# Patient Record
Sex: FEMALE | Race: WHITE | NOT HISPANIC OR LATINO | Employment: OTHER | URBAN - NONMETROPOLITAN AREA
[De-identification: names, ages, dates, MRNs, and addresses within clinical notes are randomized per-mention and may not be internally consistent; named-entity substitution may affect disease eponyms.]

---

## 2017-01-13 ENCOUNTER — OFFICE VISIT (OUTPATIENT)
Dept: OPHTHALMOLOGY | Facility: CLINIC | Age: 82
End: 2017-01-13

## 2017-01-13 DIAGNOSIS — IMO0002 NUCLEAR CATARACT, BILATERAL: ICD-10-CM

## 2017-01-13 DIAGNOSIS — E11.9 DIABETES MELLITUS WITHOUT COMPLICATION (HCC): Primary | ICD-10-CM

## 2017-01-13 PROCEDURE — 92014 COMPRE OPH EXAM EST PT 1/>: CPT | Performed by: OPHTHALMOLOGY

## 2017-01-13 RX ORDER — MULTIVIT WITH MINERALS/LUTEIN
500 TABLET ORAL
COMMUNITY
End: 2017-04-24

## 2017-01-13 RX ORDER — DOCUSATE SODIUM 100 MG/1
100 CAPSULE, LIQUID FILLED ORAL DAILY
COMMUNITY

## 2017-01-13 RX ORDER — METFORMIN HYDROCHLORIDE EXTENDED-RELEASE TABLETS 500 MG/1
TABLET, FILM COATED, EXTENDED RELEASE ORAL
COMMUNITY
Start: 2016-11-10 | End: 2017-01-13 | Stop reason: SDUPTHER

## 2017-01-13 RX ORDER — MAGNESIUM OXIDE 400 MG/1
400 TABLET ORAL
COMMUNITY
End: 2017-10-18

## 2017-01-13 RX ORDER — BENZONATATE 200 MG/1
CAPSULE ORAL
Refills: 0 | COMMUNITY
Start: 2016-12-14 | End: 2017-04-24

## 2017-01-13 RX ORDER — CEFUROXIME AXETIL 500 MG/1
TABLET ORAL
Refills: 0 | COMMUNITY
Start: 2016-12-14 | End: 2017-04-24

## 2017-01-13 RX ORDER — LANOLIN ALCOHOL/MO/W.PET/CERES
1000 CREAM (GRAM) TOPICAL
Status: ON HOLD | COMMUNITY
End: 2019-07-09

## 2017-01-13 RX ORDER — IPRATROPIUM BROMIDE 21 UG/1
SPRAY, METERED NASAL
Refills: 0 | COMMUNITY
Start: 2016-12-14 | End: 2017-01-13 | Stop reason: SDUPTHER

## 2017-01-13 RX ORDER — IPRATROPIUM BROMIDE 21 UG/1
2 SPRAY, METERED NASAL EVERY 12 HOURS
COMMUNITY
Start: 2016-12-14 | End: 2017-01-14

## 2017-01-13 NOTE — PROGRESS NOTES
Subjective   Alexia Lopez is a 81 y.o. female.   Chief Complaint   Patient presents with   • Diabetic Eye Exam     HPI     Diabetic Eye Exam   Vision is blurred for distance.  Associated symptoms include blurred vision.  Diabetes characteristics include Type 2.  Duration of years.  Blood sugar level is uncontrolled.       Last edited by Demarco Scherer MD on 1/13/2017  2:22 PM. (History)          Review of Systems    Objective   Visual Acuity (Snellen - Linear)      Right Left   Dist cc 20/40 -1 20/40   Near cc J5 J5       Correction:  Glasses         Wearing Rx      Sphere Cylinder Axis Add   Right -0.50 +1.00 008 +2.75   Left -0.75 +1.00 009 +2.75           Manifest Refraction      Sphere Cylinder Axis Dist   Right -0.75 +1.50 005 20/25   Left -0.50 +1.50 180 20/25-            Final Rx      Sphere Cylinder Axis Add   Right -0.75 +1.50 005 +2.50   Left -0.50 +1.50 180 +2.50            Confrontational Visual Fields     Visual Fields      Left Right   Result Full Full                  Extraocular Movement      Right Left   Result Full Full              Tonometry (Applanation, 2:21 PM)      Right Left   Pressure 17 17              Main Ophthalmology Exam     External Exam      Right Left    External Normal Normal      Slit Lamp Exam      Right Left    Lids/Lashes Normal Normal    Conjunctiva/Sclera White and quiet White and quiet    Cornea Clear Clear    Anterior Chamber Deep and quiet Deep and quiet    Iris Round and reactive Round and reactive    Lens 1+ Nuclear sclerosis 1+ Nuclear sclerosis    Vitreous Normal Normal      Fundus Exam      Right Left    Disc Normal Normal    Macula Normal Normal    Vessels Normal Normal    Periphery Normal Normal                Assessment/Plan   Alexia was seen today for diabetic eye exam.    Diagnoses and all orders for this visit:    Diabetes mellitus without complication    Nuclear cataract, bilateral    Eye disease risks with diabetes discussed  Glasses Rx given per  refraction  Follow up 1 year or prn

## 2017-01-13 NOTE — MR AVS SNAPSHOT
Alexia Pereskins   1/13/2017 2:00 PM   Office Visit    Dept Phone:  [phone]   Encounter #:  [id number]    Provider:  Demarco Scherer MD   Department:  [facility] OPHTHALMOLOGY                Your Full Care Plan              Today's Medication Changes          These changes are accurate as of: 1/13/17  3:12 PM.  If you have any questions, ask your nurse or doctor.               Medication(s)that have changed:     aspirin 81 MG disintegrating tablet   Take 81 mg by mouth.   What changed:  Another medication with the same name was removed. Continue taking this medication, and follow the directions you see here.   Changed by:  Demarco Scherer MD       ATROVENT 0.03 % nasal spray   Generic drug:  ipratropium   2 sprays into each nostril Every 12 (Twelve) Hours.   What changed:  Another medication with the same name was removed. Continue taking this medication, and follow the directions you see here.   Changed by:  Demarco Scherer MD       docusate sodium 100 MG capsule   Commonly known as:  COLACE   Take 100 mg by mouth.   What changed:  Another medication with the same name was removed. Continue taking this medication, and follow the directions you see here.   Changed by:  Demacro Scherer MD       metFORMIN 1000 MG tablet   Commonly known as:  GLUCOPHAGE   Take 1,000 mg by mouth daily.   What changed:  Another medication with the same name was removed. Continue taking this medication, and follow the directions you see here.   Changed by:  Gay Bower       vitamin B-12 1000 MCG tablet   Commonly known as:  CYANOCOBALAMIN   Take 1,000 mcg by mouth.   What changed:  Another medication with the same name was removed. Continue taking this medication, and follow the directions you see here.   Changed by:  Demarco Scherer MD         Stop taking medication(s)listed here:     IRON PO   Stopped by:  Demarco Scherer MD                      Your  Updated Medication List          This list is accurate as of: 1/13/17  3:12 PM.  Always use your most recent med list.                amLODIPine 5 MG tablet   Commonly known as:  NORVASC       aspirin 81 MG disintegrating tablet       ATROVENT 0.03 % nasal spray   Generic drug:  ipratropium       benzonatate 200 MG capsule   Commonly known as:  TESSALON       cefuroxime 500 MG tablet   Commonly known as:  CEFTIN       cetirizine 10 MG tablet   Commonly known as:  zyrTEC       CO ENZYME Q-10 PO       docusate sodium 100 MG capsule   Commonly known as:  COLACE       fexofenadine 60 MG tablet   Commonly known as:  ALLEGRA       labetalol 200 MG tablet   Commonly known as:  NORMODYNE       losartan 50 MG tablet   Commonly known as:  COZAAR       magnesium oxide 400 MG tablet   Commonly known as:  MAG-OX       metFORMIN 1000 MG tablet   Commonly known as:  GLUCOPHAGE       MULTI-VITAMIN/IRON PO       ONE TOUCH ULTRA TEST test strip   Generic drug:  glucose blood       polyethylene glycol-electrolytes 420 G solution   Commonly known as:  NULYTELY       * risedronate 35 MG tablet   Commonly known as:  ACTONEL       * Risedronate Sodium 35 MG tablet delayed-release       rosuvastatin 10 MG tablet   Commonly known as:  CRESTOR       vitamin B-12 1000 MCG tablet   Commonly known as:  CYANOCOBALAMIN       * vitamin C 500 MG tablet   Commonly known as:  ASCORBIC ACID       * vitamin C 250 MG tablet   Commonly known as:  ASCORBIC ACID       * Notice:  This list has 4 medication(s) that are the same as other medications prescribed for you. Read the directions carefully, and ask your doctor or other care provider to review them with you.            You Were Diagnosed With        Codes Comments    Diabetes mellitus without complication    -  Primary ICD-10-CM: E11.9  ICD-9-CM: 250.00     Nuclear cataract, bilateral     ICD-10-CM: H25.13  ICD-9-CM: 366.16       Instructions     None    Patient Instructions History      Upcoming  Appointments     Visit Type Date Time Department    OFFICE VISIT 1/13/2017  2:00 PM [facility]    OFFICE VISIT 3/21/2017 11:30 AM [facility]      MyChart Signup     Our records indicate that [facility]. If you would like to sign up for MyChart, please email [email or web address] or call [phone] to obtain an activation code.             Other Info from Your Visit           Your Appointments     Mar 21, 2017 11:30 AM CDT   Office Visit with Ember Forrest MD   [facility] CARDIOLOGY (--)    [address]  [facility] [address] [phone]           Arrive 15 minutes prior to appointment.            Jan 19, 2018  8:00 AM CST   Office Visit with Demarco Scherer MD   [facility] OPHTHALMOLOGY (--)    [address] [place] [address] [address]  [facility] [address] [place] [id number] [phone]           Arrive 15 minutes prior to appointment.              Allergies     Articaine      Cherry      Lidocaine      Lidocaine injection at dentist office caused shaking    Peanut-containing Drug Products        Reason for Visit     Diabetic Eye Exam           Vital Signs     Smoking Status                   Never Smoker           Problems and Diagnoses Noted     Diabetes mellitus without complication    Nuclear cataract

## 2017-04-24 ENCOUNTER — OFFICE VISIT (OUTPATIENT)
Dept: CARDIOLOGY | Facility: CLINIC | Age: 82
End: 2017-04-24

## 2017-04-24 VITALS
SYSTOLIC BLOOD PRESSURE: 124 MMHG | BODY MASS INDEX: 28.88 KG/M2 | WEIGHT: 184 LBS | HEART RATE: 64 BPM | HEIGHT: 67 IN | DIASTOLIC BLOOD PRESSURE: 60 MMHG

## 2017-04-24 DIAGNOSIS — Z95.1 S/P CABG (CORONARY ARTERY BYPASS GRAFT): ICD-10-CM

## 2017-04-24 DIAGNOSIS — I10 BENIGN ESSENTIAL HYPERTENSION: ICD-10-CM

## 2017-04-24 DIAGNOSIS — R00.2 PALPITATIONS: ICD-10-CM

## 2017-04-24 DIAGNOSIS — E78.00 HYPERCHOLESTEROLEMIA: ICD-10-CM

## 2017-04-24 DIAGNOSIS — I25.10 CORONARY ARTERIOSCLEROSIS IN NATIVE ARTERY: Primary | ICD-10-CM

## 2017-04-24 PROCEDURE — 99213 OFFICE O/P EST LOW 20 MIN: CPT | Performed by: INTERNAL MEDICINE

## 2017-04-24 NOTE — PROGRESS NOTES
Alexia Lopez  81 y.o. female    04/24/2017  1. Coronary arteriosclerosis in native artery    2. Benign essential hypertension    3. Hypercholesterolemia    4. S/P CABG (coronary artery bypass graft)    5. Palpitations        History of Present Illness    Mrs. Lopez is here for follow-up of her above stated problems.  She denied any chest pain but did report intermittent episodes of palpitations which seem to occur at different times.  There is no definite pattern to it and not always related to activity.  She feels a pounding in the neck with no dizziness or syncope.  No shortness of breath is reported.  She had lab work done January 2017 and LDL was within normal limits.  Her thyroid function is known to be normal.  Her blood pressure for the most part has remained stable though there are some mild fluctuations with systolic going up to 140-150 mm mercury.        SUBJECTIVE    Allergies   Allergen Reactions   • Articaine    • Cherry    • Lidocaine      Lidocaine injection at dentist office caused shaking   • Peanut-Containing Drug Products          Past Medical History:   Diagnosis Date   • Cataract    • Coronary artery disease    • Diabetes mellitus     Type 2 diabetes mellitus - without retinopathy      • History of echocardiogram 05/12/2014    Normal LV systolic function with EF of 55% with mild hypokinesis. Diastolic relaxation abnormality of left ventricle. Mild tricuspid regurg. Trace mitral regurg   • Hyperlipemia    • Hypertension    • Myocardial infarction          Past Surgical History:   Procedure Laterality Date   • BACK SURGERY     • CARDIAC CATHETERIZATION  05/12/2014    Severe multivessel CAD with critical lesions noted in the LAD coronary artery, left circumflex coronary artery and RCA. Left ventriculogram no performed in view of elevated left ventricular end-diastolic pressure   • CORONARY ARTERY BYPASS GRAFT      X 3 with LIMA to LAD, SVG to OMB and SVG to PDA.   • DILATATION AND CURETTAGE      • OTHER SURGICAL HISTORY  05/06/2014    INCISION OF EARDRUM GENERAL ANESTHETIC 68641 (1)    Bilateral myringotomy with tubes.    • TONSILLECTOMY     • TONSILLECTOMY AND ADENOIDECTOMY     • TUBAL ABDOMINAL LIGATION  03/14/1978         Family History   Problem Relation Age of Onset   • Hypertension Mother    • Coronary artery disease Father    • Diabetes Other      grandmother         Social History     Social History   • Marital status:      Spouse name: N/A   • Number of children: N/A   • Years of education: N/A     Occupational History   • Not on file.     Social History Main Topics   • Smoking status: Never Smoker   • Smokeless tobacco: Never Used   • Alcohol use No   • Drug use: No   • Sexual activity: Defer     Other Topics Concern   • Not on file     Social History Narrative         Current Outpatient Prescriptions   Medication Sig Dispense Refill   • amLODIPine (NORVASC) 5 MG tablet Take 5 mg by mouth daily.     • aspirin 81 MG disintegrating tablet Take 81 mg by mouth.     • cetirizine (ZyrTEC) 10 MG tablet Take 10 mg by mouth daily.     • CO ENZYME Q-10 PO Take 100 mg by mouth.     • docusate sodium (COLACE) 100 MG capsule Take 100 mg by mouth.     • fluticasone (VERAMYST) 27.5 MCG/SPRAY nasal spray 2 sprays into each nostril 2 (Two) Times a Day.     • labetalol (NORMODYNE) 200 MG tablet Take 100 mg by mouth 2 (two) times a day.     • losartan (COZAAR) 50 MG tablet Take 100 mg by mouth daily.     • magnesium oxide (MAG-OX) 400 MG tablet Take 400 mg by mouth.     • metFORMIN (GLUCOPHAGE) 1000 MG tablet Take 1,000 mg by mouth daily.     • Multiple Vitamins-Iron (MULTI-VITAMIN/IRON PO) Take 1 tablet by mouth daily.     • ONE TOUCH ULTRA TEST test strip USE TO TEST BLOOD SUGAR EVERY DAY DX.E11.9  5   • Risedronate Sodium 35 MG tablet delayed-release TAKE 1 TAB EVERY 7 DAYS WITH WATER ON EMPTY STOMACH, LIE DOWN FOR NEXT 30 MINUTES  3   • rosuvastatin (CRESTOR) 10 MG tablet Take 10 mg by mouth daily.    "  • vitamin B-12 (CYANOCOBALAMIN) 1000 MCG tablet Take 1,000 mcg by mouth.       No current facility-administered medications for this visit.          OBJECTIVE    /60  Pulse 64  Ht 67\" (170.2 cm)  Wt 184 lb (83.5 kg)  BMI 28.82 kg/m2        Review of Systems     Constitutional:  Denies recent weight loss, weight gain, fever or chills, no change in exercise tolerance     HENT:  Denies any hearing loss, epistaxis, hoarseness, or difficulty speaking.     Eyes: Wears eyeglasses or contact lenses     Respiratory:  Denies dyspnea with exertion,no cough, wheezing, or hemoptysis.     Cardiovascular: See history of present illness    Gastrointestinal:  Denies change in bowel habits, dyspepsia, ulcer disease, hematochezia, or melena.     Endocrine: Negative for cold intolerance, heat intolerance, polydipsia, polyphagia and polyuria. Denies any history of weight change, heat/cold intolerance, polydipsia, polyuria     Genitourinary: Negative.      Musculoskeletal: Denies any history of arthritic symptoms or back problems     Skin:  Denies any change in hair or nails, rashes, or skin lesions.     Allergic/Immunologic: Negative.  Negative for environmental allergies, food allergies and immunocompromised state.     Neurological:  Denies any history of recurrent headaches, strokes, TIA, or seizure disorder.     Hematological: Denies any food allergies, seasonal allergies, bleeding disorders, or lymphadenopathy.     Psychiatric/Behavioral: Denies any history of depression, substance abuse, or change in cognitive function.         Physical Exam     Constitutional: Cooperative, alert and oriented, well-developed, well-nourished, in no acute distress.     HENT:   Head: Normocephalic, normal hair patterns, no masses or tenderness.  Ears, Nose, and Throat: No gross abnormalities. No pallor or cyanosis. Dentition good.   Eyes: EOMS intact, PERRL, conjunctivae and lids unremarkable. Fundoscopic exam and visual fields not " performed.   Neck: No palpable masses or adenopathy, no thyromegaly, no JVD, carotid pulses are full and equal bilaterally and without  Bruits.     Cardiovascular: Regular rhythm, S1 and S2 normal, no S3 or S4. Apical impulse not displaced. No murmurs, gallops, or rubs detected.     Pulmonary/Chest: Chest: normal symmetry, no tenderness to palpation, normal respiratory excursion, no intercostal retraction, no use of accessory muscles.            Pulmonary: Normal breath sounds. No rales or ronchi.    Abdominal: Abdomen soft, bowel sounds normoactive, no masses, no hepatosplenomegaly, non-tender, no bruits.     Musculoskeletal: No deformities, clubbing, cyanosis, erythema, or edema observed. There are no spinal abnormalities noted. Normal muscle strength and tone. Pulses full and equal in all extremities, no bruits auscultated.     Neurological: No gross motor or sensory deficits noted, affect appropriate, oriented to time, person, place.     Skin: Warm and dry to the touch, no apparent skin lesions or masses noted.     Psychiatric: She has a normal mood and affect. Her behavior is normal. Judgment and thought content normal.         Procedures      Lab Results   Component Value Date    WBC 7.97 07/31/2014    HGB 12.1 07/31/2014    HCT 36.8 (L) 07/31/2014    MCV 88.0 07/31/2014     07/31/2014     Lab Results   Component Value Date    GLUCOSE 96 06/04/2014    BUN 14 06/04/2014    CREATININE 1.0 06/04/2014    CO2 26 06/04/2014    CALCIUM 9.5 06/04/2014    ALBUMIN 3.7 06/04/2014    AST 15 06/04/2014    ALT 12 06/04/2014     No results found for: CHOL  Lab Results   Component Value Date    TRIG 187 05/11/2014     Lab Results   Component Value Date    HDL 34 (L) 05/11/2014     Lab Results   Component Value Date    LDLCALC 230 (H) 05/11/2014     No results found for: LDL  No results found for: HDLLDLRATIO  No components found for: CHOLHDL  Lab Results   Component Value Date    HGBA1C 6.2 (H) 05/12/2014     Lab  Results   Component Value Date    TSH 1.74 05/12/2014           ASSESSMENT AND PLAN  Mrs. Lopez has intermittent palpitations the exact etiology of which is not clear.  Paroxysmal atrial arrhythmia is a consideration.  Her blood pressures in the normal range.  To further evaluate his symptoms Holter monitoring is being arranged and an echocardiogram to assess left ventricular and valvular function is being arranged.  Her present combination of amlodipine, labetalol, losartan for management of blood pressure, Crestor for management of hyperlipidemia and antiplatelet therapy with aspirin has been continued.  Further recommendations will follow.    Alexia was seen today for leg pain.    Diagnoses and all orders for this visit:    Coronary arteriosclerosis in native artery  -     Adult Transthoracic Echo Complete; Future  -     Holter Monitor - 24 Hour; Future    Benign essential hypertension  -     Adult Transthoracic Echo Complete; Future  -     Holter Monitor - 24 Hour; Future    Hypercholesterolemia  -     Adult Transthoracic Echo Complete; Future  -     Holter Monitor - 24 Hour; Future    S/P CABG (coronary artery bypass graft)  -     Adult Transthoracic Echo Complete; Future  -     Holter Monitor - 24 Hour; Future    Palpitations  -     Adult Transthoracic Echo Complete; Future  -     Holter Monitor - 24 Hour; Future        Ember Forrest MD  4/24/2017  1:27 PM

## 2017-05-05 ENCOUNTER — TELEPHONE (OUTPATIENT)
Dept: CARDIOLOGY | Facility: CLINIC | Age: 82
End: 2017-05-05

## 2017-10-18 ENCOUNTER — OFFICE VISIT (OUTPATIENT)
Dept: CARDIOLOGY | Facility: CLINIC | Age: 82
End: 2017-10-18

## 2017-10-18 VITALS
WEIGHT: 179 LBS | BODY MASS INDEX: 28.09 KG/M2 | HEIGHT: 67 IN | DIASTOLIC BLOOD PRESSURE: 64 MMHG | HEART RATE: 58 BPM | SYSTOLIC BLOOD PRESSURE: 156 MMHG

## 2017-10-18 DIAGNOSIS — Z95.1 S/P CABG (CORONARY ARTERY BYPASS GRAFT): ICD-10-CM

## 2017-10-18 DIAGNOSIS — E78.00 HYPERCHOLESTEROLEMIA: ICD-10-CM

## 2017-10-18 DIAGNOSIS — I10 BENIGN ESSENTIAL HYPERTENSION: Primary | ICD-10-CM

## 2017-10-18 DIAGNOSIS — I25.10 CORONARY ARTERIOSCLEROSIS IN NATIVE ARTERY: ICD-10-CM

## 2017-10-18 PROCEDURE — 99213 OFFICE O/P EST LOW 20 MIN: CPT | Performed by: INTERNAL MEDICINE

## 2017-10-18 RX ORDER — METFORMIN HYDROCHLORIDE 500 MG/1
1000 TABLET, EXTENDED RELEASE ORAL NIGHTLY
COMMUNITY
End: 2018-01-30 | Stop reason: DRUGHIGH

## 2017-10-18 RX ORDER — LOSARTAN POTASSIUM 100 MG/1
100 TABLET ORAL DAILY
COMMUNITY

## 2017-10-18 RX ORDER — MULTIVITAMIN WITH IRON
250 TABLET ORAL DAILY
COMMUNITY
End: 2019-08-15

## 2017-10-18 RX ORDER — RANITIDINE 150 MG/1
150 TABLET ORAL 2 TIMES DAILY
COMMUNITY
End: 2018-06-04

## 2017-10-18 NOTE — PROGRESS NOTES
Alexia Lopez  82 y.o. female    10/18/2017  1. Benign essential hypertension    2. Coronary arteriosclerosis in native artery    3. Hypercholesterolemia    4. S/P CABG (coronary artery bypass graft)        History of Present Illness    Mrs. Lopez is here for follow-up of above stated problems.  She denied any chest pain or shortness of breath and her predominant complaint relates to sinus problems and nasal allergies which has been distressing for her.  Clinical exam did not reveal any bronchospasm or signs of congestive heart failure.  She has been compliant with her medications.        SUBJECTIVE    Allergies   Allergen Reactions   • Articaine    • Cherry    • Lidocaine      Lidocaine injection at dentist office caused shaking   • Peanut-Containing Drug Products          Past Medical History:   Diagnosis Date   • Cataract    • Coronary artery disease    • Diabetes mellitus     Type 2 diabetes mellitus - without retinopathy      • History of echocardiogram 05/12/2014    Normal LV systolic function with EF of 55% with mild hypokinesis. Diastolic relaxation abnormality of left ventricle. Mild tricuspid regurg. Trace mitral regurg   • Hyperlipemia    • Hypertension    • Myocardial infarction          Past Surgical History:   Procedure Laterality Date   • BACK SURGERY     • CARDIAC CATHETERIZATION  05/12/2014    Severe multivessel CAD with critical lesions noted in the LAD coronary artery, left circumflex coronary artery and RCA. Left ventriculogram no performed in view of elevated left ventricular end-diastolic pressure   • CORONARY ARTERY BYPASS GRAFT      X 3 with LIMA to LAD, SVG to OMB and SVG to PDA.   • DILATATION AND CURETTAGE     • OTHER SURGICAL HISTORY  05/06/2014    INCISION OF EARDRUM GENERAL ANESTHETIC 47600 (1)    Bilateral myringotomy with tubes.    • TONSILLECTOMY     • TONSILLECTOMY AND ADENOIDECTOMY     • TUBAL ABDOMINAL LIGATION  03/14/1978         Family History   Problem Relation Age of  "Onset   • Hypertension Mother    • Coronary artery disease Father    • Diabetes Other      grandmother         Social History     Social History   • Marital status:      Spouse name: N/A   • Number of children: N/A   • Years of education: N/A     Occupational History   • Not on file.     Social History Main Topics   • Smoking status: Never Smoker   • Smokeless tobacco: Never Used   • Alcohol use No   • Drug use: No   • Sexual activity: Defer     Other Topics Concern   • Not on file     Social History Narrative         Current Outpatient Prescriptions   Medication Sig Dispense Refill   • amLODIPine (NORVASC) 5 MG tablet Take 5 mg by mouth daily.     • aspirin 81 MG disintegrating tablet Take 81 mg by mouth.     • cetirizine (ZyrTEC) 10 MG tablet Take 10 mg by mouth daily.     • CO ENZYME Q-10 PO Take 100 mg by mouth.     • docusate sodium (COLACE) 100 MG capsule Take 100 mg by mouth.     • labetalol (NORMODYNE) 200 MG tablet Take 100 mg by mouth 2 (two) times a day.     • losartan (COZAAR) 100 MG tablet Take 100 mg by mouth Daily.     • Magnesium 250 MG tablet Take 250 mg by mouth Daily.     • metFORMIN ER (GLUCOPHAGE-XR) 500 MG 24 hr tablet Take 1,000 mg by mouth Every Night.     • Multiple Vitamins-Iron (MULTI-VITAMIN/IRON PO) Take 1 tablet by mouth daily.     • ONE TOUCH ULTRA TEST test strip USE TO TEST BLOOD SUGAR EVERY DAY DX.E11.9  5   • raNITIdine (ZANTAC) 150 MG tablet Take 150 mg by mouth 2 (Two) Times a Day.     • Risedronate Sodium 35 MG tablet delayed-release TAKE 1 TAB EVERY 7 DAYS WITH WATER ON EMPTY STOMACH, LIE DOWN FOR NEXT 30 MINUTES  3   • rosuvastatin (CRESTOR) 10 MG tablet Take 10 mg by mouth daily.     • vitamin B-12 (CYANOCOBALAMIN) 1000 MCG tablet Take 1,000 mcg by mouth.       No current facility-administered medications for this visit.          OBJECTIVE    /64  Pulse 58  Ht 67\" (170.2 cm)  Wt 179 lb (81.2 kg)  BMI 28.04 kg/m2        Review of Systems     Constitutional:  " Denies recent weight loss, weight gain, fever or chills, no change in exercise tolerance     HENT:  Denies any hearing loss, epistaxis, hoarseness, or difficulty speaking. Sinusitis/ nasal allergies     Eyes: Wears eyeglasses or contact lenses     Respiratory:  Denies dyspnea with exertion,no cough, wheezing, or hemoptysis.     Cardiovascular: Negative for palpations, chest pain, orthopnea, PND, peripheral edema, syncope, or claudication.     Gastrointestinal:  Denies change in bowel habits, dyspepsia, ulcer disease, hematochezia, or melena.     Endocrine: Negative for cold intolerance, heat intolerance, polydipsia, polyphagia and polyuria.     Genitourinary: Negative.      Musculoskeletal: Denies any history of arthritic symptoms or back problems     Skin:  Denies any change in hair or nails, rashes, or skin lesions.     Allergic/Immunologic: Negative.  Negative for environmental allergies, food allergies and immunocompromised state.     Neurological:  Denies any history of recurrent headaches, strokes, TIA, or seizure disorder.     Hematological: Denies any food allergies, seasonal allergies, bleeding disorders, or lymphadenopathy.     Psychiatric/Behavioral: Denies any history of depression, substance abuse, or change in cognitive function.         Physical Exam     Constitutional: Cooperative, alert and oriented, in no acute distress.     HENT:   Head: Normocephalic, normal hair patterns, no masses or tenderness.  Ears, Nose, and Throat: No gross abnormalities. No pallor or cyanosis.  Eyes: EOMS intact, PERRL, conjunctivae and lids unremarkable. Fundoscopic exam and visual fields not performed.   Neck: No palpable masses or adenopathy, no thyromegaly, no JVD, carotid pulses are full and equal bilaterally and without  Bruits.     Cardiovascular: Regular rhythm, S1 and S2 normal, no S3 or S4. No murmurs, gallops, or rubs detected.     Pulmonary/Chest: Chest: normal symmetry, no tenderness to palpation, normal  respiratory excursion, no intercostal retraction, no use of accessory muscles.            Pulmonary: Normal breath sounds. No rales or ronchi.    Abdominal: Abdomen soft, bowel sounds normoactive, no masses, no hepatosplenomegaly, non-tender, no bruits.     Musculoskeletal: No deformities, clubbing, cyanosis, erythema, or edema observed.      Neurological: No gross motor or sensory deficits noted, affect appropriate, oriented to time, person, place.     Skin: Warm and dry to the touch, no apparent skin lesions or masses noted.     Psychiatric: She has a normal mood and affect. Her behavior is normal. Judgment and thought content normal.         Procedures      Lab Results   Component Value Date    WBC 7.97 07/31/2014    HGB 12.1 07/31/2014    HCT 36.8 (L) 07/31/2014    MCV 88.0 07/31/2014     07/31/2014     Lab Results   Component Value Date    GLUCOSE 96 06/04/2014    BUN 14 06/04/2014    CREATININE 1.0 06/04/2014    CO2 26 06/04/2014    CALCIUM 9.5 06/04/2014    ALBUMIN 3.7 06/04/2014    AST 15 06/04/2014    ALT 12 06/04/2014     No results found for: CHOL  Lab Results   Component Value Date    TRIG 187 05/11/2014     Lab Results   Component Value Date    HDL 34 (L) 05/11/2014     Lab Results   Component Value Date    LDLCALC 230 (H) 05/11/2014     No results found for: LDL  No results found for: HDLLDLRATIO  No components found for: CHOLHDL  Lab Results   Component Value Date    HGBA1C 6.2 (H) 05/12/2014     Lab Results   Component Value Date    TSH 1.74 05/12/2014           ASSESSMENT AND PLAN  Mrs. Lopez is stable with no evidence of progression of coronary artery disease and antiplatelet therapy with aspirin, antihypertensive therapy with amlodipine, labetalol, losartan and lipid-lowering therapy with Crestor have been continued.  She will be seeing Dr. Rodriguez soon, and she follows up with ENT surgeons in [place].    Alexia was seen today for follow-up.    Diagnoses and all orders for this  visit:    Benign essential hypertension    Coronary arteriosclerosis in native artery    Hypercholesterolemia    S/P CABG (coronary artery bypass graft)        Ember Forrest MD  10/18/2017  12:09 PM

## 2018-01-30 RX ORDER — METFORMIN HYDROCHLORIDE 750 MG/1
1500 TABLET, EXTENDED RELEASE ORAL NIGHTLY
COMMUNITY

## 2018-02-01 ENCOUNTER — ANESTHESIA EVENT (OUTPATIENT)
Dept: PERIOP | Facility: HOSPITAL | Age: 83
End: 2018-02-01

## 2018-02-02 ENCOUNTER — ANESTHESIA (OUTPATIENT)
Dept: PERIOP | Facility: HOSPITAL | Age: 83
End: 2018-02-02

## 2018-02-02 ENCOUNTER — HOSPITAL ENCOUNTER (OUTPATIENT)
Facility: HOSPITAL | Age: 83
Setting detail: HOSPITAL OUTPATIENT SURGERY
Discharge: HOME | End: 2018-02-02
Attending: OPHTHALMOLOGY | Admitting: OPHTHALMOLOGY

## 2018-02-02 VITALS
BODY MASS INDEX: 27.51 KG/M2 | RESPIRATION RATE: 18 BRPM | TEMPERATURE: 97.8 F | WEIGHT: 175.27 LBS | SYSTOLIC BLOOD PRESSURE: 179 MMHG | HEART RATE: 60 BPM | HEIGHT: 67 IN | DIASTOLIC BLOOD PRESSURE: 83 MMHG | OXYGEN SATURATION: 98 %

## 2018-02-02 LAB — GLUCOSE BLDC GLUCOMTR-MCNC: 130 MG/DL (ref 70–130)

## 2018-02-02 PROCEDURE — 25010000002 FENTANYL CITRATE (PF) 100 MCG/2ML SOLUTION: Performed by: NURSE ANESTHETIST, CERTIFIED REGISTERED

## 2018-02-02 PROCEDURE — 25010000002 EPINEPHRINE PER 0.1 MG: Performed by: OPHTHALMOLOGY

## 2018-02-02 PROCEDURE — V2632 POST CHMBR INTRAOCULAR LENS: HCPCS | Performed by: OPHTHALMOLOGY

## 2018-02-02 PROCEDURE — 25010000002 VANCOMYCIN PER 500 MG: Performed by: OPHTHALMOLOGY

## 2018-02-02 PROCEDURE — 82962 GLUCOSE BLOOD TEST: CPT

## 2018-02-02 DEVICE — LENS ACRYSOF IQ 6X13MM SN60WF 19.0: Type: IMPLANTABLE DEVICE | Site: EYE | Status: FUNCTIONAL

## 2018-02-02 RX ORDER — PHENYLEPHRINE HCL 2.5 %
1 DROPS OPHTHALMIC (EYE)
Status: COMPLETED | OUTPATIENT
Start: 2018-02-02 | End: 2018-02-02

## 2018-02-02 RX ORDER — PROMETHAZINE HYDROCHLORIDE 25 MG/ML
12.5 INJECTION, SOLUTION INTRAMUSCULAR; INTRAVENOUS ONCE AS NEEDED
Status: CANCELLED | OUTPATIENT
Start: 2018-02-02

## 2018-02-02 RX ORDER — SODIUM CHLORIDE 0.9 % (FLUSH) 0.9 %
10 SYRINGE (ML) INJECTION ONCE
Status: COMPLETED | OUTPATIENT
Start: 2018-02-02 | End: 2018-02-02

## 2018-02-02 RX ORDER — MOXIFLOXACIN 5 MG/ML
SOLUTION/ DROPS OPHTHALMIC AS NEEDED
Status: DISCONTINUED | OUTPATIENT
Start: 2018-02-02 | End: 2018-02-02 | Stop reason: HOSPADM

## 2018-02-02 RX ORDER — TETRACAINE HYDROCHLORIDE 5 MG/ML
SOLUTION OPHTHALMIC AS NEEDED
Status: DISCONTINUED | OUTPATIENT
Start: 2018-02-02 | End: 2018-02-02 | Stop reason: HOSPADM

## 2018-02-02 RX ORDER — ONDANSETRON 2 MG/ML
4 INJECTION INTRAMUSCULAR; INTRAVENOUS ONCE AS NEEDED
Status: CANCELLED | OUTPATIENT
Start: 2018-02-02

## 2018-02-02 RX ORDER — TETRACAINE HYDROCHLORIDE 5 MG/ML
1 SOLUTION OPHTHALMIC AS NEEDED
Status: COMPLETED | OUTPATIENT
Start: 2018-02-02 | End: 2018-02-02

## 2018-02-02 RX ORDER — PROMETHAZINE HYDROCHLORIDE 25 MG/1
25 SUPPOSITORY RECTAL ONCE AS NEEDED
Status: CANCELLED | OUTPATIENT
Start: 2018-02-02

## 2018-02-02 RX ORDER — FENTANYL CITRATE 50 UG/ML
INJECTION, SOLUTION INTRAMUSCULAR; INTRAVENOUS AS NEEDED
Status: DISCONTINUED | OUTPATIENT
Start: 2018-02-02 | End: 2018-02-02 | Stop reason: SURG

## 2018-02-02 RX ORDER — PREDNISOLONE ACETATE 10 MG/ML
SUSPENSION/ DROPS OPHTHALMIC AS NEEDED
Status: DISCONTINUED | OUTPATIENT
Start: 2018-02-02 | End: 2018-02-02 | Stop reason: HOSPADM

## 2018-02-02 RX ORDER — CYCLOPENTOLATE HYDROCHLORIDE 10 MG/ML
1 SOLUTION/ DROPS OPHTHALMIC
Status: COMPLETED | OUTPATIENT
Start: 2018-02-02 | End: 2018-02-02

## 2018-02-02 RX ORDER — PROMETHAZINE HYDROCHLORIDE 12.5 MG/1
25 TABLET ORAL ONCE AS NEEDED
Status: CANCELLED | OUTPATIENT
Start: 2018-02-02

## 2018-02-02 RX ORDER — BRIMONIDINE TARTRATE 0.15 %
DROPS OPHTHALMIC (EYE) AS NEEDED
Status: DISCONTINUED | OUTPATIENT
Start: 2018-02-02 | End: 2018-02-02 | Stop reason: HOSPADM

## 2018-02-02 RX ADMIN — PHENYLEPHRINE HYDROCHLORIDE 1 DROP: 25 SOLUTION/ DROPS OPHTHALMIC at 08:31

## 2018-02-02 RX ADMIN — FENTANYL CITRATE 50 MCG: 50 INJECTION, SOLUTION INTRAMUSCULAR; INTRAVENOUS at 10:51

## 2018-02-02 RX ADMIN — PHENYLEPHRINE HYDROCHLORIDE 1 DROP: 25 SOLUTION/ DROPS OPHTHALMIC at 08:21

## 2018-02-02 RX ADMIN — TETRACAINE HYDROCHLORIDE 1 DROP: 5 SOLUTION OPHTHALMIC at 08:31

## 2018-02-02 RX ADMIN — CYCLOPENTOLATE HYDROCHLORIDE 1 DROP: 10 SOLUTION/ DROPS OPHTHALMIC at 08:11

## 2018-02-02 RX ADMIN — SODIUM CHLORIDE, PRESERVATIVE FREE 10 ML: 5 INJECTION INTRAVENOUS at 08:18

## 2018-02-02 RX ADMIN — PHENYLEPHRINE HYDROCHLORIDE 1 DROP: 25 SOLUTION/ DROPS OPHTHALMIC at 08:11

## 2018-02-02 RX ADMIN — CYCLOPENTOLATE HYDROCHLORIDE 1 DROP: 10 SOLUTION/ DROPS OPHTHALMIC at 08:31

## 2018-02-02 RX ADMIN — TETRACAINE HYDROCHLORIDE 1 DROP: 5 SOLUTION OPHTHALMIC at 08:11

## 2018-02-02 RX ADMIN — CYCLOPENTOLATE HYDROCHLORIDE 1 DROP: 10 SOLUTION/ DROPS OPHTHALMIC at 08:21

## 2018-02-02 NOTE — ANESTHESIA PREPROCEDURE EVALUATION
Anesthesia Evaluation     no history of anesthetic complications:  NPO Solid Status: > 8 hours  NPO Liquid Status: > 2 hours     Airway   Mallampati: II  TM distance: >3 FB  Neck ROM: full  no difficulty expected  Dental - normal exam     Pulmonary - normal exam    breath sounds clear to auscultation  (-) not a smoker    ROS comment: Sinus congestion    Cardiovascular   Exercise tolerance: poor (<4 METS)    Patient on routine beta blocker and Beta blocker given within 24 hours of surgery  Rhythm: regular  Rate: normal    (+) hypertension, past MI  >12 months, CAD, CABG > 6 Months, hyperlipidemia  (-) angina, murmur    ROS comment: EF 55%    Neuro/Psych  (+) headaches,     GI/Hepatic/Renal/Endo    (+)  GERD well controlled, diabetes mellitus type 2,     Musculoskeletal     Abdominal  - normal exam   Substance History - negative use     OB/GYN negative ob/gyn ROS         Other   (+) arthritis (knees)     (-) history of cancer                                          Anesthesia Plan    ASA 3     MAC     intravenous induction   Anesthetic plan and risks discussed with patient and spouse/significant other.

## 2018-02-02 NOTE — ANESTHESIA POSTPROCEDURE EVALUATION
Patient: Alexia Lopez    Procedure Summary     Date Anesthesia Start Anesthesia Stop Room / Location    02/02/18 1051 1116  [facility] / [facility]       Procedure Diagnosis Surgeon Provider    CATARACT PHACO EXTRACTION WITH INTRAOCULAR LENS IMPLANT (Left Eye) Age-related nuclear cataract of left eye  (Age-related nuclear cataract of left eye [H25.12]) MD Nelson Head MD          Anesthesia Type: MAC  Last vitals  BP   147/70 (02/02/18 0810)   Temp   96.9 °F (36.1 °C) (02/02/18 0810)   Pulse   56 (02/02/18 0810)   Resp   18 (02/02/18 0810)     SpO2   99 % (02/02/18 0810)     Post Anesthesia Care and Evaluation    Patient location during evaluation: bedside  Patient participation: complete - patient cannot participate  Level of consciousness: awake  Pain score: 0  Pain management: adequate  Airway patency: patent  Anesthetic complications: No anesthetic complications  PONV Status: none  Cardiovascular status: acceptable  Respiratory status: acceptable  Hydration status: acceptable

## 2018-02-08 ENCOUNTER — ANESTHESIA EVENT (OUTPATIENT)
Dept: PERIOP | Facility: HOSPITAL | Age: 83
End: 2018-02-08

## 2018-02-09 ENCOUNTER — ANESTHESIA (OUTPATIENT)
Dept: PERIOP | Facility: HOSPITAL | Age: 83
End: 2018-02-09

## 2018-02-09 ENCOUNTER — HOSPITAL ENCOUNTER (OUTPATIENT)
Facility: HOSPITAL | Age: 83
Setting detail: HOSPITAL OUTPATIENT SURGERY
Discharge: HOME | End: 2018-02-09
Attending: OPHTHALMOLOGY | Admitting: OPHTHALMOLOGY

## 2018-02-09 VITALS
OXYGEN SATURATION: 98 % | TEMPERATURE: 96.7 F | WEIGHT: 171.3 LBS | DIASTOLIC BLOOD PRESSURE: 66 MMHG | HEIGHT: 67 IN | RESPIRATION RATE: 18 BRPM | BODY MASS INDEX: 26.89 KG/M2 | SYSTOLIC BLOOD PRESSURE: 148 MMHG | HEART RATE: 54 BPM

## 2018-02-09 LAB — GLUCOSE BLDC GLUCOMTR-MCNC: 125 MG/DL (ref 70–130)

## 2018-02-09 PROCEDURE — V2632 POST CHMBR INTRAOCULAR LENS: HCPCS | Performed by: OPHTHALMOLOGY

## 2018-02-09 PROCEDURE — 25010000002 MIDAZOLAM PER 1 MG: Performed by: NURSE ANESTHETIST, CERTIFIED REGISTERED

## 2018-02-09 PROCEDURE — 25010000002 VANCOMYCIN PER 500 MG: Performed by: OPHTHALMOLOGY

## 2018-02-09 PROCEDURE — 25010000002 FENTANYL CITRATE (PF) 100 MCG/2ML SOLUTION: Performed by: NURSE ANESTHETIST, CERTIFIED REGISTERED

## 2018-02-09 PROCEDURE — 82962 GLUCOSE BLOOD TEST: CPT

## 2018-02-09 PROCEDURE — 25010000002 EPINEPHRINE PER 0.1 MG: Performed by: OPHTHALMOLOGY

## 2018-02-09 DEVICE — LENS ACRYSOF IQ 6X13MM SN60WF 19.0: Type: IMPLANTABLE DEVICE | Site: EYE | Status: FUNCTIONAL

## 2018-02-09 RX ORDER — CYCLOPENTOLATE HYDROCHLORIDE 10 MG/ML
1 SOLUTION/ DROPS OPHTHALMIC
Status: COMPLETED | OUTPATIENT
Start: 2018-02-09 | End: 2018-02-09

## 2018-02-09 RX ORDER — BRIMONIDINE TARTRATE 0.15 %
DROPS OPHTHALMIC (EYE) AS NEEDED
Status: DISCONTINUED | OUTPATIENT
Start: 2018-02-09 | End: 2018-02-09 | Stop reason: HOSPADM

## 2018-02-09 RX ORDER — SODIUM CHLORIDE 0.9 % (FLUSH) 0.9 %
10 SYRINGE (ML) INJECTION AS NEEDED
Status: DISCONTINUED | OUTPATIENT
Start: 2018-02-09 | End: 2018-02-09 | Stop reason: HOSPADM

## 2018-02-09 RX ORDER — TETRACAINE HYDROCHLORIDE 5 MG/ML
SOLUTION OPHTHALMIC AS NEEDED
Status: DISCONTINUED | OUTPATIENT
Start: 2018-02-09 | End: 2018-02-09 | Stop reason: HOSPADM

## 2018-02-09 RX ORDER — TETRACAINE HYDROCHLORIDE 5 MG/ML
1 SOLUTION OPHTHALMIC AS NEEDED
Status: COMPLETED | OUTPATIENT
Start: 2018-02-09 | End: 2018-02-09

## 2018-02-09 RX ORDER — MOXIFLOXACIN 5 MG/ML
SOLUTION/ DROPS OPHTHALMIC AS NEEDED
Status: DISCONTINUED | OUTPATIENT
Start: 2018-02-09 | End: 2018-02-09 | Stop reason: HOSPADM

## 2018-02-09 RX ORDER — ONDANSETRON 2 MG/ML
4 INJECTION INTRAMUSCULAR; INTRAVENOUS ONCE AS NEEDED
Status: CANCELLED | OUTPATIENT
Start: 2018-02-09

## 2018-02-09 RX ORDER — FENTANYL CITRATE 50 UG/ML
INJECTION, SOLUTION INTRAMUSCULAR; INTRAVENOUS AS NEEDED
Status: DISCONTINUED | OUTPATIENT
Start: 2018-02-09 | End: 2018-02-09 | Stop reason: SURG

## 2018-02-09 RX ORDER — PHENYLEPHRINE HCL 2.5 %
1 DROPS OPHTHALMIC (EYE)
Status: COMPLETED | OUTPATIENT
Start: 2018-02-09 | End: 2018-02-09

## 2018-02-09 RX ORDER — PREDNISOLONE ACETATE 10 MG/ML
SUSPENSION/ DROPS OPHTHALMIC AS NEEDED
Status: DISCONTINUED | OUTPATIENT
Start: 2018-02-09 | End: 2018-02-09 | Stop reason: HOSPADM

## 2018-02-09 RX ORDER — MIDAZOLAM HYDROCHLORIDE 1 MG/ML
INJECTION INTRAMUSCULAR; INTRAVENOUS AS NEEDED
Status: DISCONTINUED | OUTPATIENT
Start: 2018-02-09 | End: 2018-02-09 | Stop reason: SURG

## 2018-02-09 RX ADMIN — MIDAZOLAM 1 MG: 1 INJECTION INTRAMUSCULAR; INTRAVENOUS at 10:44

## 2018-02-09 RX ADMIN — FENTANYL CITRATE 25 MCG: 50 INJECTION, SOLUTION INTRAMUSCULAR; INTRAVENOUS at 10:49

## 2018-02-09 RX ADMIN — PHENYLEPHRINE HYDROCHLORIDE 1 DROP: 25 SOLUTION/ DROPS OPHTHALMIC at 09:22

## 2018-02-09 RX ADMIN — CYCLOPENTOLATE HYDROCHLORIDE 1 DROP: 10 SOLUTION/ DROPS OPHTHALMIC at 09:22

## 2018-02-09 RX ADMIN — FENTANYL CITRATE 25 MCG: 50 INJECTION, SOLUTION INTRAMUSCULAR; INTRAVENOUS at 10:51

## 2018-02-09 RX ADMIN — PHENYLEPHRINE HYDROCHLORIDE 1 DROP: 25 SOLUTION/ DROPS OPHTHALMIC at 09:12

## 2018-02-09 RX ADMIN — PHENYLEPHRINE HYDROCHLORIDE 1 DROP: 25 SOLUTION/ DROPS OPHTHALMIC at 09:32

## 2018-02-09 RX ADMIN — TETRACAINE HYDROCHLORIDE 1 DROP: 5 SOLUTION OPHTHALMIC at 09:32

## 2018-02-09 RX ADMIN — SODIUM CHLORIDE, PRESERVATIVE FREE 10 ML: 5 INJECTION INTRAVENOUS at 09:21

## 2018-02-09 RX ADMIN — CYCLOPENTOLATE HYDROCHLORIDE 1 DROP: 10 SOLUTION/ DROPS OPHTHALMIC at 09:32

## 2018-02-09 RX ADMIN — TETRACAINE HYDROCHLORIDE 1 DROP: 5 SOLUTION OPHTHALMIC at 09:12

## 2018-02-09 RX ADMIN — CYCLOPENTOLATE HYDROCHLORIDE 1 DROP: 10 SOLUTION/ DROPS OPHTHALMIC at 09:12

## 2018-02-09 NOTE — ANESTHESIA POSTPROCEDURE EVALUATION
Patient: Alexia Lopez    Procedure Summary     Date Anesthesia Start Anesthesia Stop Room / Location    02/09/18 1046 1106  [facility] / [facility]       Procedure Diagnosis Surgeon Provider    CATARACT PHACO EXTRACTION WITH INTRAOCULAR LENS IMPLANT (Right Eye) Age-related nuclear cataract of right eye  (Age-related nuclear cataract of right eye [H25.11]) MD Shabbir Head MD          Anesthesia Type: MAC  Last vitals  BP   157/69 (02/09/18 0916)   Temp   97.5 °F (36.4 °C) (02/09/18 0916)   Pulse   64 (02/09/18 0916)   Resp   18 (02/09/18 0916)     SpO2   98 % (02/09/18 0916)     Post Anesthesia Care and Evaluation    Patient location during evaluation: bedside  Patient participation: complete - patient participated  Level of consciousness: responsive to verbal stimuli and awake and alert  Pain management: adequate  Airway patency: patent  Anesthetic complications: No anesthetic complications    Cardiovascular status: acceptable  Respiratory status: acceptable  Hydration status: acceptable

## 2018-02-09 NOTE — ANESTHESIA PREPROCEDURE EVALUATION
Anesthesia Evaluation     no history of anesthetic complications:  NPO Solid Status: > 8 hours  NPO Liquid Status: > 2 hours           Airway   Mallampati: II  TM distance: >3 FB  Neck ROM: full  Dental - normal exam     Pulmonary - normal exam    breath sounds clear to auscultation  (-) not a smoker    ROS comment: Sinus congestion    Cardiovascular   Exercise tolerance: poor (<4 METS)    Patient on routine beta blocker and Beta blocker given within 24 hours of surgery  Rhythm: regular  Rate: normal    (+) hypertension, past MI  >12 months, CAD, CABG > 6 Months, hyperlipidemia  (-) angina, murmur    ROS comment: EF 55%    Neuro/Psych  (+) headaches,     GI/Hepatic/Renal/Endo    (+)  GERD well controlled, diabetes mellitus type 2,     Musculoskeletal     Abdominal  - normal exam   Substance History - negative use     OB/GYN negative ob/gyn ROS         Other   (+) arthritis (knees)     (-) history of cancer                  Anesthesia Plan    ASA 3     MAC     intravenous induction   Anesthetic plan and risks discussed with patient.

## 2018-02-09 NOTE — PLAN OF CARE
Problem: Patient Care Overview (Adult)  Goal: Plan of Care Review  Outcome: Ongoing (interventions implemented as appropriate)   02/09/18 0923   Coping/Psychosocial Response Interventions   Plan Of Care Reviewed With patient   Patient Care Overview   Progress no change     Goal: Adult Individualization and Mutuality  Outcome: Ongoing (interventions implemented as appropriate)   02/09/18 0923   Mutuality/Individual Preferences   What Information Would Help Us Give You More Personalized Care? Patient likes to be called Alexia     Goal: Discharge Needs Assessment  Outcome: Ongoing (interventions implemented as appropriate)   02/09/18 0923   Discharge Needs Assessment   Concerns To Be Addressed no discharge needs identified   Living Environment   Transportation Available family or friend will provide       Problem: Perioperative Period (Adult)  Goal: Signs and Symptoms of Listed Potential Problems Will be Absent or Manageable (Perioperative Period)  Outcome: Ongoing (interventions implemented as appropriate)   02/09/18 0923   Perioperative Period   Problems Assessed (Perioperative Period) all   Problems Present (Perioperative Period) none

## 2018-04-24 ENCOUNTER — OFFICE VISIT (OUTPATIENT)
Dept: CARDIOLOGY | Facility: CLINIC | Age: 83
End: 2018-04-24

## 2018-04-24 VITALS
HEIGHT: 67 IN | HEART RATE: 69 BPM | DIASTOLIC BLOOD PRESSURE: 60 MMHG | OXYGEN SATURATION: 100 % | WEIGHT: 177.56 LBS | SYSTOLIC BLOOD PRESSURE: 122 MMHG | BODY MASS INDEX: 27.87 KG/M2

## 2018-04-24 DIAGNOSIS — I10 ESSENTIAL HYPERTENSION: ICD-10-CM

## 2018-04-24 DIAGNOSIS — Z95.1 S/P CABG (CORONARY ARTERY BYPASS GRAFT): ICD-10-CM

## 2018-04-24 DIAGNOSIS — E78.2 MIXED HYPERLIPIDEMIA: ICD-10-CM

## 2018-04-24 DIAGNOSIS — I10 BENIGN ESSENTIAL HYPERTENSION: ICD-10-CM

## 2018-04-24 DIAGNOSIS — I25.10 CORONARY ARTERIOSCLEROSIS IN NATIVE ARTERY: Primary | ICD-10-CM

## 2018-04-24 PROCEDURE — 93000 ELECTROCARDIOGRAM COMPLETE: CPT | Performed by: INTERNAL MEDICINE

## 2018-04-24 PROCEDURE — 99214 OFFICE O/P EST MOD 30 MIN: CPT | Performed by: INTERNAL MEDICINE

## 2018-04-24 RX ORDER — IPRATROPIUM BROMIDE 21 UG/1
SPRAY, METERED NASAL
Refills: 5 | COMMUNITY
Start: 2018-03-14 | End: 2019-08-15

## 2018-04-24 NOTE — PROGRESS NOTES
Alexia Lopez  82 y.o. female    04/24/2018  1. Coronary arteriosclerosis in native artery    2. Benign essential hypertension    3. Mixed hyperlipidemia    4. Essential hypertension    5. S/P CABG (coronary artery bypass graft)        History of Present Illness    Mrs. Lopez is here for follow-up of her above stated problems.  She denied any chest pain or shortness of breath but does have some degree of fatigue.  She indicated that her blood glucoses elevated and the monitorings between 130 and 150 mg and this may be contributing to her fatigue.  She will be seeing Dr. Michael chavez.  Blood pressure was in the normal range.  EKG showed sinus rhythm with heart rate of 54 bpm with no ST-T wave changes diagnostic of ischemia.  No signs of congestive heart failure was noted.        SUBJECTIVE    Allergies   Allergen Reactions   • Cherry Anaphylaxis   • Peanut-Containing Drug Products Anaphylaxis   • Articaine Other (See Comments)     Had at dentist office causes shaking   • Lidocaine Other (See Comments)     Lidocaine injection at dentist office caused shaking         Past Medical History:   Diagnosis Date   • Cataract    • Coronary artery disease    • Diabetes mellitus     Type 2 diabetes mellitus - without retinopathy      • History of echocardiogram 05/12/2014    Normal LV systolic function with EF of 55% with mild hypokinesis. Diastolic relaxation abnormality of left ventricle. Mild tricuspid regurg. Trace mitral regurg   • Hyperlipemia    • Hypertension    • Myocardial infarction          Past Surgical History:   Procedure Laterality Date   • BACK SURGERY     • CARDIAC CATHETERIZATION  05/12/2014    Severe multivessel CAD with critical lesions noted in the LAD coronary artery, left circumflex coronary artery and RCA. Left ventriculogram no performed in view of elevated left ventricular end-diastolic pressure   • CATARACT EXTRACTION W/ INTRAOCULAR LENS IMPLANT Left 2/2/2018    Procedure: CATARACT PHACO  EXTRACTION WITH INTRAOCULAR LENS IMPLANT;  Surgeon: Gagan Lizarraga MD;  Location: [facility] OR;  Service:    • CATARACT EXTRACTION W/ INTRAOCULAR LENS IMPLANT Right 2/9/2018    Procedure: CATARACT PHACO EXTRACTION WITH INTRAOCULAR LENS IMPLANT;  Surgeon: Gagan Lizarraga MD;  Location: [facility];  Service:    • CORONARY ARTERY BYPASS GRAFT      X 3 with LIMA to LAD, SVG to OMB and SVG to PDA.   • DILATATION AND CURETTAGE     • OTHER SURGICAL HISTORY  05/06/2014    INCISION OF EARDRUM GENERAL ANESTHETIC 57657 (1)    Bilateral myringotomy with tubes.    • TONSILLECTOMY     • TONSILLECTOMY AND ADENOIDECTOMY     • TUBAL ABDOMINAL LIGATION  03/14/1978         Family History   Problem Relation Age of Onset   • Hypertension Mother    • Coronary artery disease Father    • Diabetes Other      grandmother         Social History     Social History   • Marital status:      Spouse name: N/A   • Number of children: N/A   • Years of education: N/A     Occupational History   • Not on file.     Social History Main Topics   • Smoking status: Never Smoker   • Smokeless tobacco: Never Used   • Alcohol use No   • Drug use: No   • Sexual activity: Defer     Other Topics Concern   • Not on file     Social History Narrative   • No narrative on file         Current Outpatient Prescriptions   Medication Sig Dispense Refill   • amLODIPine (NORVASC) 5 MG tablet Take 5 mg by mouth daily.     • aspirin 81 MG disintegrating tablet Take 81 mg by mouth.     • budesonide (RINOCORT AQUA) 32 MCG/ACT nasal spray 2 sprays into each nostril Daily.     • CO ENZYME Q-10 PO Take 100 mg by mouth.     • docusate sodium (COLACE) 100 MG capsule Take 100 mg by mouth Daily.     • ipratropium (ATROVENT) 0.03 % nasal spray ADMINISTER 2 SPRAYS TO EACH NOSTRIL 3 TIMES DAILY AS NEEDED  5   • labetalol (NORMODYNE) 200 MG tablet Take 100 mg by mouth 2 (two) times a day.     • losartan (COZAAR) 100 MG tablet Take 100 mg by mouth Daily.     • Magnesium  "250 MG tablet Take 250 mg by mouth Daily.     • metFORMIN ER (GLUCOPHAGE-XR) 750 MG 24 hr tablet Take 1,500 mg by mouth Every Night.     • ONE TOUCH ULTRA TEST test strip USE TO TEST BLOOD SUGAR EVERY DAY DX.E11.9  5   • raNITIdine (ZANTAC) 150 MG tablet Take 150 mg by mouth 2 (Two) Times a Day.     • rosuvastatin (CRESTOR) 10 MG tablet Take 10 mg by mouth Every Night.     • vitamin B-12 (CYANOCOBALAMIN) 1000 MCG tablet Take 1,000 mcg by mouth.       No current facility-administered medications for this visit.          OBJECTIVE    /60 (BP Location: Left arm, Patient Position: Sitting, Cuff Size: Adult)   Pulse 69   Ht 170.2 cm (67\")   Wt 80.5 kg (177 lb 9 oz)   LMP  (LMP Unknown)   SpO2 100%   Breastfeeding? No   BMI 27.81 kg/m²         Review of Systems     Constitutional:  Denies recent weight loss, weight gain, fever or chills.  Has fatigue     HENT:  Denies any hearing loss, epistaxis, hoarseness, or difficulty speaking.     Eyes: Wears eyeglasses or contact lenses     Respiratory:  Denies dyspnea with exertion,no cough, wheezing, or hemoptysis.     Cardiovascular: Negative for palpations, chest pain, orthopnea, PND, peripheral edema, syncope, or claudication.     Gastrointestinal:  Denies change in bowel habits, dyspepsia, ulcer disease, hematochezia, or melena.     Endocrine: Negative for cold intolerance, heat intolerance, polydipsia, polyphagia and polyuria.     Genitourinary: Negative.      Musculoskeletal: Denies any history of arthritic symptoms or back problems     Skin:  Denies any change in hair or nails, rashes, or skin lesions.     Allergic/Immunologic: Negative.  Negative for environmental allergies, food allergies and immunocompromised state.     Neurological:  Denies any history of recurrent headaches, strokes, TIA, or seizure disorder.     Hematological: Denies any food allergies, seasonal allergies, bleeding disorders, or lymphadenopathy.     Psychiatric/Behavioral: Denies any " history of depression, substance abuse, or change in cognitive function.         Physical Exam     Constitutional: Cooperative, alert and oriented, well-developed, well-nourished, in no acute distress.     HENT:   Head: Normocephalic, normal hair patterns, no masses or tenderness.  Ears, Nose, and Throat: No gross abnormalities. No pallor or cyanosis.   Eyes: EOMS intact, PERRL, conjunctivae and lids unremarkable. Fundoscopic exam and visual fields not performed.   Neck: No palpable masses or adenopathy, no thyromegaly, no JVD, carotid pulses are full and equal bilaterally and without  Bruits.     Cardiovascular: Regular rhythm, S1 and S2 normal, no S3 or S4.  No murmurs, gallops, or rubs detected.     Pulmonary/Chest: Chest: normal symmetry, no tenderness to palpation, normal respiratory excursion, no intercostal retraction, no use of accessory muscles.            Pulmonary: Normal breath sounds. No rales or ronchi.    Abdominal: Abdomen soft, bowel sounds normoactive, no masses, no hepatosplenomegaly, non-tender, no bruits.     Musculoskeletal: No deformities, clubbing, cyanosis, erythema, or edema observed. There are no spinal abnormalities noted. Normal muscle strength and tone.     Neurological: No gross motor or sensory deficits noted, affect appropriate, oriented to time, person, place.     Skin: Warm and dry to the touch, no apparent skin lesions or masses noted.     Psychiatric: She has a normal mood and affect. Her behavior is normal. Judgment and thought content normal.           ECG 12 Lead  Date/Time: 4/24/2018 9:26 AM  Performed by: SHREYAS CROOKS  Authorized by: SHREYAS CROOKS   Comparison: not compared with previous ECG   Rhythm: sinus rhythm and sinus bradycardia  Rate: normal  QRS axis: normal  Comments: Heart rate of 54 bpm.  No ST-T wave changes diagnostic of ischemia.              Lab Results   Component Value Date    WBC 7.97 07/31/2014    HGB 12.1 07/31/2014    HCT 36.8  (L) 07/31/2014    MCV 88.0 07/31/2014     07/31/2014     Lab Results   Component Value Date    GLUCOSE 96 06/04/2014    BUN 14 06/04/2014    CREATININE 1.0 06/04/2014    CO2 26 06/04/2014    CALCIUM 9.5 06/04/2014    ALBUMIN 3.7 06/04/2014    AST 15 06/04/2014    ALT 12 06/04/2014     No results found for: CHOL  Lab Results   Component Value Date    TRIG 187 05/11/2014     Lab Results   Component Value Date    HDL 34 (L) 05/11/2014     No components found for: LDLCALC  Lab Results   Component Value Date     (H) 05/11/2014     No results found for: HDLLDLRATIO  No components found for: CHOLHDL  Lab Results   Component Value Date    HGBA1C 6.2 (H) 05/12/2014     Lab Results   Component Value Date    TSH 1.74 05/12/2014           ASSESSMENT AND PLAN  Mrs. Lopez is stable with no definite evidence of angina, arrhythmia or congestive heart failure.  Present antiplatelet therapy with aspirin, antihypertensive therapy with amlodipine, labetalol, losartan and lipid-lowering therapy with Crestor have been continued.  She admits to not exercising much on a regular basis and I strongly encouraged her to do so.  This could improve her fatigue.  She will be seen again in 6 months.    Alexia was seen today for hypertension and coronary artery disease.    Diagnoses and all orders for this visit:    Coronary arteriosclerosis in native artery    Benign essential hypertension    Mixed hyperlipidemia    Essential hypertension    S/P CABG (coronary artery bypass graft)        Ember Forrest MD  4/24/2018  9:23 AM

## 2018-05-08 DIAGNOSIS — M25.561 RIGHT KNEE PAIN, UNSPECIFIED CHRONICITY: Primary | ICD-10-CM

## 2018-05-09 ENCOUNTER — OFFICE VISIT (OUTPATIENT)
Dept: ORTHOPEDIC SURGERY | Facility: CLINIC | Age: 83
End: 2018-05-09

## 2018-05-09 VITALS — HEIGHT: 67 IN | WEIGHT: 173 LBS | BODY MASS INDEX: 27.15 KG/M2

## 2018-05-09 DIAGNOSIS — M25.561 RIGHT KNEE PAIN, UNSPECIFIED CHRONICITY: ICD-10-CM

## 2018-05-09 DIAGNOSIS — M17.0 PRIMARY OSTEOARTHRITIS OF BOTH KNEES: Primary | ICD-10-CM

## 2018-05-09 PROCEDURE — 99214 OFFICE O/P EST MOD 30 MIN: CPT | Performed by: NURSE PRACTITIONER

## 2018-05-09 NOTE — PROGRESS NOTES
Alexia Lopez is a 82 y.o. female   Primary provider:  Munir Rodriguez MD       Chief Complaint   Patient presents with   • Right Knee - Pain       HISTORY OF PRESENT ILLNESS: Patient being seen today for right knee pain. X-rays done today. Pain increases during ambulation.     Pain   This is a new problem. The current episode started in the past 7 days. The problem occurs constantly. Associated symptoms include coughing, joint swelling and numbness. Associated symptoms comments: Crushing, grinding. The symptoms are aggravated by standing and walking. She has tried acetaminophen, NSAIDs, ice, heat and rest for the symptoms. The treatment provided mild relief.        CONCURRENT MEDICAL HISTORY:    Past Medical History:   Diagnosis Date   • Cataract    • Coronary artery disease    • Diabetes mellitus     Type 2 diabetes mellitus - without retinopathy      • History of echocardiogram 05/12/2014    Normal LV systolic function with EF of 55% with mild hypokinesis. Diastolic relaxation abnormality of left ventricle. Mild tricuspid regurg. Trace mitral regurg   • Hyperlipemia    • Hypertension    • Myocardial infarction        Allergies   Allergen Reactions   • Cherry Anaphylaxis   • Peanut-Containing Drug Products Anaphylaxis   • Articaine Other (See Comments)     Had at dentist office causes shaking   • Lidocaine Other (See Comments)     Lidocaine injection at dentist office caused shaking         Current Outpatient Prescriptions:   •  amLODIPine (NORVASC) 5 MG tablet, Take 5 mg by mouth daily., Disp: , Rfl:   •  aspirin 81 MG disintegrating tablet, Take 81 mg by mouth., Disp: , Rfl:   •  budesonide (RINOCORT AQUA) 32 MCG/ACT nasal spray, 2 sprays into each nostril Daily., Disp: , Rfl:   •  CO ENZYME Q-10 PO, Take 100 mg by mouth., Disp: , Rfl:   •  docusate sodium (COLACE) 100 MG capsule, Take 100 mg by mouth Daily., Disp: , Rfl:   •  ipratropium (ATROVENT) 0.03 % nasal spray, ADMINISTER 2 SPRAYS TO EACH NOSTRIL 3  TIMES DAILY AS NEEDED, Disp: , Rfl: 5  •  labetalol (NORMODYNE) 200 MG tablet, Take 100 mg by mouth 2 (two) times a day., Disp: , Rfl:   •  losartan (COZAAR) 100 MG tablet, Take 100 mg by mouth Daily., Disp: , Rfl:   •  Magnesium 250 MG tablet, Take 250 mg by mouth Daily., Disp: , Rfl:   •  metFORMIN ER (GLUCOPHAGE-XR) 750 MG 24 hr tablet, Take 1,500 mg by mouth Every Night., Disp: , Rfl:   •  ONE TOUCH ULTRA TEST test strip, USE TO TEST BLOOD SUGAR EVERY DAY DX.E11.9, Disp: , Rfl: 5  •  raNITIdine (ZANTAC) 150 MG tablet, Take 150 mg by mouth 2 (Two) Times a Day., Disp: , Rfl:   •  rosuvastatin (CRESTOR) 10 MG tablet, Take 10 mg by mouth Every Night., Disp: , Rfl:   •  vitamin B-12 (CYANOCOBALAMIN) 1000 MCG tablet, Take 1,000 mcg by mouth., Disp: , Rfl:     Past Surgical History:   Procedure Laterality Date   • BACK SURGERY     • CARDIAC CATHETERIZATION  05/12/2014    Severe multivessel CAD with critical lesions noted in the LAD coronary artery, left circumflex coronary artery and RCA. Left ventriculogram no performed in view of elevated left ventricular end-diastolic pressure   • CATARACT EXTRACTION W/ INTRAOCULAR LENS IMPLANT Left 2/2/2018    Procedure: CATARACT PHACO EXTRACTION WITH INTRAOCULAR LENS IMPLANT;  Surgeon: Gagan Lizarraga MD;  Location: [facility];  Service:    • CATARACT EXTRACTION W/ INTRAOCULAR LENS IMPLANT Right 2/9/2018    Procedure: CATARACT PHACO EXTRACTION WITH INTRAOCULAR LENS IMPLANT;  Surgeon: Gagan Lizarraga MD;  Location: [facility];  Service:    • CORONARY ARTERY BYPASS GRAFT      X 3 with LIMA to LAD, SVG to OMB and SVG to PDA.   • DILATATION AND CURETTAGE     • OTHER SURGICAL HISTORY  05/06/2014    INCISION OF EARDRUM GENERAL ANESTHETIC 06510 (1)    Bilateral myringotomy with tubes.    • TONSILLECTOMY     • TONSILLECTOMY AND ADENOIDECTOMY     • TUBAL ABDOMINAL LIGATION  03/14/1978       Family History   Problem Relation Age of Onset   • Hypertension Mother    • Coronary  "artery disease Father    • Diabetes Other      grandmother   • Cancer Other        Social History     Social History   • Marital status:      Spouse name: N/A   • Number of children: N/A   • Years of education: N/A     Occupational History   • Not on file.     Social History Main Topics   • Smoking status: Never Smoker   • Smokeless tobacco: Never Used   • Alcohol use No   • Drug use: No   • Sexual activity: Defer     Other Topics Concern   • Not on file     Social History Narrative   • No narrative on file        Review of Systems   HENT: Positive for hearing loss and tinnitus.    Respiratory: Positive for cough.    Musculoskeletal: Positive for joint swelling.        Muscle pain   Neurological: Positive for numbness.   All other systems reviewed and are negative.      PHYSICAL EXAMINATION:       Ht 170.2 cm (67\")   Wt 78.5 kg (173 lb)   LMP  (LMP Unknown)   BMI 27.10 kg/m²     Physical Exam   Constitutional: She is oriented to person, place, and time. Vital signs are normal. She appears well-developed and well-nourished. She is cooperative.   HENT:   Head: Normocephalic and atraumatic.   Neck: Trachea normal and phonation normal.   Pulmonary/Chest: Effort normal. No respiratory distress.   Abdominal: Soft. Normal appearance. She exhibits no distension.   Musculoskeletal:        Right knee: She exhibits no effusion.        Left knee: She exhibits no effusion.   Neurological: She is alert and oriented to person, place, and time. GCS eye subscore is 4. GCS verbal subscore is 5. GCS motor subscore is 6.   Skin: Skin is warm, dry and intact.   Psychiatric: She has a normal mood and affect. Her speech is normal and behavior is normal. Judgment and thought content normal. Cognition and memory are normal.   Vitals reviewed.      GAIT:     []  Normal  [x]  Antalgic    Assistive device: [x]  None  []  Walker     []  Crutches  []  Cane     []  Wheelchair  []  Stretcher    Right Knee Exam     Tenderness   The " patient is experiencing tenderness in the medial joint line and lateral joint line.    Range of Motion   Extension: normal   Flexion: abnormal     Other   Erythema: absent  Scars: absent  Sensation: normal  Pulse: present  Swelling: mild  Other tests: no effusion present      Left Knee Exam     Tenderness   The patient is experiencing tenderness in the lateral joint line and medial joint line.    Range of Motion   Extension: normal   Flexion: abnormal     Other   Erythema: absent  Scars: absent  Sensation: normal  Pulse: present  Swelling: mild  Effusion: no effusion present                  No results found.        ASSESSMENT:    Diagnoses and all orders for this visit:    Primary osteoarthritis of both knees    Right knee pain, unspecified chronicity          PLAN  Reviewed xrays with the patient and discussed tx options with the patient in detail.   Will proceed with viscosupplmentation and modified weight baring.   No Follow-up on file.    Praveen Rivas, APRN

## 2018-06-04 ENCOUNTER — CLINICAL SUPPORT (OUTPATIENT)
Dept: ORTHOPEDIC SURGERY | Facility: CLINIC | Age: 83
End: 2018-06-04

## 2018-06-04 VITALS — HEIGHT: 67 IN | BODY MASS INDEX: 27.31 KG/M2 | WEIGHT: 174 LBS

## 2018-06-04 DIAGNOSIS — M17.0 PRIMARY OSTEOARTHRITIS OF BOTH KNEES: Primary | ICD-10-CM

## 2018-06-04 PROCEDURE — 20610 DRAIN/INJ JOINT/BURSA W/O US: CPT | Performed by: NURSE PRACTITIONER

## 2018-06-04 RX ORDER — HYALURONATE SODIUM 10 MG/ML
20 SYRINGE (ML) INTRAARTICULAR
Status: DISCONTINUED | OUTPATIENT
Start: 2018-06-04 | End: 2018-08-23

## 2018-06-04 NOTE — PROGRESS NOTES
Alexia Lopez is a 83 y.o. female returns for     Chief Complaint   Patient presents with   • Left Knee - Follow-up, Injections   • Right Knee - Injections, Follow-up       HISTORY OF PRESENT ILLNESS: Patient being seen for bilateral knee follow up. Patient would like Hyalgan injection in bilateral knees.        CONCURRENT MEDICAL HISTORY:    Past Medical History:   Diagnosis Date   • Cataract    • Coronary artery disease    • Diabetes mellitus (CMS/HCC)     Type 2 diabetes mellitus - without retinopathy      • History of echocardiogram 05/12/2014    Normal LV systolic function with EF of 55% with mild hypokinesis. Diastolic relaxation abnormality of left ventricle. Mild tricuspid regurg. Trace mitral regurg   • Hyperlipemia    • Hypertension    • Myocardial infarction        Allergies   Allergen Reactions   • Cherry Anaphylaxis   • Peanut-Containing Drug Products Anaphylaxis   • Articaine Other (See Comments)     Had at dentist office causes shaking   • Lidocaine Other (See Comments)     Lidocaine injection at dentist office caused shaking         Current Outpatient Prescriptions:   •  amLODIPine (NORVASC) 5 MG tablet, Take 5 mg by mouth daily., Disp: , Rfl:   •  aspirin 81 MG disintegrating tablet, Take 81 mg by mouth., Disp: , Rfl:   •  budesonide (RINOCORT AQUA) 32 MCG/ACT nasal spray, 2 sprays into each nostril Daily., Disp: , Rfl:   •  CO ENZYME Q-10 PO, Take 100 mg by mouth., Disp: , Rfl:   •  docusate sodium (COLACE) 100 MG capsule, Take 100 mg by mouth Daily., Disp: , Rfl:   •  ipratropium (ATROVENT) 0.03 % nasal spray, ADMINISTER 2 SPRAYS TO EACH NOSTRIL 3 TIMES DAILY AS NEEDED, Disp: , Rfl: 5  •  labetalol (NORMODYNE) 200 MG tablet, Take 100 mg by mouth 2 (two) times a day., Disp: , Rfl:   •  losartan (COZAAR) 100 MG tablet, Take 100 mg by mouth Daily., Disp: , Rfl:   •  Magnesium 250 MG tablet, Take 250 mg by mouth Daily., Disp: , Rfl:   •  metFORMIN ER (GLUCOPHAGE-XR) 750 MG 24 hr tablet, Take 1,500  "mg by mouth Every Night., Disp: , Rfl:   •  ONE TOUCH ULTRA TEST test strip, USE TO TEST BLOOD SUGAR EVERY DAY DX.E11.9, Disp: , Rfl: 5  •  rosuvastatin (CRESTOR) 10 MG tablet, Take 10 mg by mouth Every Night., Disp: , Rfl:   •  vitamin B-12 (CYANOCOBALAMIN) 1000 MCG tablet, Take 1,000 mcg by mouth., Disp: , Rfl:   No current facility-administered medications for this visit.     Past Surgical History:   Procedure Laterality Date   • BACK SURGERY     • CARDIAC CATHETERIZATION  05/12/2014    Severe multivessel CAD with critical lesions noted in the LAD coronary artery, left circumflex coronary artery and RCA. Left ventriculogram no performed in view of elevated left ventricular end-diastolic pressure   • CATARACT EXTRACTION W/ INTRAOCULAR LENS IMPLANT Left 2/2/2018    Procedure: CATARACT PHACO EXTRACTION WITH INTRAOCULAR LENS IMPLANT;  Surgeon: Gagan Lizarraga MD;  Location: [facility];  Service:    • CATARACT EXTRACTION W/ INTRAOCULAR LENS IMPLANT Right 2/9/2018    Procedure: CATARACT PHACO EXTRACTION WITH INTRAOCULAR LENS IMPLANT;  Surgeon: Gagan Lizarraga MD;  Location: [facility];  Service:    • CORONARY ARTERY BYPASS GRAFT      X 3 with LIMA to LAD, SVG to OMB and SVG to PDA.   • DILATATION AND CURETTAGE     • OTHER SURGICAL HISTORY  05/06/2014    INCISION OF EARDRUM GENERAL ANESTHETIC 34980 (1)    Bilateral myringotomy with tubes.    • TONSILLECTOMY     • TONSILLECTOMY AND ADENOIDECTOMY     • TUBAL ABDOMINAL LIGATION  03/14/1978       ROS  No fevers or chills.  No chest pain or shortness of air.  No GI or  disturbances.    PHYSICAL EXAMINATION:       Ht 170.2 cm (67\")   Wt 78.9 kg (174 lb)   LMP  (LMP Unknown)   BMI 27.25 kg/m²     Physical Exam    GAIT:     []  Normal  []  Antalgic    Assistive device: [x]  None  []  Walker     []  Crutches  []  Cane     []  Wheelchair  []  Stretcher    Ortho Exam      No results found.          ASSESSMENT:    Diagnoses and all orders for this visit:    Primary " osteoarthritis of both knees  -     Large Joint Arthrocentesis  -     Large Joint Arthrocentesis  -     Discontinue: Sodium Hyaluronate (EUFLEXXA) injection 20 mg; Inject 20 mg into the appropriate joint as directed by provider Once.  -     Discontinue: Sodium Hyaluronate (EUFLEXXA) injection 20 mg; Inject 20 mg into the appropriate joint as directed by provider Once.  -     sodium hyaluronate (SUPARTZ) injection 25 mg; Inject 25 mg into the appropriate joint as directed by provider Once.  -     sodium hyaluronate (SUPARTZ) injection 25 mg; Inject 25 mg into the appropriate joint as directed by provider Once.    Large Joint Arthrocentesis  Date/Time: 6/4/2018 9:05 AM  Consent given by: patient  Site marked: site marked  Timeout: Immediately prior to procedure a time out was called to verify the correct patient, procedure, equipment, support staff and site/side marked as required   Supporting Documentation  Indications: pain   Procedure Details  Location: knee - R knee  Preparation: Patient was prepped and draped in the usual sterile fashion  Needle size: 22 G  Approach: anteromedial  Medications administered: 25 mg sodium hyaluronate 25 MG/2.5ML      Large Joint Arthrocentesis  Date/Time: 6/4/2018 9:08 AM  Consent given by: patient  Site marked: site marked  Timeout: Immediately prior to procedure a time out was called to verify the correct patient, procedure, equipment, support staff and site/side marked as required   Supporting Documentation  Indications: pain   Procedure Details  Location: knee - L knee  Preparation: Patient was prepped and draped in the usual sterile fashion  Needle size: 22 G  Approach: anteromedial  Medications administered: 25 mg sodium hyaluronate 25 MG/2.5ML                PLAN  Injected both knees today and recommended progression range of motion and activity as tolerated based on pain follow-up next week for repeat injections.  No Follow-up on file.    Praveen Rivas, APRN

## 2018-06-11 ENCOUNTER — CLINICAL SUPPORT (OUTPATIENT)
Dept: ORTHOPEDIC SURGERY | Facility: CLINIC | Age: 83
End: 2018-06-11

## 2018-06-11 DIAGNOSIS — M17.0 PRIMARY OSTEOARTHRITIS OF BOTH KNEES: Primary | ICD-10-CM

## 2018-06-11 DIAGNOSIS — M25.561 RIGHT KNEE PAIN, UNSPECIFIED CHRONICITY: ICD-10-CM

## 2018-06-11 PROCEDURE — 20610 DRAIN/INJ JOINT/BURSA W/O US: CPT | Performed by: NURSE PRACTITIONER

## 2018-06-11 RX ORDER — HYALURONATE SODIUM 10 MG/ML
20 SYRINGE (ML) INTRAARTICULAR
Status: DISCONTINUED | OUTPATIENT
Start: 2018-06-11 | End: 2018-08-23

## 2018-06-11 NOTE — PROGRESS NOTES
Knee Joint Injection      Patient: Alexia Lopez    YOB: 1935    Chief Complaint   Patient presents with   • Right Knee - Follow-up, Pain   • Left Knee - Follow-up, Pain   • Injections     hyalgan injection #2 bilateral knees.        History of Present Illness:  Patient is here for an injection.  No new complaints.    Physical Exam: 83 y.o. female  General Appearance:    Alert, cooperative, in no acute distress                   There were no vitals filed for this visit.     Patient is alert and oriented, ×3 no acute distress.  Affect is normal respiratory rate is normal unlabored.  Exam and complaints are unchanged.    Procedure:  Large Joint Arthrocentesis  Date/Time: 6/11/2018 8:54 AM  Consent given by: patient  Site marked: site marked  Timeout: Immediately prior to procedure a time out was called to verify the correct patient, procedure, equipment, support staff and site/side marked as required   Supporting Documentation  Indications: pain   Procedure Details  Location: knee - R knee  Preparation: Patient was prepped and draped in the usual sterile fashion  Needle size: 22 G  Approach: anteromedial  Medications administered: 25 mg sodium hyaluronate 25 MG/2.5ML  Patient tolerance: patient tolerated the procedure well with no immediate complications    Large Joint Arthrocentesis  Date/Time: 6/11/2018 8:57 AM  Consent given by: patient  Site marked: site marked  Timeout: Immediately prior to procedure a time out was called to verify the correct patient, procedure, equipment, support staff and site/side marked as required   Supporting Documentation  Indications: pain   Procedure Details  Location: knee - L knee  Preparation: Patient was prepped and draped in the usual sterile fashion  Needle size: 20 G  Approach: anteromedial  Medications administered: 25 mg sodium hyaluronate 25 MG/2.5ML  Patient tolerance: patient tolerated the procedure well with no immediate  complications          Assessment:    Diagnoses and all orders for this visit:    Primary osteoarthritis of both knees  -     Large Joint Arthrocentesis  -     Large Joint Arthrocentesis  -     Sodium Hyaluronate (EUFLEXXA) injection 20 mg; Inject 20 mg into the appropriate joint as directed by provider Once.  -     Sodium Hyaluronate (EUFLEXXA) injection 20 mg; Inject 20 mg into the appropriate joint as directed by provider Once.    Right knee pain, unspecified chronicity  -     Large Joint Arthrocentesis  -     Large Joint Arthrocentesis  -     Sodium Hyaluronate (EUFLEXXA) injection 20 mg; Inject 20 mg into the appropriate joint as directed by provider Once.  -     Sodium Hyaluronate (EUFLEXXA) injection 20 mg; Inject 20 mg into the appropriate joint as directed by provider Once.        Plan:   Slowly increase activity as tolerated.  Discussed importance of leg strengthening and general conditioning.  Discussed warning signs of injection.  Discussed that purpose of injections was symptom improvement and improved activity.  Also discussed that further treatment options depended on symptoms at followup and length of time of improvement after injections.    No Follow-up on file.    Praveen Rivas, APRN

## 2018-06-18 ENCOUNTER — CLINICAL SUPPORT (OUTPATIENT)
Dept: ORTHOPEDIC SURGERY | Facility: CLINIC | Age: 83
End: 2018-06-18

## 2018-06-18 VITALS — HEIGHT: 67 IN | BODY MASS INDEX: 27.64 KG/M2 | WEIGHT: 176.1 LBS

## 2018-06-18 DIAGNOSIS — M17.0 PRIMARY OSTEOARTHRITIS OF BOTH KNEES: Primary | ICD-10-CM

## 2018-06-18 PROCEDURE — 20610 DRAIN/INJ JOINT/BURSA W/O US: CPT | Performed by: NURSE PRACTITIONER

## 2018-06-18 RX ORDER — HYALURONATE SODIUM 10 MG/ML
20 SYRINGE (ML) INTRAARTICULAR
Status: DISCONTINUED | OUTPATIENT
Start: 2018-06-18 | End: 2018-08-23

## 2018-06-18 NOTE — PROGRESS NOTES
Alexia Lopez is a 83 y.o. female returns for     Chief Complaint   Patient presents with   • Left Knee - Follow-up, Injections   • Right Knee - Follow-up, Injections       HISTORY OF PRESENT ILLNESS: Patient being seen for bilateral knee follow up. Patient would like Hyalgan #3 injections today.        CONCURRENT MEDICAL HISTORY:    Past Medical History:   Diagnosis Date   • Cataract    • Coronary artery disease    • Diabetes mellitus (CMS/HCC)     Type 2 diabetes mellitus - without retinopathy      • History of echocardiogram 05/12/2014    Normal LV systolic function with EF of 55% with mild hypokinesis. Diastolic relaxation abnormality of left ventricle. Mild tricuspid regurg. Trace mitral regurg   • Hyperlipemia    • Hypertension    • Myocardial infarction        Allergies   Allergen Reactions   • Cherry Anaphylaxis   • Peanut-Containing Drug Products Anaphylaxis   • Articaine Other (See Comments)     Had at dentist office causes shaking   • Lidocaine Other (See Comments)     Lidocaine injection at dentist office caused shaking         Current Outpatient Prescriptions:   •  amLODIPine (NORVASC) 5 MG tablet, Take 5 mg by mouth daily., Disp: , Rfl:   •  aspirin 81 MG disintegrating tablet, Take 81 mg by mouth., Disp: , Rfl:   •  budesonide (RINOCORT AQUA) 32 MCG/ACT nasal spray, 2 sprays into each nostril Daily., Disp: , Rfl:   •  CO ENZYME Q-10 PO, Take 100 mg by mouth., Disp: , Rfl:   •  docusate sodium (COLACE) 100 MG capsule, Take 100 mg by mouth Daily., Disp: , Rfl:   •  ipratropium (ATROVENT) 0.03 % nasal spray, ADMINISTER 2 SPRAYS TO EACH NOSTRIL 3 TIMES DAILY AS NEEDED, Disp: , Rfl: 5  •  labetalol (NORMODYNE) 200 MG tablet, Take 100 mg by mouth 2 (two) times a day., Disp: , Rfl:   •  losartan (COZAAR) 100 MG tablet, Take 100 mg by mouth Daily., Disp: , Rfl:   •  Magnesium 250 MG tablet, Take 250 mg by mouth Daily., Disp: , Rfl:   •  metFORMIN ER (GLUCOPHAGE-XR) 750 MG 24 hr tablet, Take 1,500 mg by  "mouth Every Night., Disp: , Rfl:   •  ONE TOUCH ULTRA TEST test strip, USE TO TEST BLOOD SUGAR EVERY DAY DX.E11.9, Disp: , Rfl: 5  •  rosuvastatin (CRESTOR) 10 MG tablet, Take 10 mg by mouth Every Night., Disp: , Rfl:   •  vitamin B-12 (CYANOCOBALAMIN) 1000 MCG tablet, Take 1,000 mcg by mouth., Disp: , Rfl:   No current facility-administered medications for this visit.     Past Surgical History:   Procedure Laterality Date   • BACK SURGERY     • CARDIAC CATHETERIZATION  05/12/2014    Severe multivessel CAD with critical lesions noted in the LAD coronary artery, left circumflex coronary artery and RCA. Left ventriculogram no performed in view of elevated left ventricular end-diastolic pressure   • CATARACT EXTRACTION W/ INTRAOCULAR LENS IMPLANT Left 2/2/2018    Procedure: CATARACT PHACO EXTRACTION WITH INTRAOCULAR LENS IMPLANT;  Surgeon: Gagan Lizarraga MD;  Location: [facility];  Service:    • CATARACT EXTRACTION W/ INTRAOCULAR LENS IMPLANT Right 2/9/2018    Procedure: CATARACT PHACO EXTRACTION WITH INTRAOCULAR LENS IMPLANT;  Surgeon: Gagan Lizarraga MD;  Location: [facility];  Service:    • CORONARY ARTERY BYPASS GRAFT      X 3 with LIMA to LAD, SVG to OMB and SVG to PDA.   • DILATATION AND CURETTAGE     • OTHER SURGICAL HISTORY  05/06/2014    INCISION OF EARDRUM GENERAL ANESTHETIC 06511 (1)    Bilateral myringotomy with tubes.    • TONSILLECTOMY     • TONSILLECTOMY AND ADENOIDECTOMY     • TUBAL ABDOMINAL LIGATION  03/14/1978       ROS  No fevers or chills.  No chest pain or shortness of air.  No GI or  disturbances.    PHYSICAL EXAMINATION:       Ht 170.2 cm (67\")   Wt 79.9 kg (176 lb 1.6 oz)   LMP  (LMP Unknown)   BMI 27.58 kg/m²     Physical Exam    GAIT:     []  Normal  []  Antalgic    Assistive device: [x]  None  []  Walker     []  Crutches  []  Cane     []  Wheelchair  []  Stretcher    Ortho Exam        XR Knee 1 or 2 View Right and XR Knee Bilateral AP Standing    Standing AP of both knees " with lateral views of the right knee only reveals severe end-stage tricompartmental degenerative changes of the right knee and severe end-stage medial compartmental degenerative changes of the left knee seen only on the AP standing view.  There are no acute radiological abnormalities noted and no comparison views on file.  05/10/18 at 10:26 AM by PEGGY Martin        ASSESSMENT:    Diagnoses and all orders for this visit:    Primary osteoarthritis of both knees  -     Large Joint Arthrocentesis  -     Large Joint Arthrocentesis  -     Discontinue: Sodium Hyaluronate (EUFLEXXA) injection 20 mg; Inject 20 mg into the appropriate joint as directed by provider Once.  -     Discontinue: Sodium Hyaluronate (EUFLEXXA) injection 20 mg; Inject 20 mg into the appropriate joint as directed by provider Once.  -     sodium hyaluronate (SUPARTZ) injection 25 mg; Inject 25 mg into the appropriate joint as directed by provider Once.  -     sodium hyaluronate (SUPARTZ) injection 25 mg; Inject 25 mg into the appropriate joint as directed by provider Once.    Large Joint Arthrocentesis  Date/Time: 6/18/2018 9:02 AM  Consent given by: patient  Site marked: site marked  Timeout: Immediately prior to procedure a time out was called to verify the correct patient, procedure, equipment, support staff and site/side marked as required   Supporting Documentation  Indications: pain   Procedure Details  Location: knee - R knee  Preparation: Patient was prepped and draped in the usual sterile fashion  Needle size: 22 G  Approach: anteromedial  Medications administered: 25 mg sodium hyaluronate 25 MG/2.5ML      Large Joint Arthrocentesis  Date/Time: 6/18/2018 9:02 AM  Consent given by: patient  Site marked: site marked  Timeout: Immediately prior to procedure a time out was called to verify the correct patient, procedure, equipment, support staff and site/side marked as required   Supporting Documentation  Indications: pain   Procedure  Details  Location: knee - L knee  Preparation: Patient was prepped and draped in the usual sterile fashion  Needle size: 22 G  Approach: anteromedial  Medications administered: 25 mg sodium hyaluronate 25 MG/2.5ML                PLAN  Injected both knees today and recommended progression of ROM and activity as tolerated based on pain   No Follow-up on file.    Praveen Rivas, APRN

## 2018-06-25 ENCOUNTER — CLINICAL SUPPORT (OUTPATIENT)
Dept: ORTHOPEDIC SURGERY | Facility: CLINIC | Age: 83
End: 2018-06-25

## 2018-06-25 VITALS — BODY MASS INDEX: 27.78 KG/M2 | HEIGHT: 67 IN | WEIGHT: 177 LBS

## 2018-06-25 DIAGNOSIS — M17.0 PRIMARY OSTEOARTHRITIS OF BOTH KNEES: Primary | ICD-10-CM

## 2018-06-25 PROCEDURE — 20610 DRAIN/INJ JOINT/BURSA W/O US: CPT | Performed by: NURSE PRACTITIONER

## 2018-06-25 RX ORDER — HYALURONATE SODIUM 10 MG/ML
20 SYRINGE (ML) INTRAARTICULAR
Status: DISCONTINUED | OUTPATIENT
Start: 2018-06-25 | End: 2018-08-23

## 2018-06-25 NOTE — PROGRESS NOTES
Alexia Lopez is a 83 y.o. female returns for     Chief Complaint   Patient presents with   • Left Knee - Follow-up, Injections   • Right Knee - Follow-up, Injections       HISTORY OF PRESENT ILLNESS: Patient being seen for bilateral knee follow up. Patient would like Hyalgan #4 injections today.        CONCURRENT MEDICAL HISTORY:    Past Medical History:   Diagnosis Date   • Cataract    • Coronary artery disease    • Diabetes mellitus (CMS/HCC)     Type 2 diabetes mellitus - without retinopathy      • History of echocardiogram 05/12/2014    Normal LV systolic function with EF of 55% with mild hypokinesis. Diastolic relaxation abnormality of left ventricle. Mild tricuspid regurg. Trace mitral regurg   • Hyperlipemia    • Hypertension    • Myocardial infarction        Allergies   Allergen Reactions   • Cherry Anaphylaxis   • Peanut-Containing Drug Products Anaphylaxis   • Articaine Other (See Comments)     Had at dentist office causes shaking   • Lidocaine Other (See Comments)     Lidocaine injection at dentist office caused shaking         Current Outpatient Prescriptions:   •  amLODIPine (NORVASC) 5 MG tablet, Take 5 mg by mouth daily., Disp: , Rfl:   •  aspirin 81 MG disintegrating tablet, Take 81 mg by mouth., Disp: , Rfl:   •  budesonide (RINOCORT AQUA) 32 MCG/ACT nasal spray, 2 sprays into each nostril Daily., Disp: , Rfl:   •  CO ENZYME Q-10 PO, Take 100 mg by mouth., Disp: , Rfl:   •  docusate sodium (COLACE) 100 MG capsule, Take 100 mg by mouth Daily., Disp: , Rfl:   •  ipratropium (ATROVENT) 0.03 % nasal spray, ADMINISTER 2 SPRAYS TO EACH NOSTRIL 3 TIMES DAILY AS NEEDED, Disp: , Rfl: 5  •  labetalol (NORMODYNE) 200 MG tablet, Take 100 mg by mouth 2 (two) times a day., Disp: , Rfl:   •  losartan (COZAAR) 100 MG tablet, Take 100 mg by mouth Daily., Disp: , Rfl:   •  Magnesium 250 MG tablet, Take 250 mg by mouth Daily., Disp: , Rfl:   •  metFORMIN ER (GLUCOPHAGE-XR) 750 MG 24 hr tablet, Take 1,500 mg by  "mouth Every Night., Disp: , Rfl:   •  ONE TOUCH ULTRA TEST test strip, USE TO TEST BLOOD SUGAR EVERY DAY DX.E11.9, Disp: , Rfl: 5  •  rosuvastatin (CRESTOR) 10 MG tablet, Take 10 mg by mouth Every Night., Disp: , Rfl:   •  vitamin B-12 (CYANOCOBALAMIN) 1000 MCG tablet, Take 1,000 mcg by mouth., Disp: , Rfl:   No current facility-administered medications for this visit.     Past Surgical History:   Procedure Laterality Date   • BACK SURGERY     • CARDIAC CATHETERIZATION  05/12/2014    Severe multivessel CAD with critical lesions noted in the LAD coronary artery, left circumflex coronary artery and RCA. Left ventriculogram no performed in view of elevated left ventricular end-diastolic pressure   • CATARACT EXTRACTION W/ INTRAOCULAR LENS IMPLANT Left 2/2/2018    Procedure: CATARACT PHACO EXTRACTION WITH INTRAOCULAR LENS IMPLANT;  Surgeon: Gagan Lizarraga MD;  Location: [facility];  Service:    • CATARACT EXTRACTION W/ INTRAOCULAR LENS IMPLANT Right 2/9/2018    Procedure: CATARACT PHACO EXTRACTION WITH INTRAOCULAR LENS IMPLANT;  Surgeon: Gagan Lizarraga MD;  Location: [facility];  Service:    • CORONARY ARTERY BYPASS GRAFT      X 3 with LIMA to LAD, SVG to OMB and SVG to PDA.   • DILATATION AND CURETTAGE     • OTHER SURGICAL HISTORY  05/06/2014    INCISION OF EARDRUM GENERAL ANESTHETIC 87311 (1)    Bilateral myringotomy with tubes.    • TONSILLECTOMY     • TONSILLECTOMY AND ADENOIDECTOMY     • TUBAL ABDOMINAL LIGATION  03/14/1978       ROS  No fevers or chills.  No chest pain or shortness of air.  No GI or  disturbances.    PHYSICAL EXAMINATION:       Ht 170.2 cm (67\")   Wt 80.3 kg (177 lb)   LMP  (LMP Unknown)   BMI 27.72 kg/m²     Physical Exam    GAIT:     []  Normal  []  Antalgic    Assistive device: []  None  []  Walker     []  Crutches  []  Cane     []  Wheelchair  []  Stretcher    Ortho Exam      Large Joint Arthrocentesis  Date/Time: 6/25/2018 8:58 AM  Consent given by: patient  Site marked: " site marked  Timeout: Immediately prior to procedure a time out was called to verify the correct patient, procedure, equipment, support staff and site/side marked as required   Supporting Documentation  Indications: pain   Procedure Details  Location: knee - R knee  Preparation: Patient was prepped and draped in the usual sterile fashion  Needle size: 22 G  Approach: anteromedial  Medications administered: 25 mg sodium hyaluronate 25 MG/2.5ML      Large Joint Arthrocentesis  Date/Time: 6/25/2018 8:58 AM  Consent given by: patient  Site marked: site marked  Timeout: Immediately prior to procedure a time out was called to verify the correct patient, procedure, equipment, support staff and site/side marked as required   Supporting Documentation  Indications: pain   Procedure Details  Location: knee - L knee  Preparation: Patient was prepped and draped in the usual sterile fashion  Needle size: 22 G  Approach: anteromedial  Medications administered: 25 mg sodium hyaluronate 25 MG/2.5ML                    ASSESSMENT:    Diagnoses and all orders for this visit:    Primary osteoarthritis of both knees  -     Large Joint Arthrocentesis  -     Large Joint Arthrocentesis  -     Discontinue: Sodium Hyaluronate (EUFLEXXA) injection 20 mg; Inject 20 mg into the appropriate joint as directed by provider Once.  -     Discontinue: Sodium Hyaluronate (EUFLEXXA) injection 20 mg; Inject 20 mg into the appropriate joint as directed by provider Once.  -     sodium hyaluronate (SUPARTZ) injection 25 mg; Inject 25 mg into the appropriate joint as directed by provider Once.  -     sodium hyaluronate (SUPARTZ) injection 25 mg; Inject 25 mg into the appropriate joint as directed by provider Once.          PLAN  Injected both knees today recommending to progress range of motion and activity as tolerated based on pain and follow-up next week for the last of the series.  No Follow-up on file.    Praveen Rivas, APRN

## 2018-07-02 ENCOUNTER — CLINICAL SUPPORT (OUTPATIENT)
Dept: ORTHOPEDIC SURGERY | Facility: CLINIC | Age: 83
End: 2018-07-02

## 2018-07-02 VITALS — WEIGHT: 176 LBS | HEIGHT: 67 IN | BODY MASS INDEX: 27.62 KG/M2

## 2018-07-02 DIAGNOSIS — M17.0 PRIMARY OSTEOARTHRITIS OF BOTH KNEES: Primary | ICD-10-CM

## 2018-07-02 PROCEDURE — 20610 DRAIN/INJ JOINT/BURSA W/O US: CPT | Performed by: NURSE PRACTITIONER

## 2018-07-02 RX ORDER — HYALURONATE SODIUM 10 MG/ML
20 SYRINGE (ML) INTRAARTICULAR
Status: DISCONTINUED | OUTPATIENT
Start: 2018-07-02 | End: 2018-08-23

## 2018-07-02 NOTE — PROGRESS NOTES
"Knee Joint Injection      Patient: Alexia Lopez    YOB: 1935    Chief Complaint   Patient presents with   • Left Knee - Injections   • Right Knee - Injections       History of Present Illness:  Patient is here for an injection.  No new complaints.    Physical Exam: 83 y.o. female  General Appearance:    Alert, cooperative, in no acute distress                   Vitals:    07/02/18 0901   Weight: 79.8 kg (176 lb)   Height: 170.2 cm (67\")   PainSc: 0-No pain        Patient is alert and oriented, ×3 no acute distress.  Affect is normal respiratory rate is normal unlabored.  Exam and complaints are unchanged.    Procedure:  Large Joint Arthrocentesis  Date/Time: 7/2/2018 9:02 AM  Consent given by: patient  Site marked: site marked  Timeout: Immediately prior to procedure a time out was called to verify the correct patient, procedure, equipment, support staff and site/side marked as required   Supporting Documentation  Indications: pain   Procedure Details  Location: knee - R knee  Preparation: Patient was prepped and draped in the usual sterile fashion  Needle size: 22 G  Approach: anteromedial  Medications administered: 25 mg sodium hyaluronate 25 MG/2.5ML      Large Joint Arthrocentesis  Date/Time: 7/2/2018 9:02 AM  Consent given by: patient  Site marked: site marked  Timeout: Immediately prior to procedure a time out was called to verify the correct patient, procedure, equipment, support staff and site/side marked as required   Supporting Documentation  Indications: pain   Procedure Details  Location: knee - L knee  Preparation: Patient was prepped and draped in the usual sterile fashion  Needle size: 22 G  Approach: anteromedial  Medications administered: 25 mg sodium hyaluronate 25 MG/2.5ML            Assessment:    Diagnoses and all orders for this visit:    Primary osteoarthritis of both knees  -     Large Joint Arthrocentesis  -     Large Joint Arthrocentesis  -     Discontinue: Sodium " Hyaluronate (EUFLEXXA) injection 20 mg; Inject 20 mg into the appropriate joint as directed by provider Once.  -     Discontinue: Sodium Hyaluronate (EUFLEXXA) injection 20 mg; Inject 20 mg into the appropriate joint as directed by provider Once.  -     sodium hyaluronate (SUPARTZ) injection 25 mg; Inject 25 mg into the appropriate joint as directed by provider Once.  -     sodium hyaluronate (SUPARTZ) injection 25 mg; Inject 25 mg into the appropriate joint as directed by provider Once.        Plan:   Slowly increase activity as tolerated.  Discussed importance of leg strengthening and general conditioning.  Discussed warning signs of injection.  Discussed that purpose of injections was symptom improvement and improved activity.  Also discussed that further treatment options depended on symptoms at followup and length of time of improvement after injections.    No Follow-up on file.    Praveen Rivas, APRN

## 2018-08-14 ENCOUNTER — OFFICE VISIT (OUTPATIENT)
Dept: ORTHOPEDIC SURGERY | Facility: CLINIC | Age: 83
End: 2018-08-14

## 2018-08-14 VITALS — WEIGHT: 175 LBS | BODY MASS INDEX: 27.47 KG/M2 | HEIGHT: 67 IN

## 2018-08-14 DIAGNOSIS — M25.561 RIGHT KNEE PAIN, UNSPECIFIED CHRONICITY: ICD-10-CM

## 2018-08-14 DIAGNOSIS — M17.0 PRIMARY OSTEOARTHRITIS OF BOTH KNEES: Primary | ICD-10-CM

## 2018-08-14 PROCEDURE — 99214 OFFICE O/P EST MOD 30 MIN: CPT | Performed by: NURSE PRACTITIONER

## 2018-08-14 NOTE — PROGRESS NOTES
"Alexia Lopez is a 83 y.o. female returns for     Chief Complaint   Patient presents with   • Right Knee - Follow-up   • Left Knee - Follow-up       HISTORY OF PRESENT ILLNESS: f/u buffy knee pain.  On 07/02/2018 patient had the Hyalgan injections in both knees. She states that she is doing much better.        CONCURRENT MEDICAL HISTORY:    The following portions of the patient's history were reviewed and updated as appropriate: allergies, current medications, past family history, past medical history, past social history, past surgical history and problem list.     ROS  No fevers or chills.  No chest pain or shortness of air.  No GI or  disturbances.    PHYSICAL EXAMINATION:       Ht 170.2 cm (67\")   Wt 79.4 kg (175 lb)   LMP  (LMP Unknown)   BMI 27.41 kg/m²     Physical Exam   Constitutional: She is oriented to person, place, and time. Vital signs are normal. She appears well-developed and well-nourished. She is cooperative.   HENT:   Head: Normocephalic and atraumatic.   Neck: Trachea normal and phonation normal.   Pulmonary/Chest: Effort normal. No respiratory distress.   Abdominal: Soft. Normal appearance. She exhibits no distension.   Musculoskeletal:        Right knee: She exhibits no effusion.        Left knee: She exhibits no effusion.   Neurological: She is alert and oriented to person, place, and time. GCS eye subscore is 4. GCS verbal subscore is 5. GCS motor subscore is 6.   Skin: Skin is warm, dry and intact.   Psychiatric: She has a normal mood and affect. Her speech is normal and behavior is normal. Judgment and thought content normal. Cognition and memory are normal.   Vitals reviewed.      GAIT:     []  Normal  []  Antalgic    Assistive device: []  None  []  Walker     []  Crutches  []  Cane     []  Wheelchair  []  Stretcher    Right Knee Exam     Tenderness   The patient is experiencing tenderness in the medial joint line and lateral joint line.    Range of Motion   Extension: normal "   Flexion: abnormal     Other   Erythema: absent  Scars: absent  Sensation: normal  Pulse: present  Swelling: mild  Other tests: no effusion present      Left Knee Exam     Tenderness   The patient is experiencing tenderness in the lateral joint line and medial joint line.    Range of Motion   Extension: normal   Flexion: abnormal     Other   Erythema: absent  Scars: absent  Sensation: normal  Pulse: present  Swelling: mild  Effusion: no effusion present              No results found.          ASSESSMENT:    Diagnoses and all orders for this visit:    Primary osteoarthritis of both knees    Right knee pain, unspecified chronicity          PLAN  Improvement after the Visco supplementation injections we'll progress range of motion and activity as tolerated based on pain follow-up as needed for exacerbations and symptoms.  No Follow-up on file.    Praveen Rivas, APRN

## 2018-10-24 ENCOUNTER — OFFICE VISIT (OUTPATIENT)
Dept: CARDIOLOGY | Facility: CLINIC | Age: 83
End: 2018-10-24

## 2018-10-24 VITALS
WEIGHT: 179 LBS | HEART RATE: 67 BPM | OXYGEN SATURATION: 98 % | BODY MASS INDEX: 28.09 KG/M2 | SYSTOLIC BLOOD PRESSURE: 138 MMHG | DIASTOLIC BLOOD PRESSURE: 58 MMHG | HEIGHT: 67 IN

## 2018-10-24 DIAGNOSIS — I10 ESSENTIAL HYPERTENSION: ICD-10-CM

## 2018-10-24 DIAGNOSIS — I25.10 CORONARY ARTERIOSCLEROSIS IN NATIVE ARTERY: Primary | ICD-10-CM

## 2018-10-24 DIAGNOSIS — E78.2 MIXED HYPERLIPIDEMIA: ICD-10-CM

## 2018-10-24 PROCEDURE — 99214 OFFICE O/P EST MOD 30 MIN: CPT | Performed by: INTERNAL MEDICINE

## 2018-10-24 NOTE — PROGRESS NOTES
Alexia Lopez  83 y.o. female    10/24/2018  1. Coronary arteriosclerosis in native artery    2. Mixed hyperlipidemia    3. Essential hypertension        History of Present Illness    Mrs. Lopez is here for follow-up of her above stated problems.  She is done well from a cardiac standpoint and denied any chest pain, shortness of breath, palpitation, dizziness or syncope.  She underwent coronary artery bypass surgery in May 2014 when she received LIMA to LAD, SVG to obtuse marginal and SVG to PDA.  She admits to not watching her diet very closely and her glucose has been fluctuating.  Her lipid profiles within the normal range when checked by Dr. Rodriguez recently.  Blood pressure was in the normal range.  Clinical exam was unremarkable.        SUBJECTIVE    Allergies   Allergen Reactions   • Cherry Anaphylaxis   • Peanut-Containing Drug Products Anaphylaxis   • Articaine Other (See Comments)     Had at dentist office causes shaking   • Lidocaine Other (See Comments)     Lidocaine injection at dentist office caused shaking         Past Medical History:   Diagnosis Date   • Cataract    • Coronary artery disease    • Diabetes mellitus (CMS/[facility])     Type 2 diabetes mellitus - without retinopathy      • History of echocardiogram 05/12/2014    Normal LV systolic function with EF of 55% with mild hypokinesis. Diastolic relaxation abnormality of left ventricle. Mild tricuspid regurg. Trace mitral regurg   • Hyperlipemia    • Hypertension    • Myocardial infarction          Past Surgical History:   Procedure Laterality Date   • BACK SURGERY     • CARDIAC CATHETERIZATION  05/12/2014    Severe multivessel CAD with critical lesions noted in the LAD coronary artery, left circumflex coronary artery and RCA. Left ventriculogram no performed in view of elevated left ventricular end-diastolic pressure   • CATARACT EXTRACTION W/ INTRAOCULAR LENS IMPLANT Left 2/2/2018    Procedure: CATARACT PHACO EXTRACTION WITH INTRAOCULAR LENS  IMPLANT;  Surgeon: Gagan Lizarraga MD;  Location: [facility];  Service:    • CATARACT EXTRACTION W/ INTRAOCULAR LENS IMPLANT Right 2/9/2018    Procedure: CATARACT PHACO EXTRACTION WITH INTRAOCULAR LENS IMPLANT;  Surgeon: Gagan Lizarraga MD;  Location: [facility];  Service:    • CORONARY ARTERY BYPASS GRAFT      X 3 with LIMA to LAD, SVG to OMB and SVG to PDA.   • DILATATION AND CURETTAGE     • OTHER SURGICAL HISTORY  05/06/2014    INCISION OF EARDRUM GENERAL ANESTHETIC 09823 (1)    Bilateral myringotomy with tubes.    • TONSILLECTOMY     • TONSILLECTOMY AND ADENOIDECTOMY     • TUBAL ABDOMINAL LIGATION  03/14/1978         Family History   Problem Relation Age of Onset   • Hypertension Mother    • Coronary artery disease Father    • Diabetes Other         grandmother   • Cancer Other          Social History     Social History   • Marital status:      Spouse name: N/A   • Number of children: N/A   • Years of education: N/A     Occupational History   • Not on file.     Social History Main Topics   • Smoking status: Never Smoker   • Smokeless tobacco: Never Used   • Alcohol use No   • Drug use: No   • Sexual activity: Defer     Other Topics Concern   • Not on file     Social History Narrative   • No narrative on file         Current Outpatient Prescriptions   Medication Sig Dispense Refill   • amLODIPine (NORVASC) 5 MG tablet Take 5 mg by mouth daily.     • aspirin 81 MG disintegrating tablet Take 81 mg by mouth.     • CO ENZYME Q-10 PO Take 100 mg by mouth.     • labetalol (NORMODYNE) 200 MG tablet Take 100 mg by mouth 2 (two) times a day.     • losartan (COZAAR) 100 MG tablet Take 100 mg by mouth Daily.     • metFORMIN ER (GLUCOPHAGE-XR) 750 MG 24 hr tablet Take 1,500 mg by mouth Every Night.     • ONE TOUCH ULTRA TEST test strip USE TO TEST BLOOD SUGAR EVERY DAY DX.E11.9  5   • rosuvastatin (CRESTOR) 10 MG tablet Take 10 mg by mouth Every Night.     • vitamin B-12 (CYANOCOBALAMIN) 1000 MCG tablet  "Take 1,000 mcg by mouth.     • budesonide (RINOCORT AQUA) 32 MCG/ACT nasal spray 2 sprays into each nostril Daily.     • docusate sodium (COLACE) 100 MG capsule Take 100 mg by mouth Daily.     • ipratropium (ATROVENT) 0.03 % nasal spray ADMINISTER 2 SPRAYS TO EACH NOSTRIL 3 TIMES DAILY AS NEEDED  5   • Magnesium 250 MG tablet Take 250 mg by mouth Daily.       No current facility-administered medications for this visit.          OBJECTIVE    /58   Pulse 67   Ht 170.2 cm (67.01\")   Wt 81.2 kg (179 lb)   LMP  (LMP Unknown)   SpO2 98%   BMI 28.03 kg/m²         Review of Systems     Constitutional:  Denies recent weight loss, weight gain, fever or chills, no change in exercise tolerance     HENT:  Denies any hearing loss, epistaxis, hoarseness, or difficulty speaking.     Eyes: Wears eyeglasses or contact lenses     Respiratory:  Denies dyspnea with exertion,no cough, wheezing, or hemoptysis.     Cardiovascular: Negative for palpations, chest pain, orthopnea, PND, peripheral edema, syncope, or claudication.     Gastrointestinal:  Denies change in bowel habits, dyspepsia, ulcer disease, hematochezia, or melena.     Endocrine: Negative for cold intolerance, heat intolerance, polydipsia, polyphagia and polyuria.  History of diabetes mellitus, hyperlipidemia    Genitourinary: Negative.      Musculoskeletal: Denies any history of arthritic symptoms or back problems     Skin:  Denies any change in hair or nails, rashes, or skin lesions.     Neurological:  Denies any history of recurrent headaches, strokes, TIA, or seizure disorder.     Hematological: Denies any food allergies, seasonal allergies, bleeding disorders, or lymphadenopathy.     Psychiatric/Behavioral: Denies any history of depression, substance abuse, or change in cognitive function.         Physical Exam     Constitutional: Cooperative, alert and oriented,  in no acute distress.     HENT:   Head: Normocephalic, normal hair patterns, no masses or " tenderness.  Ears, Nose, and Throat: No gross abnormalities. No pallor or cyanosis.   Eyes: EOMS intact, PERRL, conjunctivae and lids unremarkable. Fundoscopic exam and visual fields not performed.   Neck: No palpable masses or adenopathy, no thyromegaly, no JVD, carotid pulses are full and equal bilaterally and without  Bruits.     Cardiovascular: Regular rhythm, S1 and S2 normal, no S3 or S4.  No murmurs, gallops, or rubs detected.     Pulmonary/Chest: Chest: normal symmetry,  normal respiratory excursion, no intercostal retraction, no use of accessory muscles.            Pulmonary: Normal breath sounds. No rales or ronchi.    Abdominal: Abdomen soft, bowel sounds normoactive, no masses, no hepatosplenomegaly, non-tender, no bruits.     Musculoskeletal: No deformities, clubbing, cyanosis, erythema, or edema observed.     Neurological: No gross motor or sensory deficits noted, affect appropriate, oriented to time, person, place.     Skin: Warm and dry to the touch, no apparent skin lesions or masses noted.     Psychiatric: She has a normal mood and affect. Her behavior is normal. Judgment and thought content normal.         Procedures      Lab Results   Component Value Date    WBC 7.97 07/31/2014    HGB 12.1 07/31/2014    HCT 36.8 (L) 07/31/2014    MCV 88.0 07/31/2014     07/31/2014     Lab Results   Component Value Date    GLUCOSE 96 06/04/2014    BUN 14 06/04/2014    CREATININE 1.0 06/04/2014    CO2 26 06/04/2014    CALCIUM 9.5 06/04/2014    ALBUMIN 3.7 06/04/2014    AST 15 06/04/2014    ALT 12 06/04/2014     No results found for: CHOL  Lab Results   Component Value Date    TRIG 187 05/11/2014     Lab Results   Component Value Date    HDL 34 (L) 05/11/2014     No components found for: LDLCALC  Lab Results   Component Value Date     (H) 05/11/2014     No results found for: HDLLDLRATIO  No components found for: CHOLHDL  Lab Results   Component Value Date    HGBA1C 6.2 (H) 05/12/2014     Lab Results    Component Value Date    TSH 1.74 05/12/2014           ASSESSMENT AND PLAN  Mrs. Lopez is stable with regards to her heart with no definite evidence of angina, arrhythmia or congestive heart failure.  I have continued antiplatelet therapy with aspirin, antihypertensive therapy with amlodipine, labetalol, losartan and lipid-lowering therapy with Crestor has been continued.    Alexia was seen today for follow-up.    Diagnoses and all orders for this visit:    Coronary arteriosclerosis in native artery    Mixed hyperlipidemia    Essential hypertension        Ember Forrest MD  10/24/2018  10:11 AM

## 2019-04-24 ENCOUNTER — OFFICE VISIT (OUTPATIENT)
Dept: CARDIOLOGY | Facility: CLINIC | Age: 84
End: 2019-04-24

## 2019-04-24 VITALS
HEIGHT: 67 IN | BODY MASS INDEX: 27.72 KG/M2 | HEART RATE: 76 BPM | OXYGEN SATURATION: 98 % | WEIGHT: 176.6 LBS | DIASTOLIC BLOOD PRESSURE: 80 MMHG | SYSTOLIC BLOOD PRESSURE: 120 MMHG

## 2019-04-24 DIAGNOSIS — I73.9 CLAUDICATION OF LOWER EXTREMITY (HCC): ICD-10-CM

## 2019-04-24 DIAGNOSIS — E78.2 MIXED HYPERLIPIDEMIA: ICD-10-CM

## 2019-04-24 DIAGNOSIS — I25.10 CORONARY ARTERIOSCLEROSIS IN NATIVE ARTERY: ICD-10-CM

## 2019-04-24 DIAGNOSIS — Z95.1 S/P CABG (CORONARY ARTERY BYPASS GRAFT): ICD-10-CM

## 2019-04-24 DIAGNOSIS — I10 ESSENTIAL HYPERTENSION: Primary | ICD-10-CM

## 2019-04-24 PROCEDURE — 99214 OFFICE O/P EST MOD 30 MIN: CPT | Performed by: INTERNAL MEDICINE

## 2019-04-24 NOTE — PROGRESS NOTES
Alexia Lpoez  83 y.o. female    04/24/2019  1. Essential hypertension    2. Mixed hyperlipidemia    3. Coronary arteriosclerosis in native artery    4. S/P CABG (coronary artery bypass graft)        History of Present Illness    Mrs. Lopez is here for follow-up of her above stated problems.  She is done well from a cardiac standpoint and denied any chest pain, shortness of breath, palpitation, dizziness or syncope.  She underwent coronary artery bypass surgery in May 2014 when she received LIMA to LAD, SVG to obtuse marginal and SVG to PDA.    She does have cramping/pain in the lower extremities when she ambulates and claudication could not be ruled out.  She has multiple risk factors for vascular disease.  Her lipid profiles were checked last week and LDL was 85 and triglycerides with 235.  She has been compliant with her medications.  Her hemoglobin A1c is less than 7.  No signs of congestive heart failure was noted.  Blood pressure was in the normal range.        SUBJECTIVE    Allergies   Allergen Reactions   • Cherry Anaphylaxis   • Peanut-Containing Drug Products Anaphylaxis   • Articaine Other (See Comments)     Had at dentist office causes shaking   • Lidocaine Other (See Comments)     Lidocaine injection at dentist office caused shaking         Past Medical History:   Diagnosis Date   • Cataract    • Coronary artery disease    • Diabetes mellitus (CMS/[facility])     Type 2 diabetes mellitus - without retinopathy      • History of echocardiogram 05/12/2014    Normal LV systolic function with EF of 55% with mild hypokinesis. Diastolic relaxation abnormality of left ventricle. Mild tricuspid regurg. Trace mitral regurg   • Hyperlipemia    • Hypertension    • Myocardial infarction          Past Surgical History:   Procedure Laterality Date   • BACK SURGERY     • CARDIAC CATHETERIZATION  05/12/2014    Severe multivessel CAD with critical lesions noted in the LAD coronary artery, left circumflex coronary artery  and RCA. Left ventriculogram no performed in view of elevated left ventricular end-diastolic pressure   • CATARACT EXTRACTION W/ INTRAOCULAR LENS IMPLANT Left 2/2/2018    Procedure: CATARACT PHACO EXTRACTION WITH INTRAOCULAR LENS IMPLANT;  Surgeon: Gagan Lizarraga MD;  Location: [facility];  Service:    • CATARACT EXTRACTION W/ INTRAOCULAR LENS IMPLANT Right 2/9/2018    Procedure: CATARACT PHACO EXTRACTION WITH INTRAOCULAR LENS IMPLANT;  Surgeon: Gagan Lizarraga MD;  Location: [facility];  Service:    • CORONARY ARTERY BYPASS GRAFT      X 3 with LIMA to LAD, SVG to OMB and SVG to PDA.   • DILATATION AND CURETTAGE     • OTHER SURGICAL HISTORY  05/06/2014    INCISION OF EARDRUM GENERAL ANESTHETIC 91828 (1)    Bilateral myringotomy with tubes.    • TONSILLECTOMY     • TONSILLECTOMY AND ADENOIDECTOMY     • TUBAL ABDOMINAL LIGATION  03/14/1978         Family History   Problem Relation Age of Onset   • Hypertension Mother    • Coronary artery disease Father    • Diabetes Other         grandmother   • Cancer Other          Social History     Socioeconomic History   • Marital status:      Spouse name: Not on file   • Number of children: Not on file   • Years of education: Not on file   • Highest education level: Not on file   Tobacco Use   • Smoking status: Never Smoker   • Smokeless tobacco: Never Used   Substance and Sexual Activity   • Alcohol use: No   • Drug use: No   • Sexual activity: Defer         Current Outpatient Medications   Medication Sig Dispense Refill   • amLODIPine (NORVASC) 5 MG tablet Take 5 mg by mouth daily.     • aspirin 81 MG disintegrating tablet Take 81 mg by mouth.     • budesonide (RINOCORT AQUA) 32 MCG/ACT nasal spray 2 sprays into each nostril Daily.     • CO ENZYME Q-10 PO Take 100 mg by mouth.     • docusate sodium (COLACE) 100 MG capsule Take 100 mg by mouth Daily.     • ipratropium (ATROVENT) 0.03 % nasal spray ADMINISTER 2 SPRAYS TO EACH NOSTRIL 3 TIMES DAILY AS NEEDED   "5   • labetalol (NORMODYNE) 200 MG tablet Take 100 mg by mouth 2 (two) times a day.     • losartan (COZAAR) 100 MG tablet Take 100 mg by mouth Daily.     • Magnesium 250 MG tablet Take 250 mg by mouth Daily.     • metFORMIN ER (GLUCOPHAGE-XR) 750 MG 24 hr tablet Take 1,500 mg by mouth Every Night.     • ONE TOUCH ULTRA TEST test strip USE TO TEST BLOOD SUGAR EVERY DAY DX.E11.9  5   • rosuvastatin (CRESTOR) 10 MG tablet Take 10 mg by mouth Every Night.     • vitamin B-12 (CYANOCOBALAMIN) 1000 MCG tablet Take 1,000 mcg by mouth.       No current facility-administered medications for this visit.          OBJECTIVE    /80   Pulse 76   Ht 170.2 cm (67.01\")   Wt 80.1 kg (176 lb 9.6 oz)   LMP  (LMP Unknown)   SpO2 98%   BMI 27.65 kg/m²         Review of Systems     Constitutional:  Denies recent weight loss, weight gain, fever or chills, no change in exercise tolerance     HENT:  Denies any hearing loss, epistaxis, hoarseness, or difficulty speaking.     Eyes: Wears eyeglasses or contact lenses     Respiratory:  Denies dyspnea with exertion,no cough, wheezing, or hemoptysis.     Cardiovascular: Negative for palpations, chest pain, orthopnea, PND, peripheral edema, syncope.  Cramping/pain lower extremities suggestive of claudication.    Gastrointestinal:  Denies change in bowel habits, dyspepsia, ulcer disease, hematochezia, or melena.     Endocrine: Negative for cold intolerance, heat intolerance, polydipsia, polyphagia and polyuria.     Genitourinary: Negative.      Musculoskeletal: DJD    Skin:  Denies any change in hair or nails, rashes, or skin lesions.     Allergic/Immunologic: Negative.  Negative for environmental allergies, food allergies and immunocompromised state.     Neurological:  Denies any history of recurrent headaches, strokes, TIA, or seizure disorder.     Hematological: Denies any food allergies, seasonal allergies, bleeding disorders, or lymphadenopathy.     Psychiatric/Behavioral: Denies any " history of depression, substance abuse, or change in cognitive function.         Physical Exam     Constitutional: Cooperative, alert and oriented,in no acute distress.     HENT:   Head: Normocephalic, normal hair patterns, no masses or tenderness.  Ears, Nose, and Throat: No gross abnormalities. No pallor or cyanosis.   Eyes: EOMS intact, PERRL, conjunctivae and lids unremarkable. Fundoscopic exam and visual fields not performed.   Neck: No palpable masses or adenopathy, no thyromegaly, no JVD, carotid pulses are full and equal bilaterally and without  Bruits.     Cardiovascular: Regular rhythm, S1 and S2 normal, no S3 or S4. No murmurs, gallops, or rubs detected.     Pulmonary/Chest: Chest: normal symmetry,  normal respiratory excursion, no intercostal retraction, no use of accessory muscles.            Pulmonary: Normal breath sounds. No rales or ronchi.    Abdominal: Abdomen soft, bowel sounds normoactive, no masses, no hepatosplenomegaly, non-tender, no bruits.     Musculoskeletal: No deformities, clubbing, cyanosis, erythema, or edema observed.     Neurological: No gross motor or sensory deficits noted, affect appropriate, oriented to time, person, place.     Skin: Warm and dry to the touch, no apparent skin lesions or masses noted.     Psychiatric: She has a normal mood and affect. Her behavior is normal. Judgment and thought content normal.         Procedures      Lab Results   Component Value Date    WBC 7.97 07/31/2014    HGB 12.1 07/31/2014    HCT 36.8 (L) 07/31/2014    MCV 88.0 07/31/2014     07/31/2014     Lab Results   Component Value Date    GLUCOSE 96 06/04/2014    BUN 14 06/04/2014    CREATININE 1.0 06/04/2014    CO2 26 06/04/2014    CALCIUM 9.5 06/04/2014    ALBUMIN 3.7 06/04/2014    AST 15 06/04/2014    ALT 12 06/04/2014     Lab Results   Component Value Date    CHOL 174 04/19/2019    CHOL 157 10/19/2018    CHOL 178 04/26/2018     Lab Results   Component Value Date    TRIG 235 (H)  04/19/2019    TRIG 258 (H) 10/19/2018    TRIG 265 (H) 04/26/2018     Lab Results   Component Value Date    HDL 46 04/19/2019    HDL 44 10/19/2018    HDL 46 04/26/2018     No components found for: LDLCALC  Lab Results   Component Value Date    LDL 85 04/19/2019    LDL 75 10/19/2018    LDL 93 04/26/2018     No results found for: HDLLDLRATIO  No components found for: CHOLHDL  Lab Results   Component Value Date    HGBA1C 6.5 (H) 04/19/2019     Lab Results   Component Value Date    TSH 1.63 04/19/2019           ASSESSMENT AND PLAN  Mrs. Lopez is stable as regards her heart and of continued present antiplatelet therapy with aspirin, antihypertensive therapy with amlodipine, labetalol, losartan and lipid-lowering therapy with Crestor have been continued.  To further evaluate cramping/pain in the lower extremities suspicious for claudication, and EMMANUELLE is being arranged.  Her blood pressure was in the normal range.  No signs of congestive heart failure or angina was noted.    Alexia was seen today for follow-up.    Diagnoses and all orders for this visit:    Essential hypertension    Mixed hyperlipidemia    Coronary arteriosclerosis in native artery    S/P CABG (coronary artery bypass graft)        Patient's Body mass index is 27.65 kg/m². BMI is above normal parameters. Recommendations include: exercise counseling and nutrition counseling.    Ember Forrest MD  4/24/2019  11:27 AM

## 2019-05-03 ENCOUNTER — DOCUMENTATION (OUTPATIENT)
Dept: CARDIOLOGY | Facility: CLINIC | Age: 84
End: 2019-05-03

## 2019-05-06 ENCOUNTER — DOCUMENTATION (OUTPATIENT)
Dept: CARDIOLOGY | Facility: CLINIC | Age: 84
End: 2019-05-06

## 2019-07-08 ENCOUNTER — APPOINTMENT (OUTPATIENT)
Dept: CT IMAGING | Facility: HOSPITAL | Age: 84
End: 2019-07-08

## 2019-07-08 ENCOUNTER — HOSPITAL ENCOUNTER (OUTPATIENT)
Facility: HOSPITAL | Age: 84
Setting detail: OBSERVATION
Discharge: HOME | End: 2019-07-10
Attending: FAMILY MEDICINE | Admitting: INTERNAL MEDICINE

## 2019-07-08 ENCOUNTER — APPOINTMENT (OUTPATIENT)
Dept: GENERAL RADIOLOGY | Facility: HOSPITAL | Age: 84
End: 2019-07-08

## 2019-07-08 DIAGNOSIS — R07.2 PRECORDIAL PAIN: Primary | ICD-10-CM

## 2019-07-08 DIAGNOSIS — I16.0 HYPERTENSIVE URGENCY: ICD-10-CM

## 2019-07-08 LAB
ALBUMIN SERPL-MCNC: 4.2 G/DL (ref 3.5–5.2)
ALBUMIN/GLOB SERPL: 1.6 G/DL
ALP SERPL-CCNC: 89 U/L (ref 39–117)
ALT SERPL W P-5'-P-CCNC: 7 U/L (ref 1–33)
ANION GAP SERPL CALCULATED.3IONS-SCNC: 13 MMOL/L (ref 5–15)
AST SERPL-CCNC: 14 U/L (ref 1–32)
BASOPHILS # BLD AUTO: 0.07 10*3/MM3 (ref 0–0.2)
BASOPHILS NFR BLD AUTO: 1 % (ref 0–1.5)
BILIRUB SERPL-MCNC: 0.2 MG/DL (ref 0.2–1.2)
BUN BLD-MCNC: 20 MG/DL (ref 8–23)
BUN/CREAT SERPL: 22.5 (ref 7–25)
CALCIUM SPEC-SCNC: 10.3 MG/DL (ref 8.6–10.5)
CHLORIDE SERPL-SCNC: 105 MMOL/L (ref 98–107)
CO2 SERPL-SCNC: 24 MMOL/L (ref 22–29)
CREAT BLD-MCNC: 0.89 MG/DL (ref 0.57–1)
DEPRECATED RDW RBC AUTO: 46.9 FL (ref 37–54)
EOSINOPHIL # BLD AUTO: 0.5 10*3/MM3 (ref 0–0.4)
EOSINOPHIL NFR BLD AUTO: 7.4 % (ref 0.3–6.2)
ERYTHROCYTE [DISTWIDTH] IN BLOOD BY AUTOMATED COUNT: 14.4 % (ref 12.3–15.4)
GFR SERPL CREATININE-BSD FRML MDRD: 61 ML/MIN/1.73
GLOBULIN UR ELPH-MCNC: 2.7 GM/DL
GLUCOSE BLD-MCNC: 142 MG/DL (ref 65–99)
HCT VFR BLD AUTO: 33.1 % (ref 34–46.6)
HGB BLD-MCNC: 10.9 G/DL (ref 12–15.9)
HOLD SPECIMEN: NORMAL
HOLD SPECIMEN: NORMAL
IMM GRANULOCYTES # BLD AUTO: 0.01 10*3/MM3 (ref 0–0.05)
IMM GRANULOCYTES NFR BLD AUTO: 0.1 % (ref 0–0.5)
LYMPHOCYTES # BLD AUTO: 1.35 10*3/MM3 (ref 0.7–3.1)
LYMPHOCYTES NFR BLD AUTO: 19.9 % (ref 19.6–45.3)
MCH RBC QN AUTO: 29.5 PG (ref 26.6–33)
MCHC RBC AUTO-ENTMCNC: 32.9 G/DL (ref 31.5–35.7)
MCV RBC AUTO: 89.7 FL (ref 79–97)
MONOCYTES # BLD AUTO: 0.56 10*3/MM3 (ref 0.1–0.9)
MONOCYTES NFR BLD AUTO: 8.2 % (ref 5–12)
NEUTROPHILS # BLD AUTO: 4.31 10*3/MM3 (ref 1.7–7)
NEUTROPHILS NFR BLD AUTO: 63.4 % (ref 42.7–76)
NRBC BLD AUTO-RTO: 0 /100 WBC (ref 0–0.2)
NT-PROBNP SERPL-MCNC: 373.9 PG/ML (ref 5–1800)
PLATELET # BLD AUTO: 183 10*3/MM3 (ref 140–450)
PMV BLD AUTO: 12.3 FL (ref 6–12)
POTASSIUM BLD-SCNC: 4.6 MMOL/L (ref 3.5–5.2)
PROT SERPL-MCNC: 6.9 G/DL (ref 6–8.5)
RBC # BLD AUTO: 3.69 10*6/MM3 (ref 3.77–5.28)
SODIUM BLD-SCNC: 142 MMOL/L (ref 136–145)
TROPONIN T SERPL-MCNC: <0.01 NG/ML (ref 0–0.03)
WBC NRBC COR # BLD: 6.8 10*3/MM3 (ref 3.4–10.8)
WHOLE BLOOD HOLD SPECIMEN: NORMAL
WHOLE BLOOD HOLD SPECIMEN: NORMAL

## 2019-07-08 PROCEDURE — 84484 ASSAY OF TROPONIN QUANT: CPT | Performed by: FAMILY MEDICINE

## 2019-07-08 PROCEDURE — 93005 ELECTROCARDIOGRAM TRACING: CPT | Performed by: FAMILY MEDICINE

## 2019-07-08 PROCEDURE — 96374 THER/PROPH/DIAG INJ IV PUSH: CPT

## 2019-07-08 PROCEDURE — 71045 X-RAY EXAM CHEST 1 VIEW: CPT

## 2019-07-08 PROCEDURE — 0 IOPAMIDOL PER 1 ML: Performed by: FAMILY MEDICINE

## 2019-07-08 PROCEDURE — 80053 COMPREHEN METABOLIC PANEL: CPT | Performed by: FAMILY MEDICINE

## 2019-07-08 PROCEDURE — 85025 COMPLETE CBC W/AUTO DIFF WBC: CPT | Performed by: FAMILY MEDICINE

## 2019-07-08 PROCEDURE — 83880 ASSAY OF NATRIURETIC PEPTIDE: CPT | Performed by: FAMILY MEDICINE

## 2019-07-08 PROCEDURE — 93010 ELECTROCARDIOGRAM REPORT: CPT | Performed by: INTERNAL MEDICINE

## 2019-07-08 PROCEDURE — 93005 ELECTROCARDIOGRAM TRACING: CPT

## 2019-07-08 PROCEDURE — 99284 EMERGENCY DEPT VISIT MOD MDM: CPT

## 2019-07-08 PROCEDURE — 71275 CT ANGIOGRAPHY CHEST: CPT

## 2019-07-08 RX ORDER — LABETALOL 200 MG/1
200 TABLET, FILM COATED ORAL EVERY 12 HOURS SCHEDULED
Status: DISCONTINUED | OUTPATIENT
Start: 2019-07-08 | End: 2019-07-09 | Stop reason: DRUGHIGH

## 2019-07-08 RX ORDER — ASPIRIN 81 MG/1
324 TABLET, CHEWABLE ORAL ONCE
Status: COMPLETED | OUTPATIENT
Start: 2019-07-08 | End: 2019-07-08

## 2019-07-08 RX ORDER — AMLODIPINE BESYLATE 5 MG/1
5 TABLET ORAL
Status: DISCONTINUED | OUTPATIENT
Start: 2019-07-09 | End: 2019-07-09

## 2019-07-08 RX ORDER — SODIUM CHLORIDE 0.9 % (FLUSH) 0.9 %
10 SYRINGE (ML) INJECTION AS NEEDED
Status: DISCONTINUED | OUTPATIENT
Start: 2019-07-08 | End: 2019-07-10 | Stop reason: HOSPADM

## 2019-07-08 RX ORDER — ENALAPRILAT 2.5 MG/2ML
1.25 INJECTION INTRAVENOUS ONCE
Status: COMPLETED | OUTPATIENT
Start: 2019-07-08 | End: 2019-07-08

## 2019-07-08 RX ADMIN — ASPIRIN 81 MG 324 MG: 81 TABLET ORAL at 21:12

## 2019-07-08 RX ADMIN — AMLODIPINE BESYLATE 5 MG: 5 TABLET ORAL at 23:55

## 2019-07-08 RX ADMIN — ENALAPRILAT 1.25 MG: 1.25 INJECTION INTRAVENOUS at 22:30

## 2019-07-08 RX ADMIN — LABETALOL HYDROCHLORIDE 200 MG: 200 TABLET, FILM COATED ORAL at 23:55

## 2019-07-08 RX ADMIN — IOPAMIDOL 52 ML: 755 INJECTION, SOLUTION INTRAVENOUS at 22:45

## 2019-07-09 ENCOUNTER — APPOINTMENT (OUTPATIENT)
Dept: NUCLEAR MEDICINE | Facility: HOSPITAL | Age: 84
End: 2019-07-09

## 2019-07-09 ENCOUNTER — APPOINTMENT (OUTPATIENT)
Dept: ULTRASOUND IMAGING | Facility: HOSPITAL | Age: 84
End: 2019-07-09

## 2019-07-09 ENCOUNTER — APPOINTMENT (OUTPATIENT)
Dept: CARDIOLOGY | Facility: HOSPITAL | Age: 84
End: 2019-07-09

## 2019-07-09 PROBLEM — R07.2 PRECORDIAL PAIN: Status: ACTIVE | Noted: 2019-07-09

## 2019-07-09 LAB
ANION GAP SERPL CALCULATED.3IONS-SCNC: 11 MMOL/L (ref 5–15)
BASOPHILS # BLD AUTO: 0.06 10*3/MM3 (ref 0–0.2)
BASOPHILS NFR BLD AUTO: 1 % (ref 0–1.5)
BH CV ECHO MEAS - ACS: 2.2 CM
BH CV ECHO MEAS - AO ISTHMUS: 1.6 CM
BH CV ECHO MEAS - AO MAX PG (FULL): 2.2 MMHG
BH CV ECHO MEAS - AO MAX PG: 8.4 MMHG
BH CV ECHO MEAS - AO MEAN PG (FULL): 1 MMHG
BH CV ECHO MEAS - AO MEAN PG: 4 MMHG
BH CV ECHO MEAS - AO ROOT AREA (BSA CORRECTED): 1.6
BH CV ECHO MEAS - AO ROOT AREA: 7.1 CM^2
BH CV ECHO MEAS - AO ROOT DIAM: 3 CM
BH CV ECHO MEAS - AO V2 MAX: 145 CM/SEC
BH CV ECHO MEAS - AO V2 MEAN: 99.1 CM/SEC
BH CV ECHO MEAS - AO V2 VTI: 33.1 CM
BH CV ECHO MEAS - ASC AORTA: 3.4 CM
BH CV ECHO MEAS - AVA(I,A): 2.6 CM^2
BH CV ECHO MEAS - AVA(I,D): 2.6 CM^2
BH CV ECHO MEAS - AVA(V,A): 2.7 CM^2
BH CV ECHO MEAS - AVA(V,D): 2.7 CM^2
BH CV ECHO MEAS - BSA(HAYCOCK): 2 M^2
BH CV ECHO MEAS - BSA: 1.9 M^2
BH CV ECHO MEAS - BZI_BMI: 27.7 KILOGRAMS/M^2
BH CV ECHO MEAS - BZI_METRIC_HEIGHT: 170.2 CM
BH CV ECHO MEAS - BZI_METRIC_WEIGHT: 80.3 KG
BH CV ECHO MEAS - EDV(CUBED): 109.2 ML
BH CV ECHO MEAS - EDV(TEICH): 106.5 ML
BH CV ECHO MEAS - EF(CUBED): 69.7 %
BH CV ECHO MEAS - EF(TEICH): 61.2 %
BH CV ECHO MEAS - ESV(CUBED): 33.1 ML
BH CV ECHO MEAS - ESV(TEICH): 41.3 ML
BH CV ECHO MEAS - FS: 32.8 %
BH CV ECHO MEAS - IVS/LVPW: 0.83
BH CV ECHO MEAS - IVSD: 1.2 CM
BH CV ECHO MEAS - LA DIMENSION: 3.5 CM
BH CV ECHO MEAS - LA/AO: 1.2
BH CV ECHO MEAS - LV MASS(C)D: 236.1 GRAMS
BH CV ECHO MEAS - LV MASS(C)DI: 123 GRAMS/M^2
BH CV ECHO MEAS - LV MAX PG: 6.3 MMHG
BH CV ECHO MEAS - LV MEAN PG: 3 MMHG
BH CV ECHO MEAS - LV V1 MAX: 125 CM/SEC
BH CV ECHO MEAS - LV V1 MEAN: 82.5 CM/SEC
BH CV ECHO MEAS - LV V1 VTI: 27.2 CM
BH CV ECHO MEAS - LVIDD: 4.8 CM
BH CV ECHO MEAS - LVIDS: 3.2 CM
BH CV ECHO MEAS - LVOT AREA (M): 3.1 CM^2
BH CV ECHO MEAS - LVOT AREA: 3.1 CM^2
BH CV ECHO MEAS - LVOT DIAM: 2 CM
BH CV ECHO MEAS - LVPWD: 1.4 CM
BH CV ECHO MEAS - MV A MAX VEL: 91.2 CM/SEC
BH CV ECHO MEAS - MV DEC SLOPE: 286 CM/SEC^2
BH CV ECHO MEAS - MV E MAX VEL: 51.4 CM/SEC
BH CV ECHO MEAS - MV E/A: 0.56
BH CV ECHO MEAS - MV MAX PG: 2.4 MMHG
BH CV ECHO MEAS - MV MEAN PG: 1 MMHG
BH CV ECHO MEAS - MV P1/2T MAX VEL: 69.2 CM/SEC
BH CV ECHO MEAS - MV P1/2T: 70.9 MSEC
BH CV ECHO MEAS - MV V2 MAX: 78.2 CM/SEC
BH CV ECHO MEAS - MV V2 MEAN: 43.2 CM/SEC
BH CV ECHO MEAS - MV V2 VTI: 29.3 CM
BH CV ECHO MEAS - MVA P1/2T LCG: 3.2 CM^2
BH CV ECHO MEAS - MVA(P1/2T): 3.1 CM^2
BH CV ECHO MEAS - MVA(VTI): 2.9 CM^2
BH CV ECHO MEAS - PA MAX PG: 3.8 MMHG
BH CV ECHO MEAS - PA V2 MAX: 97.7 CM/SEC
BH CV ECHO MEAS - RVDD: 3.8 CM
BH CV ECHO MEAS - SI(AO): 121.9 ML/M^2
BH CV ECHO MEAS - SI(CUBED): 39.7 ML/M^2
BH CV ECHO MEAS - SI(LVOT): 44.5 ML/M^2
BH CV ECHO MEAS - SI(TEICH): 34 ML/M^2
BH CV ECHO MEAS - SV(AO): 234 ML
BH CV ECHO MEAS - SV(CUBED): 76.1 ML
BH CV ECHO MEAS - SV(LVOT): 85.5 ML
BH CV ECHO MEAS - SV(TEICH): 65.2 ML
BH CV ECHO MEAS - TR MAX VEL: 262 CM/SEC
BUN BLD-MCNC: 18 MG/DL (ref 8–23)
BUN/CREAT SERPL: 22.5 (ref 7–25)
CALCIUM SPEC-SCNC: 9.8 MG/DL (ref 8.6–10.5)
CHLORIDE SERPL-SCNC: 109 MMOL/L (ref 98–107)
CHOLEST SERPL-MCNC: 163 MG/DL (ref 0–200)
CO2 SERPL-SCNC: 22 MMOL/L (ref 22–29)
CREAT BLD-MCNC: 0.8 MG/DL (ref 0.57–1)
DEPRECATED RDW RBC AUTO: 47.8 FL (ref 37–54)
EOSINOPHIL # BLD AUTO: 0.51 10*3/MM3 (ref 0–0.4)
EOSINOPHIL NFR BLD AUTO: 8.3 % (ref 0.3–6.2)
ERYTHROCYTE [DISTWIDTH] IN BLOOD BY AUTOMATED COUNT: 14.3 % (ref 12.3–15.4)
GFR SERPL CREATININE-BSD FRML MDRD: 69 ML/MIN/1.73
GLUCOSE BLD-MCNC: 108 MG/DL (ref 65–99)
GLUCOSE BLDC GLUCOMTR-MCNC: 107 MG/DL (ref 70–130)
GLUCOSE BLDC GLUCOMTR-MCNC: 122 MG/DL (ref 70–130)
GLUCOSE BLDC GLUCOMTR-MCNC: 151 MG/DL (ref 70–130)
GLUCOSE BLDC GLUCOMTR-MCNC: 164 MG/DL (ref 70–130)
HCT VFR BLD AUTO: 33.3 % (ref 34–46.6)
HDLC SERPL-MCNC: 52 MG/DL (ref 40–60)
HGB BLD-MCNC: 10.7 G/DL (ref 12–15.9)
IMM GRANULOCYTES # BLD AUTO: 0.01 10*3/MM3 (ref 0–0.05)
IMM GRANULOCYTES NFR BLD AUTO: 0.2 % (ref 0–0.5)
LDLC SERPL CALC-MCNC: 71 MG/DL (ref 0–100)
LDLC/HDLC SERPL: 1.37 {RATIO}
LV EF 2D ECHO EST: 57 %
LYMPHOCYTES # BLD AUTO: 1.67 10*3/MM3 (ref 0.7–3.1)
LYMPHOCYTES NFR BLD AUTO: 27.2 % (ref 19.6–45.3)
MAGNESIUM SERPL-MCNC: 1.8 MG/DL (ref 1.6–2.4)
MAXIMAL PREDICTED HEART RATE: 139 BPM
MCH RBC QN AUTO: 29.4 PG (ref 26.6–33)
MCHC RBC AUTO-ENTMCNC: 32.1 G/DL (ref 31.5–35.7)
MCV RBC AUTO: 91.5 FL (ref 79–97)
MONOCYTES # BLD AUTO: 0.48 10*3/MM3 (ref 0.1–0.9)
MONOCYTES NFR BLD AUTO: 7.8 % (ref 5–12)
NEUTROPHILS # BLD AUTO: 3.42 10*3/MM3 (ref 1.7–7)
NEUTROPHILS NFR BLD AUTO: 55.5 % (ref 42.7–76)
NRBC BLD AUTO-RTO: 0 /100 WBC (ref 0–0.2)
NT-PROBNP SERPL-MCNC: 372.5 PG/ML (ref 5–1800)
PHOSPHATE SERPL-MCNC: 4.4 MG/DL (ref 2.5–4.5)
PLATELET # BLD AUTO: 144 10*3/MM3 (ref 140–450)
PMV BLD AUTO: 12.2 FL (ref 6–12)
POTASSIUM BLD-SCNC: 4.5 MMOL/L (ref 3.5–5.2)
RBC # BLD AUTO: 3.64 10*6/MM3 (ref 3.77–5.28)
SODIUM BLD-SCNC: 142 MMOL/L (ref 136–145)
STRESS TARGET HR: 118 BPM
TRIGL SERPL-MCNC: 198 MG/DL (ref 0–150)
TROPONIN T SERPL-MCNC: <0.01 NG/ML (ref 0–0.03)
TROPONIN T SERPL-MCNC: <0.01 NG/ML (ref 0–0.03)
TSH SERPL DL<=0.05 MIU/L-ACNC: 2.27 MIU/ML (ref 0.27–4.2)
VLDLC SERPL-MCNC: 39.6 MG/DL
WBC NRBC COR # BLD: 6.15 10*3/MM3 (ref 3.4–10.8)

## 2019-07-09 PROCEDURE — 93306 TTE W/DOPPLER COMPLETE: CPT

## 2019-07-09 PROCEDURE — 84100 ASSAY OF PHOSPHORUS: CPT | Performed by: INTERNAL MEDICINE

## 2019-07-09 PROCEDURE — G0378 HOSPITAL OBSERVATION PER HR: HCPCS

## 2019-07-09 PROCEDURE — 93018 CV STRESS TEST I&R ONLY: CPT | Performed by: INTERNAL MEDICINE

## 2019-07-09 PROCEDURE — 85025 COMPLETE CBC W/AUTO DIFF WBC: CPT | Performed by: INTERNAL MEDICINE

## 2019-07-09 PROCEDURE — 83880 ASSAY OF NATRIURETIC PEPTIDE: CPT | Performed by: INTERNAL MEDICINE

## 2019-07-09 PROCEDURE — 63710000001 INSULIN ASPART PER 5 UNITS: Performed by: NURSE PRACTITIONER

## 2019-07-09 PROCEDURE — 82962 GLUCOSE BLOOD TEST: CPT

## 2019-07-09 PROCEDURE — 78452 HT MUSCLE IMAGE SPECT MULT: CPT | Performed by: INTERNAL MEDICINE

## 2019-07-09 PROCEDURE — 78452 HT MUSCLE IMAGE SPECT MULT: CPT

## 2019-07-09 PROCEDURE — 93306 TTE W/DOPPLER COMPLETE: CPT | Performed by: INTERNAL MEDICINE

## 2019-07-09 PROCEDURE — 80061 LIPID PANEL: CPT | Performed by: INTERNAL MEDICINE

## 2019-07-09 PROCEDURE — 25010000002 ENOXAPARIN PER 10 MG: Performed by: INTERNAL MEDICINE

## 2019-07-09 PROCEDURE — A9500 TC99M SESTAMIBI: HCPCS | Performed by: NURSE PRACTITIONER

## 2019-07-09 PROCEDURE — 76705 ECHO EXAM OF ABDOMEN: CPT

## 2019-07-09 PROCEDURE — 84484 ASSAY OF TROPONIN QUANT: CPT | Performed by: INTERNAL MEDICINE

## 2019-07-09 PROCEDURE — 84443 ASSAY THYROID STIM HORMONE: CPT | Performed by: INTERNAL MEDICINE

## 2019-07-09 PROCEDURE — 93016 CV STRESS TEST SUPVJ ONLY: CPT | Performed by: INTERNAL MEDICINE

## 2019-07-09 PROCEDURE — 96372 THER/PROPH/DIAG INJ SC/IM: CPT

## 2019-07-09 PROCEDURE — 93017 CV STRESS TEST TRACING ONLY: CPT

## 2019-07-09 PROCEDURE — 83735 ASSAY OF MAGNESIUM: CPT | Performed by: INTERNAL MEDICINE

## 2019-07-09 PROCEDURE — 0 TECHNETIUM SESTAMIBI: Performed by: NURSE PRACTITIONER

## 2019-07-09 PROCEDURE — 80048 BASIC METABOLIC PNL TOTAL CA: CPT | Performed by: INTERNAL MEDICINE

## 2019-07-09 RX ORDER — MECLIZINE HYDROCHLORIDE 25 MG/1
25 TABLET ORAL 3 TIMES DAILY PRN
COMMUNITY
End: 2019-08-15

## 2019-07-09 RX ORDER — SODIUM CHLORIDE 0.9 % (FLUSH) 0.9 %
3-10 SYRINGE (ML) INJECTION AS NEEDED
Status: DISCONTINUED | OUTPATIENT
Start: 2019-07-09 | End: 2019-07-10 | Stop reason: HOSPADM

## 2019-07-09 RX ORDER — HYDRALAZINE HYDROCHLORIDE 20 MG/ML
10 INJECTION INTRAMUSCULAR; INTRAVENOUS ONCE
Status: DISCONTINUED | OUTPATIENT
Start: 2019-07-09 | End: 2019-07-10 | Stop reason: HOSPADM

## 2019-07-09 RX ORDER — SODIUM CHLORIDE 9 MG/ML
50 INJECTION, SOLUTION INTRAVENOUS CONTINUOUS
Status: DISPENSED | OUTPATIENT
Start: 2019-07-09 | End: 2019-07-10

## 2019-07-09 RX ORDER — MORPHINE SULFATE 2 MG/ML
2 INJECTION, SOLUTION INTRAMUSCULAR; INTRAVENOUS EVERY 4 HOURS PRN
Status: ACTIVE | OUTPATIENT
Start: 2019-07-09 | End: 2019-07-10

## 2019-07-09 RX ORDER — DOCUSATE SODIUM 100 MG/1
100 CAPSULE, LIQUID FILLED ORAL 2 TIMES DAILY
Status: DISCONTINUED | OUTPATIENT
Start: 2019-07-09 | End: 2019-07-10 | Stop reason: HOSPADM

## 2019-07-09 RX ORDER — SODIUM CHLORIDE 0.9 % (FLUSH) 0.9 %
3 SYRINGE (ML) INJECTION EVERY 12 HOURS SCHEDULED
Status: DISCONTINUED | OUTPATIENT
Start: 2019-07-09 | End: 2019-07-10 | Stop reason: HOSPADM

## 2019-07-09 RX ORDER — LANOLIN ALCOHOL/MO/W.PET/CERES
1000 CREAM (GRAM) TOPICAL DAILY
COMMUNITY

## 2019-07-09 RX ORDER — CLONIDINE HYDROCHLORIDE 0.1 MG/1
0.1 TABLET ORAL EVERY 4 HOURS PRN
Status: DISCONTINUED | OUTPATIENT
Start: 2019-07-09 | End: 2019-07-10 | Stop reason: HOSPADM

## 2019-07-09 RX ORDER — FAMOTIDINE 40 MG/1
40 TABLET, FILM COATED ORAL DAILY
Status: DISCONTINUED | OUTPATIENT
Start: 2019-07-09 | End: 2019-07-10 | Stop reason: HOSPADM

## 2019-07-09 RX ORDER — NALOXONE HCL 0.4 MG/ML
0.4 VIAL (ML) INJECTION
Status: DISCONTINUED | OUTPATIENT
Start: 2019-07-09 | End: 2019-07-10 | Stop reason: HOSPADM

## 2019-07-09 RX ORDER — ONDANSETRON 4 MG/1
4 TABLET, FILM COATED ORAL EVERY 6 HOURS PRN
Status: DISCONTINUED | OUTPATIENT
Start: 2019-07-09 | End: 2019-07-10 | Stop reason: HOSPADM

## 2019-07-09 RX ORDER — LABETALOL 100 MG/1
100 TABLET, FILM COATED ORAL EVERY 12 HOURS SCHEDULED
Status: DISCONTINUED | OUTPATIENT
Start: 2019-07-09 | End: 2019-07-10 | Stop reason: HOSPADM

## 2019-07-09 RX ORDER — ONDANSETRON 2 MG/ML
4 INJECTION INTRAMUSCULAR; INTRAVENOUS EVERY 6 HOURS PRN
Status: DISCONTINUED | OUTPATIENT
Start: 2019-07-09 | End: 2019-07-10 | Stop reason: HOSPADM

## 2019-07-09 RX ORDER — AMLODIPINE BESYLATE 10 MG/1
10 TABLET ORAL
Status: DISCONTINUED | OUTPATIENT
Start: 2019-07-09 | End: 2019-07-10 | Stop reason: HOSPADM

## 2019-07-09 RX ORDER — DEXTROSE MONOHYDRATE 25 G/50ML
25 INJECTION, SOLUTION INTRAVENOUS
Status: DISCONTINUED | OUTPATIENT
Start: 2019-07-09 | End: 2019-07-10 | Stop reason: HOSPADM

## 2019-07-09 RX ORDER — LOSARTAN POTASSIUM 50 MG/1
100 TABLET ORAL DAILY
Status: DISCONTINUED | OUTPATIENT
Start: 2019-07-09 | End: 2019-07-10 | Stop reason: HOSPADM

## 2019-07-09 RX ORDER — NICOTINE POLACRILEX 4 MG
15 LOZENGE BUCCAL
Status: DISCONTINUED | OUTPATIENT
Start: 2019-07-09 | End: 2019-07-10 | Stop reason: HOSPADM

## 2019-07-09 RX ORDER — ROSUVASTATIN CALCIUM 10 MG/1
10 TABLET, COATED ORAL NIGHTLY
Status: DISCONTINUED | OUTPATIENT
Start: 2019-07-09 | End: 2019-07-10 | Stop reason: HOSPADM

## 2019-07-09 RX ADMIN — SODIUM CHLORIDE 50 ML/HR: 9 INJECTION, SOLUTION INTRAVENOUS at 21:02

## 2019-07-09 RX ADMIN — LABETALOL HYDROCHLORIDE 100 MG: 100 TABLET, FILM COATED ORAL at 09:29

## 2019-07-09 RX ADMIN — LABETALOL HYDROCHLORIDE 100 MG: 100 TABLET, FILM COATED ORAL at 20:36

## 2019-07-09 RX ADMIN — ROSUVASTATIN CALCIUM 10 MG: 10 TABLET, FILM COATED ORAL at 20:35

## 2019-07-09 RX ADMIN — SODIUM CHLORIDE, PRESERVATIVE FREE 3 ML: 5 INJECTION INTRAVENOUS at 02:07

## 2019-07-09 RX ADMIN — TECHNETIUM TC 99M SESTAMIBI 1 DOSE: 1 INJECTION INTRAVENOUS at 10:20

## 2019-07-09 RX ADMIN — FAMOTIDINE 40 MG: 40 TABLET ORAL at 09:29

## 2019-07-09 RX ADMIN — ENOXAPARIN SODIUM 40 MG: 40 INJECTION SUBCUTANEOUS at 02:07

## 2019-07-09 RX ADMIN — INSULIN ASPART 2 UNITS: 100 INJECTION, SOLUTION INTRAVENOUS; SUBCUTANEOUS at 20:36

## 2019-07-09 RX ADMIN — Medication 200 MG: at 09:29

## 2019-07-09 RX ADMIN — AMLODIPINE BESYLATE 10 MG: 10 TABLET ORAL at 20:35

## 2019-07-09 RX ADMIN — LOSARTAN POTASSIUM 100 MG: 50 TABLET, FILM COATED ORAL at 09:29

## 2019-07-09 RX ADMIN — SODIUM CHLORIDE 50 ML/HR: 9 INJECTION, SOLUTION INTRAVENOUS at 02:07

## 2019-07-09 RX ADMIN — DOCUSATE SODIUM 100 MG: 100 CAPSULE, LIQUID FILLED ORAL at 09:29

## 2019-07-09 RX ADMIN — DOCUSATE SODIUM 100 MG: 100 CAPSULE, LIQUID FILLED ORAL at 20:36

## 2019-07-09 RX ADMIN — ROSUVASTATIN CALCIUM 10 MG: 10 TABLET, FILM COATED ORAL at 02:06

## 2019-07-09 NOTE — ED PROVIDER NOTES
Subjective   Thoracic back pain since 3 PM. +h/o CABG in 2014.        Back Pain   Location:  Thoracic spine  Quality:  Stabbing  Radiates to: chest.  Pain severity:  Moderate  Pain is:  Unable to specify  Onset quality:  Sudden  Duration:  5 hours  Timing:  Constant  Progression:  Worsening  Chronicity:  New  Relieved by:  Nothing  Worsened by:  Deep breathing  Associated symptoms: chest pain (right)    Associated symptoms: no abdominal pain, no dysuria, no fever, no headaches and no weakness    Chest pain:     Quality: stabbing      Duration:  5 hours    Timing:  Intermittent    Chronicity:  Recurrent  Risk factors: vascular disease        Review of Systems   Constitutional: Negative for appetite change, chills, diaphoresis, fatigue and fever.   HENT: Negative for congestion, ear discharge, ear pain, nosebleeds, rhinorrhea, sinus pressure, sore throat and trouble swallowing.    Eyes: Negative for discharge and redness.   Respiratory: Negative for apnea, cough, chest tightness, shortness of breath and wheezing.    Cardiovascular: Positive for chest pain (right).   Gastrointestinal: Negative for abdominal pain, diarrhea, nausea and vomiting.   Endocrine: Negative for polyuria.   Genitourinary: Negative for dysuria, frequency and urgency.   Musculoskeletal: Positive for back pain. Negative for myalgias and neck pain.   Skin: Negative for color change and rash.   Allergic/Immunologic: Negative for immunocompromised state.   Neurological: Positive for dizziness. Negative for seizures, syncope, weakness, light-headedness and headaches.   Hematological: Negative for adenopathy. Does not bruise/bleed easily.   Psychiatric/Behavioral: Negative for behavioral problems and confusion.   All other systems reviewed and are negative.      Past Medical History:   Diagnosis Date   • Cataract    • Coronary artery disease    • Diabetes mellitus (CMS/[facility])     Type 2 diabetes mellitus - without retinopathy      • History of  echocardiogram 05/12/2014    Normal LV systolic function with EF of 55% with mild hypokinesis. Diastolic relaxation abnormality of left ventricle. Mild tricuspid regurg. Trace mitral regurg   • Hyperlipemia    • Hypertension    • Myocardial infarction (CMS/HCC)        Allergies   Allergen Reactions   • Cherry Anaphylaxis   • Peanut-Containing Drug Products Anaphylaxis   • Articaine Other (See Comments)     Had at dentist office causes shaking   • Lidocaine Other (See Comments)     Lidocaine injection at dentist office caused shaking       Past Surgical History:   Procedure Laterality Date   • BACK SURGERY     • CARDIAC CATHETERIZATION  05/12/2014    Severe multivessel CAD with critical lesions noted in the LAD coronary artery, left circumflex coronary artery and RCA. Left ventriculogram no performed in view of elevated left ventricular end-diastolic pressure   • CATARACT EXTRACTION W/ INTRAOCULAR LENS IMPLANT Left 2/2/2018    Procedure: CATARACT PHACO EXTRACTION WITH INTRAOCULAR LENS IMPLANT;  Surgeon: Gagan Lizarraga MD;  Location: [facility];  Service:    • CATARACT EXTRACTION W/ INTRAOCULAR LENS IMPLANT Right 2/9/2018    Procedure: CATARACT PHACO EXTRACTION WITH INTRAOCULAR LENS IMPLANT;  Surgeon: Gagan Lizarraga MD;  Location: [facility];  Service:    • CORONARY ARTERY BYPASS GRAFT      X 3 with LIMA to LAD, SVG to OMB and SVG to PDA.   • DILATATION AND CURETTAGE     • OTHER SURGICAL HISTORY  05/06/2014    INCISION OF EARDRUM GENERAL ANESTHETIC 45774 (1)    Bilateral myringotomy with tubes.    • TONSILLECTOMY     • TONSILLECTOMY AND ADENOIDECTOMY     • TUBAL ABDOMINAL LIGATION  03/14/1978       Family History   Problem Relation Age of Onset   • Hypertension Mother    • Coronary artery disease Father    • Diabetes Other         grandmother   • Cancer Other        Social History     Socioeconomic History   • Marital status:      Spouse name: Not on file   • Number of children: Not on file   •  Years of education: Not on file   • Highest education level: Not on file   Tobacco Use   • Smoking status: Never Smoker   • Smokeless tobacco: Never Used   Substance and Sexual Activity   • Alcohol use: No   • Drug use: No   • Sexual activity: Defer           Objective   Physical Exam   Constitutional: She is oriented to person, place, and time. She appears well-developed and well-nourished.   HENT:   Head: Normocephalic and atraumatic.   Nose: Nose normal.   Mouth/Throat: Oropharynx is clear and moist.   Eyes: Conjunctivae and EOM are normal. Pupils are equal, round, and reactive to light. Right eye exhibits no discharge. Left eye exhibits no discharge. No scleral icterus.   Neck: Normal range of motion. Neck supple. No tracheal deviation present.   Cardiovascular: Normal rate, regular rhythm and normal heart sounds.   No murmur heard.  Pulmonary/Chest: Effort normal and breath sounds normal. No stridor. No respiratory distress. She has no wheezes. She has no rales.   Abdominal: Soft. Bowel sounds are normal. She exhibits no distension and no mass. There is no tenderness. There is no rebound and no guarding.   Musculoskeletal: She exhibits no edema.   Neurological: She is alert and oriented to person, place, and time. Coordination normal.   Skin: Skin is warm and dry. No rash noted. No erythema.   Psychiatric: She has a normal mood and affect. Her behavior is normal. Thought content normal.   Nursing note and vitals reviewed.      ECG 12 Lead    Date/Time: 7/9/2019 12:19 AM  Performed by: Jeffrey Wolfe MD  Authorized by: Jeffrey Wolfe MD   Interpreted by physician  Rhythm: sinus rhythm  Rate: normal  BPM: 63  ST Segments: ST segments normal                   ED Course          Labs Reviewed   COMPREHENSIVE METABOLIC PANEL - Abnormal; Notable for the following components:       Result Value    Glucose 142 (*)     All other components within normal limits    Narrative:     GFR Normal >60  Chronic  Kidney Disease <60  Kidney Failure <15   CBC WITH AUTO DIFFERENTIAL - Abnormal; Notable for the following components:    RBC 3.69 (*)     Hemoglobin 10.9 (*)     Hematocrit 33.1 (*)     MPV 12.3 (*)     Eosinophil % 7.4 (*)     Eosinophils, Absolute 0.50 (*)     All other components within normal limits   BASIC METABOLIC PANEL - Abnormal; Notable for the following components:    Glucose 108 (*)     Chloride 109 (*)     All other components within normal limits    Narrative:     GFR Normal >60  Chronic Kidney Disease <60  Kidney Failure <15   CBC WITH AUTO DIFFERENTIAL - Abnormal; Notable for the following components:    RBC 3.64 (*)     Hemoglobin 10.7 (*)     Hematocrit 33.3 (*)     MPV 12.2 (*)     Eosinophil % 8.3 (*)     Eosinophils, Absolute 0.51 (*)     All other components within normal limits   LIPID PANEL - Abnormal; Notable for the following components:    Triglycerides 198 (*)     All other components within normal limits    Narrative:     Cholesterol Reference Ranges  (U.S. Department of Health and Human Services ATP III Classifications)    Desirable          <200 mg/dL  Borderline High    200-239 mg/dL  High Risk          >240 mg/dL      Triglyceride Reference Ranges  (U.S. Department of Health and Human Services ATP III Classifications)    Normal           <150 mg/dL  Borderline High  150-199 mg/dL  High             200-499 mg/dL  Very High        >500 mg/dL    HDL Reference Ranges  (U.S. Department of Health and Human Services ATP III Classifcations)    Low     <40 mg/dl (major risk factor for CHD)  High    >60 mg/dl ('negative' risk factor for CHD)        LDL Reference Ranges  (U.S. Department of Health and Human Services ATP III Classifcations)    Optimal          <100 mg/dL  Near Optimal     100-129 mg/dL  Borderline High  130-159 mg/dL  High             160-189 mg/dL  Very High        >189 mg/dL   TROPONIN (IN-HOUSE) - Normal    Narrative:     Troponin T Reference Range:  <= 0.03 ng/mL-   Negative  for AMI  >0.03 ng/mL-     Abnormal for myocardial necrosis.  Clinicians would have to utilize clinical acumen, EKG, Troponin and serial changes to determine if it is an Acute Myocardial Infarction or myocardial injury due to an underlying chronic condition.    TROPONIN (IN-HOUSE) - Normal    Narrative:     Troponin T Reference Range:  <= 0.03 ng/mL-   Negative for AMI  >0.03 ng/mL-     Abnormal for myocardial necrosis.  Clinicians would have to utilize clinical acumen, EKG, Troponin and serial changes to determine if it is an Acute Myocardial Infarction or myocardial injury due to an underlying chronic condition.    BNP (IN-HOUSE) - Normal    Narrative:     Among patients with dyspnea, NT-proBNP is highly sensitive for the detection of acute congestive heart failure. In addition NT-proBNP of <300 pg/ml effectively rules out acute congestive heart failure with 99% negative predictive value.   BNP (IN-HOUSE) - Normal    Narrative:     Among patients with dyspnea, NT-proBNP is highly sensitive for the detection of acute congestive heart failure. In addition NT-proBNP of <300 pg/ml effectively rules out acute congestive heart failure with 99% negative predictive value.   MAGNESIUM - Normal   PHOSPHORUS - Normal   TSH - Normal   TROPONIN (IN-HOUSE) - Normal    Narrative:     Troponin T Reference Range:  <= 0.03 ng/mL-   Negative for AMI  >0.03 ng/mL-     Abnormal for myocardial necrosis.  Clinicians would have to utilize clinical acumen, EKG, Troponin and serial changes to determine if it is an Acute Myocardial Infarction or myocardial injury due to an underlying chronic condition.    POCT GLUCOSE FINGERSTICK - Normal   POCT GLUCOSE FINGERSTICK - Normal   RAINBOW DRAW    Narrative:     The following orders were created for panel order [place] Draw.  Procedure                               Abnormality         Status                     ---------                               -----------         ------                      Light Blue Top[[id number]]                                   Final result               Green Top (Gel)[[id number]]                                  Final result               Lavender Top[[id number]]                                     Final result               Gold Top - SST[[id number]]                                   Final result                 Please view results for these tests on the individual orders.   POCT GLUCOSE FINGERSTICK   POCT GLUCOSE FINGERSTICK   POCT GLUCOSE FINGERSTICK   POCT GLUCOSE FINGERSTICK   POCT GLUCOSE FINGERSTICK   LIGHT BLUE TOP   GREEN TOP   LAVENDER TOP   GOLD TOP - SST   CBC AND DIFFERENTIAL    Narrative:     The following orders were created for panel order CBC & Differential.  Procedure                               Abnormality         Status                     ---------                               -----------         ------                     CBC Auto Differential[[id number]]        Abnormal            Final result                 Please view results for these tests on the individual orders.       US Gallbladder   Final Result   CONCLUSION:   Normal ultrasound of the gallbladder.   Fatty infiltration of the liver.      [id number]      Electronically signed by:  Daquan Castanon MD  7/9/2019 4:15 PM CDT   Workstation: [id number]      CT Angiogram Chest With Contrast   Final Result   1. No pulmonary embolus.   2. No obvious acute abnormality      Electronically signed by:  Tigre Geronimo MD  7/8/2019 11:42 PM   CDT Workstation: [id number] Chest 1 View   Final Result      There is no acute pleural-parenchymal process seen in the imaged   lung fields.      Electronically signed by:  Shemar Swan MD  7/8/2019 9:53 PM CDT   Workstation: [id number]                    MDM      Final diagnoses:   Precordial pain   Hypertensive urgency            Jeffrey Wolfe MD  07/09/19 [id number]

## 2019-07-09 NOTE — H&P
[facility] Medicine Admission    Date of Admission: 7/8/2019    Primary Care Physician: Munir Rodriguez MD    Chief Complaint:  was having pain in my back near my right shoulder blade    HPI:  The patient is an 83-year-old  woman with history of hypertension.  She takes several medications for her condition including Norvasc, labetalol, and losartan.  She states that she has been compliant with recommended therapy for her blood pressure.  Earlier on the day of presentation, she was at her son's house, when she experienced a severe right upper back pain which she said made her feel very uncomfortable.  There was no nausea vomiting, no diaphoresis, no chest pain.  She was brought to the emergency department where she was evaluated, and noted to have significantly elevated blood pressure.  Her blood pressure at presentation was 214/84.  She was promptly evaluated and ruled out for aortic dissection.  She was also administered a parenteral anti-hypertensive medication (vasotec) to control her blood pressure.  PRN hydralazine was initiated and the patient was subsequently admitted for further work-up and management.    Past Medical History:  has a past medical history of Cataract, Coronary artery disease, Diabetes mellitus (CMS/[facility]), History of echocardiogram (05/12/2014), Hyperlipemia, Hypertension, and Myocardial infarction (CMS/[facility]).    Past Surgical History:  has a past surgical history that includes Tonsillectomy; Back surgery; Dilation and curettage of uterus; Coronary artery bypass graft; Cardiac catheterization (05/12/2014); Other surgical history (05/06/2014); Tonsillectomy and adenoidectomy; Tubal ligation (03/14/1978); Cataract extraction w/ intraocular lens implant (Left, 2/2/2018); and Cataract extraction w/ intraocular lens implant (Right, 2/9/2018).    Family History: family history includes Cancer in her other; Coronary artery disease in her father;  Diabetes in her other; Hypertension in her mother.    Social History:  reports that she has never smoked. She has never used smokeless tobacco. She reports that she does not drink alcohol or use drugs.    Allergies:   Allergies   Allergen Reactions   • Cherry Anaphylaxis   • Peanut-Containing Drug Products Anaphylaxis   • Articaine Other (See Comments)     Had at dentist office causes shaking   • Lidocaine Other (See Comments)     Lidocaine injection at dentist office caused shaking       Medications:   Prior to Admission medications    Medication Sig Start Date End Date Taking? Authorizing Provider   amLODIPine (NORVASC) 5 MG tablet Take 5 mg by mouth daily.    Sonido Cortez MD   aspirin 81 MG disintegrating tablet Take 81 mg by mouth.    Sonido Cortez MD   budesonide (RINOCORT AQUA) 32 MCG/ACT nasal spray 2 sprays into each nostril Daily.    Sonido Cortez MD   CO ENZYME Q-10 PO Take 100 mg by mouth.    Sonido Cortez MD   docusate sodium (COLACE) 100 MG capsule Take 100 mg by mouth Daily.    Sonido Cortez MD   ipratropium (ATROVENT) 0.03 % nasal spray ADMINISTER 2 SPRAYS TO EACH NOSTRIL 3 TIMES DAILY AS NEEDED 3/14/18   Sonido Cortez MD   labetalol (NORMODYNE) 200 MG tablet Take 100 mg by mouth 2 (two) times a day.    Sonido Cortez MD   losartan (COZAAR) 100 MG tablet Take 100 mg by mouth Daily.    Sonido Cortez MD   Magnesium 250 MG tablet Take 250 mg by mouth Daily.    Sonido Cortez MD   metFORMIN ER (GLUCOPHAGE-XR) 750 MG 24 hr tablet Take 1,500 mg by mouth Every Night.    Sonido Cortez MD   ONE TOUCH ULTRA TEST test strip USE TO TEST BLOOD SUGAR EVERY DAY DX.E11.9 10/14/16   Sonido Cortez MD   rosuvastatin (CRESTOR) 10 MG tablet Take 10 mg by mouth Every Night.    Sonido Cortez MD   vitamin B-12 (CYANOCOBALAMIN) 1000 MCG tablet Take 1,000 mcg by mouth.    Sonido Cortez MD     Review of Systems:  Review of  Systems   Otherwise complete ROS is negative except as mentioned above.    Physical Exam:   Temp:  [99 °F (37.2 °C)] 99 °F (37.2 °C)  Heart Rate:  [56-95] 60  Resp:  [20] 20  BP: (158-214)/(72-89) 175/81  Physical exam:  Constitutional:  Well-developed and well-nourished.  No respiratory distress.  Overweight.  HENT:  Head:  Normocephalic and atraumatic.  Mouth:  Moist mucous membranes.    Eyes:  Conjunctivae and EOM are normal.  Pupils are equal, round.  No scleral icterus.    Neck:  Neck supple.  No JVD present.    Cardiovascular:  Normal rate, regular rhythm and normal heart sounds with no murmur.  Pulmonary/Chest:  No respiratory distress, no wheezes, no crackles, with normal breath sounds and good air movement.  Abdominal:  Soft.  Bowel sounds are normal.  No distension and no tenderness.   Musculoskeletal:  No edema, no tenderness, and no deformity.  No red or swollen joints anywhere.    Neurological:  Alert and oriented to person, place, and time.  No cranial nerve deficit.  No tongue deviation.  No facial droop.  No slurred speech.   Skin:  Skin is warm and dry. No rash noted. No pallor.   Peripheral vascular:  Strong pulses in all 4 extremities with no clubbing, no cyanosis, no edema.    Results Reviewed:  I have personally reviewed current lab, radiology, and data and agree with results.  Lab Results (last 24 hours)     Procedure Component Value Units Date/Time    Troponin [[id number]] Collected:  07/08/19 2341    Specimen:  Blood from Arm, Left Updated:  07/08/19 2347    [place] Draw [[id number]] Collected:  07/08/19 2049    Specimen:  Blood Updated:  07/08/19 2200    Narrative:       The following orders were created for panel order [place] Draw.  Procedure                               Abnormality         Status                     ---------                               -----------         ------                     Light Blue Top[[id number]]                                   Final result                Green Top (Gel)[[id number]]                                  Final result               Lavender Top[[id number]]                                     Final result               Gold Top - SST[[id number]]                                   Final result                 Please view results for these tests on the individual orders.    Light Blue Top [[id number]] Collected:  07/08/19 2049    Specimen:  Blood Updated:  07/08/19 2200     Extra Tube hold for add-on     Comment: Auto resulted       Lavender Top [[id number]] Collected:  07/08/19 2049    Specimen:  Blood Updated:  07/08/19 2200     Extra Tube hold for add-on     Comment: Auto resulted       Gold Top - SST [[id number]] Collected:  07/08/19 2049    Specimen:  Blood Updated:  07/08/19 2200     Extra Tube Hold for add-ons.     Comment: Auto resulted.       Green Top (Gel) [[id number]] Collected:  07/08/19 2049    Specimen:  Blood Updated:  07/08/19 2200     Extra Tube Hold for add-ons.     Comment: Auto resulted.       Comprehensive Metabolic Panel [[id number]]  (Abnormal) Collected:  07/08/19 2049    Specimen:  Blood Updated:  07/08/19 2127     Glucose 142 mg/dL      BUN 20 mg/dL      Creatinine 0.89 mg/dL      Sodium 142 mmol/L      Potassium 4.6 mmol/L      Chloride 105 mmol/L      CO2 24.0 mmol/L      Calcium 10.3 mg/dL      Total Protein 6.9 g/dL      Albumin 4.20 g/dL      ALT (SGPT) 7 U/L      AST (SGOT) 14 U/L      Alkaline Phosphatase 89 U/L      Total Bilirubin 0.2 mg/dL      eGFR Non African Amer 61 mL/min/1.73      Globulin 2.7 gm/dL      A/G Ratio 1.6 g/dL      BUN/Creatinine Ratio 22.5     Anion Gap 13.0 mmol/L     Narrative:       GFR Normal >60  Chronic Kidney Disease <60  Kidney Failure <15    Troponin [[id number]]  (Normal) Collected:  07/08/19 2049    Specimen:  Blood Updated:  07/08/19 2126     Troponin T <0.010 ng/mL     Narrative:       Troponin T Reference Range:  <= 0.03 ng/mL-   Negative for AMI  >0.03 ng/mL-     Abnormal for myocardial necrosis.   Clinicians would have to utilize clinical acumen, EKG, Troponin and serial changes to determine if it is an Acute Myocardial Infarction or myocardial injury due to an underlying chronic condition.     BNP [[id number]]  (Normal) Collected:  07/08/19 2049    Specimen:  Blood Updated:  07/08/19 2125     proBNP 373.9 pg/mL     Narrative:       Among patients with dyspnea, NT-proBNP is highly sensitive for the detection of acute congestive heart failure. In addition NT-proBNP of <300 pg/ml effectively rules out acute congestive heart failure with 99% negative predictive value.    CBC & Differential [[id number]] Collected:  07/08/19 2049    Specimen:  Blood Updated:  07/08/19 2113    Narrative:       The following orders were created for panel order CBC & Differential.  Procedure                               Abnormality         Status                     ---------                               -----------         ------                     CBC Auto Differential[[id number]]        Abnormal            Final result                 Please view results for these tests on the individual orders.    CBC Auto Differential [[id number]]  (Abnormal) Collected:  07/08/19 2049    Specimen:  Blood Updated:  07/08/19 2113     WBC 6.80 10*3/mm3      RBC 3.69 10*6/mm3      Hemoglobin 10.9 g/dL      Hematocrit 33.1 %      MCV 89.7 fL      MCH 29.5 pg      MCHC 32.9 g/dL      RDW 14.4 %      RDW-SD 46.9 fl      MPV 12.3 fL      Platelets 183 10*3/mm3      Neutrophil % 63.4 %      Lymphocyte % 19.9 %      Monocyte % 8.2 %      Eosinophil % 7.4 %      Basophil % 1.0 %      Immature Grans % 0.1 %      Neutrophils, Absolute 4.31 10*3/mm3      Lymphocytes, Absolute 1.35 10*3/mm3      Monocytes, Absolute 0.56 10*3/mm3      Eosinophils, Absolute 0.50 10*3/mm3      Basophils, Absolute 0.07 10*3/mm3      Immature Grans, Absolute 0.01 10*3/mm3      nRBC 0.0 /100 WBC         Imaging Results (last 24 hours)     Procedure Component Value Units Date/Time     CT Angiogram Chest With Contrast [[id number]] Collected:  07/08/19 2242     Updated:  07/08/19 2343    Narrative:       Exam: CT angiogram chest with contrast    INDICATION: Chest pain    TECHNIQUE: Routine CT angiogram chest with contrast. Computer  generated 3-D reconstruction/MIPS were obtained. This exam was  performed according to our departmental dose-optimization  program, which includes automated exposure control, adjustment of  the mA and/or kV according to patient size and/or use of  iterative reconstruction technique.    COMPARISON: 7/15/2014    FINDINGS: No mediastinal or hilar lymphadenopathy. No thoracic  aortic aneurysm or dissection. Normal enhancement of pulmonary  arterial tree. No visible intraluminal thrombus. Heart size is  normal. Coronary artery calcifications are present. Status post  CABG hip. No change in 2 mm nodular density in the right upper  lobe. Lungs otherwise are clear. No pneumothorax or pleural  effusion.    The imaged upper abdomen is unremarkable.    Thoracic spondylosis compatible with DISH.      Impression:       1. No pulmonary embolus.  2. No obvious acute abnormality    Electronically signed by:  Tigre Geronimo MD  7/8/2019 11:42 PM  CDT Workstation: [id number]    XR Chest 1 View [[id number]] Collected:  07/08/19 2137     Updated:  07/08/19 2154    Narrative:       PROCEDURE: XR CHEST 1 VIEW    HISTORY: Chest pain protocol  chest pain protocol    COMPARISON: 6/4/2014     TECHNIQUE:     Single projection of the chest was done.    FINDINGS:    Note is made of median sternotomy .     There are no discrete airspace infiltrates, pneumothoraces or  pleural effusions.    The pulmonary vascularity is normal.    The cardiomediastinal silhouette is stable.      Impression:         There is no acute pleural-parenchymal process seen in the imaged  lung fields.    Electronically signed by:  Shemar Swan MD  7/8/2019 9:53 PM CDT  Workstation: [id number]           Assessment:  Hypertensive urgency  Atypical chest pain  There are no active hospital problems to display for this patient.          Plan:  Admit the patient for overnight observation and for further work-up  Review home medications and make reconciliations as necessary  Obtain further studies, including TSH, magnesium level, 2D echo, free T4, BNP, troponin.  Resume the patient's home medications for hypertension  Add as needed clonidine with hold parameters.  The patient will need adjustment in therapy for better BP control after discharge    I discussed the patients findings and my recommendations with the patient and her family.     Shadi Dotson MD  07/09/19  12:06 AM

## 2019-07-09 NOTE — PLAN OF CARE
Problem: Patient Care Overview  Goal: Plan of Care Review  Outcome: Ongoing (interventions implemented as appropriate)   07/09/19 0413   Coping/Psychosocial   Plan of Care Reviewed With patient;spouse   Plan of Care Review   Progress improving   OTHER   Outcome Summary pt's vss, appears to be resting, no complaints of pain, lipid panel and echo today, will continue to monitor     Goal: Individualization and Mutuality  Outcome: Ongoing (interventions implemented as appropriate)    Goal: Discharge Needs Assessment  Outcome: Ongoing (interventions implemented as appropriate)    Goal: Interprofessional Rounds/Family Conf  Outcome: Ongoing (interventions implemented as appropriate)      Problem: Fall Risk (Adult)  Goal: Identify Related Risk Factors and Signs and Symptoms  Outcome: Outcome(s) achieved Date Met: 07/09/19    Goal: Absence of Fall  Outcome: Ongoing (interventions implemented as appropriate)      Problem: Cardiac: ACS (Acute Coronary Syndrome) (Adult)  Goal: Signs and Symptoms of Listed Potential Problems Will be Absent, Minimized or Managed (Cardiac: ACS)  Outcome: Ongoing (interventions implemented as appropriate)

## 2019-07-09 NOTE — PROGRESS NOTES
[facility] Medicine Services  INPATIENT PROGRESS NOTE    Length of Stay: 0  Date of Admission: 7/8/2019  Primary Care Physician: Munir Rodriguez MD    Subjective   Chief Complaint: Chest pain with radiation into the right shoulder.     HPI:      7/9/2019:  Patient states she feels better today.  Serial cardiac enzymes were unremarkable.  Gallbladder ultrasound and stress test pending.       H&P by Dr. Dotson: The patient is an 83-year-old  woman with history of hypertension.  She takes several medications for her condition including Norvasc, labetalol, and losartan.  She states that she has been compliant with recommended therapy for her blood pressure.  Earlier on the day of presentation, she was at her son's house, when she experienced a severe right upper back pain which she said made her feel very uncomfortable.  There was no nausea vomiting, no diaphoresis, no chest pain.  She was brought to the emergency department where she was evaluated, and noted to have significantly elevated blood pressure.  Her blood pressure at presentation was 214/84.  She was promptly evaluated and ruled out for aortic dissection.  She was also administered a parenteral anti-hypertensive medication (vasotec) to control her blood pressure.  PRN hydralazine was initiated and the patient was subsequently admitted for further work-up and management.    Review of Systems   Constitutional: Negative for activity change and fatigue.   HENT: Negative for ear pain and sore throat.    Eyes: Negative for pain and discharge.   Respiratory: Negative for cough and shortness of breath.    Cardiovascular: Positive for chest pain. Negative for palpitations.   Gastrointestinal: Negative for abdominal pain and nausea.   Endocrine: Negative for cold intolerance and heat intolerance.   Genitourinary: Negative for difficulty urinating and dysuria.   Musculoskeletal: Positive for back pain. Negative for  arthralgias and gait problem.   Skin: Negative for color change and rash.   Neurological: Negative for dizziness and weakness.   Psychiatric/Behavioral: Negative for agitation and confusion.        Objective    Temp:  [97.6 °F (36.4 °C)-99 °F (37.2 °C)] 97.9 °F (36.6 °C)  Heart Rate:  [51-95] 52  Resp:  [18-20] 18  BP: (143-214)/(65-89) 161/72    Physical Exam   Constitutional: She is oriented to person, place, and time. She appears well-developed and well-nourished.   HENT:   Head: Normocephalic and atraumatic.   Eyes: EOM are normal. Pupils are equal, round, and reactive to light.   Neck: Normal range of motion. Neck supple.   Cardiovascular: Normal rate and regular rhythm.   Pulmonary/Chest: Effort normal and breath sounds normal.   Abdominal: Soft. Bowel sounds are normal.   Musculoskeletal: Normal range of motion.   Neurological: She is alert and oriented to person, place, and time.   Skin: Skin is warm and dry.   Psychiatric: She has a normal mood and affect. Her behavior is normal.     Results Review:  I have reviewed the labs, radiology results, and diagnostic studies.    Laboratory Data:   Results from last 7 days   Lab Units 07/09/19  0611 07/08/19  2049   SODIUM mmol/L 142 142   POTASSIUM mmol/L 4.5 4.6   CHLORIDE mmol/L 109* 105   CO2 mmol/L 22.0 24.0   BUN mg/dL 18 20   CREATININE mg/dL 0.80 0.89   GLUCOSE mg/dL 108* 142*   CALCIUM mg/dL 9.8 10.3   BILIRUBIN mg/dL  --  0.2   ALK PHOS U/L  --  89   ALT (SGPT) U/L  --  7   AST (SGOT) U/L  --  14   ANION GAP mmol/L 11.0 13.0     Estimated Creatinine Clearance: 60.2 mL/min (by C-G formula based on SCr of 0.8 mg/dL).  Results from last 7 days   Lab Units 07/09/19  0611   MAGNESIUM mg/dL 1.8   PHOSPHORUS mg/dL 4.4         Results from last 7 days   Lab Units 07/09/19  0527 07/08/19  2049   WBC 10*3/mm3 6.15 6.80   HEMOGLOBIN g/dL 10.7* 10.9*   HEMATOCRIT % 33.3* 33.1*   PLATELETS 10*3/mm3 144 183           Culture Data:   No results found for: BLOODCX  No  results found for: URINECX  No results found for: RESPCX  No results found for: WOUNDCX  No results found for: STOOLCX  No components found for: BODYFLD    Radiology Data:   Imaging Results (last 24 hours)     Procedure Component Value Units Date/Time    CT Angiogram Chest With Contrast [[id number]] Collected:  07/08/19 2242     Updated:  07/08/19 2343    Narrative:       Exam: CT angiogram chest with contrast    INDICATION: Chest pain    TECHNIQUE: Routine CT angiogram chest with contrast. Computer  generated 3-D reconstruction/MIPS were obtained. This exam was  performed according to our departmental dose-optimization  program, which includes automated exposure control, adjustment of  the mA and/or kV according to patient size and/or use of  iterative reconstruction technique.    COMPARISON: 7/15/2014    FINDINGS: No mediastinal or hilar lymphadenopathy. No thoracic  aortic aneurysm or dissection. Normal enhancement of pulmonary  arterial tree. No visible intraluminal thrombus. Heart size is  normal. Coronary artery calcifications are present. Status post  CABG hip. No change in 2 mm nodular density in the right upper  lobe. Lungs otherwise are clear. No pneumothorax or pleural  effusion.    The imaged upper abdomen is unremarkable.    Thoracic spondylosis compatible with DISH.      Impression:       1. No pulmonary embolus.  2. No obvious acute abnormality    Electronically signed by:  Tigre Geronimo MD  7/8/2019 11:42 PM  CDT Workstation: [id number]    XR Chest 1 View [[id number]] Collected:  07/08/19 2137     Updated:  07/08/19 2154    Narrative:       PROCEDURE: XR CHEST 1 VIEW    HISTORY: Chest pain protocol  chest pain protocol    COMPARISON: 6/4/2014     TECHNIQUE:     Single projection of the chest was done.    FINDINGS:    Note is made of median sternotomy .     There are no discrete airspace infiltrates, pneumothoraces or  pleural effusions.    The pulmonary vascularity is normal.    The  cardiomediastinal silhouette is stable.      Impression:         There is no acute pleural-parenchymal process seen in the imaged  lung fields.    Electronically signed by:  Shemar Swan MD  7/8/2019 9:53 PM CDT  Workstation: [id number]          I have reviewed the patient's current medications.     Assessment/Plan     Active Hospital Problems    Diagnosis POA   • Precordial pain [R07.2] Yes       Plan:    1.  Chest pain, rule out ACS: Serial cardiac enzymes and EKG unremarkable.  Stress test and echocardiogram pending.   2.  Hypertensive urgency: Continue home medications (Norvasc, Labetalol and Losartan)   3.  Coronary artery disease:  Continue aspirin.  Follows with Dr. Forrest.  S/P CABG 2014.  4.  Hyperlipidemia:  Continue home statin.    5.  Diabetes mellitus, type II:  SSI.   6.  Posterior shoulder pain:  Gallbladder ultrasound pending.       Discharge Planning: I expect patient to be discharged to home in 1-2 days.      This document has been electronically signed by PEGGY Radford on July 9, 2019 12:16 PM

## 2019-07-10 ENCOUNTER — APPOINTMENT (OUTPATIENT)
Dept: CARDIOLOGY | Facility: HOSPITAL | Age: 84
End: 2019-07-10

## 2019-07-10 ENCOUNTER — APPOINTMENT (OUTPATIENT)
Dept: NUCLEAR MEDICINE | Facility: HOSPITAL | Age: 84
End: 2019-07-10

## 2019-07-10 VITALS
TEMPERATURE: 97.7 F | RESPIRATION RATE: 18 BRPM | DIASTOLIC BLOOD PRESSURE: 71 MMHG | BODY MASS INDEX: 27.23 KG/M2 | SYSTOLIC BLOOD PRESSURE: 162 MMHG | WEIGHT: 173.5 LBS | OXYGEN SATURATION: 96 % | HEIGHT: 67 IN | HEART RATE: 57 BPM

## 2019-07-10 LAB
ALBUMIN SERPL-MCNC: 4.5 G/DL (ref 3.5–5.2)
ALBUMIN/GLOB SERPL: 1.5 G/DL
ALP SERPL-CCNC: 78 U/L (ref 39–117)
ALT SERPL W P-5'-P-CCNC: 5 U/L (ref 1–33)
ANION GAP SERPL CALCULATED.3IONS-SCNC: 11 MMOL/L (ref 5–15)
AST SERPL-CCNC: 14 U/L (ref 1–32)
BASOPHILS # BLD AUTO: 0.05 10*3/MM3 (ref 0–0.2)
BASOPHILS NFR BLD AUTO: 0.8 % (ref 0–1.5)
BH CV STRESS BP STAGE 1: NORMAL
BH CV STRESS COMMENTS STAGE 1: NORMAL
BH CV STRESS DOSE REGADENOSON STAGE 1: 0.4
BH CV STRESS DURATION MIN STAGE 1: 0
BH CV STRESS DURATION SEC STAGE 1: 10
BH CV STRESS HR STAGE 1: 75
BH CV STRESS PROTOCOL 1: NORMAL
BH CV STRESS RECOVERY BP: NORMAL MMHG
BH CV STRESS RECOVERY HR: 65 BPM
BH CV STRESS STAGE 1: 1
BILIRUB SERPL-MCNC: 0.3 MG/DL (ref 0.2–1.2)
BUN BLD-MCNC: 16 MG/DL (ref 8–23)
BUN/CREAT SERPL: 18.6 (ref 7–25)
CALCIUM SPEC-SCNC: 10 MG/DL (ref 8.6–10.5)
CHLORIDE SERPL-SCNC: 104 MMOL/L (ref 98–107)
CO2 SERPL-SCNC: 24 MMOL/L (ref 22–29)
CREAT BLD-MCNC: 0.86 MG/DL (ref 0.57–1)
DEPRECATED RDW RBC AUTO: 47.8 FL (ref 37–54)
EOSINOPHIL # BLD AUTO: 0.5 10*3/MM3 (ref 0–0.4)
EOSINOPHIL NFR BLD AUTO: 8.2 % (ref 0.3–6.2)
ERYTHROCYTE [DISTWIDTH] IN BLOOD BY AUTOMATED COUNT: 14.4 % (ref 12.3–15.4)
GFR SERPL CREATININE-BSD FRML MDRD: 63 ML/MIN/1.73
GLOBULIN UR ELPH-MCNC: 3 GM/DL
GLUCOSE BLD-MCNC: 155 MG/DL (ref 65–99)
GLUCOSE BLDC GLUCOMTR-MCNC: 111 MG/DL (ref 70–130)
GLUCOSE BLDC GLUCOMTR-MCNC: 164 MG/DL (ref 70–130)
HCT VFR BLD AUTO: 36 % (ref 34–46.6)
HGB BLD-MCNC: 11.7 G/DL (ref 12–15.9)
IMM GRANULOCYTES # BLD AUTO: 0.02 10*3/MM3 (ref 0–0.05)
IMM GRANULOCYTES NFR BLD AUTO: 0.3 % (ref 0–0.5)
LV EF NUC BP: 68 %
LYMPHOCYTES # BLD AUTO: 1.24 10*3/MM3 (ref 0.7–3.1)
LYMPHOCYTES NFR BLD AUTO: 20.4 % (ref 19.6–45.3)
MAXIMAL PREDICTED HEART RATE: 139 BPM
MCH RBC QN AUTO: 29.3 PG (ref 26.6–33)
MCHC RBC AUTO-ENTMCNC: 32.5 G/DL (ref 31.5–35.7)
MCV RBC AUTO: 90.2 FL (ref 79–97)
MONOCYTES # BLD AUTO: 0.34 10*3/MM3 (ref 0.1–0.9)
MONOCYTES NFR BLD AUTO: 5.6 % (ref 5–12)
NEUTROPHILS # BLD AUTO: 3.92 10*3/MM3 (ref 1.7–7)
NEUTROPHILS NFR BLD AUTO: 64.7 % (ref 42.7–76)
NRBC BLD AUTO-RTO: 0 /100 WBC (ref 0–0.2)
PERCENT MAX PREDICTED HR: 71.22 %
PLATELET # BLD AUTO: 195 10*3/MM3 (ref 140–450)
PMV BLD AUTO: 12.2 FL (ref 6–12)
POTASSIUM BLD-SCNC: 4.3 MMOL/L (ref 3.5–5.2)
PROT SERPL-MCNC: 7.5 G/DL (ref 6–8.5)
RBC # BLD AUTO: 3.99 10*6/MM3 (ref 3.77–5.28)
SODIUM BLD-SCNC: 139 MMOL/L (ref 136–145)
STRESS BASELINE BP: NORMAL MMHG
STRESS BASELINE HR: 65 BPM
STRESS PERCENT HR: 84 %
STRESS POST ESTIMATED WORKLOAD: 1 METS
STRESS POST PEAK BP: NORMAL MMHG
STRESS POST PEAK HR: 99 BPM
STRESS TARGET HR: 118 BPM
WBC NRBC COR # BLD: 6.07 10*3/MM3 (ref 3.4–10.8)

## 2019-07-10 PROCEDURE — 63710000001 INSULIN ASPART PER 5 UNITS: Performed by: NURSE PRACTITIONER

## 2019-07-10 PROCEDURE — G0378 HOSPITAL OBSERVATION PER HR: HCPCS

## 2019-07-10 PROCEDURE — 25010000002 ENOXAPARIN PER 10 MG: Performed by: INTERNAL MEDICINE

## 2019-07-10 PROCEDURE — 85025 COMPLETE CBC W/AUTO DIFF WBC: CPT | Performed by: NURSE PRACTITIONER

## 2019-07-10 PROCEDURE — 82962 GLUCOSE BLOOD TEST: CPT

## 2019-07-10 PROCEDURE — 80053 COMPREHEN METABOLIC PANEL: CPT | Performed by: NURSE PRACTITIONER

## 2019-07-10 PROCEDURE — A9500 TC99M SESTAMIBI: HCPCS | Performed by: INTERNAL MEDICINE

## 2019-07-10 PROCEDURE — 0 TECHNETIUM SESTAMIBI: Performed by: INTERNAL MEDICINE

## 2019-07-10 PROCEDURE — 25010000002 REGADENOSON 0.4 MG/5ML SOLUTION: Performed by: NURSE PRACTITIONER

## 2019-07-10 PROCEDURE — 96372 THER/PROPH/DIAG INJ SC/IM: CPT

## 2019-07-10 RX ORDER — AMLODIPINE BESYLATE 10 MG/1
10 TABLET ORAL
Qty: 30 TABLET | Refills: 3 | Status: SHIPPED | OUTPATIENT
Start: 2019-07-10 | End: 2019-08-15

## 2019-07-10 RX ORDER — HYDRALAZINE HYDROCHLORIDE 25 MG/1
25 TABLET, FILM COATED ORAL EVERY 12 HOURS SCHEDULED
Qty: 60 TABLET | Refills: 3 | Status: SHIPPED | OUTPATIENT
Start: 2019-07-10 | End: 2019-08-15

## 2019-07-10 RX ORDER — 0.9 % SODIUM CHLORIDE 0.9 %
10 VIAL (ML) INJECTION ONCE
Status: COMPLETED | OUTPATIENT
Start: 2019-07-10 | End: 2019-07-10

## 2019-07-10 RX ORDER — SPIRONOLACTONE 25 MG/1
25 TABLET ORAL DAILY
Status: DISCONTINUED | OUTPATIENT
Start: 2019-07-10 | End: 2019-07-10 | Stop reason: HOSPADM

## 2019-07-10 RX ORDER — HYDRALAZINE HYDROCHLORIDE 25 MG/1
25 TABLET, FILM COATED ORAL EVERY 12 HOURS SCHEDULED
Status: DISCONTINUED | OUTPATIENT
Start: 2019-07-10 | End: 2019-07-10 | Stop reason: HOSPADM

## 2019-07-10 RX ORDER — SPIRONOLACTONE 25 MG/1
25 TABLET ORAL DAILY
Qty: 30 TABLET | Refills: 3 | Status: SHIPPED | OUTPATIENT
Start: 2019-07-10

## 2019-07-10 RX ADMIN — Medication 200 MG: at 09:52

## 2019-07-10 RX ADMIN — HYDRALAZINE HYDROCHLORIDE 25 MG: 25 TABLET, FILM COATED ORAL at 14:52

## 2019-07-10 RX ADMIN — ENOXAPARIN SODIUM 40 MG: 40 INJECTION SUBCUTANEOUS at 04:20

## 2019-07-10 RX ADMIN — TECHNETIUM TC 99M SESTAMIBI 1 DOSE: 1 INJECTION INTRAVENOUS at 08:16

## 2019-07-10 RX ADMIN — SODIUM CHLORIDE, PRESERVATIVE FREE 10 ML: 5 INJECTION INTRAVENOUS at 08:16

## 2019-07-10 RX ADMIN — SODIUM CHLORIDE, PRESERVATIVE FREE 3 ML: 5 INJECTION INTRAVENOUS at 09:06

## 2019-07-10 RX ADMIN — DOCUSATE SODIUM 100 MG: 100 CAPSULE, LIQUID FILLED ORAL at 09:04

## 2019-07-10 RX ADMIN — LABETALOL HYDROCHLORIDE 100 MG: 100 TABLET, FILM COATED ORAL at 09:06

## 2019-07-10 RX ADMIN — REGADENOSON 0.4 MG: 0.08 INJECTION, SOLUTION INTRAVENOUS at 08:16

## 2019-07-10 RX ADMIN — SPIRONOLACTONE 25 MG: 25 TABLET ORAL at 14:52

## 2019-07-10 RX ADMIN — LOSARTAN POTASSIUM 100 MG: 50 TABLET, FILM COATED ORAL at 09:05

## 2019-07-10 RX ADMIN — INSULIN ASPART 2 UNITS: 100 INJECTION, SOLUTION INTRAVENOUS; SUBCUTANEOUS at 11:22

## 2019-07-10 NOTE — DISCHARGE SUMMARY
[facility] Medicine Services  DISCHARGE SUMMARY       Date of Admission: 7/8/2019  Date of Discharge:  7/10/2019  Primary Care Physician: Munir Rodriguez MD    Presenting Problem/History of Present Illness:  Precordial pain [R07.2]  Hypertensive urgency [I16.0]     Final Discharge Diagnoses:  Active Hospital Problems    Diagnosis   • Precordial pain       Consults:   Consults     Date and Time Order Name Status Description    7/9/2019 0018 Hospitalist (on-call MD unless specified)            Pertinent Test Results:   Lab Results (last 24 hours)     Procedure Component Value Units Date/Time    POC Glucose Once [[id number]]  (Abnormal) Collected:  07/10/19 1102    Specimen:  Blood Updated:  07/10/19 1123     Glucose 164 mg/dL      Comment: RN NotifiedOperator: [id number] [facility] ID: [id number]       Comprehensive Metabolic Panel [[id number]]  (Abnormal) Collected:  07/10/19 0916    Specimen:  Blood Updated:  07/10/19 1038     Glucose 155 mg/dL      BUN 16 mg/dL      Creatinine 0.86 mg/dL      Sodium 139 mmol/L      Potassium 4.3 mmol/L      Chloride 104 mmol/L      CO2 24.0 mmol/L      Calcium 10.0 mg/dL      Total Protein 7.5 g/dL      Albumin 4.50 g/dL      ALT (SGPT) 5 U/L      AST (SGOT) 14 U/L      Alkaline Phosphatase 78 U/L      Total Bilirubin 0.3 mg/dL      eGFR Non African Amer 63 mL/min/1.73      Globulin 3.0 gm/dL      A/G Ratio 1.5 g/dL      BUN/Creatinine Ratio 18.6     Anion Gap 11.0 mmol/L     Narrative:       GFR Normal >60  Chronic Kidney Disease <60  Kidney Failure <15    CBC & Differential [[id number]] Collected:  07/10/19 0916    Specimen:  Blood Updated:  07/10/19 1015    Narrative:       The following orders were created for panel order CBC & Differential.  Procedure                               Abnormality         Status                     ---------                               -----------         ------                     CBC Auto  Differential[[id number]]        Abnormal            Final result                 Please view results for these tests on the individual orders.    CBC Auto Differential [[id number]]  (Abnormal) Collected:  07/10/19 0916    Specimen:  Blood Updated:  07/10/19 1015     WBC 6.07 10*3/mm3      RBC 3.99 10*6/mm3      Hemoglobin 11.7 g/dL      Hematocrit 36.0 %      MCV 90.2 fL      MCH 29.3 pg      MCHC 32.5 g/dL      RDW 14.4 %      RDW-SD 47.8 fl      MPV 12.2 fL      Platelets 195 10*3/mm3      Neutrophil % 64.7 %      Lymphocyte % 20.4 %      Monocyte % 5.6 %      Eosinophil % 8.2 %      Basophil % 0.8 %      Immature Grans % 0.3 %      Neutrophils, Absolute 3.92 10*3/mm3      Lymphocytes, Absolute 1.24 10*3/mm3      Monocytes, Absolute 0.34 10*3/mm3      Eosinophils, Absolute 0.50 10*3/mm3      Basophils, Absolute 0.05 10*3/mm3      Immature Grans, Absolute 0.02 10*3/mm3      nRBC 0.0 /100 WBC     POC Glucose Once [[id number]]  (Normal) Collected:  07/10/19 0612    Specimen:  Blood Updated:  07/10/19 0653     Glucose 111 mg/dL      Comment: RN NotifiedOperator: [id number] [facility] ID: [id number]       POC Glucose Once [[id number]]  (Abnormal) Collected:  07/09/19 2020    Specimen:  Blood Updated:  07/09/19 2116     Glucose 164 mg/dL      Comment: RN NotifiedOperator: [id number] [facility] ID: [id number]       POC Glucose Once [[id number]]  (Abnormal) Collected:  07/09/19 1718    Specimen:  Blood Updated:  07/09/19 1737     Glucose 151 mg/dL      Comment: RN NotifiedOperator: [id number] [facility] ID: [id number]           Imaging Results (last 24 hours)     Procedure Component Value Units Date/Time    US Gallbladder [[id number]] Collected:  07/09/19 1536     Updated:  07/09/19 1616    Narrative:         Ultrasound gallbladder    HISTORY: Right shoulder pain radiating from the chest. Precordial  pain.    Ultrasound examination of the right upper quadrant was performed.    COMPARISON:  "None    Fatty infiltration of the liver.  No focal intrahepatic lesion.  The gallbladder is well displayed.  No calculi, wall thickening or pericholecystic fluid.  Common bile duct is within normal limits at 8.4 mm.  Images of the right kidney are unremarkable.  Right kidney 10.57 cm in length.  Pancreas partially obscured by bowel gas.      Impression:       CONCLUSION:  Normal ultrasound of the gallbladder.  Fatty infiltration of the liver.    [id number]    Electronically signed by:  Daquan Castanon MD  7/9/2019 4:15 PM CDT  Workstation: [employer]          Chief Complaint on Day of Discharge: No complaints    Hospital Course:   This is an 83 year old  female with PMH of HTN, CAD (h/o CABG) , DM II and HLD that presented to [facility] on 7/8/2019 with complaints of right upper back and chest pain.  The patient was found to have elevated blood pressure (214/84) on admission. CTA of the chest ruled out pulmonary embolism and aortic dissection.  Serial cardiac enzymes and EKG were unremarkable.  Gallbladder ultrasound was negative.   Echocardiogram showed a grade I diastolic dysfunction.  BNP was stable and no signs of fluid overload.  A stress test was unremarkable for ischemia.  I spoke with the patient's cardiologist, Dr. Forrest, and the patient was started on Aldactone and Hydralazine.   The patient will follow with PCP in one week.  Follow with Dr. Forrest in one month.     Condition on Discharge:  Stable    Physical Exam on Discharge:  /66 (BP Location: Left arm, Patient Position: Sitting)   Pulse 55   Temp 97.7 °F (36.5 °C) (Temporal)   Resp 18   Ht 170.2 cm (67\")   Wt 78.7 kg (173 lb 8 oz)   LMP  (LMP Unknown)   SpO2 95%   BMI 27.17 kg/m²   Physical Exam   Constitutional: She is oriented to person, place, and time. She appears well-developed and well-nourished.   HENT:   Head: Normocephalic and atraumatic.   Eyes: EOM are normal. Pupils are equal, round, and reactive to light.   Neck: Normal " range of motion. Neck supple.   Cardiovascular: Normal rate and regular rhythm.   Pulmonary/Chest: Effort normal and breath sounds normal.   Abdominal: Soft. Bowel sounds are normal.   Musculoskeletal: Normal range of motion.   Neurological: She is alert and oriented to person, place, and time.   Skin: Skin is warm and dry.   Psychiatric: She has a normal mood and affect. Her behavior is normal.       Discharge Disposition:  Home or Self Care    Discharge Medications:     Discharge Medications      New Medications      Instructions Start Date   hydrALAZINE 25 MG tablet  Commonly known as:  APRESOLINE   25 mg, Oral, Every 12 Hours Scheduled      spironolactone 25 MG tablet  Commonly known as:  ALDACTONE   25 mg, Oral, Daily         Changes to Medications      Instructions Start Date   amLODIPine 10 MG tablet  Commonly known as:  NORVASC  What changed:    · medication strength  · how much to take  · when to take this   10 mg, Oral, Every 24 Hours Scheduled         Continue These Medications      Instructions Start Date   aspirin 81 MG disintegrating tablet   81 mg, Oral, Daily      budesonide 32 MCG/ACT nasal spray  Commonly known as:  RINOCORT AQUA   2 sprays, Nasal, Daily      CO ENZYME Q-10 PO   100 mg, Oral, Daily      docusate sodium 100 MG capsule  Commonly known as:  COLACE   100 mg, Oral, Daily      FERREX 150 PO   150 mg, Oral, Daily      ipratropium 0.03 % nasal spray  Commonly known as:  ATROVENT   ADMINISTER 2 SPRAYS TO EACH NOSTRIL 3 TIMES DAILY AS NEEDED      labetalol 200 MG tablet  Commonly known as:  NORMODYNE   100 mg, Oral, 2 Times Daily      losartan 100 MG tablet  Commonly known as:  COZAAR   100 mg, Oral, Daily      Magnesium 250 MG tablet   250 mg, Oral, Daily      meclizine 25 MG tablet  Commonly known as:  ANTIVERT   25 mg, Oral, 3 Times Daily PRN      metFORMIN  MG 24 hr tablet  Commonly known as:  GLUCOPHAGE-XR   1,500 mg, Oral, Nightly      ONE TOUCH ULTRA TEST test strip  Generic  drug:  glucose blood   USE TO TEST BLOOD SUGAR EVERY DAY DX.E11.9      rosuvastatin 10 MG tablet  Commonly known as:  CRESTOR   10 mg, Oral, Nightly      SALINE NASAL SPRAY NA   Nasal, As Needed      vitamin B-12 1000 MCG tablet  Commonly known as:  CYANOCOBALAMIN   1,000 mcg, Oral, Daily             Discharge Diet:   Diet Instructions     Diet: Cardiac      Discharge Diet:  Cardiac          Activity at Discharge:   Activity Instructions     Activity as Tolerated            Discharge Care Plan/Instructions: As above.     Follow-up Appointment:  Follow-up Information     Munir Rodriguez MD Follow up in 1 week(s).    Specialty:  Family Medicine  Contact information:  [address] [place] [address]  [phone]             Ember Forrest MD Follow up in 1 month(s).    Specialty:  Cardiology  Why:  Blood pressure recheck   Contact information:  [address] [place] [address] [address]  [facility] [address] [place] [address]  [phone]                     This document has been electronically signed by PEGGY Radford on July 10, 2019 2:06 PM        Time: Greater than 30 minutes.

## 2019-07-10 NOTE — PLAN OF CARE
Problem: Patient Care Overview  Goal: Plan of Care Review  Outcome: Ongoing (interventions implemented as appropriate)   07/09/19 2029   Coping/Psychosocial   Plan of Care Reviewed With patient   Plan of Care Review   Progress no change   OTHER   Outcome Summary pt had a echo today and EF was 51%, U/S gallbladder was negative; pt had first portion of stress test today     Goal: Individualization and Mutuality  Outcome: Ongoing (interventions implemented as appropriate)    Goal: Discharge Needs Assessment  Outcome: Ongoing (interventions implemented as appropriate)    Goal: Interprofessional Rounds/Family Conf  Outcome: Ongoing (interventions implemented as appropriate)      Problem: Fall Risk (Adult)  Goal: Absence of Fall  Outcome: Ongoing (interventions implemented as appropriate)      Problem: Cardiac: ACS (Acute Coronary Syndrome) (Adult)  Goal: Signs and Symptoms of Listed Potential Problems Will be Absent, Minimized or Managed (Cardiac: ACS)  Outcome: Ongoing (interventions implemented as appropriate)

## 2019-07-10 NOTE — PLAN OF CARE
Problem: Patient Care Overview  Goal: Plan of Care Review  Outcome: Ongoing (interventions implemented as appropriate)   07/10/19 0304   Coping/Psychosocial   Plan of Care Reviewed With patient   Plan of Care Review   Progress improving   OTHER   Outcome Summary pt's vss, no complaints of pain, appears to be resting, second part of stress test today, will continue to monitor     Goal: Individualization and Mutuality  Outcome: Ongoing (interventions implemented as appropriate)    Goal: Discharge Needs Assessment  Outcome: Ongoing (interventions implemented as appropriate)    Goal: Interprofessional Rounds/Family Conf  Outcome: Ongoing (interventions implemented as appropriate)      Problem: Fall Risk (Adult)  Goal: Absence of Fall  Outcome: Ongoing (interventions implemented as appropriate)      Problem: Cardiac: ACS (Acute Coronary Syndrome) (Adult)  Goal: Signs and Symptoms of Listed Potential Problems Will be Absent, Minimized or Managed (Cardiac: ACS)  Outcome: Ongoing (interventions implemented as appropriate)

## 2019-07-19 ENCOUNTER — TELEPHONE (OUTPATIENT)
Dept: CARDIOLOGY | Facility: CLINIC | Age: 84
End: 2019-07-19

## 2019-07-19 NOTE — TELEPHONE ENCOUNTER
Patient had left VM about questions on B/P medications. She wasn't feeling well and now feels better asked about still taking medications. I told her as long as her B/P wasn't low when she checked it. She should be ok to take it. She verbalized understanding.

## 2019-07-22 ENCOUNTER — NURSE TRIAGE (OUTPATIENT)
Dept: CALL CENTER | Facility: HOSPITAL | Age: 84
End: 2019-07-22

## 2019-07-23 ENCOUNTER — TELEPHONE (OUTPATIENT)
Dept: CARDIOLOGY | Facility: CLINIC | Age: 84
End: 2019-07-23

## 2019-07-23 NOTE — TELEPHONE ENCOUNTER
Patient having having symptoms with head pounding and feels like her heart beats fast. B/P was elevated 195/79 last night before taking night time medicine and then through the night  She had above symptoms. She wants to know if she needs to stop taking the hydralazine or not and if she could see Dr Forrest sooner than 8/15. I told her I would discuss with Dr Forrest and either I or his MA would call her back.

## 2019-07-23 NOTE — TELEPHONE ENCOUNTER
"    Reason for Disposition  • Systolic BP  >= 180 OR Diastolic >= 110    Additional Information  • Negative: Difficult to awaken or acting confused (e.g., disoriented, slurred speech)  • Negative: Severe difficulty breathing (e.g., struggling for each breath, speaks in single words)  • Negative: [1] Weakness of the face, arm or leg on one side of the body AND [2] new onset  • Negative: [1] Numbness (i.e., loss of sensation) of the face, arm or leg on one side of the body AND [2] new onset  • Negative: [1] Chest pain lasts > 5 minutes AND [2] history of heart disease  (i.e., heart attack, bypass surgery, angina, angioplasty, CHF)  • Negative: [1] Chest pain AND [2] took nitrogylcerin AND [3] pain was not relieved  • Negative: Sounds like a life-threatening emergency to the triager  • Negative: Symptom is main concern  (e.g., headache, chest pain)  • Negative: Low blood pressure is main concern  • Negative: [1] Systolic BP  >= 160 OR Diastolic >= 100 AND [2] cardiac or neurologic symptoms (e.g., chest pain, difficulty breathing, unsteady gait, blurred vision)  • Negative: [1] Pregnant > 20 weeks AND [2] new hand or face swelling  • Negative: [1] Pregnant > 20 weeks AND [2] BP Systolic BP  >= 140 OR Diastolic >= 90  • Negative: [1] Systolic BP  >= 200 OR Diastolic >= 120  AND [2] having NO cardiac or neurologic symptoms  • Negative: [1] Systolic BP  >= 180 OR Diastolic >= 110 AND [2] missed most recent dose of blood pressure medication    Answer Assessment - Initial Assessment Questions  1. BLOOD PRESSURE: \"What is the blood pressure?\" \"Did you take at least two measurements 5 minutes apart?\"      195/79 HR 80; second 194/78 HR 79  2. ONSET: \"When did you take your blood pressure?\"      10 PM and during triage  3. HOW: \"How did you obtain the blood pressure?\" (e.g., visiting nurse, automatic home BP monitor)      Automatic home BP machine  4. HISTORY: \"Do you have a history of high blood pressure?\"      yes  5. " "MEDICATIONS: \"Are you taking any medications for blood pressure?\" \"Have you missed any doses recently?\"      Yes; none misse  6. OTHER SYMPTOMS: \"Do you have any symptoms?\" (e.g., headache, chest pain, blurred vision, difficulty breathing, weakness)      Denies other symptoms  7. PREGNANCY: \"Is there any chance you are pregnant?\" \"When was your last menstrual period?\"      na    Protocols used: HIGH BLOOD PRESSURE-ADULT-AH      "

## 2019-08-15 ENCOUNTER — OFFICE VISIT (OUTPATIENT)
Dept: CARDIOLOGY | Facility: CLINIC | Age: 84
End: 2019-08-15

## 2019-08-15 VITALS
HEIGHT: 67 IN | BODY MASS INDEX: 26.68 KG/M2 | OXYGEN SATURATION: 99 % | SYSTOLIC BLOOD PRESSURE: 160 MMHG | WEIGHT: 170 LBS | DIASTOLIC BLOOD PRESSURE: 78 MMHG | HEART RATE: 67 BPM

## 2019-08-15 DIAGNOSIS — I25.10 CORONARY ARTERIOSCLEROSIS IN NATIVE ARTERY: Primary | ICD-10-CM

## 2019-08-15 DIAGNOSIS — I10 ESSENTIAL HYPERTENSION: ICD-10-CM

## 2019-08-15 DIAGNOSIS — E78.2 MIXED HYPERLIPIDEMIA: ICD-10-CM

## 2019-08-15 DIAGNOSIS — Z95.1 S/P CABG (CORONARY ARTERY BYPASS GRAFT): ICD-10-CM

## 2019-08-15 PROCEDURE — 99214 OFFICE O/P EST MOD 30 MIN: CPT | Performed by: INTERNAL MEDICINE

## 2019-08-15 RX ORDER — AMLODIPINE BESYLATE 5 MG/1
5 TABLET ORAL DAILY
Qty: 30 TABLET | Refills: 6 | Status: SHIPPED | OUTPATIENT
Start: 2019-08-15 | End: 2020-05-12

## 2019-08-15 RX ORDER — HYDROCHLOROTHIAZIDE 25 MG/1
12.5 TABLET ORAL DAILY
Qty: 30 TABLET | Refills: 6 | Status: SHIPPED | OUTPATIENT
Start: 2019-08-15 | End: 2020-06-16

## 2019-08-15 NOTE — PROGRESS NOTES
Alexia Lopez  84 y.o. female    08/15/2019  1. Coronary arteriosclerosis in native artery    2. Mixed hyperlipidemia    3. Essential hypertension    4. S/P CABG (coronary artery bypass graft)        History of Present Illness  Ms. Lopez is an 84 year old  female with PMH of HTN, CAD (h/o CABG) , DM II and Hyperlipidemia who presented to [facility] on 7/8/2019 with right upper back and chest pain.  The patient was found to have elevated blood pressure (214/84) on admission. CTA of the chest ruled out pulmonary embolism and aortic dissection.  Serial cardiac enzymes and EKG were unremarkable.  Gallbladder ultrasound was negative. BNP was stable and no signs of CHF was noted.     Lexiscan Cardiolite stress test showed:   · EKG during Lexiscan stress normal  · Normal LV systolic function. No wall motion abnormalities. EF 68%  · No fixed or reversible defects noted.  · Normal myocardial perfusion. Low risk study    Echocardiogram showed:  · Left ventricular wall thickness is consistent with mild-to-moderate concentric hypertrophy.  · Estimated EF = 57%.  · Left ventricular systolic function is normal.  · Left ventricular diastolic dysfunction (grade I) consistent with impaired relaxation.  · Right ventricular cavity is mildly dilated.  · Left atrial cavity size is mildly dilated.  · Mild mitral valve regurgitation is present  · Mild tricuspid valve regurgitation is present.    She underwent coronary artery bypass surgery in May 2014 when she received LIMA to LAD, SVG to obtuse marginal and SVG to PDA.    She was started on a combination of hydralazine and Aldactone and her routine antihypertensive medications including labetalol, losartan, amlodipine but continued.  However the patient did have episodes of dizziness and I noted that amlodipine was discontinued by Dr. Rodriguez and she has been compliant with all other medications.  She does however have issues with palpitations during the night and fluctuating  blood pressures.  She brought several blood pressure readings when her systolic seems to vary from 130 to 180 mmHg. Her diastolic blood pressures have for the most part remain normal.    SUBJECTIVE    Allergies   Allergen Reactions   • Cherry Anaphylaxis   • Peanut-Containing Drug Products Anaphylaxis   • Articaine Other (See Comments)     Had at dentist office causes shaking   • Lidocaine Other (See Comments)     Lidocaine injection at dentist office caused shaking         Past Medical History:   Diagnosis Date   • Cataract    • Coronary artery disease    • Diabetes mellitus (CMS/[facility])     Type 2 diabetes mellitus - without retinopathy      • History of echocardiogram 05/12/2014    Normal LV systolic function with EF of 55% with mild hypokinesis. Diastolic relaxation abnormality of left ventricle. Mild tricuspid regurg. Trace mitral regurg   • Hyperlipemia    • Hypertension    • Myocardial infarction (CMS/HCC)          Past Surgical History:   Procedure Laterality Date   • BACK SURGERY     • CARDIAC CATHETERIZATION  05/12/2014    Severe multivessel CAD with critical lesions noted in the LAD coronary artery, left circumflex coronary artery and RCA. Left ventriculogram no performed in view of elevated left ventricular end-diastolic pressure   • CATARACT EXTRACTION W/ INTRAOCULAR LENS IMPLANT Left 2/2/2018    Procedure: CATARACT PHACO EXTRACTION WITH INTRAOCULAR LENS IMPLANT;  Surgeon: Gagan Lizarraga MD;  Location: [facility];  Service:    • CATARACT EXTRACTION W/ INTRAOCULAR LENS IMPLANT Right 2/9/2018    Procedure: CATARACT PHACO EXTRACTION WITH INTRAOCULAR LENS IMPLANT;  Surgeon: Gagan Lizarraga MD;  Location: [facility] OR;  Service:    • CORONARY ARTERY BYPASS GRAFT      X 3 with LIMA to LAD, SVG to OMB and SVG to PDA.   • DILATATION AND CURETTAGE     • OTHER SURGICAL HISTORY  05/06/2014    INCISION OF EARDRUM GENERAL ANESTHETIC 65897 (1)    Bilateral myringotomy with tubes.    • TONSILLECTOMY     •  TONSILLECTOMY AND ADENOIDECTOMY     • TUBAL ABDOMINAL LIGATION  03/14/1978         Family History   Problem Relation Age of Onset   • Hypertension Mother    • Coronary artery disease Father    • Diabetes Other         grandmother   • Cancer Other          Social History     Socioeconomic History   • Marital status:      Spouse name: Not on file   • Number of children: Not on file   • Years of education: Not on file   • Highest education level: Not on file   Tobacco Use   • Smoking status: Never Smoker   • Smokeless tobacco: Never Used   Substance and Sexual Activity   • Alcohol use: No   • Drug use: No   • Sexual activity: Defer         Current Outpatient Medications   Medication Sig Dispense Refill   • aspirin 81 MG disintegrating tablet Take 81 mg by mouth Daily.     • budesonide (RINOCORT AQUA) 32 MCG/ACT nasal spray 2 sprays into each nostril Daily.     • CO ENZYME Q-10 PO Take 100 mg by mouth Daily.     • docusate sodium (COLACE) 100 MG capsule Take 100 mg by mouth Daily.     • hydrALAZINE (APRESOLINE) 25 MG tablet Take 1 tablet by mouth Every 12 (Twelve) Hours. 60 tablet 3   • labetalol (NORMODYNE) 200 MG tablet Take 100 mg by mouth 2 (two) times a day.     • losartan (COZAAR) 100 MG tablet Take 100 mg by mouth Daily.     • metFORMIN ER (GLUCOPHAGE-XR) 750 MG 24 hr tablet Take 1,500 mg by mouth Every Night.     • ONE TOUCH ULTRA TEST test strip USE TO TEST BLOOD SUGAR EVERY DAY DX.E11.9  5   • Polysaccharide Iron Complex (FERREX 150 PO) Take 150 mg by mouth Daily.     • rosuvastatin (CRESTOR) 10 MG tablet Take 10 mg by mouth Every Night.     • SALINE NASAL SPRAY NA into the nostril(s) as directed by provider As Needed.     • spironolactone (ALDACTONE) 25 MG tablet Take 1 tablet by mouth Daily. 30 tablet 3   • vitamin B-12 (CYANOCOBALAMIN) 1000 MCG tablet Take 1,000 mcg by mouth Daily.     • amLODIPine (NORVASC) 10 MG tablet Take 1 tablet by mouth Daily. 30 tablet 3   • ipratropium (ATROVENT) 0.03 %  "nasal spray ADMINISTER 2 SPRAYS TO EACH NOSTRIL 3 TIMES DAILY AS NEEDED  5   • Magnesium 250 MG tablet Take 250 mg by mouth Daily.     • meclizine (ANTIVERT) 25 MG tablet Take 25 mg by mouth 3 (Three) Times a Day As Needed for dizziness.       No current facility-administered medications for this visit.          OBJECTIVE    /78   Pulse 67   Ht 170.2 cm (67\")   Wt 77.1 kg (170 lb)   LMP  (LMP Unknown)   SpO2 99%   BMI 26.63 kg/m²         Review of Systems     Constitutional:  Denies recent weight loss, weight gain, fever or chills     HENT:  Denies any hearing loss, epistaxis, hoarseness, or difficulty speaking.     Eyes: Wears eyeglasses or contact lenses     Respiratory:  Denies dyspnea with exertion,no cough, wheezing, or hemoptysis.     Cardiovascular: See HPI    Gastrointestinal:  Denies change in bowel habits, dyspepsia, ulcer disease, hematochezia, or melena.     Endocrine: Negative for cold intolerance, heat intolerance, polydipsia, polyphagia and polyuria.     Genitourinary: Negative.      Musculoskeletal: Denies any history of arthritic symptoms or back problems     Skin:  Denies any change in hair or nails, rashes, or skin lesions.     Allergic/Immunologic: Negative.  Negative for environmental allergies, food allergies and immunocompromised state.     Neurological:  Denies any history of recurrent headaches, strokes, TIA, or seizure disorder.     Hematological: Denies any food allergies, seasonal allergies, bleeding disorders, or lymphadenopathy.     Psychiatric/Behavioral: Denies any history of depression, substance abuse, or change in cognitive function.         Physical Exam     Constitutional: Cooperative, alert and oriented, in no acute distress.     HENT:   Head: Normocephalic, normal hair patterns, no masses or tenderness.  Ears, Nose, and Throat: No gross abnormalities. No pallor or cyanosis.   Eyes: EOMS intact, PERRL, conjunctivae and lids unremarkable. Fundoscopic exam and visual " fields not performed.   Neck: No palpable masses or adenopathy, no thyromegaly, no JVD, carotid pulses are full and equal bilaterally and without  Bruits.     Cardiovascular: Regular rhythm, S1 and S2 normal, no S3 or S4. No murmurs, gallops, or rubs detected.     Pulmonary/Chest: Chest: normal symmetry, normal respiratory excursion, no intercostal retraction, no use of accessory muscles.            Pulmonary: Normal breath sounds. No rales or ronchi.    Abdominal: Abdomen soft, bowel sounds normoactive, no masses, no hepatosplenomegaly, non-tender, no bruits.     Musculoskeletal: No deformities, clubbing, cyanosis, erythema, or edema observed.     Neurological: No gross motor or sensory deficits noted, affect appropriate, oriented to time, person, place.     Skin: Warm and dry to the touch, no apparent skin lesions or masses noted.     Psychiatric: She has a normal mood and affect. Her behavior is normal. Judgment and thought content normal.         Procedures      Lab Results   Component Value Date    WBC 6.07 07/10/2019    HGB 11.7 (L) 07/10/2019    HCT 36.0 07/10/2019    MCV 90.2 07/10/2019     07/10/2019     Lab Results   Component Value Date    GLUCOSE 155 (H) 07/10/2019    BUN 16 07/10/2019    CREATININE 0.86 07/10/2019    EGFRIFNONA 63 07/10/2019    BCR 18.6 07/10/2019    CO2 24.0 07/10/2019    CALCIUM 10.0 07/10/2019    ALBUMIN 4.50 07/10/2019    AST 14 07/10/2019    ALT 5 07/10/2019     Lab Results   Component Value Date    CHOL 163 07/09/2019    CHOL 174 04/19/2019    CHOL 157 10/19/2018     Lab Results   Component Value Date    TRIG 198 (H) 07/09/2019    TRIG 235 (H) 04/19/2019    TRIG 258 (H) 10/19/2018     Lab Results   Component Value Date    HDL 52 07/09/2019    HDL 46 04/19/2019    HDL 44 10/19/2018     No components found for: LDLCALC  Lab Results   Component Value Date    LDL 71 07/09/2019    LDL 85 04/19/2019    LDL 75 10/19/2018     No results found for: HDLLDLRATIO  No components found  for: CHOLHDL  Lab Results   Component Value Date    HGBA1C 6.5 (H) 04/19/2019     Lab Results   Component Value Date    TSH 2.270 07/09/2019           ASSESSMENT AND PLAN  Ms. Lopez has multiple medical issues but presently has no clinical evidence of angina or congestive heart failure.  No arrhythmias noted.  Her blood pressure however has been fluctuating and after reviewing the situation I have made the following changes.  I have discontinued hydralazine and have restarted amlodipine 5 mg in p.m. Labetalol 100 mg twice daily, losartan 100 mg in a.m. and Aldactone 25 mg in a.m. has been continued.  I have started her on HCTZ 12.5 mg daily in a.m  Hopefully with these changes, her blood pressure will optimize and she has been advised to keep a close watch on it.  Lipid-lowering therapy with atorvastatin and antiplatelet therapy with aspirin has been continued.    Alexia was seen today for follow-up.    Diagnoses and all orders for this visit:    Coronary arteriosclerosis in native artery    Mixed hyperlipidemia    Essential hypertension    S/P CABG (coronary artery bypass graft)        Patient's Body mass index is 26.63 kg/m². BMI is above normal parameters. Recommendations include: exercise counseling and nutrition counseling.  Patient is a non-smoker      Ember Forrest MD  8/15/2019  2:11 PM

## 2019-12-03 ENCOUNTER — OFFICE VISIT (OUTPATIENT)
Dept: CARDIOLOGY | Facility: CLINIC | Age: 84
End: 2019-12-03

## 2019-12-03 VITALS
DIASTOLIC BLOOD PRESSURE: 60 MMHG | BODY MASS INDEX: 27 KG/M2 | HEIGHT: 67 IN | OXYGEN SATURATION: 99 % | SYSTOLIC BLOOD PRESSURE: 118 MMHG | WEIGHT: 172 LBS | HEART RATE: 61 BPM

## 2019-12-03 DIAGNOSIS — Z95.1 S/P CABG (CORONARY ARTERY BYPASS GRAFT): Primary | ICD-10-CM

## 2019-12-03 DIAGNOSIS — I10 ESSENTIAL HYPERTENSION: ICD-10-CM

## 2019-12-03 DIAGNOSIS — E78.2 MIXED HYPERLIPIDEMIA: ICD-10-CM

## 2019-12-03 PROCEDURE — 99214 OFFICE O/P EST MOD 30 MIN: CPT | Performed by: INTERNAL MEDICINE

## 2019-12-03 RX ORDER — PREDNISONE 10 MG/1
TABLET ORAL
COMMUNITY
Start: 2019-11-19 | End: 2020-08-11

## 2019-12-03 NOTE — PROGRESS NOTES
Alexia Lopez  84 y.o. female    12/03/2019  1. S/P CABG (coronary artery bypass graft)    2. Mixed hyperlipidemia    3. Essential hypertension        History of Present Illness  Ms. Lopez is an 84 year old  female with PMH of HTN, CAD (h/o CABG) , DM II and Hyperlipidemia who presented to [facility] on 7/8/2019 with right upper back and chest pain.  The patient was found to have elevated blood pressure (214/84) on admission. CTA of the chest ruled out pulmonary embolism and aortic dissection.  Serial cardiac enzymes and EKG were unremarkable.  Gallbladder ultrasound was negative. BNP was stable and no signs of CHF was noted.     Lexiscan Cardiolite stress test showed:   · EKG during Lexiscan stress normal  · Normal LV systolic function. No wall motion abnormalities. EF 68%  · No fixed or reversible defects noted.  · Normal myocardial perfusion. Low risk study     Echocardiogram showed:  · Left ventricular wall thickness is consistent with mild-to-moderate concentric hypertrophy.  · Estimated EF = 57%.  · Left ventricular systolic function is normal.  · Left ventricular diastolic dysfunction (grade I) consistent with impaired relaxation.  · Right ventricular cavity is mildly dilated.  · Left atrial cavity size is mildly dilated.  · Mild mitral valve regurgitation is present  · Mild tricuspid valve regurgitation is present.     She underwent coronary artery bypass surgery in May 2014 when she received LIMA to LAD, SVG to obtuse marginal and SVG to PDA.     She was placed on a combination of amlodipine, labetalol, losartan, Aldactone and this seems to have helped her blood pressure and her blood pressure was in the normal range today.  She has been compliant with her medications.  She denied any chest pain or shortness of breath but has occasional fluttering which is very brief.  No dizziness or syncope is reported.    SUBJECTIVE    Allergies   Allergen Reactions   • Cherry Anaphylaxis   • Peanut-Containing  Drug Products Anaphylaxis   • Articaine Other (See Comments)     Had at dentist office causes shaking   • Lidocaine Other (See Comments)     Lidocaine injection at dentist office caused shaking         Past Medical History:   Diagnosis Date   • Cataract    • Coronary artery disease    • Diabetes mellitus (CMS/[facility])     Type 2 diabetes mellitus - without retinopathy      • History of echocardiogram 05/12/2014    Normal LV systolic function with EF of 55% with mild hypokinesis. Diastolic relaxation abnormality of left ventricle. Mild tricuspid regurg. Trace mitral regurg   • Hyperlipemia    • Hypertension    • Myocardial infarction (CMS/HCC)          Past Surgical History:   Procedure Laterality Date   • BACK SURGERY     • CARDIAC CATHETERIZATION  05/12/2014    Severe multivessel CAD with critical lesions noted in the LAD coronary artery, left circumflex coronary artery and RCA. Left ventriculogram no performed in view of elevated left ventricular end-diastolic pressure   • CATARACT EXTRACTION W/ INTRAOCULAR LENS IMPLANT Left 2/2/2018    Procedure: CATARACT PHACO EXTRACTION WITH INTRAOCULAR LENS IMPLANT;  Surgeon: Gagan Lizarraga MD;  Location: [facility];  Service:    • CATARACT EXTRACTION W/ INTRAOCULAR LENS IMPLANT Right 2/9/2018    Procedure: CATARACT PHACO EXTRACTION WITH INTRAOCULAR LENS IMPLANT;  Surgeon: Gagan Lizarraga MD;  Location: [facility];  Service:    • CORONARY ARTERY BYPASS GRAFT      X 3 with LIMA to LAD, SVG to OMB and SVG to PDA.   • DILATATION AND CURETTAGE     • OTHER SURGICAL HISTORY  05/06/2014    INCISION OF EARDRUM GENERAL ANESTHETIC 61784 (1)    Bilateral myringotomy with tubes.    • TONSILLECTOMY     • TONSILLECTOMY AND ADENOIDECTOMY     • TUBAL ABDOMINAL LIGATION  03/14/1978         Family History   Problem Relation Age of Onset   • Hypertension Mother    • Coronary artery disease Father    • Diabetes Other         grandmother   • Cancer Other          Social History  "    Socioeconomic History   • Marital status:      Spouse name: Not on file   • Number of children: Not on file   • Years of education: Not on file   • Highest education level: Not on file   Tobacco Use   • Smoking status: Never Smoker   • Smokeless tobacco: Never Used   Substance and Sexual Activity   • Alcohol use: No   • Drug use: No   • Sexual activity: Defer         Current Outpatient Medications   Medication Sig Dispense Refill   • amLODIPine (NORVASC) 5 MG tablet Take 1 tablet by mouth Daily. Take in PM 30 tablet 6   • aspirin 81 MG disintegrating tablet Take 81 mg by mouth Daily.     • budesonide (RINOCORT AQUA) 32 MCG/ACT nasal spray 2 sprays into each nostril Daily.     • CO ENZYME Q-10 PO Take 100 mg by mouth Daily.     • docusate sodium (COLACE) 100 MG capsule Take 100 mg by mouth Daily.     • hydrochlorothiazide (HYDRODIURIL) 25 MG tablet Take 0.5 tablets by mouth Daily. 30 tablet 6   • labetalol (NORMODYNE) 200 MG tablet Take 100 mg by mouth 2 (two) times a day.     • losartan (COZAAR) 100 MG tablet Take 100 mg by mouth Daily.     • metFORMIN ER (GLUCOPHAGE-XR) 750 MG 24 hr tablet Take 1,500 mg by mouth Every Night.     • ONE TOUCH ULTRA TEST test strip USE TO TEST BLOOD SUGAR EVERY DAY DX.E11.9  5   • Polysaccharide Iron Complex (FERREX 150 PO) Take 150 mg by mouth Daily.     • predniSONE (DELTASONE) 10 MG tablet Take 3 tabs daily for 3 days then 2 tabs daily for 3 days then 1 tab daily for 3 days     • rosuvastatin (CRESTOR) 10 MG tablet Take 10 mg by mouth Every Night.     • SALINE NASAL SPRAY NA into the nostril(s) as directed by provider As Needed.     • spironolactone (ALDACTONE) 25 MG tablet Take 1 tablet by mouth Daily. 30 tablet 3   • vitamin B-12 (CYANOCOBALAMIN) 1000 MCG tablet Take 1,000 mcg by mouth Daily.       No current facility-administered medications for this visit.          OBJECTIVE    /60   Pulse 61   Ht 170.2 cm (67.01\")   Wt 78 kg (172 lb)   LMP  (LMP Unknown)  "  SpO2 99%   BMI 26.93 kg/m²         Review of Systems     Constitutional:  Denies recent weight loss, weight gain, fever or chills     HENT:  Denies any hearing loss, epistaxis, hoarseness, or difficulty speaking.     Eyes: Wears eyeglasses or contact lenses     Respiratory:  Denies dyspnea with exertion,no cough, wheezing, or hemoptysis.     Cardiovascular: See HPI    Gastrointestinal:  Denies change in bowel habits, dyspepsia, ulcer disease, hematochezia, or melena.     Endocrine: Negative for cold intolerance, heat intolerance, polydipsia, polyphagia and polyuria.     Genitourinary: Negative.      Musculoskeletal: DJD    Skin:  Denies any change in hair or nails, rashes, or skin lesions.     Allergic/Immunologic: Negative.  Negative for environmental allergies, food allergies and immunocompromised state.     Neurological:  Denies any history of recurrent headaches, strokes, TIA, or seizure disorder.     Hematological: Denies any food allergies, seasonal allergies, bleeding disorders, or lymphadenopathy.     Psychiatric/Behavioral: Denies any history of depression, substance abuse, or change in cognitive function.         Physical Exam     Constitutional: Cooperative, alert and oriented,in no acute distress.     HENT:   Head: Normocephalic, normal hair patterns, no masses or tenderness.  Ears, Nose, and Throat: No gross abnormalities. No pallor or cyanosis.   Eyes: EOMS intact, PERRL, conjunctivae and lids unremarkable. Fundoscopic exam and visual fields not performed.   Neck: No palpable masses or adenopathy, no thyromegaly, no JVD, carotid pulses are full and equal bilaterally and without  Bruits.     Cardiovascular: Regular rhythm, S1 and S2 normal, no S3 or S4.  No murmurs, gallops, or rubs detected.     Pulmonary/Chest: Chest: normal symmetry,  normal respiratory excursion, no intercostal retraction, no use of accessory muscles.            Pulmonary: Normal breath sounds. No rales or ronchi.    Abdominal:  Abdomen soft, bowel sounds normoactive, no masses, no hepatosplenomegaly, non-tender, no bruits.     Musculoskeletal: No deformities, clubbing, cyanosis, erythema, or edema observed.     Neurological: No gross motor or sensory deficits noted, affect appropriate, oriented to time, person, place.     Skin: Warm and dry to the touch, no apparent skin lesions or masses noted.     Psychiatric: She has a normal mood and affect. Her behavior is normal. Judgment and thought content normal.         Procedures      Lab Results   Component Value Date    WBC 6.2 10/17/2019    HGB 10.7 (L) 10/17/2019    HCT 33.6 (L) 10/17/2019    MCV 96 (H) 10/17/2019     10/17/2019     Lab Results   Component Value Date    GLUCOSE 155 (H) 07/10/2019    BUN 24 (H) 10/17/2019    CREATININE 1 10/17/2019    EGFRIFNONA 63 07/10/2019    BCR 18.6 07/10/2019    CO2 24.0 07/10/2019    CALCIUM 9.6 10/17/2019    ALBUMIN 3.8 10/17/2019    AST 15 10/17/2019    ALT 9 (L) 10/17/2019     Lab Results   Component Value Date    CHOL 167 10/17/2019    CHOL 163 07/09/2019    CHOL 174 04/19/2019     Lab Results   Component Value Date    TRIG 226 (H) 10/17/2019    TRIG 198 (H) 07/09/2019    TRIG 235 (H) 04/19/2019     Lab Results   Component Value Date    HDL 43 10/17/2019    HDL 52 07/09/2019    HDL 46 04/19/2019     No components found for: LDLCALC  Lab Results   Component Value Date    LDL 86 10/17/2019    LDL 71 07/09/2019    LDL 85 04/19/2019     No results found for: HDLLDLRATIO  No components found for: CHOLHDL  Lab Results   Component Value Date    HGBA1C 6 10/17/2019     Lab Results   Component Value Date    TSH 2.270 07/09/2019           ASSESSMENT AND PLAN  Ms. Lopez is stable with regards to her heart with no clinical evidence of angina or congestive heart failure.  Her blood pressure is under good control on her present combination of amlodipine, labetalol, HCTZ and Aldactone.  Her LDL is in the normal range and Crestor has been  jose armandoRadha Hale was seen today for follow-up.    Diagnoses and all orders for this visit:    S/P CABG (coronary artery bypass graft)    Mixed hyperlipidemia    Essential hypertension        Patient's Body mass index is 26.93 kg/m². BMI is above normal parameters. Recommendations include: exercise counseling and nutrition counseling.  Patient is a non-smoker    Ember Forrest MD  12/3/2019  9:11 AM

## 2020-05-12 RX ORDER — AMLODIPINE BESYLATE 5 MG/1
TABLET ORAL
Qty: 90 TABLET | Refills: 2 | Status: SHIPPED | OUTPATIENT
Start: 2020-05-12

## 2020-06-15 DIAGNOSIS — Z95.1 S/P CABG (CORONARY ARTERY BYPASS GRAFT): Primary | ICD-10-CM

## 2020-06-16 ENCOUNTER — OFFICE VISIT (OUTPATIENT)
Dept: CARDIOLOGY | Facility: CLINIC | Age: 85
End: 2020-06-16

## 2020-06-16 VITALS
DIASTOLIC BLOOD PRESSURE: 70 MMHG | SYSTOLIC BLOOD PRESSURE: 118 MMHG | BODY MASS INDEX: 26.93 KG/M2 | HEART RATE: 56 BPM | WEIGHT: 172 LBS | OXYGEN SATURATION: 100 %

## 2020-06-16 DIAGNOSIS — I10 BENIGN ESSENTIAL HYPERTENSION: ICD-10-CM

## 2020-06-16 DIAGNOSIS — I10 ESSENTIAL HYPERTENSION: ICD-10-CM

## 2020-06-16 DIAGNOSIS — Z95.1 S/P CABG (CORONARY ARTERY BYPASS GRAFT): ICD-10-CM

## 2020-06-16 DIAGNOSIS — E78.2 MIXED HYPERLIPIDEMIA: Primary | ICD-10-CM

## 2020-06-16 PROCEDURE — 99214 OFFICE O/P EST MOD 30 MIN: CPT | Performed by: INTERNAL MEDICINE

## 2020-06-16 NOTE — PROGRESS NOTES
Alexia Lopez  84 y.o. female      1. Mixed hyperlipidemia    2. Essential hypertension    3. S/P CABG (coronary artery bypass graft)    4. Benign essential hypertension        History of Present Illness:  Ms. Lopez is an 84 year old  female with PMH of HTN, CAD (h/o CABG) , DM II and Hyperlipidemia who presented to [facility] on 7/8/2019 with right upper back and chest pain. The patient was found to have elevated blood pressure (214/84) on admission. CTA of the chest ruled out pulmonary embolism and aortic dissection.  Serial cardiac enzymes and EKG were unremarkable. Gallbladder ultrasound was negative. BNP was stable and no signs of CHF was noted.     Lexiscan Cardiolite stress test (7/ 2019) showed:   · EKG during Lexiscan stress normal  · Normal LV systolic function. No wall motion abnormalities. EF 68%  · No fixed or reversible defects noted.  · Normal myocardial perfusion. Low risk study     Echocardiogram (7/ 2019) showed:  · Left ventricular wall thickness is consistent with mild-to-moderate concentric hypertrophy.  · Estimated EF = 57%.  · Left ventricular systolic function is normal.  · Left ventricular diastolic dysfunction (grade I) consistent with impaired relaxation.  · Right ventricular cavity is mildly dilated.  · Left atrial cavity size is mildly dilated.  · Mild mitral valve regurgitation is present  · Mild tricuspid valve regurgitation is present.     She underwent coronary artery bypass surgery in May 2014 when she received LIMA to LAD, SVG to obtuse marginal and SVG to PDA.     She was placed on a combination of amlodipine, labetalol, losartan, Aldactone and this seems to have helped her blood pressure and her blood pressure was in the normal range today.  She has been compliant with her medications.  She denies any cardiac symptoms and her problems mainly relate to arthritis.    SUBJECTIVE    Allergies   Allergen Reactions   • Cherry Anaphylaxis   • Peanut-Containing Drug Products  Anaphylaxis   • Articaine Other (See Comments)     Had at dentist office causes shaking   • Lidocaine Other (See Comments)     Lidocaine injection at dentist office caused shaking         Past Medical History:   Diagnosis Date   • Cataract    • Coronary artery disease    • Diabetes mellitus (CMS/[facility])     Type 2 diabetes mellitus - without retinopathy      • History of echocardiogram 05/12/2014    Normal LV systolic function with EF of 55% with mild hypokinesis. Diastolic relaxation abnormality of left ventricle. Mild tricuspid regurg. Trace mitral regurg   • Hyperlipemia    • Hypertension    • Myocardial infarction (CMS/HCC)          Past Surgical History:   Procedure Laterality Date   • BACK SURGERY     • CARDIAC CATHETERIZATION  05/12/2014    Severe multivessel CAD with critical lesions noted in the LAD coronary artery, left circumflex coronary artery and RCA. Left ventriculogram no performed in view of elevated left ventricular end-diastolic pressure   • CATARACT EXTRACTION W/ INTRAOCULAR LENS IMPLANT Left 2/2/2018    Procedure: CATARACT PHACO EXTRACTION WITH INTRAOCULAR LENS IMPLANT;  Surgeon: Gagan Lizarraga MD;  Location: [facility];  Service:    • CATARACT EXTRACTION W/ INTRAOCULAR LENS IMPLANT Right 2/9/2018    Procedure: CATARACT PHACO EXTRACTION WITH INTRAOCULAR LENS IMPLANT;  Surgeon: Gagan Lizarraga MD;  Location: [facility];  Service:    • CORONARY ARTERY BYPASS GRAFT      X 3 with LIMA to LAD, SVG to OMB and SVG to PDA.   • DILATATION AND CURETTAGE     • OTHER SURGICAL HISTORY  05/06/2014    INCISION OF EARDRUM GENERAL ANESTHETIC 11994 (1)    Bilateral myringotomy with tubes.    • TONSILLECTOMY     • TONSILLECTOMY AND ADENOIDECTOMY     • TUBAL ABDOMINAL LIGATION  03/14/1978         Family History   Problem Relation Age of Onset   • Hypertension Mother    • Coronary artery disease Father    • Diabetes Other         grandmother   • Cancer Other          Social History     Socioeconomic  History   • Marital status:      Spouse name: Not on file   • Number of children: Not on file   • Years of education: Not on file   • Highest education level: Not on file   Tobacco Use   • Smoking status: Never Smoker   • Smokeless tobacco: Never Used   Substance and Sexual Activity   • Alcohol use: No   • Drug use: No   • Sexual activity: Defer         Current Outpatient Medications   Medication Sig Dispense Refill   • amLODIPine (NORVASC) 5 MG tablet TAKE 1 TABLET BY MOUTH DAILY IN THE EVENING 90 tablet 2   • aspirin 81 MG disintegrating tablet Take 81 mg by mouth Daily.     • budesonide (RINOCORT AQUA) 32 MCG/ACT nasal spray 2 sprays into each nostril Daily.     • CO ENZYME Q-10 PO Take 100 mg by mouth Daily.     • docusate sodium (COLACE) 100 MG capsule Take 100 mg by mouth Daily.     • labetalol (NORMODYNE) 200 MG tablet Take 100 mg by mouth 2 (two) times a day.     • losartan (COZAAR) 100 MG tablet Take 100 mg by mouth Daily.     • metFORMIN ER (GLUCOPHAGE-XR) 750 MG 24 hr tablet Take 1,500 mg by mouth Every Night.     • ONE TOUCH ULTRA TEST test strip USE TO TEST BLOOD SUGAR EVERY DAY DX.E11.9  5   • Polysaccharide Iron Complex (FERREX 150 PO) Take 150 mg by mouth Daily.     • predniSONE (DELTASONE) 10 MG tablet Take 3 tabs daily for 3 days then 2 tabs daily for 3 days then 1 tab daily for 3 days     • rosuvastatin (CRESTOR) 10 MG tablet Take 10 mg by mouth Every Night.     • SALINE NASAL SPRAY NA into the nostril(s) as directed by provider As Needed.     • spironolactone (ALDACTONE) 25 MG tablet Take 1 tablet by mouth Daily. 30 tablet 3   • vitamin B-12 (CYANOCOBALAMIN) 1000 MCG tablet Take 1,000 mcg by mouth Daily.     • hydrochlorothiazide (HYDRODIURIL) 25 MG tablet Take 0.5 tablets by mouth Daily. 30 tablet 6     No current facility-administered medications for this visit.          OBJECTIVE    /70 (BP Location: Right arm, Patient Position: Sitting, Cuff Size: Large Adult)   Pulse 56   Wt  78 kg (172 lb)   LMP  (LMP Unknown)   SpO2 100%   BMI 26.93 kg/m²         Review of Systems     Constitutional:  Denies recent weight loss, weight gain, fever or chills     HENT:  Denies any hearing loss, epistaxis, hoarseness, or difficulty speaking.     Eyes: Wears eyeglasses or contact lenses     Respiratory:  Denies dyspnea with exertion,no cough, wheezing, or hemoptysis.     Cardiovascular: See HPI    Gastrointestinal:  Denies change in bowel habits, dyspepsia, ulcer disease, hematochezia, or melena.     Endocrine: Negative for cold intolerance, heat intolerance, polydipsia, polyphagia and polyuria.     Genitourinary: Negative.      Musculoskeletal: DJD    Skin:  Denies any change in hair or nails, rashes, or skin lesions.     Allergic/Immunologic: Negative.  Negative for environmental allergies, food allergies and immunocompromised state.     Neurological:  Denies any history of recurrent headaches, strokes, TIA, or seizure disorder.     Hematological: Denies any food allergies, seasonal allergies, bleeding disorders, or lymphadenopathy.     Psychiatric/Behavioral: Denies any history of depression, substance abuse, or change in cognitive function.         Physical Exam     Constitutional: Cooperative, alert and oriented,in no acute distress.     HENT:   Head: Normocephalic, normal hair patterns, no masses or tenderness.  Ears, Nose, and Throat: No gross abnormalities. No pallor or cyanosis.   Eyes: EOMS intact, PERRL, conjunctivae and lids unremarkable. Fundoscopic exam and visual fields not performed.   Neck: No palpable masses or adenopathy, no thyromegaly, no JVD, carotid pulses are full and equal bilaterally and without  Bruits.     Cardiovascular: Regular rhythm, S1 and S2 normal, no S3 or S4.  No murmurs, gallops, or rubs detected.     Pulmonary/Chest: Chest: normal symmetry,  normal respiratory excursion, no intercostal retraction, no use of accessory muscles.            Pulmonary: Normal breath  sounds. No rales or ronchi.    Abdominal: Abdomen soft, bowel sounds normoactive, no masses, no hepatosplenomegaly, non-tender, no bruits.     Musculoskeletal: No deformities, clubbing, cyanosis, erythema, or edema observed.     Neurological: No gross motor or sensory deficits noted, affect appropriate, oriented to time, person, place.     Skin: Warm and dry to the touch, no apparent skin lesions or masses noted.     Psychiatric: She has a normal mood and affect. Her behavior is normal. Judgment and thought content normal.         Procedures      Lab Results   Component Value Date    WBC 6.2 10/17/2019    HGB 10.7 (L) 10/17/2019    HCT 33.6 (L) 10/17/2019    MCV 96 (H) 10/17/2019     10/17/2019     Lab Results   Component Value Date    GLUCOSE 155 (H) 07/10/2019    BUN 24 (H) 10/17/2019    CREATININE 1 10/17/2019    EGFRIFNONA 63 07/10/2019    BCR 18.6 07/10/2019    CO2 24.0 07/10/2019    CALCIUM 9.6 10/17/2019    ALBUMIN 3.8 10/17/2019    AST 15 10/17/2019    ALT 9 (L) 10/17/2019     Lab Results   Component Value Date    CHOL 167 10/17/2019    CHOL 163 07/09/2019    CHOL 174 04/19/2019     Lab Results   Component Value Date    TRIG 226 (H) 10/17/2019    TRIG 198 (H) 07/09/2019    TRIG 235 (H) 04/19/2019     Lab Results   Component Value Date    HDL 43 10/17/2019    HDL 52 07/09/2019    HDL 46 04/19/2019     No components found for: LDLCALC  Lab Results   Component Value Date    LDL 86 10/17/2019    LDL 71 07/09/2019    LDL 85 04/19/2019     No results found for: HDLLDLRATIO  No components found for: CHOLHDL  Lab Results   Component Value Date    HGBA1C 6.7 (H) 03/06/2020     Lab Results   Component Value Date    TSH 2.270 07/09/2019           ASSESSMENT AND PLAN  Ms. Lopez is stable with regards to her heart with no clinical evidence of angina or congestive heart failure.  Her blood pressure is under good control on her present combination of amlodipine, labetalol, and Aldactone.  Her LDL is in the normal  range and Crestor has been continued. I note that HCTZ was discontinued by Dr. Rodriguez.    Alexia was seen today for follow-up.    Diagnoses and all orders for this visit:    Mixed hyperlipidemia    Essential hypertension    S/P CABG (coronary artery bypass graft)    Benign essential hypertension        Patient's Body mass index is 26.93 kg/m². BMI is above normal parameters. Recommendations include: exercise counseling and nutrition counseling.  Patient is a non-smoker    Ember Forrest MD  6/16/2020  09:53

## 2020-08-11 ENCOUNTER — OFFICE VISIT (OUTPATIENT)
Dept: OTOLARYNGOLOGY | Facility: CLINIC | Age: 85
End: 2020-08-11

## 2020-08-11 VITALS — WEIGHT: 170.4 LBS | BODY MASS INDEX: 26.74 KG/M2 | OXYGEN SATURATION: 99 % | HEIGHT: 67 IN

## 2020-08-11 DIAGNOSIS — H72.93 PERFORATION OF BOTH TYMPANIC MEMBRANES: ICD-10-CM

## 2020-08-11 DIAGNOSIS — T48.5X5A RHINITIS MEDICAMENTOSA: Primary | ICD-10-CM

## 2020-08-11 DIAGNOSIS — J31.0 RHINITIS MEDICAMENTOSA: Primary | ICD-10-CM

## 2020-08-11 PROCEDURE — 99203 OFFICE O/P NEW LOW 30 MIN: CPT | Performed by: OTOLARYNGOLOGY

## 2020-08-11 RX ORDER — LORATADINE 10 MG/1
10 TABLET ORAL DAILY
COMMUNITY

## 2020-08-11 RX ORDER — FLUTICASONE PROPIONATE 50 MCG
2 SPRAY, SUSPENSION (ML) NASAL DAILY
Qty: 16 G | Refills: 11 | Status: SHIPPED | OUTPATIENT
Start: 2020-08-11

## 2020-08-11 NOTE — PROGRESS NOTES
"Subjective   Alexia Lopez is a 85 y.o. female.     History of Present Illness   Patient reports that she has had longstanding difficulty with nasal congestion.  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  She says her left ear drains intermittently.  She says she hears her heartbeat in her head at times.  Underwent sinus surgery in [place] in 2016.  Briefly experienced improvement in symptoms but then subsequently recurred.  Was sent for an allergy evaluation and the patient states \"the allergist said it was my sinuses and the sinus doctor said it was my allergies\" and so she decided to seek another opinion here.  Of note she uses Vicks salve every night in her nose and has for many years.  She also uses saline nasal spray but is not currently using a nasal steroid spray.  She says her nasal congestion will alternate from one side to the other and is particularly worse at night.      The following portions of the patient's history were reviewed and updated as appropriate: allergies, current medications, past family history, past medical history, past social history, past surgical history and problem list.      Alexia Lopez reports that she has never smoked. She has never used smokeless tobacco. She reports that she does not drink alcohol or use drugs.  Patient is not a tobacco user and has not been counseled for use of tobacco products    Family History   Problem Relation Age of Onset   • Hypertension Mother    • Coronary artery disease Father    • Diabetes Other         grandmother   • Cancer Other        Allergies   Allergen Reactions   • Cherry Anaphylaxis   • Peanut-Containing Drug Products Anaphylaxis   • Articaine Other (See Comments)     Had at dentist office causes shaking   • Lidocaine Other (See Comments)     Lidocaine injection at dentist office caused shaking         Current Outpatient Medications:   •  amLODIPine (NORVASC) 5 MG tablet, TAKE 1 TABLET BY MOUTH DAILY IN THE EVENING, " Disp: 90 tablet, Rfl: 2  •  aspirin 81 MG disintegrating tablet, Take 81 mg by mouth Daily., Disp: , Rfl:   •  CO ENZYME Q-10 PO, Take 100 mg by mouth Daily., Disp: , Rfl:   •  docusate sodium (COLACE) 100 MG capsule, Take 100 mg by mouth Daily., Disp: , Rfl:   •  labetalol (NORMODYNE) 200 MG tablet, Take 100 mg by mouth 2 (two) times a day., Disp: , Rfl:   •  losartan (COZAAR) 100 MG tablet, Take 100 mg by mouth Daily., Disp: , Rfl:   •  metFORMIN ER (GLUCOPHAGE-XR) 750 MG 24 hr tablet, Take 1,500 mg by mouth Every Night., Disp: , Rfl:   •  ONE TOUCH ULTRA TEST test strip, USE TO TEST BLOOD SUGAR EVERY DAY DX.E11.9, Disp: , Rfl: 5  •  Polysaccharide Iron Complex (FERREX 150 PO), Take 150 mg by mouth Daily., Disp: , Rfl:   •  rosuvastatin (CRESTOR) 10 MG tablet, Take 10 mg by mouth Every Night., Disp: , Rfl:   •  SALINE NASAL SPRAY NA, into the nostril(s) as directed by provider As Needed., Disp: , Rfl:   •  spironolactone (ALDACTONE) 25 MG tablet, Take 1 tablet by mouth Daily., Disp: 30 tablet, Rfl: 3  •  vitamin B-12 (CYANOCOBALAMIN) 1000 MCG tablet, Take 1,000 mcg by mouth Daily., Disp: , Rfl:   •  fluticasone (FLONASE) 50 MCG/ACT nasal spray, 2 sprays into the nostril(s) as directed by provider Daily., Disp: 16 g, Rfl: 11  •  loratadine (CLARITIN) 10 MG tablet, Take 10 mg by mouth Daily., Disp: , Rfl:     Past Medical History:   Diagnosis Date   • Cataract    • Coronary artery disease    • Diabetes mellitus (CMS/HCC)     Type 2 diabetes mellitus - without retinopathy      • History of echocardiogram 05/12/2014    Normal LV systolic function with EF of 55% with mild hypokinesis. Diastolic relaxation abnormality of left ventricle. Mild tricuspid regurg. Trace mitral regurg   • Hyperlipemia    • Hypertension    • Myocardial infarction (CMS/HCC)        Past Surgical History:   Procedure Laterality Date   • BACK SURGERY     • CARDIAC CATHETERIZATION  05/12/2014    Severe multivessel CAD with critical lesions noted in  the LAD coronary artery, left circumflex coronary artery and RCA. Left ventriculogram no performed in view of elevated left ventricular end-diastolic pressure   • CATARACT EXTRACTION W/ INTRAOCULAR LENS IMPLANT Left 2/2/2018    Procedure: CATARACT PHACO EXTRACTION WITH INTRAOCULAR LENS IMPLANT;  Surgeon: Gagan Lizarraga MD;  Location: [facility];  Service:    • CATARACT EXTRACTION W/ INTRAOCULAR LENS IMPLANT Right 2/9/2018    Procedure: CATARACT PHACO EXTRACTION WITH INTRAOCULAR LENS IMPLANT;  Surgeon: Gagan Lizarraga MD;  Location: [facility];  Service:    • CORONARY ARTERY BYPASS GRAFT      X 3 with LIMA to LAD, SVG to OMB and SVG to PDA.   • DILATATION AND CURETTAGE     • OTHER SURGICAL HISTORY  05/06/2014    INCISION OF EARDRUM GENERAL ANESTHETIC 77911 (1)    Bilateral myringotomy with tubes.    • SINUS SURGERY     • TONSILLECTOMY     • TONSILLECTOMY AND ADENOIDECTOMY     • TUBAL ABDOMINAL LIGATION  03/14/1978         Review of Systems   HENT: Positive for congestion, ear discharge, hearing loss, postnasal drip and sneezing.    Respiratory: Positive for cough.    Cardiovascular: Positive for palpitations.   Musculoskeletal: Positive for arthralgias, back pain, neck pain and neck stiffness.   Neurological: Positive for weakness and headaches.   Hematological: Bruises/bleeds easily.   Psychiatric/Behavioral:        Not assessed   All other systems reviewed and are negative.          Objective   Physical Exam  General: Well-developed well-nourished female in no acute distress.  Alert and oriented x-3. Head: Normocephalic. Face: Symmetrical strength and appearance. PERRL. EOMI. Voice:Strong. Speech:Fluent  Ears: External ears no deformity, canals no discharge, tympanic membranes show dry central perforations each occupying about 10% of the drum head with surrounding tympanosclerosis but no evidence of squamous ingrowth or granulation tissue  Nose: Nares show no discharge mass polyp or purulence.  Boggy  mucosa is present.  No gross external deformity.  Septum: Midline  Oral cavity: Lips and gums without lesions.  Tongue and floor of mouth without lesions.  Parotid and submandibular ducts unobstructed.  No mucosal lesions on the buccal mucosa or vestibule of the mouth.  Pharynx: No erythema exudate mass or ulcer.  Tonsils absent  Neck: No lymphadenopathy.  No thyromegaly.  Trachea and larynx midline.  No masses in the parotid or submandibular glands.        Assessment/Plan   Alexia was seen today for nasal congestion.    Diagnoses and all orders for this visit:    Rhinitis medicamentosa    Perforation of both tympanic membranes    Other orders  -     fluticasone (FLONASE) 50 MCG/ACT nasal spray; 2 sprays into the nostril(s) as directed by provider Daily.      Plan: Explained to the patient that as long as she continues to use Vicks on a regular basis she will have trouble with rebound nasal congestion.  She must discontinue the Vicks salve entirely.  Will prescribe Flonase 2 sprays each nostril daily, advised her to continue use her nasal saline spray as needed.  Warned her that she would have at least a couple of nights of rather severe nasal congestion without using the Vicks but that she must resist the urge to reapply the Vicks salve.  Return in 1 month, call sooner for problems.

## 2020-09-09 ENCOUNTER — OFFICE VISIT (OUTPATIENT)
Dept: OTOLARYNGOLOGY | Facility: CLINIC | Age: 85
End: 2020-09-09

## 2020-09-09 VITALS — BODY MASS INDEX: 27.25 KG/M2 | HEIGHT: 67 IN | WEIGHT: 173.6 LBS | TEMPERATURE: 97.3 F

## 2020-09-09 DIAGNOSIS — J31.0 CHRONIC RHINITIS: Primary | ICD-10-CM

## 2020-09-09 DIAGNOSIS — J33.9 NASAL POLYPOSIS: ICD-10-CM

## 2020-09-09 DIAGNOSIS — H72.93 PERFORATION OF BOTH TYMPANIC MEMBRANES: ICD-10-CM

## 2020-09-09 PROCEDURE — 31231 NASAL ENDOSCOPY DX: CPT | Performed by: OTOLARYNGOLOGY

## 2020-09-09 PROCEDURE — 99214 OFFICE O/P EST MOD 30 MIN: CPT | Performed by: OTOLARYNGOLOGY

## 2020-09-09 RX ORDER — PREDNISONE 20 MG/1
TABLET ORAL
Qty: 12 TABLET | Refills: 0 | Status: SHIPPED | OUTPATIENT
Start: 2020-09-09 | End: 2020-12-22

## 2020-09-11 ENCOUNTER — TELEPHONE (OUTPATIENT)
Dept: OTOLARYNGOLOGY | Facility: CLINIC | Age: 85
End: 2020-09-11

## 2020-09-13 NOTE — PROGRESS NOTES
Subjective   Alexia Lopez is a 85 y.o. female.       History of Present Illness   Patient was seen previously with a history of nasal congestion as well as bilateral tympanic membrane perforations.  Was previously using Vicks salve on a nightly basis.  I had her discontinue this and begin using Flonase and nasal saline spray.  She reports that she has successfully discontinued the Vicks salve but still has some congestion and a lot of rhinorrhea and postnasal drainage.  It is typically nonpurulent.  She does have a history of sinus surgery performed in [place] previously.      The following portions of the patient's history were reviewed and updated as appropriate: allergies, current medications, past family history, past medical history, past social history, past surgical history and problem list.     reports that she has never smoked. She has never used smokeless tobacco. She reports that she does not drink alcohol or use drugs.   Patient is not a tobacco user and has not been counseled for use of tobacco products      Review of Systems   Constitutional: Negative for fever.   HENT: Positive for postnasal drip.            Objective   Physical Exam  General: Well-developed well-nourished female in no acute distress.  Alert and oriented x-3.  Voice:Strong. Speech:Fluent  Ears: External ears no deformity, canals no discharge, tympanic membranes show tympanosclerosis and dry perforations bilaterally.    Nose: Nares show boggy mucosa with some viscous nonpurulent discharge present.  No gross external deformity.  Septum: Midline  Oral cavity: Lips and gums without lesions.  Tongue and floor of mouth without lesions.  Parotid and submandibular ducts unobstructed.  No mucosal lesions on the buccal mucosa or vestibule of the mouth.  Pharynx: No erythema exudate mass or ulcer  Neck: No lymphadenopathy.  No thyromegaly.  Trachea and larynx midline.  No masses in the parotid or submandibular glands.  Nasal endoscopy  is performed: Jose-Synephrine and Xylocaine are instilled the nares bilaterally.  0° scope is passed into each nostril.  The inferior, middle, and superior turbinates as well as nasal septum and nasopharynx are examined.  Pertinent findings include: Bilateral surgical maxillary sinus antrostomies.  Polypoid material in the left middle meatal cleft with whitish discharge but no purulence.    Assessment/Plan   Alexia was seen today for follow-up.    Diagnoses and all orders for this visit:    Chronic rhinitis    Nasal polyposis    Other orders  -     predniSONE (DELTASONE) 20 MG tablet; 1 pill by mouth 3 times a day for 2 days then 1 pill by mouth twice a day for 2 days then 1 pill by mouth daily for 2 days      Plan: Add prednisone taper.  Continue Flonase and saline.  Return in 6 weeks.

## 2020-10-21 ENCOUNTER — OFFICE VISIT (OUTPATIENT)
Dept: OTOLARYNGOLOGY | Facility: CLINIC | Age: 85
End: 2020-10-21

## 2020-10-21 VITALS — WEIGHT: 169.6 LBS | BODY MASS INDEX: 26.62 KG/M2 | OXYGEN SATURATION: 98 % | HEIGHT: 67 IN

## 2020-10-21 DIAGNOSIS — J31.0 CHRONIC RHINITIS: Primary | ICD-10-CM

## 2020-10-21 DIAGNOSIS — H72.93 PERFORATION OF BOTH TYMPANIC MEMBRANES: ICD-10-CM

## 2020-10-21 PROCEDURE — 99213 OFFICE O/P EST LOW 20 MIN: CPT | Performed by: OTOLARYNGOLOGY

## 2020-10-25 NOTE — PROGRESS NOTES
Subjective   Alexia Lopez is a 85 y.o. female.       History of Present Illness   Patient with a history of chronic rhinitis and longstanding binaural tympanic membrane perforations was seen previously with worsened symptoms of congestion and rhinitis medicamentosa.  After she discontinued using Vicks nasal endoscopy had noted some polypoid material in her left middle meatal cleft along with patent bilateral maxillary sinus antrostomies.  She was given prednisone.  She says she is still somewhat congested and having a good bit of postnasal drainage but is better than she was.  No purulent rhinorrhea.      The following portions of the patient's history were reviewed and updated as appropriate: allergies, current medications, past family history, past medical history, past social history, past surgical history and problem list.     reports that she has never smoked. She has never used smokeless tobacco. She reports that she does not drink alcohol or use drugs.   Patient is not a tobacco user and has not been counseled for use of tobacco products      Review of Systems   Constitutional: Negative for fever.           Objective   Physical Exam  General: Well-developed well-nourished female in no acute distress.  Alert and oriented x-3. Head: Normocephalic. Face: Symmetrical strength and appearance. PERRL. EOMI. Voice:Strong. Speech:Fluent  Ears: External ears no deformity, canals no discharge, tympanic membranes show tympanosclerosis and dry central perforations unchanged from previous  Nose: Nares show no discharge mass polyp or purulence.  Boggy mucosa is present.  No gross external deformity.  Polyps were not noted on anterior rhinoscopy  Oral cavity: Lips and gums without lesions.  Tongue and floor of mouth without lesions.  Parotid and submandibular ducts unobstructed.  No mucosal lesions on the buccal mucosa or vestibule of the mouth.  Pharynx: No erythema exudate mass or ulcer  Neck: No lymphadenopathy.   No thyromegaly.  Trachea and larynx midline.  No masses in the parotid or submandibular glands.      Assessment/Plan   Diagnoses and all orders for this visit:    1. Chronic rhinitis (Primary)    2. Perforation of both tympanic membranes        Plan: Add Mucinex as needed for postnasal drainage along with as needed saline nasal spray.  She requests as needed follow-up.  May call for further problems.

## 2020-12-22 ENCOUNTER — OFFICE VISIT (OUTPATIENT)
Dept: CARDIOLOGY | Facility: CLINIC | Age: 85
End: 2020-12-22

## 2020-12-22 VITALS
WEIGHT: 170.2 LBS | BODY MASS INDEX: 26.71 KG/M2 | DIASTOLIC BLOOD PRESSURE: 68 MMHG | OXYGEN SATURATION: 99 % | HEIGHT: 67 IN | SYSTOLIC BLOOD PRESSURE: 128 MMHG

## 2020-12-22 DIAGNOSIS — I10 BENIGN ESSENTIAL HYPERTENSION: Primary | ICD-10-CM

## 2020-12-22 DIAGNOSIS — Z95.1 S/P CABG (CORONARY ARTERY BYPASS GRAFT): ICD-10-CM

## 2020-12-22 DIAGNOSIS — E78.00 HYPERCHOLESTEROLEMIA: ICD-10-CM

## 2020-12-22 PROCEDURE — 99214 OFFICE O/P EST MOD 30 MIN: CPT | Performed by: INTERNAL MEDICINE

## 2020-12-22 PROCEDURE — 93000 ELECTROCARDIOGRAM COMPLETE: CPT | Performed by: INTERNAL MEDICINE

## 2020-12-22 NOTE — PROGRESS NOTES
Alexia Lopez  85 y.o. female      1. Benign essential hypertension    2. Hypercholesterolemia    3. S/P CABG (coronary artery bypass graft)        History of Present Illness:  Ms. Lopez is an 85 year old  female with PMH of HTN, CAD (h/o CABG) , DM II and Hyperlipidemia.  The patient had an extensive evaluation at [facility] in July 2019 when she presented with right upper back and chest pain. The patient was found to have elevated blood pressure (214/84) on admission. CTA of the chest ruled out pulmonary embolism and aortic dissection.  Serial cardiac enzymes and EKG were unremarkable. Gallbladder ultrasound was negative. BNP was stable and no signs of CHF was noted.     Lexiscan Cardiolite stress test (7/ 2019) showed:   · EKG during Lexiscan stress normal  · Normal LV systolic function. No wall motion abnormalities. EF 68%  · No fixed or reversible defects noted.  · Normal myocardial perfusion. Low risk study     Echocardiogram (7/ 2019) showed:  · Left ventricular wall thickness is consistent with mild-to-moderate concentric hypertrophy.  · Estimated EF = 57%.  · Left ventricular systolic function is normal.  · Left ventricular diastolic dysfunction (grade I) consistent with impaired relaxation.  · Right ventricular cavity is mildly dilated.  · Left atrial cavity size is mildly dilated.  · Mild mitral valve regurgitation is present  · Mild tricuspid valve regurgitation is present.     She underwent coronary artery bypass surgery in May 2014 when she received LIMA to LAD, SVG to obtuse marginal and SVG to PDA.     Her blood pressure has been under good control on a combination of labetalol, amlodipine, losartan and Aldactone.    Clinical exam today was unremarkable.  Heart and lung exam was unremarkable and blood pressure was normal.    EKG today showed sinus rhythm with heart rate of 58 bpm.  First-degree AV block.  Nonspecific T wave changes.  Baseline  artifacts.    SUBJECTIVE    Allergies   Allergen Reactions   • Cherry Anaphylaxis   • Peanut-Containing Drug Products Anaphylaxis   • Articaine Other (See Comments)     Had at dentist office causes shaking   • Lidocaine Other (See Comments)     Lidocaine injection at dentist office caused shaking         Past Medical History:   Diagnosis Date   • Cataract    • Coronary artery disease    • Diabetes mellitus (CMS/[facility])     Type 2 diabetes mellitus - without retinopathy      • History of echocardiogram 05/12/2014    Normal LV systolic function with EF of 55% with mild hypokinesis. Diastolic relaxation abnormality of left ventricle. Mild tricuspid regurg. Trace mitral regurg   • Hyperlipemia    • Hypertension    • Myocardial infarction (CMS/HCC)          Past Surgical History:   Procedure Laterality Date   • BACK SURGERY     • CARDIAC CATHETERIZATION  05/12/2014    Severe multivessel CAD with critical lesions noted in the LAD coronary artery, left circumflex coronary artery and RCA. Left ventriculogram no performed in view of elevated left ventricular end-diastolic pressure   • CATARACT EXTRACTION W/ INTRAOCULAR LENS IMPLANT Left 2/2/2018    Procedure: CATARACT PHACO EXTRACTION WITH INTRAOCULAR LENS IMPLANT;  Surgeon: Gagan Lizarraga MD;  Location: [facility];  Service:    • CATARACT EXTRACTION W/ INTRAOCULAR LENS IMPLANT Right 2/9/2018    Procedure: CATARACT PHACO EXTRACTION WITH INTRAOCULAR LENS IMPLANT;  Surgeon: Gagan Lizarraga MD;  Location: [facility];  Service:    • CORONARY ARTERY BYPASS GRAFT      X 3 with LIMA to LAD, SVG to OMB and SVG to PDA.   • DILATATION AND CURETTAGE     • OTHER SURGICAL HISTORY  05/06/2014    INCISION OF EARDRUM GENERAL ANESTHETIC 09680 (1)    Bilateral myringotomy with tubes.    • SINUS SURGERY     • TONSILLECTOMY     • TONSILLECTOMY AND ADENOIDECTOMY     • TUBAL ABDOMINAL LIGATION  03/14/1978         Family History   Problem Relation Age of Onset   • Hypertension Mother     • Coronary artery disease Father    • Diabetes Other         grandmother   • Cancer Other          Social History     Socioeconomic History   • Marital status:      Spouse name: Not on file   • Number of children: Not on file   • Years of education: Not on file   • Highest education level: Not on file   Tobacco Use   • Smoking status: Never Smoker   • Smokeless tobacco: Never Used   Substance and Sexual Activity   • Alcohol use: No   • Drug use: No   • Sexual activity: Defer         Current Outpatient Medications   Medication Sig Dispense Refill   • amLODIPine (NORVASC) 5 MG tablet TAKE 1 TABLET BY MOUTH DAILY IN THE EVENING 90 tablet 2   • aspirin 81 MG disintegrating tablet Take 81 mg by mouth Daily.     • CO ENZYME Q-10 PO Take 100 mg by mouth Daily.     • docusate sodium (COLACE) 100 MG capsule Take 100 mg by mouth Daily.     • fluticasone (FLONASE) 50 MCG/ACT nasal spray 2 sprays into the nostril(s) as directed by provider Daily. 16 g 11   • labetalol (NORMODYNE) 200 MG tablet Take 100 mg by mouth 2 (two) times a day.     • loratadine (CLARITIN) 10 MG tablet Take 10 mg by mouth Daily.     • losartan (COZAAR) 100 MG tablet Take 100 mg by mouth Daily.     • metFORMIN ER (GLUCOPHAGE-XR) 750 MG 24 hr tablet Take 1,500 mg by mouth Every Night.     • ONE TOUCH ULTRA TEST test strip USE TO TEST BLOOD SUGAR EVERY DAY DX.E11.9  5   • Polysaccharide Iron Complex (FERREX 150 PO) Take 150 mg by mouth Daily.     • rosuvastatin (CRESTOR) 10 MG tablet Take 10 mg by mouth Every Night.     • SALINE NASAL SPRAY NA into the nostril(s) as directed by provider As Needed.     • spironolactone (ALDACTONE) 25 MG tablet Take 1 tablet by mouth Daily. 30 tablet 3   • vitamin B-12 (CYANOCOBALAMIN) 1000 MCG tablet Take 1,000 mcg by mouth Daily.       No current facility-administered medications for this visit.          OBJECTIVE    /68 (BP Location: Left arm, Patient Position: Sitting, Cuff Size: Adult)   Ht 170.2 cm  "(67.01\")   Wt 77.2 kg (170 lb 3.2 oz)   LMP  (LMP Unknown)   SpO2 99%   BMI 26.65 kg/m²         Review of Systems     Constitutional:  Denies recent weight loss, weight gain, fever or chills     HENT:  Denies any hearing loss, epistaxis, hoarseness, or difficulty speaking.     Eyes: Wears eyeglasses or contact lenses     Respiratory:  Denies dyspnea with exertion,no cough, wheezing, or hemoptysis.     Cardiovascular: See HPI    Gastrointestinal:  Denies change in bowel habits, dyspepsia, ulcer disease, hematochezia, or melena.     Endocrine: Negative for cold intolerance, heat intolerance, polydipsia, polyphagia and polyuria.     Genitourinary: Negative.      Musculoskeletal: DJD    Skin:  Denies any change in hair or nails, rashes, or skin lesions.     Allergic/Immunologic: Negative.  Negative for environmental allergies, food allergies and immunocompromised state.     Neurological:  Denies any history of recurrent headaches, strokes, TIA, or seizure disorder.     Hematological: Denies any food allergies, seasonal allergies, bleeding disorders, or lymphadenopathy.     Psychiatric/Behavioral: Denies any history of depression, substance abuse, or change in cognitive function.         Physical Exam     Constitutional: Cooperative, alert and oriented,in no acute distress.     HENT:   Head: Normocephalic, normal hair patterns, no masses or tenderness.  Ears, Nose, and Throat: No gross abnormalities. No pallor or cyanosis.   Eyes: EOMS intact, PERRL, conjunctivae and lids unremarkable. Fundoscopic exam and visual fields not performed.   Neck: No palpable masses or adenopathy, no thyromegaly, no JVD, carotid pulses are full and equal bilaterally and without  Bruits.     Cardiovascular: Regular rhythm, S1 and S2 normal, no S3 or S4.  No murmurs, gallops, or rubs detected.     Pulmonary/Chest: Chest: normal symmetry,  normal respiratory excursion, no intercostal retraction, no use of accessory muscles.            " Pulmonary: Normal breath sounds. No rales or ronchi.    Abdominal: Abdomen soft, bowel sounds normoactive, no masses, no hepatosplenomegaly, non-tender, no bruits.     Musculoskeletal: No deformities, clubbing, cyanosis, erythema, or edema observed.     Neurological: No gross motor or sensory deficits noted, affect appropriate, oriented to time, person, place.     Skin: Warm and dry to the touch, no apparent skin lesions or masses noted.     Psychiatric: She has a normal mood and affect. Her behavior is normal. Judgment and thought content normal.         Procedures      Lab Results   Component Value Date    WBC 6.2 10/17/2019    HGB 10.7 (L) 10/17/2019    HCT 33.6 (L) 10/17/2019    MCV 96 (H) 10/17/2019     10/17/2019     Lab Results   Component Value Date    GLUCOSE 155 (H) 07/10/2019    BUN 24 (H) 10/17/2019    CREATININE 1 10/17/2019    EGFRIFNONA 63 07/10/2019    BCR 18.6 07/10/2019    CO2 24.0 07/10/2019    CALCIUM 9.6 10/17/2019    ALBUMIN 3.8 10/17/2019    AST 15 10/17/2019    ALT 9 (L) 10/17/2019     Lab Results   Component Value Date    CHOL 182 07/06/2020    CHOL 167 10/17/2019    CHOL 163 07/09/2019     Lab Results   Component Value Date    TRIG 311 (H) 07/06/2020    TRIG 226 (H) 10/17/2019    TRIG 198 (H) 07/09/2019     Lab Results   Component Value Date    HDL 47 07/06/2020    HDL 43 10/17/2019    HDL 52 07/09/2019     No components found for: LDLCALC  Lab Results   Component Value Date    LDL 91 07/06/2020    LDL 86 10/17/2019    LDL 71 07/09/2019     No results found for: HDLLDLRATIO  No components found for: CHOLHDL  Lab Results   Component Value Date    HGBA1C 6.0 07/06/2020     Lab Results   Component Value Date    TSH 2.270 07/09/2019           ASSESSMENT AND PLAN  Ms. Lopez is stable with regards to her heart with no clinical evidence of angina or congestive heart failure.  Her blood pressure is under good control on her present combination of amlodipine, labetalol, and Aldactone.   Statin therapy with Crestor has been continued.  Her lipid profiles were in the normal range when checked in July 2020, except elevated triglyceride of 311.  She was advised to watch her diet closely especially carbs.  We could consider adding fenofibrate if triglycerides are consistently elevated.      .Diagnoses and all orders for this visit:    1. Benign essential hypertension (Primary)  -     ECG 12 Lead    2. Hypercholesterolemia    3. S/P CABG (coronary artery bypass graft)        Patient's Body mass index is 26.65 kg/m². BMI is above normal parameters. Recommendations include: exercise counseling and nutrition counseling.  Patient is a non-smoker    Ember Forrest MD  12/22/2020  17:33 CST

## 2020-12-28 LAB
QT INTERVAL: 412 MS
QTC INTERVAL: 404 MS

## 2021-01-19 ENCOUNTER — IMMUNIZATION (OUTPATIENT)
Dept: VACCINE CLINIC | Facility: HOSPITAL | Age: 86
End: 2021-01-19

## 2021-01-19 PROCEDURE — 0001A: CPT | Performed by: NURSE PRACTITIONER

## 2021-01-19 PROCEDURE — 91300 HC SARSCOV02 VAC 30MCG/0.3ML IM: CPT | Performed by: NURSE PRACTITIONER

## 2021-02-09 ENCOUNTER — IMMUNIZATION (OUTPATIENT)
Dept: VACCINE CLINIC | Facility: HOSPITAL | Age: 86
End: 2021-02-09

## 2021-02-09 PROCEDURE — 0002A: CPT | Performed by: THORACIC SURGERY (CARDIOTHORACIC VASCULAR SURGERY)

## 2021-02-09 PROCEDURE — 91300 HC SARSCOV02 VAC 30MCG/0.3ML IM: CPT | Performed by: THORACIC SURGERY (CARDIOTHORACIC VASCULAR SURGERY)

## 2021-11-05 ENCOUNTER — OFFICE VISIT (OUTPATIENT)
Dept: CARDIOLOGY | Facility: CLINIC | Age: 86
End: 2021-11-05

## 2021-11-05 VITALS
OXYGEN SATURATION: 99 % | HEIGHT: 67 IN | SYSTOLIC BLOOD PRESSURE: 126 MMHG | WEIGHT: 170 LBS | BODY MASS INDEX: 26.68 KG/M2 | DIASTOLIC BLOOD PRESSURE: 68 MMHG | TEMPERATURE: 97.3 F | HEART RATE: 61 BPM

## 2021-11-05 DIAGNOSIS — E78.2 MIXED HYPERLIPIDEMIA: ICD-10-CM

## 2021-11-05 DIAGNOSIS — I10 PRIMARY HYPERTENSION: ICD-10-CM

## 2021-11-05 DIAGNOSIS — Z95.1 S/P CABG (CORONARY ARTERY BYPASS GRAFT): Primary | ICD-10-CM

## 2021-11-05 PROCEDURE — 99213 OFFICE O/P EST LOW 20 MIN: CPT | Performed by: INTERNAL MEDICINE

## 2021-11-05 NOTE — PROGRESS NOTES
Alexia Lopez  86 y.o. female      1. S/P CABG (coronary artery bypass graft)    2. Primary hypertension    3. Mixed hyperlipidemia        History of Present Illness:  Ms. Lopez is an 86 year old  female with PMH of HTN, CAD (h/o CABG) , DM II and Hyperlipidemia. She underwent coronary artery bypass surgery in May 2014 when she received LIMA to LAD, SVG to obtuse marginal and SVG to PDA.    The patient had an extensive evaluation at [facility] in July 2019 when she presented with right upper back and chest pain. The patient was found to have elevated blood pressure (214/84) on admission. CTA of the chest ruled out pulmonary embolism and aortic dissection.  Serial cardiac enzymes and EKG were unremarkable. Gallbladder ultrasound was negative. BNP was stable and no signs of CHF was noted.     Lexiscan Cardiolite stress test (7/ 2019) showed ejection fraction of 68% with no fixed or reversible defects.     Echocardiogram (7/ 2019) showed normal LV systolic function with an EF of 57% with mild to moderate LVH.  Grade 1 diastolic dysfunction was present.  RV was mildly dilated and left atrium was mildly dilated.  There was mild mitral and tricuspid regurgitation.     She has progressed well and denied any cardiac symptoms at the present time.  She is able to perform her activities of daily living.  She brought several blood pressure readings for review and these were within normal limits.  Her heart rate and blood pressure were in the normal range today.    Allergies   Allergen Reactions   • Cherry Anaphylaxis   • Peanut-Containing Drug Products Anaphylaxis   • Articaine Other (See Comments)     Had at dentist office causes shaking   • Lidocaine Other (See Comments)     Lidocaine injection at dentist office caused shaking         Past Medical History:   Diagnosis Date   • Cataract    • Coronary artery disease    • Diabetes mellitus (HCC)     Type 2 diabetes mellitus - without  retinopathy      • History of echocardiogram 05/12/2014    Normal LV systolic function with EF of 55% with mild hypokinesis. Diastolic relaxation abnormality of left ventricle. Mild tricuspid regurg. Trace mitral regurg   • Hyperlipemia    • Hypertension    • Myocardial infarction (HCC)          Past Surgical History:   Procedure Laterality Date   • BACK SURGERY     • CARDIAC CATHETERIZATION  05/12/2014    Severe multivessel CAD with critical lesions noted in the LAD coronary artery, left circumflex coronary artery and RCA. Left ventriculogram no performed in view of elevated left ventricular end-diastolic pressure   • CATARACT EXTRACTION W/ INTRAOCULAR LENS IMPLANT Left 2/2/2018    Procedure: CATARACT PHACO EXTRACTION WITH INTRAOCULAR LENS IMPLANT;  Surgeon: Gagan Lizarraga MD;  Location: [facility];  Service:    • CATARACT EXTRACTION W/ INTRAOCULAR LENS IMPLANT Right 2/9/2018    Procedure: CATARACT PHACO EXTRACTION WITH INTRAOCULAR LENS IMPLANT;  Surgeon: Gagan Lizarraga MD;  Location: [facility];  Service:    • CORONARY ARTERY BYPASS GRAFT      X 3 with LIMA to LAD, SVG to OMB and SVG to PDA.   • DILATATION AND CURETTAGE     • OTHER SURGICAL HISTORY  05/06/2014    INCISION OF EARDRUM GENERAL ANESTHETIC 41859 (1)    Bilateral myringotomy with tubes.    • SINUS SURGERY     • TONSILLECTOMY     • TONSILLECTOMY AND ADENOIDECTOMY     • TUBAL ABDOMINAL LIGATION  03/14/1978         Family History   Problem Relation Age of Onset   • Hypertension Mother    • Coronary artery disease Father    • Diabetes Other         grandmother   • Cancer Other          Social History     Socioeconomic History   • Marital status:    Tobacco Use   • Smoking status: Never Smoker   • Smokeless tobacco: Never Used   Substance and Sexual Activity   • Alcohol use: No   • Drug use: No   • Sexual activity: Defer         Current Outpatient Medications   Medication Sig Dispense Refill   • amLODIPine (NORVASC) 5 MG tablet TAKE 1  "TABLET BY MOUTH DAILY IN THE EVENING 90 tablet 2   • aspirin 81 MG disintegrating tablet Take 81 mg by mouth Daily.     • CO ENZYME Q-10 PO Take 100 mg by mouth Daily.     • docusate sodium (COLACE) 100 MG capsule Take 100 mg by mouth Daily.     • fluticasone (FLONASE) 50 MCG/ACT nasal spray 2 sprays into the nostril(s) as directed by provider Daily. 16 g 11   • labetalol (NORMODYNE) 200 MG tablet Take 100 mg by mouth 2 (Two) Times a Day. .5     • loratadine (CLARITIN) 10 MG tablet Take 10 mg by mouth Daily.     • losartan (COZAAR) 100 MG tablet Take 100 mg by mouth Daily.     • metFORMIN ER (GLUCOPHAGE-XR) 750 MG 24 hr tablet Take 1,500 mg by mouth Every Night.     • ONE TOUCH ULTRA TEST test strip USE TO TEST BLOOD SUGAR EVERY DAY DX.E11.9  5   • Polysaccharide Iron Complex (FERREX 150 PO) Take 150 mg by mouth Daily.     • rosuvastatin (CRESTOR) 10 MG tablet Take 10 mg by mouth Every Night.     • SALINE NASAL SPRAY NA into the nostril(s) as directed by provider As Needed.     • spironolactone (ALDACTONE) 25 MG tablet Take 1 tablet by mouth Daily. (Patient taking differently: Take 25 mg by mouth Daily. .) 30 tablet 3   • vitamin B-12 (CYANOCOBALAMIN) 1000 MCG tablet Take 1,000 mcg by mouth Daily.       No current facility-administered medications for this visit.         OBJECTIVE    /68 (BP Location: Left arm, Patient Position: Sitting, Cuff Size: Adult)   Pulse 61   Temp 97.3 °F (36.3 °C)   Ht 170.4 cm (67.1\")   Wt 77.1 kg (170 lb)   LMP  (LMP Unknown)   SpO2 99%   BMI 26.55 kg/m²       Review of Systems : The following systems are reviewed and no changes noted      Constitutional:  Denies recent weight loss, weight gain, fever or chills     HENT:  Denies any hearing loss, epistaxis, hoarseness, or difficulty speaking.     Eyes: Wears eyeglasses or contact lenses     Respiratory:  Denies dyspnea with exertion,no cough, wheezing, or hemoptysis.     Cardiovascular: No chest pain, palpitation or " dizziness.    Gastrointestinal:  Denies change in bowel habits, dyspepsia, ulcer disease, hematochezia, or melena.     Endocrine: Negative for cold intolerance, heat intolerance, polydipsia, polyphagia and polyuria.     Genitourinary: Negative.      Musculoskeletal: DJD    Neurological:  Denies any history of recurrent headaches, strokes, TIA, or seizure disorder.     Hematological: Denies any food allergies, seasonal allergies, bleeding disorders, or lymphadenopathy.     Psychiatric/Behavioral: Denies any history of depression, substance abuse, or change in cognitive function.         Physical Exam     Constitutional: Cooperative, alert and oriented,in no acute distress.     HENT:   Head: Normocephalic, normal hair patterns, no masses or tenderness.  Ears, Nose, and Throat: No gross abnormalities. No pallor or cyanosis.   Eyes: EOMS intact, PERRL, conjunctivae and lids unremarkable. Fundoscopic exam and visual fields not performed.   Neck: No palpable masses or adenopathy, no thyromegaly, no JVD, carotid pulses are full and equal bilaterally and without  Bruits.     Cardiovascular: Regular rhythm, S1 and S2 normal, no S3 or S4.  No murmurs, gallops, or rubs detected.     Pulmonary/Chest: Chest: normal symmetry,  normal respiratory excursion, no intercostal retraction, no use of accessory muscles.            Pulmonary: Normal breath sounds. No rales or ronchi.    Abdominal: Abdomen soft, bowel sounds normoactive, no masses, no hepatosplenomegaly, non-tender, no bruits.     Musculoskeletal: No deformities, clubbing, cyanosis, erythema, or edema observed.     Neurological: No gross motor or sensory deficits noted, affect appropriate, oriented to time, person, place.     Skin: Warm and dry to the touch, no apparent skin lesions or masses noted.     Psychiatric: She has a normal mood and affect. Her behavior is normal. Judgment and thought content normal.         Procedures      Lab Results   Component Value Date    WBC  6.2 10/17/2019    HGB 10.7 (L) 10/17/2019    HCT 33.6 (L) 10/17/2019    MCV 96 (H) 10/17/2019     10/17/2019     Lab Results   Component Value Date    GLUCOSE 155 (H) 07/10/2019    BUN 29 (H) 06/08/2021    CREATININE 1.1 06/08/2021    EGFRIFNONA 63 07/10/2019    EGFRIFAFRI 57 06/08/2021    BCR 18.6 07/10/2019    CO2 22 06/08/2021    CALCIUM 9.7 06/08/2021    ALBUMIN 4.2 06/08/2021    AST 15 06/08/2021    ALT 7 06/08/2021     Lab Results   Component Value Date    CHOL 151 01/07/2021    CHOL 182 07/06/2020    CHOL 167 10/17/2019     Lab Results   Component Value Date    TRIG 138 06/08/2021    TRIG 214 (H) 01/07/2021    TRIG 311 (H) 07/06/2020     Lab Results   Component Value Date    HDL 53 06/08/2021    HDL 49 01/07/2021    HDL 47 07/06/2020     No components found for: LDLCALC  Lab Results   Component Value Date    LDL 79 06/08/2021    LDL 78 01/07/2021    LDL 91 07/06/2020     No results found for: HDLLDLRATIO  No components found for: CHOLHDL  Lab Results   Component Value Date    HGBA1C 6.2 (H) 06/08/2021     Lab Results   Component Value Date    TSH 1.50 06/08/2021           ASSESSMENT AND PLAN  Ms. Lopez is stable with regards to her heart with no clinical evidence of angina or congestive heart failure.  She had lipid profile checked in June 2021 and LDL was 79 and HDL 53.  Triglycerides were 138.   Her blood pressure is under good control on her present combination of amlodipine, labetalol, and Aldactone.  Statin therapy with Crestor has been continued.     .Diagnoses and all orders for this visit:    1. S/P CABG (coronary artery bypass graft) (Primary)    2. Primary hypertension    3. Mixed hyperlipidemia        Patient's Body mass index is 26.55 kg/m². BMI is above normal parameters. Recommendations include: exercise counseling and nutrition counseling.  Patient is a non-smoker    Ember Forrest MD  11/5/2021  09:24 CDT

## 2022-04-22 DIAGNOSIS — G89.29 CHRONIC PAIN OF BOTH KNEES: Primary | ICD-10-CM

## 2022-04-22 DIAGNOSIS — M25.562 CHRONIC PAIN OF BOTH KNEES: Primary | ICD-10-CM

## 2022-04-22 DIAGNOSIS — M25.561 CHRONIC PAIN OF BOTH KNEES: Primary | ICD-10-CM

## 2022-04-26 ENCOUNTER — OFFICE VISIT (OUTPATIENT)
Dept: ORTHOPEDIC SURGERY | Facility: CLINIC | Age: 87
End: 2022-04-26

## 2022-04-26 VITALS — WEIGHT: 174 LBS | BODY MASS INDEX: 27.31 KG/M2 | HEIGHT: 67 IN

## 2022-04-26 DIAGNOSIS — G89.29 CHRONIC PAIN OF BOTH KNEES: Primary | ICD-10-CM

## 2022-04-26 DIAGNOSIS — M25.562 CHRONIC PAIN OF BOTH KNEES: Primary | ICD-10-CM

## 2022-04-26 DIAGNOSIS — M25.561 CHRONIC PAIN OF BOTH KNEES: Primary | ICD-10-CM

## 2022-04-26 DIAGNOSIS — M17.0 PRIMARY OSTEOARTHRITIS OF BOTH KNEES: ICD-10-CM

## 2022-04-26 PROCEDURE — 99214 OFFICE O/P EST MOD 30 MIN: CPT | Performed by: NURSE PRACTITIONER

## 2022-05-06 ENCOUNTER — OFFICE VISIT (OUTPATIENT)
Dept: CARDIOLOGY | Facility: CLINIC | Age: 87
End: 2022-05-06

## 2022-05-06 VITALS
DIASTOLIC BLOOD PRESSURE: 72 MMHG | HEART RATE: 59 BPM | SYSTOLIC BLOOD PRESSURE: 126 MMHG | TEMPERATURE: 97 F | BODY MASS INDEX: 27.31 KG/M2 | WEIGHT: 174 LBS | OXYGEN SATURATION: 98 % | HEIGHT: 67 IN

## 2022-05-06 DIAGNOSIS — E78.2 MIXED HYPERLIPIDEMIA: ICD-10-CM

## 2022-05-06 DIAGNOSIS — I10 PRIMARY HYPERTENSION: Primary | ICD-10-CM

## 2022-05-06 DIAGNOSIS — I10 BENIGN ESSENTIAL HYPERTENSION: ICD-10-CM

## 2022-05-06 DIAGNOSIS — Z95.1 S/P CABG (CORONARY ARTERY BYPASS GRAFT): ICD-10-CM

## 2022-05-06 DIAGNOSIS — E11.9 CONTROLLED TYPE 2 DIABETES MELLITUS WITHOUT COMPLICATION, WITH LONG-TERM CURRENT USE OF INSULIN: ICD-10-CM

## 2022-05-06 DIAGNOSIS — Z79.4 CONTROLLED TYPE 2 DIABETES MELLITUS WITHOUT COMPLICATION, WITH LONG-TERM CURRENT USE OF INSULIN: ICD-10-CM

## 2022-05-06 LAB
QT INTERVAL: 424 MS
QTC INTERVAL: 419 MS

## 2022-05-06 PROCEDURE — 93000 ELECTROCARDIOGRAM COMPLETE: CPT | Performed by: INTERNAL MEDICINE

## 2022-05-06 PROCEDURE — 99214 OFFICE O/P EST MOD 30 MIN: CPT | Performed by: INTERNAL MEDICINE

## 2022-05-06 NOTE — PROGRESS NOTES
Alexia Lopez  86 y.o. female      1. Primary hypertension    2. Benign essential hypertension    3. S/P CABG (coronary artery bypass graft)    4. Mixed hyperlipidemia    5. Controlled type 2 diabetes mellitus without complication, with long-term current use of insulin (HCC)        History of Present Illness:  Ms. Lopez is an 86 year old  female with PMH of HTN, CAD (h/o CABG) , DM II and Hyperlipidemia. She underwent coronary artery bypass surgery in May 2014 when she received LIMA to LAD, SVG to obtuse marginal and SVG to PDA.    The patient had an extensive evaluation at [facility] in July 2019 when she presented with right upper back and chest pain. The patient was found to have elevated blood pressure (214/84) on admission. CTA of the chest ruled out pulmonary embolism and aortic dissection.  Serial cardiac enzymes and EKG were unremarkable. Gallbladder ultrasound was negative. BNP was stable and no signs of CHF was noted.     Lexiscan Cardiolite stress test (7/ 2019) showed ejection fraction of 68% with no fixed or reversible defects.     Echocardiogram (7/ 2019) showed normal LV systolic function with an EF of 57% with mild to moderate LVH.  Grade 1 diastolic dysfunction was present.  RV was mildly dilated and left atrium was mildly dilated.  There was mild mitral and tricuspid regurgitation.    Patient was progressed quite well and denied any chest pain, shortness of breath or palpitation.  She is able to perform her activities of daily living for the most part except that she is limited by arthritis.  She does get joint injections from time to time.  She wondered if she would be a candidate for surgery from a cardiac standpoint.  Based on all the information I have she will be at least moderate risk for perioperative cardiac events.     EKG today showed sinus rhythm with heart rate of 59 bpm.  Mild change in P wave axis   baseline artifacts.    Allergies   Allergen  Reactions   • Cherry Anaphylaxis   • Peanut-Containing Drug Products Anaphylaxis   • Articaine Other (See Comments)     Had at dentist office causes shaking   • Lidocaine Other (See Comments)     Lidocaine injection at dentist office caused shaking         Past Medical History:   Diagnosis Date   • Cataract    • Coronary artery disease    • Diabetes mellitus (HCC)     Type 2 diabetes mellitus - without retinopathy      • History of echocardiogram 05/12/2014    Normal LV systolic function with EF of 55% with mild hypokinesis. Diastolic relaxation abnormality of left ventricle. Mild tricuspid regurg. Trace mitral regurg   • Hyperlipemia    • Hypertension    • Myocardial infarction (HCC)          Past Surgical History:   Procedure Laterality Date   • BACK SURGERY     • CARDIAC CATHETERIZATION  05/12/2014    Severe multivessel CAD with critical lesions noted in the LAD coronary artery, left circumflex coronary artery and RCA. Left ventriculogram no performed in view of elevated left ventricular end-diastolic pressure   • CATARACT EXTRACTION W/ INTRAOCULAR LENS IMPLANT Left 2/2/2018    Procedure: CATARACT PHACO EXTRACTION WITH INTRAOCULAR LENS IMPLANT;  Surgeon: Gagan Lizarraga MD;  Location: [facility];  Service:    • CATARACT EXTRACTION W/ INTRAOCULAR LENS IMPLANT Right 2/9/2018    Procedure: CATARACT PHACO EXTRACTION WITH INTRAOCULAR LENS IMPLANT;  Surgeon: Gagan Lizarraga MD;  Location: [facility];  Service:    • CORONARY ARTERY BYPASS GRAFT      X 3 with LIMA to LAD, SVG to OMB and SVG to PDA.   • DILATATION AND CURETTAGE     • OTHER SURGICAL HISTORY  05/06/2014    INCISION OF EARDRUM GENERAL ANESTHETIC 62471 (1)    Bilateral myringotomy with tubes.    • SINUS SURGERY     • TONSILLECTOMY     • TONSILLECTOMY AND ADENOIDECTOMY     • TUBAL ABDOMINAL LIGATION  03/14/1978         Family History   Problem Relation Age of Onset   • Hypertension Mother    • Coronary artery disease Father    • Diabetes Other       "   grandmother   • Cancer Other          Social History     Socioeconomic History   • Marital status:    Tobacco Use   • Smoking status: Never Smoker   • Smokeless tobacco: Never Used   Substance and Sexual Activity   • Alcohol use: No   • Drug use: No   • Sexual activity: Defer         Current Outpatient Medications   Medication Sig Dispense Refill   • amLODIPine (NORVASC) 5 MG tablet TAKE 1 TABLET BY MOUTH DAILY IN THE EVENING 90 tablet 2   • aspirin 81 MG disintegrating tablet Take 81 mg by mouth Daily.     • CO ENZYME Q-10 PO Take 100 mg by mouth Daily.     • docusate sodium (COLACE) 100 MG capsule Take 100 mg by mouth Daily.     • fluticasone (FLONASE) 50 MCG/ACT nasal spray 2 sprays into the nostril(s) as directed by provider Daily. 16 g 11   • labetalol (NORMODYNE) 200 MG tablet Take 100 mg by mouth 2 (Two) Times a Day. .5     • loratadine (CLARITIN) 10 MG tablet Take 10 mg by mouth Daily.     • losartan (COZAAR) 100 MG tablet Take 100 mg by mouth Daily.     • metFORMIN ER (GLUCOPHAGE-XR) 750 MG 24 hr tablet Take 1,500 mg by mouth Every Night.     • ONE TOUCH ULTRA TEST test strip USE TO TEST BLOOD SUGAR EVERY DAY DX.E11.9  5   • Polysaccharide Iron Complex (FERREX 150 PO) Take 150 mg by mouth Daily.     • rosuvastatin (CRESTOR) 10 MG tablet Take 10 mg by mouth Every Night.     • SALINE NASAL SPRAY NA into the nostril(s) as directed by provider As Needed.     • spironolactone (ALDACTONE) 25 MG tablet Take 1 tablet by mouth Daily. (Patient taking differently: Take 25 mg by mouth Daily. .) 30 tablet 3   • vitamin B-12 (CYANOCOBALAMIN) 1000 MCG tablet Take 1,000 mcg by mouth Daily.       No current facility-administered medications for this visit.         OBJECTIVE    /72 (BP Location: Left arm, Patient Position: Sitting, Cuff Size: Adult)   Pulse 59   Temp 97 °F (36.1 °C)   Ht 170.2 cm (67\")   Wt 78.9 kg (174 lb)   LMP  (LMP Unknown)   SpO2 98%   BMI 27.25 kg/m²       Review of Systems : The " following systems are reviewed and changes noted as indicated below.      Constitutional:  Denies recent weight loss, weight gain, fever or chills     HENT:  Denies any hearing loss, epistaxis, hoarseness, or difficulty speaking.     Eyes: Wears eyeglasses or contact lenses     Respiratory:  Denies dyspnea with exertion,no cough, wheezing, or hemoptysis.     Cardiovascular: No chest pain, palpitation or dizziness.    Gastrointestinal:  Denies change in bowel habits, dyspepsia, ulcer disease, hematochezia, or melena.     Endocrine: Negative for cold intolerance, heat intolerance, polydipsia, polyphagia and polyuria.     Genitourinary: Negative.      Musculoskeletal: DJD.  Bilateral knee pain.    Neurological:  Denies any history of recurrent headaches, strokes, TIA, or seizure disorder.     Hematological: Denies any food allergies, seasonal allergies, bleeding disorders, or lymphadenopathy.     Psychiatric/Behavioral: Denies any history of depression, substance abuse, or change in cognitive function.       Physical Exam : The following systems are reviewed and no changes noted    Constitutional: Cooperative, alert and oriented,in no acute distress.     HENT:   Head: Normocephalic, normal hair patterns, no masses or tenderness.  Ears, Nose, and Throat: No gross abnormalities. No pallor or cyanosis.   Eyes: EOMS intact, PERRL, conjunctivae and lids unremarkable. Fundoscopic exam and visual fields not performed.   Neck: No palpable masses or adenopathy, no thyromegaly, no JVD, carotid pulses are full and equal bilaterally and without  Bruits.     Cardiovascular: Regular rhythm, S1 and S2 normal, no S3 or S4.  No murmurs, gallops, or rubs detected.     Pulmonary/Chest: Chest: normal symmetry,  normal respiratory excursion, no intercostal retraction, no use of accessory muscles.            Pulmonary: Normal breath sounds. No rales or ronchi.    Abdominal: Abdomen soft, bowel sounds normoactive, no masses, no  hepatosplenomegaly, non-tender, no bruits.     Musculoskeletal: No deformities, clubbing, cyanosis, erythema, or edema observed.     Neurological: No gross motor or sensory deficits noted, affect appropriate, oriented to time, person, place.     Skin: Warm and dry to the touch, no apparent skin lesions or masses noted.     Psychiatric: She has a normal mood and affect. Her behavior is normal. Judgment and thought content normal.         Procedures      Lab Results   Component Value Date    WBC 6.2 10/17/2019    HGB 10.7 (L) 10/17/2019    HCT 33.6 (L) 10/17/2019    MCV 96 (H) 10/17/2019     10/17/2019     Lab Results   Component Value Date    GLUCOSE 155 (H) 07/10/2019    BUN 19 01/03/2022    CREATININE 1.0 01/03/2022    EGFRIFNONA 63 07/10/2019    EGFRIFAFRI 57 06/08/2021    BCR 18.6 07/10/2019    CO2 25 01/03/2022    CALCIUM 9.6 01/03/2022    ALBUMIN 4.1 01/03/2022    AST 13 01/03/2022    ALT 5 (L) 01/03/2022     Lab Results   Component Value Date    CHOL 151 01/07/2021    CHOL 182 07/06/2020    CHOL 167 10/17/2019     Lab Results   Component Value Date    TRIG 304 (H) 01/03/2022    TRIG 138 06/08/2021    TRIG 214 (H) 01/07/2021     Lab Results   Component Value Date    HDL 45 01/03/2022    HDL 53 06/08/2021    HDL 49 01/07/2021     No components found for: LDLCALC  Lab Results   Component Value Date    LDL 76 01/03/2022    LDL 79 06/08/2021    LDL 78 01/07/2021     No results found for: HDLLDLRATIO  No components found for: CHOLHDL  Lab Results   Component Value Date    HGBA1C 6.4 (H) 01/03/2022     Lab Results   Component Value Date    TSH 1.47 01/03/2022           ASSESSMENT AND PLAN  Ms. Lopez is progressing reasonably well with no cardiac symptoms at the present time.  Clinical exam today was unremarkable.  Her heart rate and blood pressure were in the normal range.   Her lab results from January 2022 were reviewed and LDL was 76 and HDL 45.  CMP was within normal limits.  I have continued  antiplatelet therapy with aspirin, antihypertensive therapy with amlodipine, labetalol, losartan, Aldactone and lipid-lowering therapy with Crestor has been continued.    .Diagnoses and all orders for this visit:    1. Primary hypertension (Primary)    2. Benign essential hypertension  -     ECG 12 Lead    3. S/P CABG (coronary artery bypass graft)    4. Mixed hyperlipidemia    5. Controlled type 2 diabetes mellitus without complication, with long-term current use of insulin (HCC)        Patient's Body mass index is 27.25 kg/m². BMI is above normal parameters. Recommendations include: exercise counseling and nutrition counseling.  Patient is a non-smoker    Ember Forrest MD  5/6/2022  09:48 CDT

## 2022-05-18 ENCOUNTER — TELEPHONE (OUTPATIENT)
Dept: ORTHOPEDIC SURGERY | Facility: CLINIC | Age: 87
End: 2022-05-18

## 2022-05-25 ENCOUNTER — CLINICAL SUPPORT (OUTPATIENT)
Dept: ORTHOPEDIC SURGERY | Facility: CLINIC | Age: 87
End: 2022-05-25

## 2022-05-25 VITALS — HEIGHT: 67 IN | BODY MASS INDEX: 27.31 KG/M2 | WEIGHT: 174 LBS

## 2022-05-25 DIAGNOSIS — M25.561 CHRONIC PAIN OF BOTH KNEES: Primary | ICD-10-CM

## 2022-05-25 DIAGNOSIS — M25.562 CHRONIC PAIN OF BOTH KNEES: Primary | ICD-10-CM

## 2022-05-25 DIAGNOSIS — M17.0 PRIMARY OSTEOARTHRITIS OF BOTH KNEES: ICD-10-CM

## 2022-05-25 DIAGNOSIS — G89.29 CHRONIC PAIN OF BOTH KNEES: Primary | ICD-10-CM

## 2022-05-25 PROCEDURE — 20610 DRAIN/INJ JOINT/BURSA W/O US: CPT | Performed by: NURSE PRACTITIONER

## 2022-05-25 NOTE — PROGRESS NOTES
"Knee Joint Injection      Patient: Alexia Lopez    YOB: 1935    Chief Complaint   Patient presents with   • Left Knee - Follow-up   • Right Knee - Follow-up       History of Present Illness:  Patient is here for an injection.  No new complaints.    Physical Exam: 86 y.o. female  General Appearance:    Alert, cooperative, in no acute distress                   Vitals:    05/25/22 0925   Weight: 78.9 kg (174 lb)   Height: 170.2 cm (67\")   PainSc:   5        Patient is alert and oriented, ×3 no acute distress.  Affect is normal respiratory rate is normal unlabored.  Exam and complaints are unchanged.    Procedure:  Large Joint Arthrocentesis: R knee  Date/Time: 5/25/2022 9:24 AM  Consent given by: patient  Site marked: site marked  Timeout: Immediately prior to procedure a time out was called to verify the correct patient, procedure, equipment, support staff and site/side marked as required   Supporting Documentation  Indications: pain   Procedure Details  Location: knee - R knee  Preparation: Patient was prepped and draped in the usual sterile fashion  Needle size: 22 G  Medications administered: 60 mg Sodium Hyaluronate 60 MG/3ML  Patient tolerance: patient tolerated the procedure well with no immediate complications    Large Joint Arthrocentesis: L knee  Date/Time: 5/25/2022 9:24 AM  Consent given by: patient  Site marked: site marked  Timeout: Immediately prior to procedure a time out was called to verify the correct patient, procedure, equipment, support staff and site/side marked as required   Supporting Documentation  Indications: pain   Procedure Details  Location: knee - L knee  Preparation: Patient was prepped and draped in the usual sterile fashion  Needle size: 22 G  Medications administered: 60 mg Sodium Hyaluronate 60 MG/3ML  Patient tolerance: patient tolerated the procedure well with no immediate complications          Assessment:    Diagnoses and all orders for this " visit:    Chronic pain of both knees    Primary osteoarthritis of both knees    Other orders  -     Large Joint Arthrocentesis: R knee  -     Large Joint Arthrocentesis: L knee        Plan:   Slowly increase activity as tolerated.  Discussed importance of leg strengthening and general conditioning.  Discussed warning signs of injection.  Discussed that purpose of injections was symptom improvement and improved activity.  Also discussed that further treatment options depended on symptoms at followup and length of time of improvement after injections.    Return in about 3 months (around 8/25/2022), or if symptoms worsen or fail to improve.    EMR Dragon/Transciption Disclaimer: Some of this note may be an electronic transcription/translation of spoken language to printed text.  The electronic translation of spoken language may permit erroneous, or at times, nonsensical words or phrases to be inadvertently transcribed. Although I have reviewed the note for such errors, some may still exist.       This document has been electronically signed by PEGGY Andino on May 25, 2022 09:43 CDT

## 2022-10-12 DIAGNOSIS — G89.29 CHRONIC PAIN OF BOTH SHOULDERS: Primary | ICD-10-CM

## 2022-10-12 DIAGNOSIS — M25.511 CHRONIC PAIN OF BOTH SHOULDERS: Primary | ICD-10-CM

## 2022-10-12 DIAGNOSIS — M25.512 CHRONIC PAIN OF BOTH SHOULDERS: Primary | ICD-10-CM

## 2022-11-01 ENCOUNTER — OFFICE VISIT (OUTPATIENT)
Dept: ORTHOPEDIC SURGERY | Facility: CLINIC | Age: 87
End: 2022-11-01

## 2022-11-01 VITALS — BODY MASS INDEX: 27.2 KG/M2 | WEIGHT: 173.3 LBS | HEIGHT: 67 IN

## 2022-11-01 DIAGNOSIS — M19.012 BILATERAL SHOULDER REGION ARTHRITIS: ICD-10-CM

## 2022-11-01 DIAGNOSIS — M19.011 BILATERAL SHOULDER REGION ARTHRITIS: ICD-10-CM

## 2022-11-01 DIAGNOSIS — G89.29 CHRONIC RIGHT SHOULDER PAIN: ICD-10-CM

## 2022-11-01 DIAGNOSIS — M25.512 CHRONIC LEFT SHOULDER PAIN: Primary | ICD-10-CM

## 2022-11-01 DIAGNOSIS — G89.29 CHRONIC LEFT SHOULDER PAIN: Primary | ICD-10-CM

## 2022-11-01 DIAGNOSIS — M25.511 CHRONIC RIGHT SHOULDER PAIN: ICD-10-CM

## 2022-11-01 PROCEDURE — 20610 DRAIN/INJ JOINT/BURSA W/O US: CPT | Performed by: NURSE PRACTITIONER

## 2022-11-01 PROCEDURE — 99214 OFFICE O/P EST MOD 30 MIN: CPT | Performed by: NURSE PRACTITIONER

## 2022-11-01 RX ORDER — TRIAMCINOLONE ACETONIDE 40 MG/ML
40 INJECTION, SUSPENSION INTRA-ARTICULAR; INTRAMUSCULAR
Status: COMPLETED | OUTPATIENT
Start: 2022-11-01 | End: 2022-11-01

## 2022-11-01 RX ADMIN — TRIAMCINOLONE ACETONIDE 40 MG: 40 INJECTION, SUSPENSION INTRA-ARTICULAR; INTRAMUSCULAR at 10:31

## 2022-11-01 NOTE — PROGRESS NOTES
Alexia Lopez is a 87 y.o. female   Primary provider:  Munir Rodriguez MD       Chief Complaint   Patient presents with   • Left Shoulder - Pain   • Right Shoulder - Pain   • Initial Evaluation       HISTORY OF PRESENT ILLNESS:    Mrs. Lopez is an 87-year-old female who presents today with complaints of bilateral shoulder pain, right worse than left.  She is right-handed.  She reports pain is moderate and intermittent.  Pain occurs when lifting overhead and makes certain ADLs such as washing windows difficult.  She describes the pain as aching.  Pain sometimes radiates down into her biceps and into her neck.  She tried diclofenac for 10 days, this was ineffective in controlling symptoms so she switched back to plain Tylenol.  She reports occasional numbness and tingling down her left hand, she states that she has been treated for carpal tunnel in the past and wears a carpal tunnel brace for her left hand.  No injuries or traumas.  No fever, nausea, vomiting.        Pain  This is a new problem. Episode onset: 8 weeks. The problem occurs intermittently. Associated symptoms include coughing, numbness and a sore throat. Associated symptoms comments: Aching. The symptoms are aggravated by standing (sitting). She has tried ice and heat for the symptoms. The treatment provided mild relief.        CONCURRENT MEDICAL HISTORY:    Past Medical History:   Diagnosis Date   • Cataract    • Coronary artery disease    • Diabetes mellitus (HCC)     Type 2 diabetes mellitus - without retinopathy      • History of echocardiogram 05/12/2014    Normal LV systolic function with EF of 55% with mild hypokinesis. Diastolic relaxation abnormality of left ventricle. Mild tricuspid regurg. Trace mitral regurg   • Hyperlipemia    • Hypertension    • Myocardial infarction (HCC)        Allergies   Allergen Reactions   • Cherry Anaphylaxis   • Peanut-Containing Drug Products Anaphylaxis   • Articaine Other (See Comments)     Had at  dentist office causes shaking   • Lidocaine Other (See Comments)     Lidocaine injection at dentist office caused shaking         Current Outpatient Medications:   •  amLODIPine (NORVASC) 5 MG tablet, TAKE 1 TABLET BY MOUTH DAILY IN THE EVENING, Disp: 90 tablet, Rfl: 2  •  aspirin 81 MG disintegrating tablet, Take 81 mg by mouth Daily., Disp: , Rfl:   •  CO ENZYME Q-10 PO, Take 100 mg by mouth Daily., Disp: , Rfl:   •  docusate sodium (COLACE) 100 MG capsule, Take 100 mg by mouth Daily., Disp: , Rfl:   •  fluticasone (FLONASE) 50 MCG/ACT nasal spray, 2 sprays into the nostril(s) as directed by provider Daily., Disp: 16 g, Rfl: 11  •  labetalol (NORMODYNE) 200 MG tablet, Take 100 mg by mouth 2 (Two) Times a Day. .5, Disp: , Rfl:   •  loratadine (CLARITIN) 10 MG tablet, Take 10 mg by mouth Daily., Disp: , Rfl:   •  losartan (COZAAR) 100 MG tablet, Take 100 mg by mouth Daily., Disp: , Rfl:   •  metFORMIN ER (GLUCOPHAGE-XR) 750 MG 24 hr tablet, Take 1,500 mg by mouth Every Night., Disp: , Rfl:   •  ONE TOUCH ULTRA TEST test strip, USE TO TEST BLOOD SUGAR EVERY DAY DX.E11.9, Disp: , Rfl: 5  •  Polysaccharide Iron Complex (FERREX 150 PO), Take 150 mg by mouth Daily., Disp: , Rfl:   •  rosuvastatin (CRESTOR) 10 MG tablet, Take 10 mg by mouth Every Night., Disp: , Rfl:   •  SALINE NASAL SPRAY NA, into the nostril(s) as directed by provider As Needed., Disp: , Rfl:   •  spironolactone (ALDACTONE) 25 MG tablet, Take 1 tablet by mouth Daily. (Patient taking differently: Take 1 tablet by mouth Daily. .), Disp: 30 tablet, Rfl: 3  •  vitamin B-12 (CYANOCOBALAMIN) 1000 MCG tablet, Take 1,000 mcg by mouth Daily., Disp: , Rfl:     Past Surgical History:   Procedure Laterality Date   • BACK SURGERY     • CARDIAC CATHETERIZATION  05/12/2014    Severe multivessel CAD with critical lesions noted in the LAD coronary artery, left circumflex coronary artery and RCA. Left ventriculogram no performed in view of elevated left ventricular  "end-diastolic pressure   • CATARACT EXTRACTION W/ INTRAOCULAR LENS IMPLANT Left 2/2/2018    Procedure: CATARACT PHACO EXTRACTION WITH INTRAOCULAR LENS IMPLANT;  Surgeon: Gagan Lizarraga MD;  Location: [facility];  Service:    • CATARACT EXTRACTION W/ INTRAOCULAR LENS IMPLANT Right 2/9/2018    Procedure: CATARACT PHACO EXTRACTION WITH INTRAOCULAR LENS IMPLANT;  Surgeon: Gagan Lizarraga MD;  Location: [facility];  Service:    • CORONARY ARTERY BYPASS GRAFT      X 3 with LIMA to LAD, SVG to OMB and SVG to PDA.   • DILATATION AND CURETTAGE     • OTHER SURGICAL HISTORY  05/06/2014    INCISION OF EARDRUM GENERAL ANESTHETIC 05415 (1)    Bilateral myringotomy with tubes.    • SINUS SURGERY     • TONSILLECTOMY     • TONSILLECTOMY AND ADENOIDECTOMY     • TUBAL ABDOMINAL LIGATION  03/14/1978       Family History   Problem Relation Age of Onset   • Hypertension Mother    • Coronary artery disease Father    • Diabetes Other         grandmother   • Cancer Other         Social History     Socioeconomic History   • Marital status:    Tobacco Use   • Smoking status: Never   • Smokeless tobacco: Never   Substance and Sexual Activity   • Alcohol use: No   • Drug use: No   • Sexual activity: Defer        Review of Systems   Constitutional: Negative.    HENT: Positive for sore throat.    Eyes: Negative.    Respiratory: Positive for cough.    Cardiovascular: Negative.    Gastrointestinal: Negative.    Endocrine: Negative.    Genitourinary: Negative.    Musculoskeletal: Negative.    Skin: Negative.    Allergic/Immunologic: Negative.    Neurological: Positive for numbness.        Tingling   Hematological: Negative.    Psychiatric/Behavioral: Negative.        PHYSICAL EXAMINATION:       Ht 170.2 cm (67\")   Wt 78.6 kg (173 lb 4.8 oz)   LMP  (LMP Unknown)   BMI 27.14 kg/m²     Physical Exam  Vitals and nursing note reviewed.   Constitutional:       General: She is not in acute distress.     Appearance: She is " well-developed. She is not toxic-appearing or diaphoretic.   HENT:      Head: Normocephalic.   Eyes:      General: No scleral icterus.  Pulmonary:      Effort: Pulmonary effort is normal. No respiratory distress.   Skin:     General: Skin is warm and dry.   Neurological:      Mental Status: She is alert and oriented to person, place, and time.   Psychiatric:         Behavior: Behavior normal.         Thought Content: Thought content normal.         Judgment: Judgment normal.         GAIT:     []  Normal  []  Antalgic    Assistive device: []  None  []  Walker     []  Crutches  []  Cane     []  Wheelchair  []  Stretcher    Right Shoulder Exam     Tenderness   The patient is experiencing tenderness in the acromion.    Range of Motion   Active abduction: 110   Extension: 50   External rotation: abnormal   Forward flexion: 110   Internal rotation 90 degrees: abnormal     Tests   Impingement: positive    Other   Erythema: absent  Sensation: normal  Pulse: present    Comments:  Positive full can test.  Negative empty can test  No evidence of infection  Fingers are warm, pink, with good capillary refill       Left Shoulder Exam     Tenderness   The patient is experiencing tenderness in the acromion.    Range of Motion   Active abduction: 130   Extension: 40   External rotation: abnormal   Forward flexion: 90   Internal rotation 90 degrees: abnormal     Tests   Impingement: positive    Other   Erythema: absent  Sensation: normal  Pulse: present     Comments:  Negative empty can test  Negative full can test  No evidence of infection  Fingers are warm, pink, with good capillary refill               No results found.    X-ray shoulder right 2 or more views    Anatomical Region Laterality Modality   Shoulder right Radiographic Imaging   Chest -- --   Arm -- --     Impression      Negative     [id number]  Narrative    Right shoulder pain     2 views right shoulder: The shoulder is normally aligned and no fracture or other  bony abnormality .  Procedure Note    Ebre Varela MD - 09/21/2022   Formatting of this note might be different from the original.   Right shoulder pain     2 views right shoulder: The shoulder is normally aligned and no fracture or other bony abnormality .     IMPRESSION:     Negative     [id number]  Exam End: 09/21/22 11:06    Specimen Collected: 09/21/22 11:33 Last Resulted: 09/21/22 11:33         ASSESSMENT:    Diagnoses and all orders for this visit:    Chronic left shoulder pain    Chronic right shoulder pain    Bilateral shoulder region arthritis          PLAN  Large Joint Arthrocentesis: R subacromial bursa  Date/Time: 11/1/2022 10:31 AM  Consent given by: patient  Site marked: site marked  Timeout: Immediately prior to procedure a time out was called to verify the correct patient, procedure, equipment, support staff and site/side marked as required   Supporting Documentation  Indications: pain   Procedure Details  Location: shoulder - R subacromial bursa  Preparation: Patient was prepped and draped in the usual sterile fashion  Needle size: 22 G  Approach: posterior  Medications administered: 40 mg triamcinolone acetonide 40 MG/ML  Patient tolerance: patient tolerated the procedure well with no immediate complications    Large Joint Arthrocentesis: L subacromial bursa  Date/Time: 11/1/2022 10:31 AM  Consent given by: patient  Site marked: site marked  Timeout: Immediately prior to procedure a time out was called to verify the correct patient, procedure, equipment, support staff and site/side marked as required   Supporting Documentation  Indications: pain   Procedure Details  Location: shoulder - L subacromial bursa  Preparation: Patient was prepped and draped in the usual sterile fashion  Needle size: 22 G  Approach: posterior  Medications administered: 40 mg triamcinolone acetonide 40 MG/ML  Patient tolerance: patient tolerated the procedure well with no immediate complications          X-rays  reviewed, patient has arthritic changes in bilateral shoulders.  She has tried and failed diclofenac.  After discussing available treatment options including risk, benefits, alternatives, patient desires to proceed with subacromial injections today.  Patient has lidocaine listed on allergy list so just plain Kenalog was given.  Patient tolerated injections well.  Formal physical therapy discussed, patient declines at this time.  Patient is to return in 2 to 4 weeks if she does not get good relief from injections to discuss possible MRIs and surgical indications if she so desires.      Patient reports occasional numbness and tingling that she is currently wearing wrist brace for, patient instructed to return to office if she desires to be worked up for this issue as we do not have time to dedicate to this problem today.      Patient also requests repeat viscosupplementation to bilateral knees.  [employer] reordered.     Return in about 4 weeks (around 11/29/2022), or if symptoms worsen or fail to improve.    EMR Dragon/Transciption Disclaimer: Some of this note may be an electronic transcription/translation of spoken language to printed text.  The electronic translation of spoken language may permit erroneous, or at times, nonsensical words or phrases to be inadvertently transcribed. Although I have reviewed the note for such errors, some may still exist.     Time spent of a minimum of 30 minutes including the face to face evaluation, reviewing of medical history and prior medial records, reviewing of diagnostic studies, ordering additional tests, documentation, patient education and coordination of care.       This document has been electronically signed by Laila WOODS on November 1, 2022 09:47 CDT

## 2022-11-11 ENCOUNTER — OFFICE VISIT (OUTPATIENT)
Dept: CARDIOLOGY | Facility: CLINIC | Age: 87
End: 2022-11-11

## 2022-11-11 VITALS
SYSTOLIC BLOOD PRESSURE: 138 MMHG | TEMPERATURE: 98 F | OXYGEN SATURATION: 98 % | HEIGHT: 67 IN | HEART RATE: 68 BPM | DIASTOLIC BLOOD PRESSURE: 68 MMHG | BODY MASS INDEX: 26.84 KG/M2 | WEIGHT: 171 LBS

## 2022-11-11 DIAGNOSIS — Z95.1 S/P CABG (CORONARY ARTERY BYPASS GRAFT): Primary | ICD-10-CM

## 2022-11-11 DIAGNOSIS — I10 PRIMARY HYPERTENSION: ICD-10-CM

## 2022-11-11 DIAGNOSIS — E78.2 MIXED HYPERLIPIDEMIA: ICD-10-CM

## 2022-11-11 PROCEDURE — 99214 OFFICE O/P EST MOD 30 MIN: CPT | Performed by: INTERNAL MEDICINE

## 2022-11-11 RX ORDER — LABETALOL 200 MG/1
200 TABLET, FILM COATED ORAL
COMMUNITY
Start: 2022-11-09 | End: 2022-11-11

## 2022-11-11 NOTE — PROGRESS NOTES
Alexia Lopez  87 y.o. female      1. S/P CABG (coronary artery bypass graft)    2. Primary hypertension    3. Mixed hyperlipidemia        History of Present Illness:  Ms. Lopez is an 87 year old  female with PMH of HTN, CAD (h/o CABG) , DM II and Hyperlipidemia. She underwent coronary artery bypass surgery in May 2014 when she received LIMA to LAD, SVG to obtuse marginal and SVG to PDA.    The patient had an extensive evaluation at [facility] in July 2019 when she presented with right upper back and chest pain. The patient was found to have elevated blood pressure (214/84) on admission. CTA of the chest ruled out pulmonary embolism and aortic dissection.  Serial cardiac enzymes and EKG were unremarkable. Gallbladder ultrasound was negative. BNP was stable and no signs of CHF was noted.  Lexiscan Cardiolite stress test (7/ 2019) showed ejection fraction of 68% with no fixed or reversible defects.  Echocardiogram (7/ 2019) showed normal LV systolic function with an EF of 57% with mild to moderate LVH.  Grade 1 diastolic dysfunction was present.  RV was mildly dilated and left atrium was mildly dilated.  There was mild mitral and tricuspid regurgitation.    The patient recently tested positive for COVID in October 2022 and has recovered from this.  Last week, one night she apparently woke up, not feeling well and was shaky, and noted to have an elevated blood pressure.  Understand that Dr. Rodriguez increase the dose of labetalol to twice a day.  This seems to have helped.  Her blood pressure was in the normal range today.  She denies any chest pain or shortness of breath.  Clinical exam was otherwise unremarkable.    Allergies   Allergen Reactions   • Cherry Anaphylaxis   • Peanut-Containing Drug Products Anaphylaxis   • Articaine Other (See Comments)     Had at dentist office causes shaking   • Lidocaine Other (See Comments)     Lidocaine injection at dentist office caused shaking          Past Medical History:   Diagnosis Date   • Cataract    • Coronary artery disease    • Diabetes mellitus (HCC)     Type 2 diabetes mellitus - without retinopathy      • History of echocardiogram 05/12/2014    Normal LV systolic function with EF of 55% with mild hypokinesis. Diastolic relaxation abnormality of left ventricle. Mild tricuspid regurg. Trace mitral regurg   • Hyperlipemia    • Hypertension    • Myocardial infarction (HCC)          Past Surgical History:   Procedure Laterality Date   • BACK SURGERY     • CARDIAC CATHETERIZATION  05/12/2014    Severe multivessel CAD with critical lesions noted in the LAD coronary artery, left circumflex coronary artery and RCA. Left ventriculogram no performed in view of elevated left ventricular end-diastolic pressure   • CATARACT EXTRACTION W/ INTRAOCULAR LENS IMPLANT Left 2/2/2018    Procedure: CATARACT PHACO EXTRACTION WITH INTRAOCULAR LENS IMPLANT;  Surgeon: Gagan Lizarraga MD;  Location: [facility];  Service:    • CATARACT EXTRACTION W/ INTRAOCULAR LENS IMPLANT Right 2/9/2018    Procedure: CATARACT PHACO EXTRACTION WITH INTRAOCULAR LENS IMPLANT;  Surgeon: Gagan Lizarraga MD;  Location: [facility];  Service:    • CORONARY ARTERY BYPASS GRAFT      X 3 with LIMA to LAD, SVG to OMB and SVG to PDA.   • DILATATION AND CURETTAGE     • OTHER SURGICAL HISTORY  05/06/2014    INCISION OF EARDRUM GENERAL ANESTHETIC 44833 (1)    Bilateral myringotomy with tubes.    • SINUS SURGERY     • TONSILLECTOMY     • TONSILLECTOMY AND ADENOIDECTOMY     • TUBAL ABDOMINAL LIGATION  03/14/1978         Family History   Problem Relation Age of Onset   • Hypertension Mother    • Coronary artery disease Father    • Diabetes Other         grandmother   • Cancer Other          Social History     Socioeconomic History   • Marital status:    Tobacco Use   • Smoking status: Never   • Smokeless tobacco: Never   Substance and Sexual Activity   • Alcohol use: No   • Drug use: No  "  • Sexual activity: Defer         Current Outpatient Medications   Medication Sig Dispense Refill   • amLODIPine (NORVASC) 5 MG tablet TAKE 1 TABLET BY MOUTH DAILY IN THE EVENING 90 tablet 2   • aspirin 81 MG disintegrating tablet Take 81 mg by mouth Daily.     • CO ENZYME Q-10 PO Take 100 mg by mouth Daily.     • docusate sodium (COLACE) 100 MG capsule Take 100 mg by mouth Daily.     • fluticasone (FLONASE) 50 MCG/ACT nasal spray 2 sprays into the nostril(s) as directed by provider Daily. 16 g 11   • labetalol (NORMODYNE) 200 MG tablet Take 100 mg by mouth 2 (Two) Times a Day. .5     • loratadine (CLARITIN) 10 MG tablet Take 10 mg by mouth Daily.     • losartan (COZAAR) 100 MG tablet Take 100 mg by mouth Daily.     • metFORMIN ER (GLUCOPHAGE-XR) 750 MG 24 hr tablet Take 1,500 mg by mouth Every Night.     • ONE TOUCH ULTRA TEST test strip USE TO TEST BLOOD SUGAR EVERY DAY DX.E11.9  5   • Polysaccharide Iron Complex (FERREX 150 PO) Take 150 mg by mouth Daily.     • rosuvastatin (CRESTOR) 10 MG tablet Take 10 mg by mouth Every Night.     • SALINE NASAL SPRAY NA into the nostril(s) as directed by provider As Needed.     • spironolactone (ALDACTONE) 25 MG tablet Take 1 tablet by mouth Daily. (Patient taking differently: Take 1 tablet by mouth Daily. .) 30 tablet 3   • vitamin B-12 (CYANOCOBALAMIN) 1000 MCG tablet Take 1,000 mcg by mouth Daily.       No current facility-administered medications for this visit.         OBJECTIVE    /68 (BP Location: Left arm, Patient Position: Sitting, Cuff Size: Adult)   Pulse 68   Temp 98 °F (36.7 °C)   Ht 170.2 cm (67\")   Wt 77.6 kg (171 lb)   LMP  (LMP Unknown)   SpO2 98%   BMI 26.78 kg/m²       Review of Systems : The following systems are reviewed and changes noted as indicated below.    Constitutional:  Denies recent weight loss, weight gain, fever or chills     HENT:  Denies any hearing loss, epistaxis, hoarseness, or difficulty speaking.     Eyes: Wears eyeglasses " or contact lenses     Respiratory:  Denies dyspnea with exertion,no cough, wheezing, or hemoptysis.     Cardiovascular: No chest pain, palpitation or dizziness.    Gastrointestinal:  Denies change in bowel habits, dyspepsia, ulcer disease, hematochezia, or melena.     Endocrine: Negative for cold intolerance, heat intolerance, polydipsia, polyphagia and polyuria.     Genitourinary: Negative.      Musculoskeletal: DJD.  Bilateral knee pain.    Neurological:  Denies any history of recurrent headaches, strokes, TIA, or seizure disorder.     Hematological: Denies any food allergies, seasonal allergies, bleeding disorders, or lymphadenopathy.     Psychiatric/Behavioral: Denies any history of depression, substance abuse, or change in cognitive function.       Physical Exam : The following systems are reviewed and no changes noted    Constitutional: Cooperative, alert and oriented,in no acute distress.     HENT:   Head: Normocephalic, normal hair patterns, no masses or tenderness.  Ears, Nose, and Throat: No gross abnormalities. No pallor or cyanosis.   Eyes: EOMS intact, PERRL, conjunctivae and lids unremarkable. Fundoscopic exam and visual fields not performed.   Neck: No palpable masses or adenopathy, no thyromegaly, no JVD, carotid pulses are full and equal bilaterally and without  Bruits.     Cardiovascular: Regular rhythm, S1 and S2 normal, no S3 or S4.  No murmurs, gallops, or rubs detected.     Pulmonary/Chest: Chest: normal symmetry,  normal respiratory excursion, no intercostal retraction, no use of accessory muscles.            Pulmonary: Normal breath sounds. No rales or ronchi.    Abdominal: Abdomen soft, bowel sounds normoactive, no masses, no hepatosplenomegaly, non-tender, no bruits.     Musculoskeletal: No deformities, clubbing, cyanosis, erythema, or edema observed.     Neurological: No gross motor or sensory deficits noted, affect appropriate, oriented to time, person, place.     Skin: Warm and dry to  the touch, no apparent skin lesions or masses noted.     Psychiatric: She has a normal mood and affect. Her behavior is normal. Judgment and thought content normal.         Procedures      Lab Results   Component Value Date    WBC 6.2 10/17/2019    HGB 10.7 (L) 10/17/2019    HCT 33.6 (L) 10/17/2019    MCV 96 (H) 10/17/2019     10/17/2019     Lab Results   Component Value Date    GLUCOSE 155 (H) 07/10/2019    BUN 21 07/19/2022    CREATININE 1.0 07/19/2022    EGFRIFNONA 63 07/10/2019    EGFRIFAFRI 57 06/08/2021    BCR 18.6 07/10/2019    CO2 21 07/19/2022    CALCIUM 9.0 07/19/2022    ALBUMIN 4.0 07/19/2022    AST 13 07/19/2022    ALT 5 (L) 07/19/2022     Lab Results   Component Value Date    CHOL 151 01/07/2021    CHOL 182 07/06/2020    CHOL 167 10/17/2019     Lab Results   Component Value Date    TRIG 218 (H) 07/19/2022    TRIG 304 (H) 01/03/2022    TRIG 138 06/08/2021     Lab Results   Component Value Date    HDL 43 07/19/2022    HDL 45 01/03/2022    HDL 53 06/08/2021     No components found for: LDLCALC  Lab Results   Component Value Date    LDL 66 07/19/2022    LDL 76 01/03/2022    LDL 79 06/08/2021     No results found for: HDLLDLRATIO  No components found for: CHOLHDL  Lab Results   Component Value Date    HGBA1C 6.4 (H) 07/19/2022     Lab Results   Component Value Date    TSH 1.73 07/19/2022           ASSESSMENT AND PLAN  Ms. Lopez is progressing reasonably well with no cardiac symptoms at the present time.  As mentioned above, she had a recent episode of COVID from which she seems to have recovered.  There is no evidence of pneumonia.    Clinical exam showed that her heart rate and blood pressure were in the normal range.  No signs of bronchospasm or congestive heart failure was noted.    Her lab results from July 2022 were reviewed and LDL was 66 and HDL 43.    I have continued antiplatelet therapy with aspirin, antihypertensive therapy with amlodipine, labetalol, losartan, Aldactone and  lipid-lowering therapy with Crestor has been continued.    .Diagnoses and all orders for this visit:    1. S/P CABG (coronary artery bypass graft) (Primary)    2. Primary hypertension    3. Mixed hyperlipidemia        Patient's Body mass index is 26.78 kg/m². BMI is above normal parameters. Recommendations include: exercise counseling and nutrition counseling.  Patient is a non-smoker    Ember Forrest MD  11/11/2022  10:03 CST

## 2022-11-17 ENCOUNTER — APPOINTMENT (OUTPATIENT)
Dept: CT IMAGING | Facility: HOSPITAL | Age: 87
End: 2022-11-17

## 2022-11-17 ENCOUNTER — HOSPITAL ENCOUNTER (EMERGENCY)
Facility: HOSPITAL | Age: 87
Discharge: HOME | End: 2022-11-17
Attending: EMERGENCY MEDICINE | Admitting: EMERGENCY MEDICINE

## 2022-11-17 VITALS
OXYGEN SATURATION: 100 % | WEIGHT: 173 LBS | HEIGHT: 67 IN | RESPIRATION RATE: 18 BRPM | HEART RATE: 61 BPM | SYSTOLIC BLOOD PRESSURE: 164 MMHG | BODY MASS INDEX: 27.15 KG/M2 | TEMPERATURE: 98 F | DIASTOLIC BLOOD PRESSURE: 76 MMHG

## 2022-11-17 DIAGNOSIS — S02.2XXA CLOSED FRACTURE OF NASAL BONE, INITIAL ENCOUNTER: Primary | ICD-10-CM

## 2022-11-17 DIAGNOSIS — J32.0 MAXILLARY SINUSITIS, UNSPECIFIED CHRONICITY: ICD-10-CM

## 2022-11-17 DIAGNOSIS — S00.83XA CONTUSION OF FOREHEAD, INITIAL ENCOUNTER: ICD-10-CM

## 2022-11-17 DIAGNOSIS — S00.81XA ABRASION OF FACE, INITIAL ENCOUNTER: ICD-10-CM

## 2022-11-17 PROCEDURE — 72125 CT NECK SPINE W/O DYE: CPT

## 2022-11-17 PROCEDURE — 70450 CT HEAD/BRAIN W/O DYE: CPT

## 2022-11-17 PROCEDURE — 70486 CT MAXILLOFACIAL W/O DYE: CPT

## 2022-11-17 PROCEDURE — 99283 EMERGENCY DEPT VISIT LOW MDM: CPT

## 2022-11-17 RX ORDER — GINSENG 100 MG
1 CAPSULE ORAL 2 TIMES DAILY
Qty: 14 G | Refills: 0 | Status: SHIPPED | OUTPATIENT
Start: 2022-11-17 | End: 2022-11-24

## 2022-11-17 RX ORDER — AMOXICILLIN AND CLAVULANATE POTASSIUM 875; 125 MG/1; MG/1
1 TABLET, FILM COATED ORAL 2 TIMES DAILY
Qty: 20 TABLET | Refills: 0 | Status: SHIPPED | OUTPATIENT
Start: 2022-11-17 | End: 2022-11-27

## 2022-11-17 RX ORDER — DIAPER,BRIEF,INFANT-TODD,DISP
1 EACH MISCELLANEOUS ONCE
Status: COMPLETED | OUTPATIENT
Start: 2022-11-17 | End: 2022-11-17

## 2022-11-17 RX ADMIN — BACITRACIN 1 APPLICATION: 500 OINTMENT TOPICAL at 20:20

## 2022-11-18 NOTE — ED NOTES
Pt states she was walking up a ramp and stubbed her foot and fell forward. Pt has abrasion on her forehead and face. Pt denies any LOC.

## 2022-11-18 NOTE — ED PROVIDER NOTES
Subjective   History of Present Illness  88yo female pmh significant htn/hyperlipidemia/cad/dm2 presents ED c/o trip and fall forward on [employer] ramp striking forehead/nasal bridge with associated hematoma/abrasion.  ROS (+) headache/mild upper cervical neck pain.  Denies loc/nausea/vomiting/paresthesia/motor weakness/chest pain/abd pain.    History provided by:  Patient  Facial Injury  Mechanism of injury:  Fall  Location:  Forehead and nose  Pain details:     Quality:  Aching    Severity:  Moderate  Associated symptoms: headaches and neck pain        Review of Systems   Constitutional: Negative.    Eyes: Negative.    Respiratory: Negative.    Cardiovascular: Negative.    Gastrointestinal: Negative.    Musculoskeletal: Positive for neck pain.   Skin: Positive for wound.   Neurological: Positive for headaches. Negative for syncope.   All other systems reviewed and are negative.      Past Medical History:   Diagnosis Date   • Cataract    • Coronary artery disease    • Diabetes mellitus (HCC)     Type 2 diabetes mellitus - without retinopathy      • History of echocardiogram 05/12/2014    Normal LV systolic function with EF of 55% with mild hypokinesis. Diastolic relaxation abnormality of left ventricle. Mild tricuspid regurg. Trace mitral regurg   • Hyperlipemia    • Hypertension    • Myocardial infarction (HCC)        Allergies   Allergen Reactions   • Cherry Anaphylaxis   • Peanut-Containing Drug Products Anaphylaxis   • Articaine Other (See Comments)     Had at dentist office causes shaking   • Lidocaine Other (See Comments)     Lidocaine injection at dentist office caused shaking       Past Surgical History:   Procedure Laterality Date   • BACK SURGERY     • CARDIAC CATHETERIZATION  05/12/2014    Severe multivessel CAD with critical lesions noted in the LAD coronary artery, left circumflex coronary artery and RCA. Left ventriculogram no performed in view of elevated left ventricular end-diastolic pressure   •  CATARACT EXTRACTION W/ INTRAOCULAR LENS IMPLANT Left 2/2/2018    Procedure: CATARACT PHACO EXTRACTION WITH INTRAOCULAR LENS IMPLANT;  Surgeon: Gagan Lizarraga MD;  Location: [facility] OR;  Service:    • CATARACT EXTRACTION W/ INTRAOCULAR LENS IMPLANT Right 2/9/2018    Procedure: CATARACT PHACO EXTRACTION WITH INTRAOCULAR LENS IMPLANT;  Surgeon: Gagan Lizarraga MD;  Location: [facility] OR;  Service:    • CORONARY ARTERY BYPASS GRAFT      X 3 with LIMA to LAD, SVG to OMB and SVG to PDA.   • DILATATION AND CURETTAGE     • OTHER SURGICAL HISTORY  05/06/2014    INCISION OF EARDRUM GENERAL ANESTHETIC 36362 (1)    Bilateral myringotomy with tubes.    • SINUS SURGERY     • TONSILLECTOMY     • TONSILLECTOMY AND ADENOIDECTOMY     • TUBAL ABDOMINAL LIGATION  03/14/1978       Family History   Problem Relation Age of Onset   • Hypertension Mother    • Coronary artery disease Father    • Diabetes Other         grandmother   • Cancer Other        Social History     Socioeconomic History   • Marital status:    Tobacco Use   • Smoking status: Never   • Smokeless tobacco: Never   Substance and Sexual Activity   • Alcohol use: No   • Drug use: No   • Sexual activity: Defer           Objective   Physical Exam  Vitals and nursing note reviewed.   HENT:      Head: Normocephalic.      Jaw: There is normal jaw occlusion.        Right Ear: Tympanic membrane, ear canal and external ear normal. No hemotympanum.      Left Ear: Tympanic membrane, ear canal and external ear normal. No hemotympanum.      Nose:      Right Nostril: No septal hematoma.      Left Nostril: No septal hematoma.      Mouth/Throat:      Mouth: Mucous membranes are moist.      Pharynx: Oropharynx is clear.   Eyes:      Extraocular Movements: Extraocular movements intact.      Pupils: Pupils are equal, round, and reactive to light.   Cardiovascular:      Rate and Rhythm: Normal rate and regular rhythm.      Pulses: Normal pulses.      Heart sounds: Normal  heart sounds.   Pulmonary:      Effort: Pulmonary effort is normal. No respiratory distress.      Breath sounds: Normal breath sounds. No wheezing, rhonchi or rales.   Abdominal:      General: Abdomen is flat. Bowel sounds are normal. There is no distension.      Palpations: Abdomen is soft.      Tenderness: There is no abdominal tenderness. There is no guarding or rebound.   Musculoskeletal:         General: No swelling or deformity.      Cervical back: Normal range of motion and neck supple. No rigidity or tenderness.      Right lower leg: No edema.      Left lower leg: No edema.   Lymphadenopathy:      Cervical: No cervical adenopathy.   Skin:     General: Skin is warm and dry.   Neurological:      General: No focal deficit present.      Mental Status: She is alert and oriented to person, place, and time.      GCS: GCS eye subscore is 4. GCS verbal subscore is 5. GCS motor subscore is 6.      Sensory: Sensation is intact.      Motor: Motor function is intact.      Coordination: Coordination is intact.         Procedures           ED Course      Labs Reviewed - No data to display  XR shoulder 2+ vw left    Result Date: 11/2/2022  Narrative: EXAM: XR SHOULDER 2 OR MORE VIEWS LEFT HISTORY: pain, M25.511 Pain in right shoulder G89.29 Other chronic pain M25.512 Pain in left shoulder. COMPARISON: None FINDINGS: Mineralization: Osseous mineralization Bones: Degenerative changes at the AC joint. Inferior osteophytes. Glenohumeral joint space narrowing. Visualized ribs are intact. Soft tissues: Normal     Impression: Degenerative changes at the acromioclavicular and glenohumeral joint Electronically signed by:  Catarino Blakely DO  11/2/2022 7:55 AM CDT Workstation: [id number]    CT Head Without Contrast    Result Date: 11/17/2022  Narrative: EXAM: CT HEAD WITHOUT IV CONTRAST ORDERING PROVIDER: JOEY REYES CLINICAL HISTORY: Trauma COMPARISON: TECHNIQUE: Nonenhanced CT of the head was performed and reformatted in the  sagittal and coronal planes. This examination was performed according to our departmental dose optimization program which includes automated exposure control, adjustment of the MA and kV according to patient size, and/or use of iterative reconstruction technique. FINDINGS: CEREBRAL PARENCHYMA:  Chronic microangiopathic changes. No hemorrhage.  No intracranial mass or mass effect. Age-appropriate cerebral atrophy. POSTERIOR FOSSA:  Age-appropriate atrophy of cerebellum and brainstem.  No cerebellar tonsillar ectopia. EXTRA-AXIAL SPACES:  Normal size and configuration.  No mass, fluid collection or hemorrhage. ORBITS: No mass. Unremarkable extraocular muscles, globe and optic nerve. CALVARIA AND SOFT TISSUES: Moderate left frontal scalp soft tissue swelling and hematoma. No mass or adenopathy, lytic or sclerotic lesion. TEMPORAL BONE AND SKULL BASE:  Unremarkable middle and inner ear , and mastoid air cells. PARANASAL SINUSES AND FACIAL BONES: Unremarkable. VASCULAR STRUCTURES:  Unremarkable.     Impression: 1.  No acute intracranial process. 2.  Moderate left frontal scalp soft tissue swelling and hematoma. 3.  Scattered chronic microangiopathic changes. Electronically signed by:  Soren Berry MD  11/17/2022 8:00 PM CST Workstation: [id number]    CT Cervical Spine Without Contrast    Result Date: 11/17/2022  Narrative: EXAM: CT CERVICAL SPINE TECHNIQUE: Multislice scan was obtained of the cervical spine in the axial plane. Reformatted images were generated in the sagittal and coronal planes.  This exam was performed according to our departmental dose-optimization program, which includes automated exposure control, adjustment of the mA and/or kV according to the patient's size and/or use of iterative reconstruction technique. CLINICAL HISTORY: Trauma. COMPARISON: FINDINGS: Normal alignment. Vertebral body height is maintained. Intervertebral disc height is narrowed with osteophyte at multiple levels. No fracture, lytic or  sclerotic lesion. Perivertebral soft tissues are unremarkable. Lung apices are normal.     Impression: No acute displaced fracture, or traumatic subluxation based on current assessment. Electronically signed by:  Soren Berry MD  11/17/2022 8:03 PM CST Workstation: [id number]    CT Maxillofacial Without Contrast    Result Date: 11/17/2022  Narrative: EXAM: CT MAXILLOFACIAL WITHOUT IV CONTRAST ORDERING PROVIDER: JOEY REYES CLINICAL HISTORY: Trauma TECHNIQUE: Multiple contiguous axial images of the facial bones acquired with a helical scanner, with both coronal and also sagittal reconstructed images acquired for evaluation. This examination was performed according to our departmental dose optimization program which includes automated exposure control, adjustment of the MA and kV according to patient size, and/or use of iterative reconstruction technique. COMPARISON:    FINDINGS: Acute mildly displaced fracture of the nasal bone bilaterally. Intact anterior nasal spine. There is normal alignment of the bony structure. No evidence of acute fracture-dislocation is noted in the visualized portion of bony orbits. Medial wall, lateral wall, and the floor of the orbits are unremarkable and intact. Bony wall of the sinuses including the frontal sinuses and the visualized portion of the maxillary sinuses is unremarkable. The bilateral zygomatic arches are intact. Unremarkable mandible and also bilateral TMJ joint. Scattered sinonasal disease of the visualized aerated sinuses. Moderate left frontal scalp soft tissue swelling and hematoma.     Impression: Acute mildly displaced fracture of the nasal bone bilaterally. Scattered sinonasal disease of the visualized aerated sinuses. Moderate left frontal scalp soft tissue swelling and hematoma.    Electronically signed by:  Soren Berry MD  11/17/2022 8:07 PM CST Workstation: [id number]                                         [facility]    Final diagnoses:   Closed fracture of nasal bone,  initial encounter   Abrasion of face, initial encounter   Contusion of forehead, initial encounter   Maxillary sinusitis, unspecified chronicity       ED Disposition  ED Disposition     ED Disposition   Discharge    Condition   Good    Comment   --             Munir Rodriguez MD  [address] [id number] [phone]    In 2 days      Angela Mccormick MD  [address] [place] [address] [address]  [address]  [place] [id number] [phone]    Schedule an appointment as soon as possible for a visit in 1 week           Medication List      New Prescriptions    amoxicillin-clavulanate 875-125 MG per tablet  Commonly known as: AUGMENTIN  Take 1 tablet by mouth 2 (Two) Times a Day for 10 days.     bacitracin 500 UNIT/GM ointment  Apply 1 application topically to the appropriate area as directed 2 (Two) Times a Day for 7 days.        Changed    spironolactone 25 MG tablet  Commonly known as: ALDACTONE  Take 1 tablet by mouth Daily.  What changed: additional instructions           Where to Get Your Medications      These medications were sent to [facility]/pharmacy #[id number] - [place], [place] - [address] [place] [address] - [phone]  - [phone] [place] [id number]    Phone: [phone]   · amoxicillin-clavulanate 875-125 MG per tablet  · bacitracin 500 UNIT/GM ointment          Leandro Mata MD  11/17/22 2021

## 2022-11-22 ENCOUNTER — OFFICE VISIT (OUTPATIENT)
Dept: OTOLARYNGOLOGY | Facility: CLINIC | Age: 87
End: 2022-11-22

## 2022-11-22 VITALS — OXYGEN SATURATION: 99 % | WEIGHT: 171 LBS | BODY MASS INDEX: 26.84 KG/M2 | HEIGHT: 67 IN

## 2022-11-22 DIAGNOSIS — S02.2XXA CLOSED FRACTURE OF NASAL BONE, INITIAL ENCOUNTER: Primary | ICD-10-CM

## 2022-11-22 PROCEDURE — 99214 OFFICE O/P EST MOD 30 MIN: CPT | Performed by: OTOLARYNGOLOGY

## 2022-11-22 NOTE — PROGRESS NOTES
Subjective   Alexia Lopez is a 87 y.o. female.       History of Present Illness   Patient suffered a fall on 11/17/2022.  Injured her face.  Was evaluated in the emergency department and found to have a nasal fracture.  CT maxillofacial from 11/17/2022 is personally reviewed and does show a nasal fracture.  I had seen the patient back in 2020 with rhinitis and nasal polyposis which responded to medical therapy.  Patient states that she is actually breathing better through her nose after the injury.  She states that other than the bruising she does not think her nose looks bad.      The following portions of the patient's history were reviewed and updated as appropriate: allergies, current medications, past family history, past medical history, past social history, past surgical history and problem list.     reports that she has never smoked. She has never used smokeless tobacco. She reports that she does not drink alcohol and does not use drugs.   Patient is not a tobacco user and has not been counseled for use of tobacco products      Review of Systems        Objective   Physical Exam  Nasal dorsum shows palpable step-off with depression of the right nasal bone and lateral rotation of the left nasal bone.  Bilateral periorbital ecchymosis.  Has a left brow hematoma that is not infected.  Intranasally the septum appears to be in the midline with good airflow bilaterally and no evidence of hematoma         Assessment and Plan   Diagnoses and all orders for this visit:    1. Closed fracture of nasal bone, initial encounter (Primary)               Plan: Discussed options with the patient.  Close reduction would be an option if she is unsatisfied with the appearance but since she is pleased with her nasal breathing surgery is not necessary.  She does not want to have general anesthesia and says she is not displeased with the appearance of the nose at this point so surgery will not be pursued.  I told her if she  changed her mind in the next few days as long as she got back to me before 2 weeks after injury we could reconsider but otherwise follow-up with me as needed.

## 2022-12-01 ENCOUNTER — CLINICAL SUPPORT (OUTPATIENT)
Dept: ORTHOPEDIC SURGERY | Facility: CLINIC | Age: 87
End: 2022-12-01

## 2022-12-01 VITALS — WEIGHT: 171 LBS | HEIGHT: 67 IN | BODY MASS INDEX: 26.84 KG/M2

## 2022-12-01 DIAGNOSIS — M17.0 BILATERAL PRIMARY OSTEOARTHRITIS OF KNEE: ICD-10-CM

## 2022-12-01 DIAGNOSIS — G89.29 CHRONIC PAIN OF BOTH KNEES: Primary | ICD-10-CM

## 2022-12-01 DIAGNOSIS — M25.561 CHRONIC PAIN OF BOTH KNEES: Primary | ICD-10-CM

## 2022-12-01 DIAGNOSIS — M25.562 CHRONIC PAIN OF BOTH KNEES: Primary | ICD-10-CM

## 2022-12-01 PROCEDURE — 20610 DRAIN/INJ JOINT/BURSA W/O US: CPT | Performed by: NURSE PRACTITIONER

## 2022-12-01 PROCEDURE — 99213 OFFICE O/P EST LOW 20 MIN: CPT | Performed by: NURSE PRACTITIONER

## 2022-12-01 NOTE — PROGRESS NOTES
"Knee Joint Injection      Patient: Alexia Lopez    YOB: 1935    Chief Complaint   Patient presents with   • Right Knee - Pain, Follow-up   • Left Knee - Pain, Follow-up       History of Present Illness:  Mrs. Beltran is an 87-year-old woman who presents today requesting Synvisc injections.  She recently suffered a fall which resulted in her breaking her nose.  She also skinned her knees during the fall.  She does not report new symptoms in either knee.  She is bearing weight.  She has been using a cane since her last fall.  She is sent for x-rays.    Physical Exam: 87 y.o. female  General Appearance:    Alert, cooperative, in no acute distress                   Vitals:    12/01/22 1308   Weight: 77.6 kg (171 lb)   Height: 170.2 cm (67\")   PainSc:   6        Patient is alert and oriented, ×3 no acute distress.  Affect is normal respiratory rate is normal unlabored.      Right Knee Exam     Tenderness   Right knee tenderness location: diffuse.    Range of Motion   Extension: -5   Flexion: 130     Other   Erythema: absent  Sensation: normal  Pulse: present  Swelling: mild  Effusion: no effusion present    Comments:  Crepitus with arc of motion.  Mild pain and limitations with arc of motion.  No evidence of infection  Superficial abrasion to knee just below patella.  No specific bony tenderness.        Left Knee Exam     Tenderness   Left knee tenderness location: diffuse.    Range of Motion   Extension: -5   Flexion: 130     Other   Erythema: absent  Sensation: normal  Pulse: present  Swelling: mild  Effusion: no effusion present    Comments:  Mild pain and limitations with arc of motion.  No evidence of infection  Superficial abrasion to knee just below patella.  No specific bony tenderness.              Procedure:  Large Joint Arthrocentesis: R knee  Date/Time: 12/1/2022 1:09 PM  Consent given by: patient  Timeout: Immediately prior to procedure a time out was called to verify the correct " patient, procedure, equipment, support staff and site/side marked as required   Supporting Documentation  Indications: pain   Procedure Details  Location: knee - R knee  Needle size: 22 G  Medications administered: 60 mg Sodium Hyaluronate 60 MG/3ML      Large Joint Arthrocentesis: L knee  Date/Time: 12/1/2022 1:09 PM  Consent given by: patient  Timeout: Immediately prior to procedure a time out was called to verify the correct patient, procedure, equipment, support staff and site/side marked as required   Supporting Documentation  Indications: pain   Procedure Details  Location: knee - L knee  Needle size: 22 G  Medications administered: 60 mg Sodium Hyaluronate 60 MG/3ML            Assessment:    Diagnoses and all orders for this visit:    Chronic pain of both knees  -     XR Knee Bilateral AP Standing  -     XR Knee 1 or 2 View Bilateral    Bilateral primary osteoarthritis of knee    Other orders  -     Large Joint Arthrocentesis: R knee  -     Large Joint Arthrocentesis: L knee        Plan:     X-rays reviewed, no acute bony abnormality identified.  Patient's exam today does not seem suspicious for occult fracture.  After discussing risk, benefits, alternatives, patient desires to proceed with injections.  Patient tolerated injections well.        Slowly increase activity as tolerated.  Discussed importance of leg strengthening and general conditioning.  Discussed warning signs of injection.  Discussed that purpose of injections was symptom improvement and improved activity.  Also discussed that further treatment options depended on symptoms at followup and length of time of improvement after injections.    Return in about 3 months (around 3/1/2023), or if symptoms worsen or fail to improve.    EMR Dragon/Transciption Disclaimer: Some of this note may be an electronic transcription/translation of spoken language to printed text.  The electronic translation of spoken language may permit erroneous, or at times,  nonsensical words or phrases to be inadvertently transcribed. Although I have reviewed the note for such errors, some may still exist.       This document has been electronically signed by Laila WOODS on December 1, 2022 13:59 CST

## 2023-03-10 ENCOUNTER — OFFICE VISIT (OUTPATIENT)
Dept: ORTHOPEDIC SURGERY | Facility: CLINIC | Age: 88
End: 2023-03-10
Payer: MEDICARE

## 2023-03-10 VITALS — BODY MASS INDEX: 27.31 KG/M2 | WEIGHT: 174 LBS | HEIGHT: 67 IN

## 2023-03-10 DIAGNOSIS — G89.29 CHRONIC PAIN OF BOTH SHOULDERS: Primary | ICD-10-CM

## 2023-03-10 DIAGNOSIS — M25.511 CHRONIC PAIN OF BOTH SHOULDERS: Primary | ICD-10-CM

## 2023-03-10 DIAGNOSIS — M25.512 CHRONIC PAIN OF BOTH SHOULDERS: Primary | ICD-10-CM

## 2023-03-10 DIAGNOSIS — M19.019 ARTHRITIS OF SHOULDER: ICD-10-CM

## 2023-03-10 PROCEDURE — 20610 DRAIN/INJ JOINT/BURSA W/O US: CPT | Performed by: NURSE PRACTITIONER

## 2023-03-10 PROCEDURE — 1160F RVW MEDS BY RX/DR IN RCRD: CPT | Performed by: NURSE PRACTITIONER

## 2023-03-10 PROCEDURE — 1159F MED LIST DOCD IN RCRD: CPT | Performed by: NURSE PRACTITIONER

## 2023-03-10 RX ORDER — TRIAMCINOLONE ACETONIDE 40 MG/ML
40 INJECTION, SUSPENSION INTRA-ARTICULAR; INTRAMUSCULAR
Status: COMPLETED | OUTPATIENT
Start: 2023-03-10 | End: 2023-03-10

## 2023-03-10 RX ORDER — LIDOCAINE HYDROCHLORIDE 10 MG/ML
2 INJECTION, SOLUTION INFILTRATION; PERINEURAL
Status: COMPLETED | OUTPATIENT
Start: 2023-03-10 | End: 2023-03-10

## 2023-03-10 RX ADMIN — LIDOCAINE HYDROCHLORIDE 2 ML: 10 INJECTION, SOLUTION INFILTRATION; PERINEURAL at 10:30

## 2023-03-10 RX ADMIN — TRIAMCINOLONE ACETONIDE 40 MG: 40 INJECTION, SUSPENSION INTRA-ARTICULAR; INTRAMUSCULAR at 10:30

## 2023-03-10 NOTE — PROGRESS NOTES
"Alexia Lopez is a 87 y.o. female returns for     Chief Complaint   Patient presents with   • Right Shoulder - Follow-up   • Left Shoulder - Follow-up       HISTORY OF PRESENT ILLNESS:    Mrs. Lopez is an 87-year-old female who presents today requesting repeat subacromial injections for bilateral shoulders.  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  No traumas or injuries since last being seen.  No changes in symptoms.       CONCURRENT MEDICAL HISTORY:    The following portions of the patient's history were reviewed and updated as appropriate: allergies, current medications, past family history, past medical history, past social history, past surgical history and problem list.     ROS  No fevers or chills.  No chest pain or shortness of air.  No GI or  disturbances.  Bilateral shoulder pain    PHYSICAL EXAMINATION:       Ht 170.2 cm (67\")   Wt 78.9 kg (174 lb)   LMP  (LMP Unknown)   BMI 27.25 kg/m²     Physical Exam  Vitals and nursing note reviewed.   Constitutional:       General: She is not in acute distress.     Appearance: She is well-developed. She is not toxic-appearing or diaphoretic.   HENT:      Head: Normocephalic.   Eyes:      General: No scleral icterus.  Pulmonary:      Effort: Pulmonary effort is normal. No respiratory distress.   Skin:     General: Skin is warm and dry.   Neurological:      Mental Status: She is alert and oriented to person, place, and time.   Psychiatric:         Behavior: Behavior normal.         Thought Content: Thought content normal.         Judgment: Judgment normal.         GAIT:     []  Normal  [x]  Antalgic    Assistive device: [x]  None  []  Walker     []  Crutches  []  Cane     []  Wheelchair  []  Stretcher    Right Shoulder Exam     Tenderness   Right shoulder tenderness location: Diffuse.    Range of Motion   Active abduction: 110   Forward flexion: 110     Other   Erythema: absent  Sensation: normal  Pulse: present    Comments:  No evidence of " infection  Fingers are warm, pink, with good capillary refill       Left Shoulder Exam     Tenderness   Left shoulder tenderness location: Diffuse.    Range of Motion   Active abduction: 110   Forward flexion: 110     Other   Erythema: absent  Sensation: normal  Pulse: present     Comments:  No evidence of infection  Fingers are warm, pink, with good capillary refill                       ASSESSMENT:    Diagnoses and all orders for this visit:    Chronic pain of both shoulders    Arthritis of shoulder    Other orders  -     Large Joint Arthrocentesis: R subacromial bursa  -     Large Joint Arthrocentesis: L subacromial bursa          PLAN  Large Joint Arthrocentesis: R subacromial bursa  Date/Time: 3/10/2023 10:30 AM  Consent given by: patient  Site marked: site marked  Timeout: Immediately prior to procedure a time out was called to verify the correct patient, procedure, equipment, support staff and site/side marked as required   Supporting Documentation  Indications: pain   Procedure Details  Location: shoulder - R subacromial bursa  Preparation: Patient was prepped and draped in the usual sterile fashion  Needle size: 22 G  Approach: posterior  Medications administered: 40 mg triamcinolone acetonide 40 MG/ML; 2 mL lidocaine 1 %  Patient tolerance: patient tolerated the procedure well with no immediate complications    Large Joint Arthrocentesis: L subacromial bursa  Date/Time: 3/10/2023 10:30 AM  Consent given by: patient  Site marked: site marked  Timeout: Immediately prior to procedure a time out was called to verify the correct patient, procedure, equipment, support staff and site/side marked as required   Supporting Documentation  Indications: pain   Procedure Details  Location: shoulder - L subacromial bursa  Preparation: Patient was prepped and draped in the usual sterile fashion  Needle size: 22 G  Approach: posterior  Medications administered: 40 mg triamcinolone acetonide 40 MG/ML; 2 mL lidocaine 1  %  Patient tolerance: patient tolerated the procedure well with no immediate complications        Risk, benefits, alternatives to repeat subacromial injection explained.  Patient verbalized understanding and tolerated injection well.  Post injection site care, including signs and symptoms to report and when to seek care explained.  Patient verbalized understanding.      She is not interested in PT or surgical management today.     Return in about 3 months (around 6/10/2023), or if symptoms worsen or fail to improve.    EMR Dragon/Transciption Disclaimer: Some of this note may be an electronic transcription/translation of spoken language to printed text.  The electronic translation of spoken language may permit erroneous, or at times, nonsensical words or phrases to be inadvertently transcribed. Although I have reviewed the note for such errors, some may still exist.       This document has been electronically signed by Laila WOODS on March 10, 2023 12:00 CST

## 2023-05-12 ENCOUNTER — OFFICE VISIT (OUTPATIENT)
Dept: CARDIOLOGY | Facility: CLINIC | Age: 88
End: 2023-05-12
Payer: MEDICARE

## 2023-05-12 VITALS
HEIGHT: 67 IN | OXYGEN SATURATION: 98 % | BODY MASS INDEX: 27.31 KG/M2 | WEIGHT: 174 LBS | DIASTOLIC BLOOD PRESSURE: 76 MMHG | SYSTOLIC BLOOD PRESSURE: 140 MMHG | HEART RATE: 63 BPM | TEMPERATURE: 97.1 F

## 2023-05-12 DIAGNOSIS — E78.2 MIXED HYPERLIPIDEMIA: ICD-10-CM

## 2023-05-12 DIAGNOSIS — I10 PRIMARY HYPERTENSION: Primary | ICD-10-CM

## 2023-05-12 DIAGNOSIS — Z95.1 S/P CABG (CORONARY ARTERY BYPASS GRAFT): ICD-10-CM

## 2023-05-12 DIAGNOSIS — E11.9 CONTROLLED TYPE 2 DIABETES MELLITUS WITHOUT COMPLICATION, WITH LONG-TERM CURRENT USE OF INSULIN: ICD-10-CM

## 2023-05-12 DIAGNOSIS — Z79.4 CONTROLLED TYPE 2 DIABETES MELLITUS WITHOUT COMPLICATION, WITH LONG-TERM CURRENT USE OF INSULIN: ICD-10-CM

## 2023-05-12 LAB
QT INTERVAL: 406 MS
QTC INTERVAL: 415 MS

## 2023-05-12 RX ORDER — HYDROCHLOROTHIAZIDE 12.5 MG/1
12.5 TABLET ORAL DAILY
COMMUNITY
Start: 2023-04-13

## 2023-05-12 NOTE — PROGRESS NOTES
Alexia Lopez  87 y.o. female      1. Primary hypertension    2. S/P CABG (coronary artery bypass graft)    3. Mixed hyperlipidemia    4. Controlled type 2 diabetes mellitus without complication, with long-term current use of insulin        History of Present Illness:  Ms. Lopez is an 87 year old  female with PMH of HTN, CAD (h/o CABG) , DM II and Hyperlipidemia. She underwent coronary artery bypass surgery in May 2014 when she received LIMA to LAD, SVG to obtuse marginal and SVG to PDA.    The patient had an extensive evaluation at [facility] in July 2019 when she presented with right upper back and chest pain. The patient was found to have elevated blood pressure (214/84) on admission. CTA of the chest ruled out pulmonary embolism and aortic dissection.  Serial cardiac enzymes and EKG were unremarkable. Gallbladder ultrasound was negative. BNP was stable and no signs of CHF was noted.  Lexiscan Cardiolite stress test (7/ 2019) showed ejection fraction of 68% with no fixed or reversible defects.  Echocardiogram (7/ 2019) showed normal LV systolic function with an EF of 57% with mild to moderate LVH.  Grade 1 diastolic dysfunction was present.  RV was mildly dilated and left atrium was mildly dilated.  There was mild mitral and tricuspid regurgitation.    The patient recently tested positive for COVID in October 2022 and has recovered from this.  She does have some generalized aches and pains and arthritis related symptoms.  She denies any chest pain.  She does have some generalized fatigue.      EKG today showed sinus rhythm with first-degree AV block.  UT interval 218 ms.  QTc interval 415 ms.    Allergies   Allergen Reactions   • Cherry Anaphylaxis   • Peanut-Containing Drug Products Anaphylaxis   • Articaine Other (See Comments)     Had at dentist office causes shaking   • Lidocaine Other (See Comments)     Lidocaine injection at dentist office caused shaking         Past  Medical History:   Diagnosis Date   • Cataract    • Coronary artery disease    • Diabetes mellitus     Type 2 diabetes mellitus - without retinopathy      • History of echocardiogram 05/12/2014    Normal LV systolic function with EF of 55% with mild hypokinesis. Diastolic relaxation abnormality of left ventricle. Mild tricuspid regurg. Trace mitral regurg   • Hyperlipemia    • Hypertension    • Myocardial infarction          Past Surgical History:   Procedure Laterality Date   • BACK SURGERY     • CARDIAC CATHETERIZATION  05/12/2014    Severe multivessel CAD with critical lesions noted in the LAD coronary artery, left circumflex coronary artery and RCA. Left ventriculogram no performed in view of elevated left ventricular end-diastolic pressure   • CATARACT EXTRACTION W/ INTRAOCULAR LENS IMPLANT Left 2/2/2018    Procedure: CATARACT PHACO EXTRACTION WITH INTRAOCULAR LENS IMPLANT;  Surgeon: Gagan Lizarraga MD;  Location: [facility];  Service:    • CATARACT EXTRACTION W/ INTRAOCULAR LENS IMPLANT Right 2/9/2018    Procedure: CATARACT PHACO EXTRACTION WITH INTRAOCULAR LENS IMPLANT;  Surgeon: Gagan Lizarraga MD;  Location: [facility];  Service:    • CORONARY ARTERY BYPASS GRAFT      X 3 with LIMA to LAD, SVG to OMB and SVG to PDA.   • DILATATION AND CURETTAGE     • OTHER SURGICAL HISTORY  05/06/2014    INCISION OF EARDRUM GENERAL ANESTHETIC 58139 (1)    Bilateral myringotomy with tubes.    • SINUS SURGERY     • TONSILLECTOMY     • TONSILLECTOMY AND ADENOIDECTOMY     • TUBAL ABDOMINAL LIGATION  03/14/1978         Family History   Problem Relation Age of Onset   • Hypertension Mother    • Coronary artery disease Father    • Diabetes Other         grandmother   • Cancer Other          Social History     Socioeconomic History   • Marital status:    Tobacco Use   • Smoking status: Never   • Smokeless tobacco: Never   Substance and Sexual Activity   • Alcohol use: No   • Drug use: No   • Sexual activity:  "Defer         Current Outpatient Medications   Medication Sig Dispense Refill   • amLODIPine (NORVASC) 5 MG tablet TAKE 1 TABLET BY MOUTH DAILY IN THE EVENING 90 tablet 2   • aspirin 81 MG disintegrating tablet Take 81 mg by mouth Daily.     • CO ENZYME Q-10 PO Take 100 mg by mouth Daily.     • docusate sodium (COLACE) 100 MG capsule Take 1 capsule by mouth Daily.     • fluticasone (FLONASE) 50 MCG/ACT nasal spray 2 sprays into the nostril(s) as directed by provider Daily. 16 g 11   • hydroCHLOROthiazide (HYDRODIURIL) 12.5 MG tablet Take 1 tablet by mouth Daily.     • labetalol (NORMODYNE) 200 MG tablet Take 100 mg by mouth 2 (Two) Times a Day. .5     • loratadine (CLARITIN) 10 MG tablet Take 1 tablet by mouth Daily.     • losartan (COZAAR) 100 MG tablet Take 1 tablet by mouth Daily.     • metFORMIN ER (GLUCOPHAGE-XR) 750 MG 24 hr tablet Take 2 tablets by mouth Every Night.     • ONE TOUCH ULTRA TEST test strip USE TO TEST BLOOD SUGAR EVERY DAY DX.E11.9  5   • Polysaccharide Iron Complex (FERREX 150 PO) Take 150 mg by mouth Daily.     • rosuvastatin (CRESTOR) 10 MG tablet Take 1 tablet by mouth Every Night.     • SALINE NASAL SPRAY NA into the nostril(s) as directed by provider As Needed.     • vitamin B-12 (CYANOCOBALAMIN) 1000 MCG tablet Take 1 tablet by mouth Daily.       No current facility-administered medications for this visit.         OBJECTIVE    /76 (BP Location: Left arm, Patient Position: Sitting, Cuff Size: Adult)   Temp 97.1 °F (36.2 °C)   Ht 170.2 cm (67\")   Wt 78.9 kg (174 lb)   LMP  (LMP Unknown)   SpO2 98%   BMI 27.25 kg/m²       Review of Systems : The following systems are reviewed and changes noted as indicated below.    Constitutional:  Denies recent weight loss, weight gain, fever or chills     HENT:  Denies any hearing loss, epistaxis, hoarseness, or difficulty speaking.     Eyes: Wears eyeglasses or contact lenses     Respiratory:  Denies dyspnea with exertion,no cough, wheezing, " or hemoptysis.     Cardiovascular: No chest pain, palpitation or dizziness.    Gastrointestinal:  Denies change in bowel habits, dyspepsia, ulcer disease, hematochezia, or melena.     Endocrine: Negative for cold intolerance, heat intolerance, polydipsia, polyphagia and polyuria.     Genitourinary: Negative.      Musculoskeletal: DJD.  Bilateral knee pain.    Neurological:  Denies any history of recurrent headaches, strokes, TIA, or seizure disorder.     Hematological: Denies any food allergies, seasonal allergies, bleeding disorders, or lymphadenopathy.     Psychiatric/Behavioral: Denies any history of depression, substance abuse, or change in cognitive function.       Physical Exam : The following systems are reviewed and no changes noted    Constitutional: Cooperative, alert and oriented,in no acute distress.     HENT:   Head: Normocephalic, normal hair patterns, no masses or tenderness.  Ears, Nose, and Throat: No gross abnormalities. No pallor or cyanosis.   Eyes: EOMS intact, PERRL, conjunctivae and lids unremarkable. Fundoscopic exam and visual fields not performed.   Neck: No palpable masses or adenopathy, no thyromegaly, no JVD, carotid pulses are full and equal bilaterally and without  Bruits.     Cardiovascular: Regular rhythm, S1 and S2 normal, no S3 or S4.  No murmurs, gallops, or rubs detected.     Pulmonary/Chest: Chest: normal symmetry,  normal respiratory excursion, no intercostal retraction, no use of accessory muscles.            Pulmonary: Normal breath sounds. No rales or ronchi.    Abdominal: Abdomen soft, bowel sounds normoactive, no masses, no hepatosplenomegaly, non-tender, no bruits.     Musculoskeletal: No deformities, clubbing, cyanosis, erythema, or edema observed.     Neurological: No gross motor or sensory deficits noted, affect appropriate, oriented to time, person, place.     Skin: Warm and dry to the touch, no apparent skin lesions or masses noted.     Psychiatric: She has a normal  mood and affect. Her behavior is normal. Judgment and thought content normal.         Procedures      Lab Results   Component Value Date    WBC 6.2 10/17/2019    HGB 10.7 (L) 10/17/2019    HCT 33.6 (L) 10/17/2019    MCV 96 (H) 10/17/2019     10/17/2019     Lab Results   Component Value Date    GLUCOSE 155 (H) 07/10/2019    BUN 25 04/12/2023    CREATININE 1.1 04/12/2023    EGFRIFNONA 63 07/10/2019    EGFRIFAFRI 57 06/08/2021    BCR 18.6 07/10/2019    CO2 25 04/12/2023    CALCIUM 9.7 04/12/2023    ALBUMIN 4.1 04/12/2023    AST 14 04/12/2023    ALT 9 04/12/2023     Lab Results   Component Value Date    CHOL 151 01/07/2021    CHOL 182 07/06/2020    CHOL 167 10/17/2019     Lab Results   Component Value Date    TRIG 222 (H) 02/06/2023    TRIG 218 (H) 07/19/2022    TRIG 304 (H) 01/03/2022     Lab Results   Component Value Date    HDL 48 02/06/2023    HDL 43 07/19/2022    HDL 45 01/03/2022     No components found for: LDLCALC  Lab Results   Component Value Date    LDL 82 02/06/2023    LDL 66 07/19/2022    LDL 76 01/03/2022     No results found for: HDLLDLRATIO  No components found for: CHOLHDL  Lab Results   Component Value Date    HGBA1C 6.2 (H) 02/06/2023     Lab Results   Component Value Date    TSH 1.93 02/06/2023           ASSESSMENT AND PLAN  Ms. Lopez is progressing reasonably well with no cardiac symptoms at the present time.  No signs of arrhythmia or congestive heart failure noted.  Labs from April 2023 were reviewed and CMP was within normal limits.  I have continued antiplatelet therapy with aspirin, antihypertensive therapy with amlodipine, labetalol, losartan, Aldactone and lipid-lowering therapy with Crestor has been continued.    .Diagnoses and all orders for this visit:    1. Primary hypertension (Primary)  -     ECG 12 Lead    2. S/P CABG (coronary artery bypass graft)    3. Mixed hyperlipidemia    4. Controlled type 2 diabetes mellitus without complication, with long-term current use of  insulin        Patient's Body mass index is 27.25 kg/m². BMI is above normal parameters. Recommendations include: exercise counseling and nutrition counseling.  Patient is a non-smoker    Ember Forrest MD  5/12/2023  11:36 CDT

## 2023-09-08 VITALS
HEART RATE: 72 BPM | SYSTOLIC BLOOD PRESSURE: 174 MMHG | DIASTOLIC BLOOD PRESSURE: 80 MMHG | TEMPERATURE: 98 F | HEIGHT: 67 IN | BODY MASS INDEX: 26.68 KG/M2 | OXYGEN SATURATION: 100 % | WEIGHT: 170 LBS | RESPIRATION RATE: 16 BRPM

## 2023-09-08 PROCEDURE — 99211 OFF/OP EST MAY X REQ PHY/QHP: CPT

## 2023-09-09 ENCOUNTER — HOSPITAL ENCOUNTER (EMERGENCY)
Facility: HOSPITAL | Age: 88
Discharge: HOME | End: 2023-09-09
Payer: MEDICARE

## 2023-09-19 ENCOUNTER — ANESTHESIA EVENT (OUTPATIENT)
Dept: PERIOP | Facility: HOSPITAL | Age: 88
DRG: 536 | End: 2023-09-19
Payer: MEDICARE

## 2023-09-19 ENCOUNTER — APPOINTMENT (OUTPATIENT)
Dept: GENERAL RADIOLOGY | Facility: HOSPITAL | Age: 88
DRG: 536 | End: 2023-09-19
Payer: MEDICARE

## 2023-09-19 ENCOUNTER — ANESTHESIA (OUTPATIENT)
Dept: PERIOP | Facility: HOSPITAL | Age: 88
DRG: 536 | End: 2023-09-19
Payer: MEDICARE

## 2023-09-19 ENCOUNTER — HOSPITAL ENCOUNTER (INPATIENT)
Facility: HOSPITAL | Age: 88
LOS: 6 days | Discharge: NURSING, REHAB OR LONG-TERM CARE | DRG: 536 | End: 2023-09-29
Attending: STUDENT IN AN ORGANIZED HEALTH CARE EDUCATION/TRAINING PROGRAM | Admitting: HOSPITALIST
Payer: MEDICARE

## 2023-09-19 DIAGNOSIS — S72.001A CLOSED FRACTURE OF RIGHT HIP, INITIAL ENCOUNTER: ICD-10-CM
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
## 2023-09-19 DIAGNOSIS — Z79.4 CONTROLLED TYPE 2 DIABETES MELLITUS WITHOUT COMPLICATION, WITH LONG-TERM CURRENT USE OF INSULIN: ICD-10-CM

## 2023-09-19 DIAGNOSIS — I10 BENIGN ESSENTIAL HYPERTENSION: ICD-10-CM

## 2023-09-19 DIAGNOSIS — Z74.09 IMPAIRED MOBILITY AND ADLS: ICD-10-CM

## 2023-09-19 DIAGNOSIS — S72.001A: Primary | ICD-10-CM

## 2023-09-19 DIAGNOSIS — E11.9 CONTROLLED TYPE 2 DIABETES MELLITUS WITHOUT COMPLICATION, WITH LONG-TERM CURRENT USE OF INSULIN: ICD-10-CM

## 2023-09-19 DIAGNOSIS — Z95.1 S/P CABG (CORONARY ARTERY BYPASS GRAFT): ICD-10-CM

## 2023-09-19 DIAGNOSIS — I25.10 CORONARY ARTERIOSCLEROSIS IN NATIVE ARTERY: ICD-10-CM

## 2023-09-19 DIAGNOSIS — Z78.9 IMPAIRED MOBILITY AND ADLS: ICD-10-CM

## 2023-09-19 DIAGNOSIS — Z74.09 IMPAIRED FUNCTIONAL MOBILITY, BALANCE, GAIT, AND ENDURANCE: ICD-10-CM

## 2023-09-19 PROBLEM — S72.009A HIP FRACTURE: Status: ACTIVE | Noted: 2023-09-19

## 2023-09-19 LAB
ABO GROUP BLD: NORMAL
ALBUMIN SERPL-MCNC: 3.9 G/DL (ref 3.5–5.2)
ALBUMIN/GLOB SERPL: 1.6 G/DL
ALP SERPL-CCNC: 69 U/L (ref 39–117)
ALT SERPL W P-5'-P-CCNC: 9 U/L (ref 1–33)
ANION GAP SERPL CALCULATED.3IONS-SCNC: 10 MMOL/L (ref 5–15)
APTT PPP: 28 SECONDS (ref 20–40.3)
AST SERPL-CCNC: 20 U/L (ref 1–32)
BASOPHILS # BLD AUTO: 0.06 10*3/MM3 (ref 0–0.2)
BASOPHILS NFR BLD AUTO: 0.9 % (ref 0–1.5)
BILIRUB SERPL-MCNC: 0.3 MG/DL (ref 0–1.2)
BLD GP AB SCN SERPL QL: NEGATIVE
BUN SERPL-MCNC: 17 MG/DL (ref 8–23)
BUN/CREAT SERPL: 20.5 (ref 7–25)
CALCIUM SPEC-SCNC: 9.8 MG/DL (ref 8.6–10.5)
CHLORIDE SERPL-SCNC: 98 MMOL/L (ref 98–107)
CO2 SERPL-SCNC: 25 MMOL/L (ref 22–29)
CREAT SERPL-MCNC: 0.83 MG/DL (ref 0.57–1)
DEPRECATED RDW RBC AUTO: 42.7 FL (ref 37–54)
EGFRCR SERPLBLD CKD-EPI 2021: 68.8 ML/MIN/1.73
EOSINOPHIL # BLD AUTO: 0.21 10*3/MM3 (ref 0–0.4)
EOSINOPHIL NFR BLD AUTO: 3 % (ref 0.3–6.2)
ERYTHROCYTE [DISTWIDTH] IN BLOOD BY AUTOMATED COUNT: 12.9 % (ref 12.3–15.4)
GLOBULIN UR ELPH-MCNC: 2.5 GM/DL
GLUCOSE BLDC GLUCOMTR-MCNC: 163 MG/DL (ref 70–130)
GLUCOSE SERPL-MCNC: 138 MG/DL (ref 65–99)
HCT VFR BLD AUTO: 32 % (ref 34–46.6)
HCT VFR BLD AUTO: 32.3 % (ref 34–46.6)
HGB BLD-MCNC: 10.8 G/DL (ref 12–15.9)
HGB BLD-MCNC: 10.9 G/DL (ref 12–15.9)
IMM GRANULOCYTES # BLD AUTO: 0.06 10*3/MM3 (ref 0–0.05)
IMM GRANULOCYTES NFR BLD AUTO: 0.9 % (ref 0–0.5)
INR PPP: 1.09 (ref 0.8–1.2)
LYMPHOCYTES # BLD AUTO: 0.96 10*3/MM3 (ref 0.7–3.1)
LYMPHOCYTES NFR BLD AUTO: 13.9 % (ref 19.6–45.3)
Lab: NORMAL
MCH RBC QN AUTO: 30.8 PG (ref 26.6–33)
MCHC RBC AUTO-ENTMCNC: 33.7 G/DL (ref 31.5–35.7)
MCV RBC AUTO: 91.2 FL (ref 79–97)
MONOCYTES # BLD AUTO: 0.46 10*3/MM3 (ref 0.1–0.9)
MONOCYTES NFR BLD AUTO: 6.7 % (ref 5–12)
NEUTROPHILS NFR BLD AUTO: 5.15 10*3/MM3 (ref 1.7–7)
NEUTROPHILS NFR BLD AUTO: 74.6 % (ref 42.7–76)
NRBC BLD AUTO-RTO: 0 /100 WBC (ref 0–0.2)
PLATELET # BLD AUTO: 158 10*3/MM3 (ref 140–450)
PMV BLD AUTO: 11.6 FL (ref 6–12)
POTASSIUM SERPL-SCNC: 4.5 MMOL/L (ref 3.5–5.2)
PROT SERPL-MCNC: 6.4 G/DL (ref 6–8.5)
PROTHROMBIN TIME: 14.1 SECONDS (ref 11.1–15.3)
RBC # BLD AUTO: 3.54 10*6/MM3 (ref 3.77–5.28)
RH BLD: POSITIVE
SODIUM SERPL-SCNC: 133 MMOL/L (ref 136–145)
T&S EXPIRATION DATE: NORMAL
WBC NRBC COR # BLD: 6.9 10*3/MM3 (ref 3.4–10.8)

## 2023-09-19 PROCEDURE — G0378 HOSPITAL OBSERVATION PER HR: HCPCS

## 2023-09-19 PROCEDURE — 25010000002 CEFAZOLIN PER 500 MG: Performed by: ORTHOPAEDIC SURGERY

## 2023-09-19 PROCEDURE — 25010000002 ONDANSETRON PER 1 MG: Performed by: STUDENT IN AN ORGANIZED HEALTH CARE EDUCATION/TRAINING PROGRAM

## 2023-09-19 PROCEDURE — 25810000003 SODIUM CHLORIDE 0.9 % SOLUTION: Performed by: ANESTHESIOLOGY

## 2023-09-19 PROCEDURE — A9270 NON-COVERED ITEM OR SERVICE: HCPCS | Performed by: ORTHOPAEDIC SURGERY

## 2023-09-19 PROCEDURE — 25010000002 HYDROMORPHONE 1 MG/ML SOLUTION: Performed by: STUDENT IN AN ORGANIZED HEALTH CARE EDUCATION/TRAINING PROGRAM

## 2023-09-19 PROCEDURE — 25010000002 HYDROMORPHONE 1 MG/ML SOLUTION: Performed by: ORTHOPAEDIC SURGERY

## 2023-09-19 PROCEDURE — 99223 1ST HOSP IP/OBS HIGH 75: CPT | Performed by: ORTHOPAEDIC SURGERY

## 2023-09-19 PROCEDURE — 86901 BLOOD TYPING SEROLOGIC RH(D): CPT | Performed by: ANESTHESIOLOGY

## 2023-09-19 PROCEDURE — 88311 DECALCIFY TISSUE: CPT

## 2023-09-19 PROCEDURE — 86900 BLOOD TYPING SEROLOGIC ABO: CPT | Performed by: ANESTHESIOLOGY

## 2023-09-19 PROCEDURE — 85025 COMPLETE CBC W/AUTO DIFF WBC: CPT | Performed by: STUDENT IN AN ORGANIZED HEALTH CARE EDUCATION/TRAINING PROGRAM

## 2023-09-19 PROCEDURE — 36415 COLL VENOUS BLD VENIPUNCTURE: CPT

## 2023-09-19 PROCEDURE — 63710000001 ACETAMINOPHEN 325 MG TABLET: Performed by: ORTHOPAEDIC SURGERY

## 2023-09-19 PROCEDURE — 63710000001 AMLODIPINE 5 MG TABLET: Performed by: ORTHOPAEDIC SURGERY

## 2023-09-19 PROCEDURE — 85610 PROTHROMBIN TIME: CPT | Performed by: STUDENT IN AN ORGANIZED HEALTH CARE EDUCATION/TRAINING PROGRAM

## 2023-09-19 PROCEDURE — 82948 REAGENT STRIP/BLOOD GLUCOSE: CPT

## 2023-09-19 PROCEDURE — 25010000002 HYDROMORPHONE 1 MG/ML SOLUTION: Performed by: HOSPITALIST

## 2023-09-19 PROCEDURE — 25010000002 ONDANSETRON PER 1 MG: Performed by: PHYSICIAN ASSISTANT

## 2023-09-19 PROCEDURE — 85018 HEMOGLOBIN: CPT | Performed by: ORTHOPAEDIC SURGERY

## 2023-09-19 PROCEDURE — 76000 FLUOROSCOPY <1 HR PHYS/QHP: CPT

## 2023-09-19 PROCEDURE — 25010000002 FENTANYL CITRATE (PF) 100 MCG/2ML SOLUTION: Performed by: NURSE ANESTHETIST, CERTIFIED REGISTERED

## 2023-09-19 PROCEDURE — 25010000002 DEXAMETHASONE PER 1 MG: Performed by: NURSE ANESTHETIST, CERTIFIED REGISTERED

## 2023-09-19 PROCEDURE — 25010000002 PROPOFOL 200 MG/20ML EMULSION: Performed by: NURSE ANESTHETIST, CERTIFIED REGISTERED

## 2023-09-19 PROCEDURE — 88305 TISSUE EXAM BY PATHOLOGIST: CPT

## 2023-09-19 PROCEDURE — 80053 COMPREHEN METABOLIC PANEL: CPT | Performed by: STUDENT IN AN ORGANIZED HEALTH CARE EDUCATION/TRAINING PROGRAM

## 2023-09-19 PROCEDURE — 85014 HEMATOCRIT: CPT | Performed by: ORTHOPAEDIC SURGERY

## 2023-09-19 PROCEDURE — 99285 EMERGENCY DEPT VISIT HI MDM: CPT

## 2023-09-19 PROCEDURE — 85730 THROMBOPLASTIN TIME PARTIAL: CPT | Performed by: STUDENT IN AN ORGANIZED HEALTH CARE EDUCATION/TRAINING PROGRAM

## 2023-09-19 PROCEDURE — 73502 X-RAY EXAM HIP UNI 2-3 VIEWS: CPT

## 2023-09-19 PROCEDURE — 86850 RBC ANTIBODY SCREEN: CPT | Performed by: ANESTHESIOLOGY

## 2023-09-19 PROCEDURE — 63710000001 OXYCODONE 5 MG TABLET: Performed by: ORTHOPAEDIC SURGERY

## 2023-09-19 PROCEDURE — 63710000001 LABETALOL 100 MG TABLET: Performed by: ORTHOPAEDIC SURGERY

## 2023-09-19 PROCEDURE — 0SRR0JZ REPLACEMENT OF RIGHT HIP JOINT, FEMORAL SURFACE WITH SYNTHETIC SUBSTITUTE, OPEN APPROACH: ICD-10-PCS | Performed by: ORTHOPAEDIC SURGERY

## 2023-09-19 PROCEDURE — 63710000001 SENNOSIDES-DOCUSATE 8.6-50 MG TABLET: Performed by: ORTHOPAEDIC SURGERY

## 2023-09-19 PROCEDURE — 25010000002 ROPIVACAINE PER 1 MG: Performed by: ANESTHESIOLOGY

## 2023-09-19 PROCEDURE — 27236 TREAT THIGH FRACTURE: CPT | Performed by: ORTHOPAEDIC SURGERY

## 2023-09-19 RX ORDER — DEXAMETHASONE SODIUM PHOSPHATE 4 MG/ML
INJECTION, SOLUTION INTRA-ARTICULAR; INTRALESIONAL; INTRAMUSCULAR; INTRAVENOUS; SOFT TISSUE AS NEEDED
Status: DISCONTINUED | OUTPATIENT
Start: 2023-09-19 | End: 2023-09-19 | Stop reason: SURG

## 2023-09-19 RX ORDER — NALOXONE HCL 0.4 MG/ML
0.4 VIAL (ML) INJECTION
Status: DISCONTINUED | OUTPATIENT
Start: 2023-09-19 | End: 2023-09-29 | Stop reason: HOSPADM

## 2023-09-19 RX ORDER — ONDANSETRON 2 MG/ML
4 INJECTION INTRAMUSCULAR; INTRAVENOUS ONCE AS NEEDED
Status: DISCONTINUED | OUTPATIENT
Start: 2023-09-19 | End: 2023-09-19 | Stop reason: HOSPADM

## 2023-09-19 RX ORDER — HYDROCHLOROTHIAZIDE 12.5 MG/1
12.5 TABLET ORAL DAILY
Status: DISCONTINUED | OUTPATIENT
Start: 2023-09-20 | End: 2023-09-20

## 2023-09-19 RX ORDER — FENTANYL CITRATE 50 UG/ML
INJECTION, SOLUTION INTRAMUSCULAR; INTRAVENOUS AS NEEDED
Status: DISCONTINUED | OUTPATIENT
Start: 2023-09-19 | End: 2023-09-19 | Stop reason: SURG

## 2023-09-19 RX ORDER — BISACODYL 5 MG/1
5 TABLET, DELAYED RELEASE ORAL DAILY PRN
Status: DISCONTINUED | OUTPATIENT
Start: 2023-09-19 | End: 2023-09-29 | Stop reason: HOSPADM

## 2023-09-19 RX ORDER — BUPIVACAINE HCL/0.9 % NACL/PF 0.1 %
2000 PLASTIC BAG, INJECTION (ML) EPIDURAL EVERY 8 HOURS
Status: COMPLETED | OUTPATIENT
Start: 2023-09-20 | End: 2023-09-20

## 2023-09-19 RX ORDER — BUPIVACAINE HCL/0.9 % NACL/PF 0.1 %
2 PLASTIC BAG, INJECTION (ML) EPIDURAL ONCE
Status: COMPLETED | OUTPATIENT
Start: 2023-09-19 | End: 2023-09-19

## 2023-09-19 RX ORDER — ONDANSETRON 4 MG/1
4 TABLET, FILM COATED ORAL EVERY 6 HOURS PRN
Status: DISCONTINUED | OUTPATIENT
Start: 2023-09-19 | End: 2023-09-29 | Stop reason: HOSPADM

## 2023-09-19 RX ORDER — ONDANSETRON 2 MG/ML
4 INJECTION INTRAMUSCULAR; INTRAVENOUS ONCE
Status: COMPLETED | OUTPATIENT
Start: 2023-09-19 | End: 2023-09-19

## 2023-09-19 RX ORDER — ROSUVASTATIN CALCIUM 10 MG/1
10 TABLET, COATED ORAL NIGHTLY
Status: DISCONTINUED | OUTPATIENT
Start: 2023-09-19 | End: 2023-09-29 | Stop reason: HOSPADM

## 2023-09-19 RX ORDER — HEPARIN SODIUM 5000 [USP'U]/ML
5000 INJECTION, SOLUTION INTRAVENOUS; SUBCUTANEOUS EVERY 8 HOURS SCHEDULED
Status: DISCONTINUED | OUTPATIENT
Start: 2023-09-20 | End: 2023-09-29 | Stop reason: HOSPADM

## 2023-09-19 RX ORDER — ONDANSETRON 2 MG/ML
4 INJECTION INTRAMUSCULAR; INTRAVENOUS EVERY 6 HOURS PRN
Status: DISCONTINUED | OUTPATIENT
Start: 2023-09-19 | End: 2023-09-29 | Stop reason: HOSPADM

## 2023-09-19 RX ORDER — DIPHENHYDRAMINE HYDROCHLORIDE 50 MG/ML
12.5 INJECTION INTRAMUSCULAR; INTRAVENOUS
Status: DISCONTINUED | OUTPATIENT
Start: 2023-09-19 | End: 2023-09-19 | Stop reason: HOSPADM

## 2023-09-19 RX ORDER — POLYETHYLENE GLYCOL 3350 17 G/17G
17 POWDER, FOR SOLUTION ORAL DAILY PRN
Status: DISCONTINUED | OUTPATIENT
Start: 2023-09-19 | End: 2023-09-29 | Stop reason: HOSPADM

## 2023-09-19 RX ORDER — LOSARTAN POTASSIUM 50 MG/1
100 TABLET ORAL DAILY
Status: DISCONTINUED | OUTPATIENT
Start: 2023-09-20 | End: 2023-09-20

## 2023-09-19 RX ORDER — SODIUM CHLORIDE 9 MG/ML
40 INJECTION, SOLUTION INTRAVENOUS AS NEEDED
Status: DISCONTINUED | OUTPATIENT
Start: 2023-09-19 | End: 2023-09-29 | Stop reason: HOSPADM

## 2023-09-19 RX ORDER — AMLODIPINE BESYLATE 5 MG/1
5 TABLET ORAL EVERY EVENING
Status: DISCONTINUED | OUTPATIENT
Start: 2023-09-19 | End: 2023-09-20

## 2023-09-19 RX ORDER — OXYCODONE HYDROCHLORIDE 5 MG/1
5 TABLET ORAL EVERY 6 HOURS PRN
Status: DISCONTINUED | OUTPATIENT
Start: 2023-09-19 | End: 2023-09-29 | Stop reason: HOSPADM

## 2023-09-19 RX ORDER — ACETAMINOPHEN 325 MG/1
650 TABLET ORAL ONCE AS NEEDED
Status: DISCONTINUED | OUTPATIENT
Start: 2023-09-19 | End: 2023-09-19 | Stop reason: HOSPADM

## 2023-09-19 RX ORDER — FLUMAZENIL 0.1 MG/ML
0.2 INJECTION INTRAVENOUS AS NEEDED
Status: DISCONTINUED | OUTPATIENT
Start: 2023-09-19 | End: 2023-09-19 | Stop reason: HOSPADM

## 2023-09-19 RX ORDER — BISACODYL 10 MG
10 SUPPOSITORY, RECTAL RECTAL DAILY PRN
Status: DISCONTINUED | OUTPATIENT
Start: 2023-09-19 | End: 2023-09-29 | Stop reason: HOSPADM

## 2023-09-19 RX ORDER — ASPIRIN 81 MG/1
81 TABLET, CHEWABLE ORAL DAILY
Status: DISCONTINUED | OUTPATIENT
Start: 2023-09-20 | End: 2023-09-27

## 2023-09-19 RX ORDER — PROMETHAZINE HYDROCHLORIDE 25 MG/1
25 SUPPOSITORY RECTAL ONCE AS NEEDED
Status: DISCONTINUED | OUTPATIENT
Start: 2023-09-19 | End: 2023-09-19 | Stop reason: HOSPADM

## 2023-09-19 RX ORDER — EPHEDRINE SULFATE 50 MG/ML
INJECTION INTRAVENOUS AS NEEDED
Status: DISCONTINUED | OUTPATIENT
Start: 2023-09-19 | End: 2023-09-19 | Stop reason: SURG

## 2023-09-19 RX ORDER — SODIUM CHLORIDE 0.9 % (FLUSH) 0.9 %
10 SYRINGE (ML) INJECTION AS NEEDED
Status: DISCONTINUED | OUTPATIENT
Start: 2023-09-19 | End: 2023-09-29 | Stop reason: HOSPADM

## 2023-09-19 RX ORDER — SODIUM CHLORIDE 9 MG/ML
30 INJECTION, SOLUTION INTRAVENOUS ONCE
Status: COMPLETED | OUTPATIENT
Start: 2023-09-19 | End: 2023-09-19

## 2023-09-19 RX ORDER — SODIUM CHLORIDE 0.9 % (FLUSH) 0.9 %
10 SYRINGE (ML) INJECTION EVERY 12 HOURS SCHEDULED
Status: DISCONTINUED | OUTPATIENT
Start: 2023-09-19 | End: 2023-09-29 | Stop reason: HOSPADM

## 2023-09-19 RX ORDER — ROPIVACAINE HYDROCHLORIDE 5 MG/ML
INJECTION, SOLUTION EPIDURAL; INFILTRATION; PERINEURAL
Status: COMPLETED | OUTPATIENT
Start: 2023-09-19 | End: 2023-09-19

## 2023-09-19 RX ORDER — EPHEDRINE SULFATE 50 MG/ML
5 INJECTION, SOLUTION INTRAVENOUS ONCE AS NEEDED
Status: DISCONTINUED | OUTPATIENT
Start: 2023-09-19 | End: 2023-09-19 | Stop reason: HOSPADM

## 2023-09-19 RX ORDER — PROPOFOL 10 MG/ML
INJECTION, EMULSION INTRAVENOUS AS NEEDED
Status: DISCONTINUED | OUTPATIENT
Start: 2023-09-19 | End: 2023-09-19 | Stop reason: SURG

## 2023-09-19 RX ORDER — ACETAMINOPHEN 325 MG/1
650 TABLET ORAL EVERY 6 HOURS
Status: DISCONTINUED | OUTPATIENT
Start: 2023-09-19 | End: 2023-09-29 | Stop reason: HOSPADM

## 2023-09-19 RX ORDER — CEFUROXIME AXETIL 500 MG/1
500 TABLET ORAL
COMMUNITY
Start: 2023-09-13 | End: 2023-09-29 | Stop reason: HOSPADM

## 2023-09-19 RX ORDER — LABETALOL 100 MG/1
100 TABLET, FILM COATED ORAL EVERY 12 HOURS SCHEDULED
Status: DISCONTINUED | OUTPATIENT
Start: 2023-09-19 | End: 2023-09-29 | Stop reason: HOSPADM

## 2023-09-19 RX ORDER — PROMETHAZINE HYDROCHLORIDE 25 MG/1
25 TABLET ORAL ONCE AS NEEDED
Status: DISCONTINUED | OUTPATIENT
Start: 2023-09-19 | End: 2023-09-19 | Stop reason: HOSPADM

## 2023-09-19 RX ORDER — NALOXONE HCL 0.4 MG/ML
0.4 VIAL (ML) INJECTION AS NEEDED
Status: DISCONTINUED | OUTPATIENT
Start: 2023-09-19 | End: 2023-09-19 | Stop reason: HOSPADM

## 2023-09-19 RX ORDER — AMOXICILLIN 250 MG
2 CAPSULE ORAL 2 TIMES DAILY
Status: DISCONTINUED | OUTPATIENT
Start: 2023-09-19 | End: 2023-09-29 | Stop reason: HOSPADM

## 2023-09-19 RX ADMIN — HYDROMORPHONE HYDROCHLORIDE 0.5 MG: 1 INJECTION, SOLUTION INTRAMUSCULAR; INTRAVENOUS; SUBCUTANEOUS at 20:29

## 2023-09-19 RX ADMIN — DOCUSATE SODIUM 50 MG AND SENNOSIDES 8.6 MG 2 TABLET: 8.6; 5 TABLET, FILM COATED ORAL at 21:27

## 2023-09-19 RX ADMIN — AMLODIPINE BESYLATE 5 MG: 5 TABLET ORAL at 21:31

## 2023-09-19 RX ADMIN — OXYCODONE HYDROCHLORIDE 5 MG: 5 TABLET ORAL at 23:20

## 2023-09-19 RX ADMIN — SODIUM CHLORIDE 25 ML/HR: 9 INJECTION, SOLUTION INTRAVENOUS at 18:23

## 2023-09-19 RX ADMIN — LABETALOL HYDROCHLORIDE 100 MG: 100 TABLET, FILM COATED ORAL at 21:27

## 2023-09-19 RX ADMIN — TRANEXAMIC ACID 1000 MG: 1 INJECTION, SOLUTION INTRAVENOUS at 17:52

## 2023-09-19 RX ADMIN — ONDANSETRON 4 MG: 2 INJECTION INTRAMUSCULAR; INTRAVENOUS at 13:01

## 2023-09-19 RX ADMIN — TRANEXAMIC ACID 1000 MG: 1 INJECTION, SOLUTION INTRAVENOUS at 19:54

## 2023-09-19 RX ADMIN — DEXAMETHASONE SODIUM PHOSPHATE 4 MG: 4 INJECTION, SOLUTION INTRAMUSCULAR; INTRAVENOUS at 19:50

## 2023-09-19 RX ADMIN — TRANEXAMIC ACID 1000 MG: 1 INJECTION, SOLUTION INTRAVENOUS at 18:25

## 2023-09-19 RX ADMIN — SODIUM CHLORIDE 200 ML: 9 INJECTION, SOLUTION INTRAVENOUS at 20:05

## 2023-09-19 RX ADMIN — ONDANSETRON 4 MG: 2 INJECTION INTRAMUSCULAR; INTRAVENOUS at 19:51

## 2023-09-19 RX ADMIN — HYDROMORPHONE HYDROCHLORIDE 1 MG: 1 INJECTION, SOLUTION INTRAMUSCULAR; INTRAVENOUS; SUBCUTANEOUS at 13:01

## 2023-09-19 RX ADMIN — PROPOFOL 100 MG: 10 INJECTION, EMULSION INTRAVENOUS at 18:30

## 2023-09-19 RX ADMIN — FENTANYL CITRATE 25 MCG: 50 INJECTION, SOLUTION INTRAMUSCULAR; INTRAVENOUS at 19:34

## 2023-09-19 RX ADMIN — HYDROMORPHONE HYDROCHLORIDE 0.5 MG: 1 INJECTION, SOLUTION INTRAMUSCULAR; INTRAVENOUS; SUBCUTANEOUS at 15:11

## 2023-09-19 RX ADMIN — FENTANYL CITRATE 25 MCG: 50 INJECTION, SOLUTION INTRAMUSCULAR; INTRAVENOUS at 18:55

## 2023-09-19 RX ADMIN — ACETAMINOPHEN 650 MG: 325 TABLET, FILM COATED ORAL at 21:31

## 2023-09-19 RX ADMIN — ROPIVACAINE HYDROCHLORIDE 30 ML: 5 INJECTION, SOLUTION EPIDURAL; INFILTRATION; PERINEURAL at 16:30

## 2023-09-19 RX ADMIN — EPHEDRINE SULFATE 5 MG: 50 INJECTION INTRAVENOUS at 19:59

## 2023-09-19 RX ADMIN — Medication 2 G: at 18:33

## 2023-09-19 RX ADMIN — SODIUM CHLORIDE 300 ML: 9 INJECTION, SOLUTION INTRAVENOUS at 19:20

## 2023-09-19 RX ADMIN — SODIUM CHLORIDE 30 ML/HR: 9 INJECTION, SOLUTION INTRAVENOUS at 17:09

## 2023-09-19 NOTE — PLAN OF CARE
Goal Outcome Evaluation:  Plan of Care Reviewed With: patient        Progress: no change  Outcome Evaluation: new admit

## 2023-09-19 NOTE — ANESTHESIA PREPROCEDURE EVALUATION
Anesthesia Evaluation     Patient summary reviewed and Nursing notes reviewed   no history of anesthetic complications:   NPO Solid Status: > 8 hours  NPO Liquid Status: > 8 hours           Airway   Mallampati: III  TM distance: >3 FB  Neck ROM: full  Possible difficult intubation and Small opening  Dental    (+) poor dentition    Pulmonary     breath sounds clear to auscultation  (-) asthma, shortness of breath, rhonchi, decreased breath sounds, wheezes, not a smoker, pulmonary embolism, no home oxygen  Cardiovascular   Exercise tolerance: good (4-7 METS)    ECG reviewed  Rhythm: regular  Rate: normal    (+) hypertension 2 medications or greater, past MI  >12 months, CAD, CABG >6 Months, CHF Diastolic >=55%, hyperlipidemia  (-) pacemaker, dysrhythmias, angina, VALENCIA, murmur, cardiac stents, DVT    ROS comment: EKG 5/12/2023:  Sinus rhythm with 1st degree AV block  Low voltage QRS  Borderline ECG  When compared with ECG of 06-MAY-2022 09:38,  No significant change was found    TTE 5/12/2023:  · Left ventricular wall thickness is consistent with mild-to-moderate concentric hypertrophy.  · Estimated EF = 57%.  · Left ventricular systolic function is normal.  · Left ventricular diastolic dysfunction (grade I) consistent with impaired relaxation.  · Right ventricular cavity is mildly dilated.  · Left atrial cavity size is mildly dilated.  · Mild mitral valve regurgitation is present  · Mild tricuspid valve regurgitation is present.    Stress test 7/10/2019:  · EKG during Lexiscan stress normal  · Normal LV systolic function. No wall motion abnormalities. EF 68%  · No fixed or reversible defects noted.  · Normal myocardial perfusion. Low risk study        Neuro/Psych  (-) seizures, TIA, CVA  GI/Hepatic/Renal/Endo    (+) renal disease stones, diabetes mellitus type 2 well controlled  (-)  obesity, morbid obesity, GERD    Musculoskeletal     Abdominal  - normal exam   Substance History - negative use     OB/GYN negative  ob/gyn ROS         Other   arthritis,     (-) blood dyscrasia  ROS/Med Hx Other: CABG (2014)- TTE & stress test results from 2019 above    Last EbcI2G=9.2                      Anesthesia Plan    ASA 3 - emergent     general with block     (Last ate at 8:30am this morning-donut, tomato and tea  Allergy to lidocaine (shakes at dentists office)  Hgb=10.9  Discussed peripheral nerve block (fascia-iliaca) for post op pain relief and patient understands possible complications, risks, & agrees.  )  intravenous induction     Anesthetic plan, risks, benefits, and alternatives have been provided, discussed and informed consent has been obtained with: spouse/significant other, patient and child.  Pre-procedure education provided  Use of blood products discussed with patient, spouse/significant other and child  Consented to blood products.    Plan discussed with CRNA.      CODE STATUS:    Level Of Support Discussed With: Patient  Code Status (Patient has no pulse and is not breathing): CPR (Attempt to Resuscitate)  Medical Interventions (Patient has pulse or is breathing): Full Support

## 2023-09-19 NOTE — CONSULTS
[facility]   Orthopedic Consult Note    Patient Name: Alexia Lopez  : 1935  MRN: [id number]  Primary Care Physician: Munir Rodriguez MD  Referring Physician: Munir Yates DO  Date of admission: 2023    Consults  Subjective   Subjective     Reason for Consult/ Chief Complaint: right hip pain    History of Present Illness  The patient is an 88-year-old female with known history of coronary artery disease, diabetes, hyperlipidemia, and hypertension.  She presented to the emergency department after a fall at home.  She denies a loss of consciousness and denies a syncopal episode preceding her fall.  She was unable to stand and unable to bear weight and was having severe pain in her right hip.  She was evaluated and found to have a displaced femoral neck fracture and consult was obtained for evaluation management of the injury.  She has some scrapes and some bruises other locations but no other true acute bony complaints.  She does have prolonged osteoarthritic changes in both knees.      Review of Systems   Constitutional:  Negative for chills and fever.   Respiratory: Negative.     Cardiovascular: Negative.    Gastrointestinal: Negative.    Genitourinary:         Known kidney stones awaiting intervention with Dr. Mandujano.   Musculoskeletal:         Right hip pain   All other systems reviewed and are negative.     Personal History     Past Medical History:   Diagnosis Date    Cataract     Coronary artery disease     Diabetes mellitus     Type 2 diabetes mellitus - without retinopathy       History of echocardiogram 2014    Normal LV systolic function with EF of 55% with mild hypokinesis. Diastolic relaxation abnormality of left ventricle. Mild tricuspid regurg. Trace mitral regurg    Hyperlipemia     Hypertension     Myocardial infarction        Past Surgical History:   Procedure Laterality Date    BACK SURGERY      CARDIAC CATHETERIZATION  2014    Severe multivessel CAD with  critical lesions noted in the LAD coronary artery, left circumflex coronary artery and RCA. Left ventriculogram no performed in view of elevated left ventricular end-diastolic pressure    CATARACT EXTRACTION W/ INTRAOCULAR LENS IMPLANT Left 2/2/2018    Procedure: CATARACT PHACO EXTRACTION WITH INTRAOCULAR LENS IMPLANT;  Surgeon: Gagan Lizarraga MD;  Location: [facility];  Service:     CATARACT EXTRACTION W/ INTRAOCULAR LENS IMPLANT Right 2/9/2018    Procedure: CATARACT PHACO EXTRACTION WITH INTRAOCULAR LENS IMPLANT;  Surgeon: Gagan Lizarraga MD;  Location: [facility];  Service:     CORONARY ARTERY BYPASS GRAFT      X 3 with LIMA to LAD, SVG to OMB and SVG to PDA.    DILATATION AND CURETTAGE      OTHER SURGICAL HISTORY  05/06/2014    INCISION OF EARDRUM GENERAL ANESTHETIC 11624 (1)    Bilateral myringotomy with tubes.     SINUS SURGERY      TONSILLECTOMY      TONSILLECTOMY AND ADENOIDECTOMY      TUBAL ABDOMINAL LIGATION  03/14/1978       Family History: family history includes Cancer in an other family member; Coronary artery disease in her father; Diabetes in an other family member; Hypertension in her mother. Otherwise pertinent FHx was reviewed and not pertinent to current issue.    Social History:  reports that she has never smoked. She has never used smokeless tobacco. She reports that she does not drink alcohol and does not use drugs.    Home Medications:  Coenzyme Q10, Polysaccharide Iron Complex, Saline, amLODIPine, aspirin, cefuroxime, docusate sodium, fluticasone, glucose blood, hydroCHLOROthiazide, labetalol, loratadine, losartan, metFORMIN ER, rosuvastatin, and vitamin B-12      Allergies:  Allergies   Allergen Reactions    Cherry Anaphylaxis    Peanut-Containing Drug Products Anaphylaxis    Articaine Other (See Comments)     Had at dentist office causes shaking    Lidocaine Other (See Comments)     Lidocaine injection at dentist office caused shaking    Ciprofloxacin Other (See Comments)      Weakness and fatigue    Losartan Other (See Comments)     Hyperkalemia       Objective    Objective   Vitals:  Temp:  [97.9 °F (36.6 °C)] 97.9 °F (36.6 °C)  Heart Rate:  [60-62] 60  Resp:  [18] 18  BP: (188)/(72-77) 188/72    Physical Exam  Vitals reviewed.   Constitutional:       General: She is not in acute distress.     Appearance: Normal appearance. She is well-developed.   Eyes:      Conjunctiva/sclera: Conjunctivae normal.      Pupils: Pupils are equal, round, and reactive to light.   Neck:      Trachea: No tracheal deviation.   Cardiovascular:      Rate and Rhythm: Normal rate and regular rhythm.      Heart sounds: Normal heart sounds.   Pulmonary:      Effort: Pulmonary effort is normal.      Breath sounds: Normal breath sounds.   Chest:      Chest wall: No tenderness.   Abdominal:      General: Bowel sounds are normal. There is no distension.      Palpations: Abdomen is soft. There is no mass.      Tenderness: There is no abdominal tenderness.   Musculoskeletal:      Cervical back: Neck supple. No muscular tenderness.      Comments: The right lower extremity is shortened and externally rotated.  She does have good distal pulses and sensation.  She has good toe and ankle motion.  She has tenderness with any attempted motion of the right hip.  Calves are soft and nontender.   Lymphadenopathy:      Cervical: No cervical adenopathy.   Skin:     General: Skin is warm.      Findings: No rash.   Neurological:      Mental Status: She is alert and oriented to person, place, and time.   Psychiatric:         Behavior: Behavior normal.         Thought Content: Thought content normal.         Judgment: Judgment normal.       Result Review    Result Review:  I have personally reviewed the results from the time of this admission to 9/19/2023 17:04 CDT and agree with these findings:  [x]  Laboratory  []  Microbiology  []  Radiology  []  EKG/Telemetry   []  Cardiology/Vascular   []  Pathology  [x]  Old records  []   Other:  Most notable findings include:   Results from last 7 days   Lab Units 09/19/23  1235   WBC 10*3/mm3 6.90   HEMOGLOBIN g/dL 10.9*   HEMATOCRIT % 32.3*   PLATELETS 10*3/mm3 158     Results from last 7 days   Lab Units 09/19/23  1235   SODIUM mmol/L 133*   POTASSIUM mmol/L 4.5   CHLORIDE mmol/L 98   CO2 mmol/L 25.0   BUN mg/dL 17   CREATININE mg/dL 0.83   CALCIUM mg/dL 9.8   BILIRUBIN mg/dL 0.3   ALK PHOS U/L 69   ALT (SGPT) U/L 9   AST (SGOT) U/L 20   GLUCOSE mg/dL 138*     Results from last 7 days   Lab Units 09/19/23  1235   INR  1.09     CT Abdomen Pelvis With Contrast    Result Date: 9/13/2023  Narrative: CT abdomen and pelvis with contrast INDICATION: Left lower quadrant abdominal pain. FINDINGS: 5.0 mm axial images were obtained through the abdomen and pelvis during injection of 50 cc Omnipaque 300, creatinine 1.2, GFR 44, and reconstructed in the coronal and sagittal planes. The included lung bases are clear. The liver, spleen and pancreas are unremarkable. A tiny cyst is seen in the right hepatic lobe. No calcified gallstones are seen. No adrenal lesion is evident. There are vascular calcifications seen in the renal hilum bilaterally. The kidneys enhance symmetrically with no mass or hydronephrosis seen. There is a small cortical cyst seen in each kidney. There is a 1.0 cm oval calculus in the left renal pelvis. This is not grossly obstructing and no significant hydronephrosis is seen. However, there is inflammatory change around the renal pelvis and there is potential for obstruction if this calcification extends distally into the ureter. The right ureter is unremarkable and the mid/distal left ureter is unremarkable. The urinary bladder is within normal limits. The abdominal aorta has a normal caliber. No retroperitoneal or pelvic adenopathy is seen. The uterus and adnexa are unremarkable. Several scattered sigmoid colon diverticula are seen with no CT evidence of diverticulitis. A normal appendix  extends off the cecum. Mild/moderate lumbar spine degenerative changes are seen with no lytic/blastic focus evident in the osseous structures. A small hiatal hernia is incidentally noted.    Impression: 1.0 cm calculus in the left renal pelvis with surrounding inflammation. There is no gross hydronephrosis at this time; recommend correlation with history/exam findings as well as urinalysis. Diverticulosis with no CT evidence of diverticulitis.   [id number]    Peripheral Block    Result Date: 9/19/2023  Narrative: Lilibeth Le DO     9/19/2023  4:59 PM Peripheral Block Patient reassessed immediately prior to procedure Patient location during procedure: floor Start time: 9/19/2023 4:30 PM Stop time: 9/19/2023 4:40 PM Reason for block: procedure for pain, at surgeon's request, post-op pain management and secondary anesthetic Performed by Anesthesiologist: Lilibeth Le DO Preanesthetic Checklist Completed: patient identified, IV checked, site marked, risks and benefits discussed, surgical consent, monitors and equipment checked, pre-op evaluation and timeout performed Prep: Pt Position: supine Sterile barriers:cap, gloves and sterile barriers Prep: ChloraPrep Patient monitoring: blood pressure monitoring, continuous pulse oximetry and EKG Procedure Sedation: no Performed under: PNB Guidance:ultrasound guided ULTRASOUND INTERPRETATION.  Using ultrasound guidance a 22 G gauge needle was placed in close proximity to the femoral nerve, at which point, under ultrasound guidance anesthetic was injected in the area of the nerve and spread of the anesthesia was seen on ultrasound in close proximity thereto.  There were no abnormalities seen on ultrasound; a digital image was taken; and the patient tolerated the procedure with no complications. Images:still images obtained, printed/placed on chart Laterality:right Block Type:fascia iliaca compartment Injection Technique:single-shot Needle Type:echogenic  Needle Gauge:22 G Resistance on Injection: none Medications Used: ropivacaine (NAROPIN) 0.5 % injection - Injection  30 mL - 9/19/2023 4:30:00 PM Medications Comment:Pts allergy to lidocaine was jittery at the dentist more than likely due to epinephrine added. Pt doesn't have an allergy to lidocaine Post Assessment Injection Assessment: negative aspiration for heme, no paresthesia on injection and incremental injection Patient Tolerance:comfortable throughout block Complications:no Additional Notes Pt & side identified U/S used throughout and needle seen throughout No complications Pt tolerated procedure well     XR Hip With or Without Pelvis 2 - 3 View Right    Result Date: 9/19/2023  Narrative: HISTORY: trauma COMPARISON: None FINDINGS: AP and lateral views were obtained. Acute fracture of the right femoral neck with proximal migration of the distal fragment and varus angulation. No dislocation. No pelvic fracture.        Assessment & Plan   Assessment / Plan         Active Hospital Problems:  Active Hospital Problems    Diagnosis     **Traumatic closed displaced fracture of neck of right femur, initial encounter     Fall from slip, trip, or stumble, initial encounter     Hip fracture     Controlled type 2 diabetes mellitus without complication     S/P CABG (coronary artery bypass graft)     Benign essential hypertension     Coronary arteriosclerosis in native artery        Plan:     Alexia oLpez is a 88 y.o. female who has a displaced right femoral neck fracture.  She takes a 81 mg aspirin at home but no other blood thinners.  Long discussion was had with the patient and her son regarding her situation and further treatment options.  The femoral neck fracture is a fracture of necessity.  Patient lives at home and is an independent ambulator.  We discussed proceeding with operative fixation being a bipolar endoprosthesis.  She is at high risk due to her prior CABG, coronary artery disease, diabetes, and  hypertension.  However, the benefit of the surgery far exceeds the risk.    The patient voiced understanding of the risks, benefits, and alternative forms of treatment that were discussed and the patient consents to proceed with surgery.  All risks, benefits and alternatives were discussed.  Risks include, but not exclusive to anesthetic complications, including death, MI, CVA, infection, bleeding DVT, fracture, residual pain and need for future surgery.    This discussion was held with the patient by Cas Adler MD and all questions were answered.    Plan right anterior approach bipolar.    We began the discussion for early mobilization, DVT prophylaxis, and discharge planning postoperatively.  She wishes to return home but understands that there may be an interim disposition prior to returning home ([facility], SNF).    Thank you for this consult and for allowing me to participate in the care of this patient.    Electronically signed by Cas Adler MD, 09/19/23, 5:04 PM CDT.

## 2023-09-19 NOTE — ED PROVIDER NOTES
Subjective   History of Present Illness  Old female on a baby aspirin comes to the ER after from a mechanical fall landing on her right hip.  She has been unable to move it or bear weight on it.  Denies seeing her head or lose consciousness.  No neck or back pain.  She denies other symptoms.    History provided by:  Patient, relative and spouse   used: No      Review of Systems   Constitutional:  Negative for chills and fever.   HENT:  Negative for drooling.    Eyes:  Negative for redness.   Respiratory:  Negative for shortness of breath.    Cardiovascular:  Negative for chest pain.   Gastrointestinal:  Negative for nausea and vomiting.   Genitourinary:  Negative for flank pain.   Musculoskeletal:  Positive for arthralgias. Negative for back pain, myalgias and neck pain.   Skin:  Negative for color change.   Neurological:  Negative for dizziness, seizures, weakness, light-headedness and headaches.   Psychiatric/Behavioral:  Negative for confusion.      Past Medical History:   Diagnosis Date    Cataract     Coronary artery disease     Diabetes mellitus     Type 2 diabetes mellitus - without retinopathy       History of echocardiogram 05/12/2014    Normal LV systolic function with EF of 55% with mild hypokinesis. Diastolic relaxation abnormality of left ventricle. Mild tricuspid regurg. Trace mitral regurg    Hyperlipemia     Hypertension     Myocardial infarction        Allergies   Allergen Reactions    Cherry Anaphylaxis    Peanut-Containing Drug Products Anaphylaxis    Articaine Other (See Comments)     Had at dentist office causes shaking    Lidocaine Other (See Comments)     Lidocaine injection at dentist office caused shaking       Past Surgical History:   Procedure Laterality Date    BACK SURGERY      CARDIAC CATHETERIZATION  05/12/2014    Severe multivessel CAD with critical lesions noted in the LAD coronary artery, left circumflex coronary artery and RCA. Left ventriculogram no performed  "in view of elevated left ventricular end-diastolic pressure    CATARACT EXTRACTION W/ INTRAOCULAR LENS IMPLANT Left 2/2/2018    Procedure: CATARACT PHACO EXTRACTION WITH INTRAOCULAR LENS IMPLANT;  Surgeon: Gagan Lizarraga MD;  Location: [facility];  Service:     CATARACT EXTRACTION W/ INTRAOCULAR LENS IMPLANT Right 2/9/2018    Procedure: CATARACT PHACO EXTRACTION WITH INTRAOCULAR LENS IMPLANT;  Surgeon: Gagan Lizarraga MD;  Location: [facility];  Service:     CORONARY ARTERY BYPASS GRAFT      X 3 with LIMA to LAD, SVG to OMB and SVG to PDA.    DILATATION AND CURETTAGE      OTHER SURGICAL HISTORY  05/06/2014    INCISION OF EARDRUM GENERAL ANESTHETIC 29980 (1)    Bilateral myringotomy with tubes.     SINUS SURGERY      TONSILLECTOMY      TONSILLECTOMY AND ADENOIDECTOMY      TUBAL ABDOMINAL LIGATION  03/14/1978       Family History   Problem Relation Age of Onset    Hypertension Mother     Coronary artery disease Father     Diabetes Other         grandmother    Cancer Other        Social History     Socioeconomic History    Marital status:    Tobacco Use    Smoking status: Never    Smokeless tobacco: Never   Substance and Sexual Activity    Alcohol use: No    Drug use: No    Sexual activity: Defer           Objective   Vitals:    09/19/23 1134   BP: (!) 188/77   BP Location: Left arm   Patient Position: Lying   Pulse: 62   Resp: 18   Temp: 97.9 °F (36.6 °C)   TempSrc: Oral   SpO2: 92%   Weight: 74.8 kg (165 lb)   Height: 170.2 cm (67\")       Physical Exam  Vitals and nursing note reviewed.   Constitutional:       General: She is not in acute distress.     Appearance: She is well-developed. She is not ill-appearing, toxic-appearing or diaphoretic.   Pulmonary:      Effort: Pulmonary effort is normal. No accessory muscle usage or respiratory distress.   Chest:      Chest wall: No tenderness.   Abdominal:      Palpations: Abdomen is soft.      Tenderness: There is no abdominal tenderness (deep " palpation).   Musculoskeletal:         General: No swelling.      Comments: Right hip tenderness.   Skin:     General: Skin is warm and dry.      Capillary Refill: Capillary refill takes less than 2 seconds.   Neurological:      Mental Status: She is alert and oriented to person, place, and time.       Procedures           ED Course      XR Hip With or Without Pelvis 2 - 3 View Right   Final Result                                             Medical Decision Making  Vital signs are stable, afebrile.  X-ray shows a right femoral neck fracture.  Consulted orthopedic surgery who will evaluate the patient.  Spoke with the on-call hospitalist who agrees to admit.    Amount and/or Complexity of Data Reviewed  Labs: ordered.  Radiology: ordered.        Final diagnoses:   Closed fracture of right hip, initial encounter       ED Disposition  ED Disposition       ED Disposition   Decision to Admit    Condition   --    Comment   Level of Care: Med/Surg [1]   Diagnosis: Hip fracture [156173]   Admitting Physician: REMY ROJAS [[id number]]   Attending Physician: REMY ROJAS [[id number]]                 No follow-up provider specified.       Medication List      No changes were made to your prescriptions during this visit.            Gerhard Medina MD  09/19/23 [id number]

## 2023-09-19 NOTE — ANESTHESIA PROCEDURE NOTES
Airway  Urgency: elective    Date/Time: 9/19/2023 6:31 PM  Airway not difficult    General Information and Staff    Patient location during procedure: OR  CRNA/CAA: Dinorah Gilliam CRNA  SRNA: Carmen Patel SRNA  Indications and Patient Condition  Indications for airway management: airway protection    Preoxygenated: yes  MILS maintained throughout  Mask difficulty assessment: 0 - not attempted    Final Airway Details  Final airway type: supraglottic airway      Successful airway: I-gel  Size 4     Number of attempts at approach: 1  Assessment: lips, teeth, and gum same as pre-op

## 2023-09-19 NOTE — ED NOTES
"Nursing report ED to floor  Alexia Lopez  88 y.o.  female    HPI:   Chief Complaint   Patient presents with    Hip Pain    Fall       Admitting doctor:   Red Castaneda MD    Consulting provider(s):  Consults       Date and Time Order Name Status Description    9/19/2023 12:37 PM Inpatient Orthopedic Surgery Consult               Admitting diagnosis:   The primary encounter diagnosis was Traumatic closed displaced fracture of neck of right femur, initial encounter. Diagnoses of Coronary arteriosclerosis in native artery, S/P CABG (coronary artery bypass graft), Benign essential hypertension, Controlled type 2 diabetes mellitus without complication, with long-term current use of insulin, Fall from slip, trip, or stumble, initial encounter, and Closed fracture of right hip, initial encounter were also pertinent to this visit.    Code status:   Current Code Status       Date Active Code Status Order ID Comments User Context       Prior            Allergies:   Cherry, Peanut-containing drug products, Articaine, and Lidocaine    Intake and Output  No intake or output data in the 24 hours ending 09/19/23 1305    Weight:       09/19/23  1134   Weight: 74.8 kg (165 lb)       Most recent vitals:   Vitals:    09/19/23 1134   BP: (!) 188/77   BP Location: Left arm   Patient Position: Lying   Pulse: 62   Resp: 18   Temp: 97.9 °F (36.6 °C)   TempSrc: Oral   SpO2: 92%   Weight: 74.8 kg (165 lb)   Height: 170.2 cm (67\")     Oxygen Therapy: RA    Active LDAs/IV Access:   Lines, Drains & Airways       Active LDAs       Name Placement date Placement time Site Days    Peripheral IV 09/19/23 Distal;Left;Posterior Forearm 09/19/23  --  Forearm  less than 1                    Labs (abnormal labs have a star):   Labs Reviewed   COMPREHENSIVE METABOLIC PANEL - Abnormal; Notable for the following components:       Result Value    Glucose 138 (*)     Sodium 133 (*)     All other components within normal limits    Narrative:     GFR " Normal >60  Chronic Kidney Disease <60  Kidney Failure <15    The GFR formula is only valid for adults with stable renal function between ages 18 and 70.   CBC WITH AUTO DIFFERENTIAL - Abnormal; Notable for the following components:    RBC 3.54 (*)     Hemoglobin 10.9 (*)     Hematocrit 32.3 (*)     Lymphocyte % 13.9 (*)     Immature Grans % 0.9 (*)     Immature Grans, Absolute 0.06 (*)     All other components within normal limits   APTT - Normal    Narrative:     The recommended Heparin therapeutic range is 68-97 seconds.   PROTIME-INR - Normal    Narrative:     Therapeutic range for most indications is 2.0-3.0 INR,  or 2.5-3.5 for mechanical heart valves.   CBC AND DIFFERENTIAL    Narrative:     The following orders were created for panel order CBC & Differential.  Procedure                               Abnormality         Status                     ---------                               -----------         ------                     CBC Auto Differential[[id number]]        Abnormal            Final result                 Please view results for these tests on the individual orders.       Meds given in ED:   Medications   HYDROmorphone (DILAUDID) injection 1 mg (1 mg Intravenous Given 9/19/23 1301)   ondansetron (ZOFRAN) injection 4 mg (4 mg Intravenous Given 9/19/23 1301)     No current facility-administered medications for this encounter.       NIH Stroke Scale:       Isolation/Infection(s):  No active isolations   No active infections     COVID Testing  Collected NA  Resulted NA    Nursing report ED to floor:  Mental status: A&O x4  Ambulatory status: Selfer  Precautions: None    ED nurse phone [phone]

## 2023-09-19 NOTE — H&P
"    [facility] Medicine  HISTORY AND PHYSICAL      Date of Admission: 9/19/2023  Primary Care Physician: Munir Rodriguez MD    Subjective     Chief Complaint: Fall, Left hip pain    History of Present Illness  Patient is an 88 year old female (PMHx CAD, DM, HLD, HTN) who presented to [facility] ED from home after a fall. Patient was going down a couple steps this morning when her knee gave out, causing her to fall to the concrete. She reports that she did hit her head, but denies any loss of consciousness, headache, confusion, or vision change. She is not having any nausea or vomiting. She complains of right hip pain from the fall. She reports history of arthritis in several joints, but that her knees are particularly bad, \"bone on bone\", but she has opted not to undergo surgical fixation. Patient does not take any blood thinners. Recently she has been experiencing some back discomfort and last week, was found to have a 1cm stone within the left kidney. She is awaiting scheduling for surgery with Dr Mandujano.     ED findings reveals acute right femoral neck fracture on xray. CBC reveals stable anemia with Hgb 10.9, Hct 32.3. CMP stable. ED provider contacted Dr. Adler, who is planning for surgery later today. Dr. Castaneda admitted patient to hospitalist service on observation.     Review of Systems   Constitutional:  Negative for appetite change, chills, diaphoresis, fatigue and fever.   HENT:  Negative for congestion, ear pain, postnasal drip, rhinorrhea, sinus pressure, sinus pain, sneezing, sore throat and trouble swallowing.    Eyes:  Negative for photophobia, pain and discharge.   Respiratory:  Negative for cough, chest tightness, shortness of breath and wheezing.    Cardiovascular:  Negative for chest pain, palpitations and leg swelling.   Gastrointestinal:  Negative for abdominal pain, blood in stool, constipation, diarrhea, nausea and vomiting.   Endocrine: Negative " for polydipsia, polyphagia and polyuria.   Genitourinary:  Negative for difficulty urinating, dysuria, flank pain, frequency, hematuria and urgency.   Musculoskeletal:  Positive for arthralgias (right hip). Negative for back pain, myalgias and neck pain.   Skin:  Negative for color change, rash and wound.   Neurological:  Negative for dizziness, seizures, syncope, weakness and headaches.   Hematological:  Does not bruise/bleed easily.   Psychiatric/Behavioral:  Negative for behavioral problems, confusion, hallucinations, sleep disturbance and suicidal ideas.    All other systems reviewed and are negative.     Otherwise complete ROS reviewed and negative except as mentioned in the HPI.    Past Medical History:   Past Medical History:   Diagnosis Date   • Cataract    • Coronary artery disease    • Diabetes mellitus     Type 2 diabetes mellitus - without retinopathy      • History of echocardiogram 05/12/2014    Normal LV systolic function with EF of 55% with mild hypokinesis. Diastolic relaxation abnormality of left ventricle. Mild tricuspid regurg. Trace mitral regurg   • Hyperlipemia    • Hypertension    • Myocardial infarction      Past Surgical History:  Past Surgical History:   Procedure Laterality Date   • BACK SURGERY     • CARDIAC CATHETERIZATION  05/12/2014    Severe multivessel CAD with critical lesions noted in the LAD coronary artery, left circumflex coronary artery and RCA. Left ventriculogram no performed in view of elevated left ventricular end-diastolic pressure   • CATARACT EXTRACTION W/ INTRAOCULAR LENS IMPLANT Left 2/2/2018    Procedure: CATARACT PHACO EXTRACTION WITH INTRAOCULAR LENS IMPLANT;  Surgeon: Gagan Lizarraga MD;  Location: [facility];  Service:    • CATARACT EXTRACTION W/ INTRAOCULAR LENS IMPLANT Right 2/9/2018    Procedure: CATARACT PHACO EXTRACTION WITH INTRAOCULAR LENS IMPLANT;  Surgeon: Gagan Lizarraga MD;  Location: [facility];  Service:    • CORONARY ARTERY BYPASS GRAFT       X 3 with LIMA to LAD, SVG to OMB and SVG to PDA.   • DILATATION AND CURETTAGE     • OTHER SURGICAL HISTORY  05/06/2014    INCISION OF EARDRUM GENERAL ANESTHETIC 87033 (1)    Bilateral myringotomy with tubes.    • SINUS SURGERY     • TONSILLECTOMY     • TONSILLECTOMY AND ADENOIDECTOMY     • TUBAL ABDOMINAL LIGATION  03/14/1978     Social History:  reports that she has never smoked. She has never used smokeless tobacco. She reports that she does not drink alcohol and does not use drugs.    Family History: family history includes Cancer in an other family member; Coronary artery disease in her father; Diabetes in an other family member; Hypertension in her mother.       Allergies:  Allergies   Allergen Reactions   • Cherry Anaphylaxis   • Peanut-Containing Drug Products Anaphylaxis   • Articaine Other (See Comments)     Had at dentist office causes shaking   • Lidocaine Other (See Comments)     Lidocaine injection at dentist office caused shaking       Medications:  Prior to Admission medications    Medication Sig Start Date End Date Taking? Authorizing Provider   cefuroxime (CEFTIN) 500 MG tablet Take 1 tablet by mouth. 9/13/23 9/24/23 Yes Sonido Cortez MD   amLODIPine (NORVASC) 5 MG tablet TAKE 1 TABLET BY MOUTH DAILY IN THE EVENING 5/12/20   Ember Forrest MD   aspirin 81 MG disintegrating tablet Take 81 mg by mouth Daily.    Sonido Cortez MD   CO ENZYME Q-10 PO Take 100 mg by mouth Daily.    Sonido Cortez MD   docusate sodium (COLACE) 100 MG capsule Take 1 capsule by mouth Daily.    Sonido Cortez MD   fluticasone (FLONASE) 50 MCG/ACT nasal spray 2 sprays into the nostril(s) as directed by provider Daily. 8/11/20   Reynaldo Baig MD   hydroCHLOROthiazide (HYDRODIURIL) 12.5 MG tablet Take 1 tablet by mouth Daily. 4/13/23   Sonido Cortez MD   labetalol (NORMODYNE) 200 MG tablet Take 100 mg by mouth 2 (Two) Times a Day. .5    Sonido Cortez MD  "  loratadine (CLARITIN) 10 MG tablet Take 1 tablet by mouth Daily.    Sonido Cortez MD   losartan (COZAAR) 100 MG tablet Take 1 tablet by mouth Daily.    Sonido Cortez MD   metFORMIN ER (GLUCOPHAGE-XR) 750 MG 24 hr tablet Take 2 tablets by mouth Every Night.    Sonido Cortez MD   ONE TOUCH ULTRA TEST test strip USE TO TEST BLOOD SUGAR EVERY DAY DX.E11.9 10/14/16   Sonido Cortez MD   Polysaccharide Iron Complex (FERREX 150 PO) Take 150 mg by mouth Daily.    Sonido Cortez MD   rosuvastatin (CRESTOR) 10 MG tablet Take 1 tablet by mouth Every Night.    Sonido Cortez MD   SALINE NASAL SPRAY NA into the nostril(s) as directed by provider As Needed.    Sonido Cortez MD   vitamin B-12 (CYANOCOBALAMIN) 1000 MCG tablet Take 1 tablet by mouth Daily.    Sonido Cortez MD     I have utilized all available immediate resources to obtain, update, and review the patient's current medications.    Objective     Vital Signs: BP (!) 188/77 (BP Location: Left arm, Patient Position: Lying)   Pulse 62   Temp 97.9 °F (36.6 °C) (Oral)   Resp 18   Ht 170.2 cm (67\")   Wt 74.8 kg (165 lb)   LMP  (LMP Unknown)   SpO2 92%   BMI 25.84 kg/m²   Physical Exam  Vitals and nursing note reviewed.   Constitutional:       Appearance: Normal appearance.   HENT:      Head: Normocephalic and atraumatic.      Right Ear: External ear normal.      Left Ear: External ear normal.      Nose: Nose normal. No congestion or rhinorrhea.      Mouth/Throat:      Mouth: Mucous membranes are moist.      Pharynx: Oropharynx is clear.   Eyes:      General: No scleral icterus.     Extraocular Movements: Extraocular movements intact.      Conjunctiva/sclera: Conjunctivae normal.      Pupils: Pupils are equal, round, and reactive to light.   Neck:      Vascular: No carotid bruit.   Cardiovascular:      Rate and Rhythm: Normal rate and regular rhythm.      Pulses: Normal pulses.      Heart sounds: Normal " heart sounds. No murmur heard.  Pulmonary:      Effort: Pulmonary effort is normal.      Breath sounds: Normal breath sounds. No wheezing, rhonchi or rales.   Abdominal:      General: Abdomen is flat. Bowel sounds are normal.      Palpations: Abdomen is soft.      Tenderness: There is no abdominal tenderness. There is no guarding.   Musculoskeletal:         General: No swelling or deformity.      Cervical back: Normal range of motion and neck supple. No tenderness.      Right hip: Tenderness and bony tenderness present. Decreased range of motion.      Right lower leg: No edema.      Left lower leg: No edema.   Skin:     General: Skin is warm and dry.      Capillary Refill: Capillary refill takes less than 2 seconds.      Findings: No rash.   Neurological:      General: No focal deficit present.      Mental Status: She is alert and oriented to person, place, and time.      GCS: GCS eye subscore is 4. GCS verbal subscore is 5. GCS motor subscore is 6.      Motor: No weakness.   Psychiatric:         Mood and Affect: Mood normal.         Behavior: Behavior normal.         Thought Content: Thought content normal.         Judgment: Judgment normal.            Results Reviewed:  Lab Results (last 24 hours)       Procedure Component Value Units Date/Time    aPTT [[id number]]  (Normal) Collected: 09/19/23 1235    Specimen: Blood Updated: 09/19/23 1254     PTT 28.0 seconds     Narrative:      The recommended Heparin therapeutic range is 68-97 seconds.    Protime-INR [[id number]]  (Normal) Collected: 09/19/23 1235    Specimen: Blood Updated: 09/19/23 1254     Protime 14.1 Seconds      INR 1.09    Narrative:      Therapeutic range for most indications is 2.0-3.0 INR,  or 2.5-3.5 for mechanical heart valves.    CBC & Differential [[id number]]  (Abnormal) Collected: 09/19/23 1235    Specimen: Blood Updated: 09/19/23 1241    Narrative:      The following orders were created for panel order CBC & Differential.  Procedure                                Abnormality         Status                     ---------                               -----------         ------                     CBC Auto Differential[[id number]]        Abnormal            Final result                 Please view results for these tests on the individual orders.    CBC Auto Differential [[id number]]  (Abnormal) Collected: 09/19/23 1235    Specimen: Blood Updated: 09/19/23 1241     WBC 6.90 10*3/mm3      RBC 3.54 10*6/mm3      Hemoglobin 10.9 g/dL      Hematocrit 32.3 %      MCV 91.2 fL      MCH 30.8 pg      MCHC 33.7 g/dL      RDW 12.9 %      RDW-SD 42.7 fl      MPV 11.6 fL      Platelets 158 10*3/mm3      Neutrophil % 74.6 %      Lymphocyte % 13.9 %      Monocyte % 6.7 %      Eosinophil % 3.0 %      Basophil % 0.9 %      Immature Grans % 0.9 %      Neutrophils, Absolute 5.15 10*3/mm3      Lymphocytes, Absolute 0.96 10*3/mm3      Monocytes, Absolute 0.46 10*3/mm3      Eosinophils, Absolute 0.21 10*3/mm3      Basophils, Absolute 0.06 10*3/mm3      Immature Grans, Absolute 0.06 10*3/mm3      nRBC 0.0 /100 WBC     Comprehensive Metabolic Panel [[id number]] Collected: 09/19/23 1235    Specimen: Blood Updated: 09/19/23 1237          Imaging Results (Last 24 Hours)       Procedure Component Value Units Date/Time    XR Hip With or Without Pelvis 2 - 3 View Right [[id number]] Collected: 09/19/23 1213     Updated: 09/19/23 1217    Narrative:      HISTORY: trauma    COMPARISON: None    FINDINGS:  AP and lateral views were obtained.    Acute fracture of the right femoral neck with proximal migration of the distal  fragment and varus angulation. No dislocation. No pelvic fracture.            I have personally reviewed and interpreted the radiology studies and ECG obtained at time of admission.       Assessment / Plan   Treatment Plan:   1. Traumatic closed displaced fracture of neck of right femur, initial encounter   -NPO status; Dr Adler planning for surgical intervention later  "today   -Pain/nausea control   -PT/OT    2. CAD   -continue daily ASA    3 Hypertension   -continue Norvasc, HCTC, Labetalol, Losartan    4. Type 2 diabetes mellitus without complication   -Consistent carbohydrate diet, Accu-chek with sliding scale insulin    5. HLD   -continue statin    VTE ppx: SCDs  Diet: NPO for now; Cardiac/Diabetic when able to resume per ortho team    Medical Decision Making  Number and Complexity of problems: 1 high, 4 moderate    Conditions and Status:        Condition is unchanged.       [facility] Data  External documents reviewed: The patient's recent past medical charts for this facility as well as outside facilities via the \"Care Everywhere\" application of Epic reviewed.     Discussed with: admitting physician     I have utilized all available immediate resources to obtain, update, or review the patient's current medications (including all prescriptions, over-the-counter products, herbals, cannabis/cannabidiol products, and vitamin/mineral/dietary (nutritional) supplements).     Care Planning  Shared decision making: Patient updated on current status and informed of proposed care plan; is in agreement with plan  Code status and discussions: Full code, Healthcare surrogate is   I confirmed that the patient's Advance Care Plan is present, code status is documented, or surrogate decision maker is listed in the patient's medical record.       Disposition  Social Determinants of Health that impact treatment or disposition: n/a    The patient was seen and examined by me on 09/19/23.        Electronically signed by Mariel Acharya PA-C, 09/19/23, 13:02 CDT.              "

## 2023-09-19 NOTE — ED NOTES
Pt arrives via EMS from home with c/o mechanical fall. Pt was in a hurry, walking on her front porch and tripped over the step falling on her right hip. Per EMS PTA 50mcg fentinal and 4mg zofran.

## 2023-09-19 NOTE — ANESTHESIA PROCEDURE NOTES
Peripheral Block      Patient reassessed immediately prior to procedure    Patient location during procedure: floor  Start time: 9/19/2023 4:30 PM  Stop time: 9/19/2023 4:40 PM  Reason for block: procedure for pain, at surgeon's request, post-op pain management and secondary anesthetic  Performed by  Anesthesiologist: Lilibeth Le DO  Preanesthetic Checklist  Completed: patient identified, IV checked, site marked, risks and benefits discussed, surgical consent, monitors and equipment checked, pre-op evaluation and timeout performed  Prep:  Pt Position: supine  Sterile barriers:cap, gloves and sterile barriers  Prep: ChloraPrep  Patient monitoring: blood pressure monitoring, continuous pulse oximetry and EKG  Procedure    Sedation: no  Performed under: PNB  Guidance:ultrasound guided    ULTRASOUND INTERPRETATION.  Using ultrasound guidance a 22 G gauge needle was placed in close proximity to the femoral nerve, at which point, under ultrasound guidance anesthetic was injected in the area of the nerve and spread of the anesthesia was seen on ultrasound in close proximity thereto.  There were no abnormalities seen on ultrasound; a digital image was taken; and the patient tolerated the procedure with no complications. Images:still images obtained, printed/placed on chart    Laterality:right  Block Type:fascia iliaca compartment  Injection Technique:single-shot  Needle Type:echogenic  Needle Gauge:22 G  Resistance on Injection: none    Medications Used: ropivacaine (NAROPIN) 0.5 % injection - Injection   30 mL - 9/19/2023 4:30:00 PM      Medications  Comment:Pts allergy to lidocaine was jittery at the dentist more than likely due to epinephrine added. Pt doesn't have an allergy to lidocaine    Post Assessment  Injection Assessment: negative aspiration for heme, no paresthesia on injection and incremental injection  Patient Tolerance:comfortable throughout block  Complications:no  Additional Notes  Pt & side  identified  U/S used throughout and needle seen throughout  No complications  Pt tolerated procedure well

## 2023-09-20 ENCOUNTER — APPOINTMENT (OUTPATIENT)
Dept: ULTRASOUND IMAGING | Facility: HOSPITAL | Age: 88
DRG: 536 | End: 2023-09-20
Payer: MEDICARE

## 2023-09-20 LAB
ANION GAP SERPL CALCULATED.3IONS-SCNC: 9 MMOL/L (ref 5–15)
BASOPHILS # BLD AUTO: 0.03 10*3/MM3 (ref 0–0.2)
BASOPHILS NFR BLD AUTO: 0.2 % (ref 0–1.5)
BUN SERPL-MCNC: 17 MG/DL (ref 8–23)
BUN/CREAT SERPL: 20 (ref 7–25)
CALCIUM SPEC-SCNC: 9.2 MG/DL (ref 8.6–10.5)
CHLORIDE SERPL-SCNC: 98 MMOL/L (ref 98–107)
CO2 SERPL-SCNC: 24 MMOL/L (ref 22–29)
CREAT SERPL-MCNC: 0.85 MG/DL (ref 0.57–1)
DEPRECATED RDW RBC AUTO: 43.9 FL (ref 37–54)
EGFRCR SERPLBLD CKD-EPI 2021: 66.8 ML/MIN/1.73
EOSINOPHIL # BLD AUTO: 0 10*3/MM3 (ref 0–0.4)
EOSINOPHIL NFR BLD AUTO: 0 % (ref 0.3–6.2)
ERYTHROCYTE [DISTWIDTH] IN BLOOD BY AUTOMATED COUNT: 13.1 % (ref 12.3–15.4)
GLUCOSE BLDC GLUCOMTR-MCNC: 157 MG/DL (ref 70–130)
GLUCOSE BLDC GLUCOMTR-MCNC: 162 MG/DL (ref 70–130)
GLUCOSE BLDC GLUCOMTR-MCNC: 190 MG/DL (ref 70–130)
GLUCOSE BLDC GLUCOMTR-MCNC: 194 MG/DL (ref 70–130)
GLUCOSE SERPL-MCNC: 151 MG/DL (ref 65–99)
HCT VFR BLD AUTO: 29.9 % (ref 34–46.6)
HGB BLD-MCNC: 10.1 G/DL (ref 12–15.9)
IMM GRANULOCYTES # BLD AUTO: 0.04 10*3/MM3 (ref 0–0.05)
IMM GRANULOCYTES NFR BLD AUTO: 0.3 % (ref 0–0.5)
LYMPHOCYTES # BLD AUTO: 0.49 10*3/MM3 (ref 0.7–3.1)
LYMPHOCYTES NFR BLD AUTO: 3.7 % (ref 19.6–45.3)
MCH RBC QN AUTO: 31.3 PG (ref 26.6–33)
MCHC RBC AUTO-ENTMCNC: 33.8 G/DL (ref 31.5–35.7)
MCV RBC AUTO: 92.6 FL (ref 79–97)
MONOCYTES # BLD AUTO: 0.52 10*3/MM3 (ref 0.1–0.9)
MONOCYTES NFR BLD AUTO: 3.9 % (ref 5–12)
NEUTROPHILS NFR BLD AUTO: 12.14 10*3/MM3 (ref 1.7–7)
NEUTROPHILS NFR BLD AUTO: 91.9 % (ref 42.7–76)
NRBC BLD AUTO-RTO: 0 /100 WBC (ref 0–0.2)
PLATELET # BLD AUTO: 157 10*3/MM3 (ref 140–450)
PMV BLD AUTO: 12.1 FL (ref 6–12)
POTASSIUM SERPL-SCNC: 5 MMOL/L (ref 3.5–5.2)
RBC # BLD AUTO: 3.23 10*6/MM3 (ref 3.77–5.28)
SODIUM SERPL-SCNC: 131 MMOL/L (ref 136–145)
WBC NRBC COR # BLD: 13.22 10*3/MM3 (ref 3.4–10.8)

## 2023-09-20 PROCEDURE — G0378 HOSPITAL OBSERVATION PER HR: HCPCS

## 2023-09-20 PROCEDURE — 76775 US EXAM ABDO BACK WALL LIM: CPT

## 2023-09-20 PROCEDURE — 97162 PT EVAL MOD COMPLEX 30 MIN: CPT

## 2023-09-20 PROCEDURE — 97166 OT EVAL MOD COMPLEX 45 MIN: CPT

## 2023-09-20 PROCEDURE — 25010000002 HEPARIN (PORCINE) PER 1000 UNITS: Performed by: ORTHOPAEDIC SURGERY

## 2023-09-20 PROCEDURE — 63710000001 INSULIN ASPART PER 5 UNITS: Performed by: STUDENT IN AN ORGANIZED HEALTH CARE EDUCATION/TRAINING PROGRAM

## 2023-09-20 PROCEDURE — 25010000002 HYDROMORPHONE 1 MG/ML SOLUTION: Performed by: ORTHOPAEDIC SURGERY

## 2023-09-20 PROCEDURE — 85025 COMPLETE CBC W/AUTO DIFF WBC: CPT | Performed by: ORTHOPAEDIC SURGERY

## 2023-09-20 PROCEDURE — 80048 BASIC METABOLIC PNL TOTAL CA: CPT | Performed by: ORTHOPAEDIC SURGERY

## 2023-09-20 PROCEDURE — 99024 POSTOP FOLLOW-UP VISIT: CPT | Performed by: ORTHOPAEDIC SURGERY

## 2023-09-20 PROCEDURE — 82948 REAGENT STRIP/BLOOD GLUCOSE: CPT

## 2023-09-20 PROCEDURE — 25010000002 CEFAZOLIN PER 500 MG: Performed by: ORTHOPAEDIC SURGERY

## 2023-09-20 RX ORDER — LOSARTAN POTASSIUM 50 MG/1
50 TABLET ORAL
Status: DISCONTINUED | OUTPATIENT
Start: 2023-09-20 | End: 2023-09-20

## 2023-09-20 RX ORDER — GLUCAGON 1 MG/ML
1 KIT INJECTION
Status: DISCONTINUED | OUTPATIENT
Start: 2023-09-20 | End: 2023-09-29 | Stop reason: HOSPADM

## 2023-09-20 RX ORDER — AMLODIPINE BESYLATE 5 MG/1
5 TABLET ORAL
Status: DISCONTINUED | OUTPATIENT
Start: 2023-09-21 | End: 2023-09-29 | Stop reason: HOSPADM

## 2023-09-20 RX ORDER — SODIUM CHLORIDE 9 MG/ML
100 INJECTION, SOLUTION INTRAVENOUS CONTINUOUS
Status: DISCONTINUED | OUTPATIENT
Start: 2023-09-20 | End: 2023-09-21

## 2023-09-20 RX ORDER — DEXTROSE MONOHYDRATE 25 G/50ML
25 INJECTION, SOLUTION INTRAVENOUS
Status: DISCONTINUED | OUTPATIENT
Start: 2023-09-20 | End: 2023-09-29 | Stop reason: HOSPADM

## 2023-09-20 RX ORDER — AMLODIPINE BESYLATE 5 MG/1
5 TABLET ORAL ONCE
Status: COMPLETED | OUTPATIENT
Start: 2023-09-20 | End: 2023-09-20

## 2023-09-20 RX ORDER — NICOTINE POLACRILEX 4 MG
15 LOZENGE BUCCAL
Status: DISCONTINUED | OUTPATIENT
Start: 2023-09-20 | End: 2023-09-29 | Stop reason: HOSPADM

## 2023-09-20 RX ORDER — INSULIN ASPART 100 [IU]/ML
0-7 INJECTION, SOLUTION INTRAVENOUS; SUBCUTANEOUS
Status: DISCONTINUED | OUTPATIENT
Start: 2023-09-20 | End: 2023-09-29 | Stop reason: HOSPADM

## 2023-09-20 RX ADMIN — Medication 10 ML: at 21:00

## 2023-09-20 RX ADMIN — HYDROMORPHONE HYDROCHLORIDE 0.5 MG: 1 INJECTION, SOLUTION INTRAMUSCULAR; INTRAVENOUS; SUBCUTANEOUS at 15:47

## 2023-09-20 RX ADMIN — HYDROCHLOROTHIAZIDE 12.5 MG: 12.5 TABLET ORAL at 08:43

## 2023-09-20 RX ADMIN — AMLODIPINE BESYLATE 5 MG: 5 TABLET ORAL at 22:01

## 2023-09-20 RX ADMIN — SODIUM CHLORIDE 100 ML/HR: 9 INJECTION, SOLUTION INTRAVENOUS at 22:01

## 2023-09-20 RX ADMIN — LABETALOL HYDROCHLORIDE 100 MG: 100 TABLET, FILM COATED ORAL at 08:44

## 2023-09-20 RX ADMIN — HYDROMORPHONE HYDROCHLORIDE 0.5 MG: 1 INJECTION, SOLUTION INTRAMUSCULAR; INTRAVENOUS; SUBCUTANEOUS at 01:48

## 2023-09-20 RX ADMIN — ACETAMINOPHEN 650 MG: 325 TABLET, FILM COATED ORAL at 22:01

## 2023-09-20 RX ADMIN — ASPIRIN 81 MG: 81 TABLET, CHEWABLE ORAL at 08:43

## 2023-09-20 RX ADMIN — HEPARIN SODIUM 5000 UNITS: 5000 INJECTION INTRAVENOUS; SUBCUTANEOUS at 21:00

## 2023-09-20 RX ADMIN — INSULIN ASPART 2 UNITS: 100 INJECTION, SOLUTION INTRAVENOUS; SUBCUTANEOUS at 18:05

## 2023-09-20 RX ADMIN — CEFAZOLIN 2000 MG: 10 INJECTION, POWDER, FOR SOLUTION INTRAVENOUS at 01:37

## 2023-09-20 RX ADMIN — DOCUSATE SODIUM 50 MG AND SENNOSIDES 8.6 MG 2 TABLET: 8.6; 5 TABLET, FILM COATED ORAL at 21:00

## 2023-09-20 RX ADMIN — OXYCODONE HYDROCHLORIDE 5 MG: 5 TABLET ORAL at 21:00

## 2023-09-20 RX ADMIN — ROSUVASTATIN CALCIUM 10 MG: 10 TABLET, FILM COATED ORAL at 21:00

## 2023-09-20 RX ADMIN — CEFAZOLIN 2000 MG: 10 INJECTION, POWDER, FOR SOLUTION INTRAVENOUS at 11:30

## 2023-09-20 RX ADMIN — OXYCODONE HYDROCHLORIDE 5 MG: 5 TABLET ORAL at 11:42

## 2023-09-20 RX ADMIN — SODIUM CHLORIDE 100 ML/HR: 9 INJECTION, SOLUTION INTRAVENOUS at 09:56

## 2023-09-20 RX ADMIN — ACETAMINOPHEN 650 MG: 325 TABLET, FILM COATED ORAL at 05:10

## 2023-09-20 RX ADMIN — LABETALOL HYDROCHLORIDE 100 MG: 100 TABLET, FILM COATED ORAL at 21:00

## 2023-09-20 RX ADMIN — ACETAMINOPHEN 650 MG: 325 TABLET, FILM COATED ORAL at 15:36

## 2023-09-20 RX ADMIN — ACETAMINOPHEN 650 MG: 325 TABLET, FILM COATED ORAL at 11:23

## 2023-09-20 RX ADMIN — DOCUSATE SODIUM 50 MG AND SENNOSIDES 8.6 MG 2 TABLET: 8.6; 5 TABLET, FILM COATED ORAL at 08:44

## 2023-09-20 RX ADMIN — HEPARIN SODIUM 5000 UNITS: 5000 INJECTION INTRAVENOUS; SUBCUTANEOUS at 08:41

## 2023-09-20 RX ADMIN — INSULIN ASPART 2 UNITS: 100 INJECTION, SOLUTION INTRAVENOUS; SUBCUTANEOUS at 15:38

## 2023-09-20 RX ADMIN — OXYCODONE HYDROCHLORIDE 5 MG: 5 TABLET ORAL at 05:14

## 2023-09-20 RX ADMIN — Medication 10 ML: at 08:50

## 2023-09-20 NOTE — OP NOTE
HIP BIPOLAR ANTERIOR APPROACH  Procedure Note    Name:    Alexia Lopez  YOB: 1935  Date of surgery:   9/19/2023    Pre-op Diagnosis:   Coronary arteriosclerosis in native artery [I25.10]  S/P CABG (coronary artery bypass graft) [Z95.1]  Benign essential hypertension [I10]  Traumatic closed displaced fracture of neck of right femur, initial encounter [S72.001A]  Controlled type 2 diabetes mellitus without complication, with long-term current use of insulin [E11.9, Z79.4]  Fall from slip, trip, or stumble, initial encounter [W01.0XXA]    Post-op Diagnosis:    Post-Op Diagnosis Codes:     * Coronary arteriosclerosis in native artery [I25.10]     * S/P CABG (coronary artery bypass graft) [Z95.1]     * Benign essential hypertension [I10]     * Traumatic closed displaced fracture of neck of right femur, initial encounter [S72.001A]     * Controlled type 2 diabetes mellitus without complication, with long-term current use of insulin [E11.9, Z79.4]     * Fall from slip, trip, or stumble, initial encounter [W01.0XXA]    Procedure:  Procedure(s):  RIGHT HIP BIPOLAR ANTERIOR APPROACH           Surgeon:  Surgeon(s):  Cas Adler MD    Assistant: Reanna Husain CSA was responsible for performing the following activities: Retraction, Suction, Irrigation, Suturing, Closing, and Placing Dressing and their skilled assistance was necessary for the success of this case.     Anesthesia: General    Staff:   Circulator: Chasidy Sands RN; Alina Urban RN  Scrub Person: Kale Dos Santos Douglas A  Assistant: Reanna Husain CSA    Estimated Blood Loss: 200ml    Surgical Approach: Hip Direct Anterior ([place])      Specimens:                ID Type Source Tests Collected by Time   A : femoral head Bone Hip, Right TISSUE EXAM, [facility] ([facility]) Cas Adler MD 9/19/2023 2002         Drains: * No LDAs found *    Complications: None    IMPLANTS:   Implant Name  Type Inv. Item Serial No.  Lot No. LRB No. Used Action   DEV CONTRL TISS STRATAFIXSPIRALMNCRYL PLSPS2 REV4/0 30CM - [id number] Implant DEV CONTRL TISS STRATAFIXSPIRALMNCRYL PLSPS2 REV4/0 30CM  [employer]  DIV OF J AND J [id number] Right 1 Implanted   DEV WND/CLS TISS STRATAFIX ABS SYMM PDS/PLS CT1 SZ0 24CM CRISTY - [id number] Implant DEV WND/CLS TISS STRATAFIX ABS SYMM PDS/PLS CT1 SZ0 24CM CRISTY  [employer]  DIV OF J AND J [id number] Right 1 Implanted   DEV WND/CLS TISS STRATAFIX ABS SYMM PDS/PLS CT1 SZ2 24CM CRISTY - [id number] Implant DEV WND/CLS TISS STRATAFIX ABS SYMM PDS/PLS CT1 SZ2 24CM CRISTY  [employer]  DIV OF J AND J [id number] Right 1 Implanted         PROCEDURE:  The patient was taken to the operating room and placed in the supine position. A sequential compression device was carefully placed on the nonoperative leg. Preoperative antibiotics were administered. Surgical time out was performed. After adequate induction of anesthesia, the feet were padded and placed in the [place] table boots. The patient was then transferred onto the [place] table and positioned appropriately. The Right hip was then prepped and draped in the usual sterile fashion.    An incision was then made starting 2 cm lateral to the ASIS heading distal and lateral at approximately 30 degrees. The subcutaneous fat was then divided down to the fascia overlying the tensor fascia marii (TFL) muscle. This was sharply divided staying a few cm lateral to the interval between the sartorius and the TFL muscle. This interval was then bluntly dissected. The circumflex vessels were then identified, cauterized, and divided. There was excellent hemostasis. Cobra retractors were then placed around the femoral neck capsule. The anterior capsule was then resected. The retractors were then placed intracapsular and the femoral neck was identified. The arthritic change was noted to be moderate in the femoral head and along the rim of the acetabulum. The femoral neck cut was made  "and the femoral head was then engaged with the corkscrew and removed without difficulty. The head was found to be misshapen and devoid of cartilage over most of the joint surface. There were significant osteophytes noted around the periphery of the head.    The acetabulum was then exposed with \"number 7\" retractors. The acetabulum was then addressed with C-arm imaging and the acetabular reamers. We reamed out the medial osteophyte and then up to a solid 'rim' fit. A trial cup was then impacted into position with good fixation. The trial was removed and the hip copiously irrigated. The final acetabular implant was then placed and impacted into position with C-arm guidance.  A single acetabular screw was then placed with excellent purchase to supplement cup stability. The final liner was then placed and then the wound was irrigated once again.     Attention was turned to the femur. The femoral elevator hook was placed. The leg was then moved to the external rotation, extension, and adduction position. Retractors were placed around the proximal femur and then the posterior capsule and conjoined tendon were released. The box osteotome was then used to create the starting hole. The femur was then prepared using the chili-pepper broach and then we progressively broached up the final broach which fit nicely with excellent rotational and axial stability. The trial neck and head were then placed and the hip was then reduced and C-arm images were taken which confirmed appropriate fit and position of the implants. There were no complicating factors noted, and fluoro images were taken which confirmed proper restoration of leg length and offset. The trial components were removed, the hip was copiously irrigated and the final implants were then seated. The hip was then reduced and found to be stable. C-arm images again confirmed appropriate anatomy restoration without complicating factors noted.     The hip was then copiously " irrigated.  There was excellent hemostasis. Hemostasis was felt to be obtained and therefore a drain was not utilized. . We closed the hip in multiple layers in standard fashion. With final capsular closure, the Zynrelief product was injected into the joint space.  Sterile dressing applied. At the end of the case, the sponge and needle counts were reported as being correct. There were no known complications. The patient was then transported to the recovery room.          09/19/23 at 20:15 CDT by Cas Adler MD

## 2023-09-20 NOTE — PLAN OF CARE
Goal Outcome Evaluation:              Outcome Evaluation: Pain managed with meds. Blood sugar 194. Insulin per sliding scale ordered per/Dr. Perry. Administered 2 units. Blood pressure elevated this am and has decreased this afternoon. Oxygen removed to room air. Pt. 96% room air. Dressing and skin is intact. Bladder scanned patient and performed in/out cathether and excreted 300ml. Purewik still in tact. Pt. is resting between care.

## 2023-09-20 NOTE — ANESTHESIA POSTPROCEDURE EVALUATION
Patient: Alexia Lopez    Procedure Summary       Date: 09/19/23 Room / Location: [facility] OR [address] [place] [address] OR    Anesthesia Start: 1823 Anesthesia Stop: 2025    Procedure: RIGHT HIP BIPOLAR ANTERIOR APPROACH (Right: Hip) Diagnosis:       Coronary arteriosclerosis in native artery      S/P CABG (coronary artery bypass graft)      Benign essential hypertension      Traumatic closed displaced fracture of neck of right femur, initial encounter      Controlled type 2 diabetes mellitus without complication, with long-term current use of insulin      Fall from slip, trip, or stumble, initial encounter      (Coronary arteriosclerosis in native artery [I25.10])      (S/P CABG (coronary artery bypass graft) [Z95.1])      (Benign essential hypertension [I10])      (Traumatic closed displaced fracture of neck of right femur, initial encounter [S72.001A])      (Controlled type 2 diabetes mellitus without complication, with long-term current use of insulin [E11.9, Z79.4])      (Fall from slip, trip, or stumble, initial encounter [W01.0XXA])    Surgeons: Cas Adler MD Provider: Nikita Bhatia CRNA    Anesthesia Type: general ASA Status: 3 - Emergent            Anesthesia Type: general    Vitals  No vitals data found for the desired time range.          Post Anesthesia Care and Evaluation    Patient location during evaluation: PACU  Patient participation: complete - patient participated  Level of consciousness: sleepy but conscious  Pain score: 0  Pain management: adequate    Airway patency: patent  Anesthetic complications: No anesthetic complications  PONV Status: none  Cardiovascular status: acceptable  Respiratory status: acceptable  Hydration status: acceptable    Comments: BP: 191/89  SpO2: 98  HR: 66  Temp: 98.7  RR: 16  No anesthesia care post op

## 2023-09-20 NOTE — THERAPY EVALUATION
Patient Name: Alexia Lopez  : 1935    MRN: [id number]                              Today's Date: 2023       Admit Date: 2023    Visit Dx:     ICD-10-CM ICD-9-CM   1. Traumatic closed displaced fracture of neck of right femur, initial encounter  S72.001A 820.8   2. Coronary arteriosclerosis in native artery  I25.10 414.01   3. S/P CABG (coronary artery bypass graft)  Z95.1 V45.81   4. Benign essential hypertension  I10 401.1   5. Controlled type 2 diabetes mellitus without complication, with long-term current use of insulin  E11.9 250.00    Z79.4 V58.67   6. Fall from slip, trip, or stumble, initial encounter  W01.0XXA E885.9   7. Closed fracture of right hip, initial encounter  S72.001A 820.8   8. Impaired functional mobility, balance, gait, and endurance [Z74.09 (ICD-10-CM)]  Z74.09 V49.89     Patient Active Problem List   Diagnosis    Coronary arteriosclerosis in native artery    S/P CABG (coronary artery bypass graft)    Benign essential hypertension    Hypercholesterolemia    Type 2 diabetes mellitus    Nuclear cataract    Controlled type 2 diabetes mellitus without complication    Pernicious anemia    Arthritis of knee    Chronic sinusitis    Encounter for screening mammogram for malignant neoplasm of breast    Generalized osteoarthritis    Iron deficiency anemia    Palpitations    Myocardial infarction    Hypertension    Hyperlipemia    History of echocardiogram    Diabetes mellitus    Coronary artery disease    Cataract    Chronic pain of both knees    Primary osteoarthritis of both knees    Claudication of lower extremity    Precordial pain    Fall from slip, trip, or stumble, initial encounter    Traumatic closed displaced fracture of neck of right femur, initial encounter    Hip fracture     Past Medical History:   Diagnosis Date    Cataract     Coronary artery disease     Diabetes mellitus     Type 2 diabetes mellitus - without retinopathy       History of echocardiogram  05/12/2014    Normal LV systolic function with EF of 55% with mild hypokinesis. Diastolic relaxation abnormality of left ventricle. Mild tricuspid regurg. Trace mitral regurg    Hyperlipemia     Hypertension     Myocardial infarction      Past Surgical History:   Procedure Laterality Date    BACK SURGERY      CARDIAC CATHETERIZATION  05/12/2014    Severe multivessel CAD with critical lesions noted in the LAD coronary artery, left circumflex coronary artery and RCA. Left ventriculogram no performed in view of elevated left ventricular end-diastolic pressure    CATARACT EXTRACTION W/ INTRAOCULAR LENS IMPLANT Left 2/2/2018    Procedure: CATARACT PHACO EXTRACTION WITH INTRAOCULAR LENS IMPLANT;  Surgeon: Gagan Lizarraga MD;  Location: [facility];  Service:     CATARACT EXTRACTION W/ INTRAOCULAR LENS IMPLANT Right 2/9/2018    Procedure: CATARACT PHACO EXTRACTION WITH INTRAOCULAR LENS IMPLANT;  Surgeon: Gagan Lizarraga MD;  Location: [facility];  Service:     CORONARY ARTERY BYPASS GRAFT      X 3 with LIMA to LAD, SVG to OMB and SVG to PDA.    DILATATION AND CURETTAGE      OTHER SURGICAL HISTORY  05/06/2014    INCISION OF EARDRUM GENERAL ANESTHETIC 72376 (1)    Bilateral myringotomy with tubes.     SINUS SURGERY      TONSILLECTOMY      TONSILLECTOMY AND ADENOIDECTOMY      TUBAL ABDOMINAL LIGATION  03/14/1978      General Information       Row Name 09/20/23 0840          Physical Therapy Time and Intention    Document Type evaluation  -CZ     Mode of Treatment physical therapy;occupational therapy  -CZ       Row Name 09/20/23 0840          General Information    Prior Level of Function independent:;all household mobility;community mobility  -CZ       Row Name 09/20/23 0840          Living Environment    People in Home spouse  -CZ       Row Name 09/20/23 0840          Home Main Entrance    Number of Stairs, Main Entrance two  -CZ     Stair Railings, Main Entrance railings on both sides of stairs  -CZ       Row  Name 09/20/23 0840          Stairs Within Home, Primary    Stairs, Within Home, Primary (I) ambulation without an AD, occasionally uses SPC. Tall toilet.  Hand rail in walk in shower, no shower chair. Needs FWW.  -CZ     Number of Stairs, Within Home, Primary one  -CZ       Row Name 09/20/23 0840          Cognition    Orientation Status (Cognition) oriented x 4  -CZ       Row Name 09/20/23 0840          Safety Issues, Functional Mobility    Impairments Affecting Function (Mobility) strength;endurance/activity tolerance  -CZ               User Key  (r) = Recorded By, (t) = Taken By, (c) = Cosigned By      Initials Name Provider Type    CZ John Bazan, PT Physical Therapist                   Mobility       Row Name 09/20/23 0840          Bed Mobility    Bed Mobility supine-sit;sit-supine  -CZ     Supine-Sit [place] (Bed Mobility) moderate assist (50% patient effort);2 person assist  -CZ     Sit-Supine [place] (Bed Mobility) moderate assist (50% patient effort)  -CZ     Assistive Device (Bed Mobility) head of bed elevated;bed rails  -CZ       Row Name 09/20/23 0840          Sit-Stand Transfer    Sit-Stand [place] (Transfers) maximum assist (25% patient effort);2 person assist  -CZ     Assistive Device (Sit-Stand Transfers) walker, front-wheeled  -CZ       Row Name 09/20/23 0840          Gait/Stairs (Locomotion)    [place] Level (Gait) maximum assist (25% patient effort)  -CZ     Assistive Device (Gait) walker, front-wheeled  -CZ     Distance in Feet (Gait) 10'x1, 5'x1.  -CZ     Comment, (Gait/Stairs) Slow delvin, head down, decreased stance time on R, fearful of increasing pain. Hypotensive while sitting on toilet; safely returned to sitting on bed.  -CZ       Row Name 09/20/23 0840          Mobility    Extremity Weight-bearing Status right lower extremity  -CZ     Right Lower Extremity (Weight-bearing Status) weight-bearing as tolerated (WBAT)  -CZ               User Key  (r) = Recorded By,  (t) = Taken By, (c) = Cosigned By      Initials Name Provider Type    CZ John Bazan, PT Physical Therapist                   Obj/Interventions       Row Name 09/20/23 0840          Range of Motion Comprehensive    General Range of Motion bilateral lower extremity ROM WFL  -CZ       Row Name 09/20/23 0840          Strength Comprehensive (MMT)    Comment, General Manual Muscle Testing (MMT) Assessment RLE: 3-/5, LLE: 3+/5 grossly.  -CZ       Row Name 09/20/23 0840          Sensory Assessment (Somatosensory)    Sensory Assessment (Somatosensory) right LE  -CZ     Right LE Sensory Assessment light touch awareness;impaired  -CZ               User Key  (r) = Recorded By, (t) = Taken By, (c) = Cosigned By      Initials Name Provider Type    CZ John Bazan, PT Physical Therapist                   Goals/Plan       Row Name 09/20/23 0840          Bed Mobility Goal 1 (PT)    Activity/Assistive Device (Bed Mobility Goal 1, PT) sit to supine/supine to sit  -CZ     [place] Level/Cues Needed (Bed Mobility Goal 1, PT) independent  -CZ     Time Frame (Bed Mobility Goal 1, PT) by discharge  -CZ     Strategies/Barriers (Bed Mobility Goal 1, PT) HOB flat, no bed rails.  -CZ     Progress/Outcomes (Bed Mobility Goal 1, PT) goal not met  -CZ       Row Name 09/20/23 0840          Transfer Goal 1 (PT)    Activity/Assistive Device (Transfer Goal 1, PT) sit-to-stand/stand-to-sit;bed-to-chair/chair-to-bed;walker, rolling  -CZ     [place] Level/Cues Needed (Transfer Goal 1, PT) modified independence  -CZ     Time Frame (Transfer Goal 1, PT) by discharge  -CZ     Strategies/Barriers (Transfers Goal 1, PT) R hip fx, SHEELA, anterior, WBAT.  -CZ     Progress/Outcome (Transfer Goal 1, PT) goal not met  -CZ       Row Name 09/20/23 0840          Gait Training Goal 1 (PT)    Activity/Assistive Device (Gait Training Goal 1, PT) walker, rolling  -CZ     [place] Level (Gait Training Goal 1, PT) contact guard required  -CZ      Distance (Gait Training Goal 1, PT) 25' x 2.  -CZ     Time Frame (Gait Training Goal 1, PT) by discharge  -CZ     Strategies/Barriers (Gait Training Goal 1, PT) R hip fx, SHEELA, anterior, WBAT.  -CZ     Progress/Outcome (Gait Training Goal 1, PT) goal not met  -CZ       Row Name 09/20/23 0840          Stairs Goal 1 (PT)    Activity/Assistive Device (Stairs Goal 1, PT) using handrail, right;using handrail, left  -CZ     [place] Level/Cues Needed (Stairs Goal 1, PT) contact guard required  -CZ     Number of Stairs (Stairs Goal 1, PT) 2 steps.  -CZ     Time Frame (Stairs Goal 1, PT) by discharge  -CZ     Strategies/Barriers (Stairs Goal 1, PT) R hip fx, SHEELA, anterior, WBAT.  -CZ     Progress/Outcome (Stairs Goal 1, PT) goal not met  -CZ       Row Name 09/20/23 0840          Therapy Assessment/Plan (PT)    Planned Therapy Interventions (PT) balance training;bed mobility training;gait training;home exercise program;patient/family education;transfer training;ROM (range of motion);stair training;strengthening;stretching  -CZ               User Key  (r) = Recorded By, (t) = Taken By, (c) = Cosigned By      Initials Name Provider Type    CZ John Bazan, PT Physical Therapist                   Clinical Impression       Row Name 09/20/23 0840          Pain    Pretreatment Pain Rating 5/10  -CZ     Posttreatment Pain Rating 9/10  -CZ     Pain Location - Side/Orientation Right  -CZ     Pain Location - hip  -CZ     Pain Intervention(s) Medication (See MAR);Repositioned;Ambulation/increased activity;Distraction  -CZ       Row Name 09/20/23 0840          Plan of Care Review    Plan of Care Reviewed With patient  -CZ     Outcome Evaluation Initial PT evaluation complete, co-evaluation with OT.  Patient is alert and cooperative.  She requires mod Ax1-2 with bed mobility, max Ax2 with transfers and gait. Patient ambulates 10'x1 with FWW, slow delvin, head down, decreased stance time on R, fearful of increasing pain.  Hypotensive while sitting on toilet; safely returned to sitting on bed, 5'x1 with FWW. BP at rest: 175/77, 92/51 sitting on toilet, 125/59 supine in bed at end of session, RN present. Patient will need a FWW and at least assistance at home with O/P PT.  However, she is quite dependent currently and may require placement at a rehab facility prior to discharge home with spouse.  Goals established, continue skilled I/P PT.  -       Row Name 09/20/23 0840          Therapy Assessment/Plan (PT)    Rehab Potential (PT) good, to achieve stated therapy goals  -     Criteria for Skilled Interventions Met (PT) yes;skilled treatment is necessary  -     Therapy Frequency (PT) daily  -       Row Name 09/20/23 0840          Vital Signs    Pre Systolic BP Rehab 175  -CZ     Pre Treatment Diastolic BP 77  -CZ     Intra Systolic BP Rehab 92  -CZ     Intra Treatment Diastolic BP 51  -CZ     Post Systolic BP Rehab 125  -CZ     Post Treatment Diastolic BP 59  -CZ     Pretreatment Heart Rate (beats/min) 67  -CZ     Intratreatment Heart Rate (beats/min) 73  -CZ     Posttreatment Heart Rate (beats/min) 71  -CZ     Pre SpO2 (%) 93  -CZ     O2 Delivery Pre Treatment nasal cannula  1 LPM, no home O2.  -CZ     Intra SpO2 (%) 98  -CZ     O2 Delivery Intra Treatment nasal cannula  -CZ     Post SpO2 (%) 99  -CZ     O2 Delivery Post Treatment room air  -CZ     Pre Patient Position Supine  -CZ     Intra Patient Position Sitting  -CZ     Post Patient Position Sitting  -CZ       Row Name 09/20/23 0840          Positioning and Restraints    Pre-Treatment Position in bed  -CZ     Post Treatment Position bed  -CZ     In Bed supine;call light within reach;encouraged to call for assist;exit alarm on  -CZ               User Key  (r) = Recorded By, (t) = Taken By, (c) = Cosigned By      Initials Name Provider Type    CZ John Bazan, PT Physical Therapist                   Outcome Measures       Row Name 09/20/23 0840 09/20/23 0828       How  much help from another person do you currently need...    Turning from your back to your side while in flat bed without using bedrails? 2  -CZ 2  -JUDAH    Moving from lying on back to sitting on the side of a flat bed without bedrails? 2  -CZ 2  -JUDAH    Moving to and from a bed to a chair (including a wheelchair)? 2  -CZ 2  -JUDAH    Standing up from a chair using your arms (e.g., wheelchair, bedside chair)? 2  -CZ 2  -JUDAH    Climbing 3-5 steps with a railing? 1  -CZ 1  -JUDAH    To walk in hospital room? 2  -CZ 2  -JUDAH    AM-PAC 6 Clicks Score (PT) 11  -CZ 11  -JUDAH    Highest level of mobility 4 --> Transferred to chair/commode  -CZ 4 --> Transferred to chair/commode  -JUDAH      Row Name 09/20/23 0840          Functional Assessment    Outcome Measure Options AM-PAC 6 Clicks Basic Mobility (PT)  -               User Key  (r) = Recorded By, (t) = Taken By, (c) = Cosigned By      Initials Name Provider Type    CZ John Bazan, PT Physical Therapist    Shital Anton LPN Licensed Nurse                                 Physical Therapy Education       Title: PT OT SLP Therapies (In Progress)       Topic: Physical Therapy (In Progress)       Point: Mobility training (In Progress)       Learning Progress Summary             Patient Acceptance, E, NR by  at 9/20/2023 0933    Comment: PT POC, rehab process, hand placement with transfers, use of FWW, proper gait mechanics.                         Point: Home exercise program (Not Started)       Learner Progress:  Not documented in this visit.              Point: Body mechanics (Not Started)       Learner Progress:  Not documented in this visit.              Point: Precautions (Not Started)       Learner Progress:  Not documented in this visit.                              User Key       Initials Effective Dates Name Provider Type Discipline     07/11/23 -  John Bazan, PT Physical Therapist PT                  PT Recommendation and Plan  Planned Therapy Interventions  (PT): balance training, bed mobility training, gait training, home exercise program, patient/family education, transfer training, ROM (range of motion), stair training, strengthening, stretching  Plan of Care Reviewed With: patient  Outcome Evaluation: Initial PT evaluation complete, co-evaluation with OT.  Patient is alert and cooperative.  She requires mod Ax1-2 with bed mobility, max Ax2 with transfers and gait. Patient ambulates 10'x1 with FWW, slow delvin, head down, decreased stance time on R, fearful of increasing pain. Hypotensive while sitting on toilet; safely returned to sitting on bed, 5'x1 with FWW. BP at rest: 175/77, 92/51 sitting on toilet, 125/59 supine in bed at end of session, RN present. Patient will need a FWW and at least assistance at home with O/P PT.  However, she is quite dependent currently and may require placement at a rehab facility prior to discharge home with spouse.  Goals established, continue skilled I/P PT.     Time Calculation:   PT Evaluation Complexity  History, PT Evaluation Complexity: 1-2 personal factors and/or comorbidities  Examination of Body Systems (PT Eval Complexity): total of 3 or more elements  Clinical Presentation (PT Evaluation Complexity): evolving  Clinical Decision Making (PT Evaluation Complexity): moderate complexity  Overall Complexity (PT Evaluation Complexity): moderate complexity     PT Charges       Row Name 09/20/23 1143             Time Calculation    Start Time 0840  -CZ      Stop Time 0935  -CZ      Time Calculation (min) 55 min  -CZ      PT Received On 09/20/23  -CZ      PT Goal Re-Cert Due Date 10/03/23  -CZ         Untimed Charges    PT Eval/Re-eval Minutes 55  -CZ         Total Minutes    Untimed Charges Total Minutes 55  -CZ       Total Minutes 55  -CZ                User Key  (r) = Recorded By, (t) = Taken By, (c) = Cosigned By      Initials Name Provider Type    John De Santiago, PT Physical Therapist                  Therapy Charges for  Today       Code Description Service Date Service Provider Modifiers Qty    [id number] HC PT EVAL MOD COMPLEXITY 4 9/20/2023 John Bazan, PT GP 1            PT G-Codes  Outcome Measure Options: AM-PAC 6 Clicks Basic Mobility (PT)  AM-PAC 6 Clicks Score (PT): 11  PT Discharge Summary  Anticipated Discharge Disposition (PT): home with assist, home with outpatient therapy services, inpatient rehabilitation facility, skilled nursing facility    John Bazan, PT  9/20/2023

## 2023-09-20 NOTE — THERAPY EVALUATION
Patient Name: Alexia Lopez  : 1935    MRN: [id number]                              Today's Date: 2023       Admit Date: 2023    Visit Dx:     ICD-10-CM ICD-9-CM   1. Traumatic closed displaced fracture of neck of right femur, initial encounter  S72.001A 820.8   2. Coronary arteriosclerosis in native artery  I25.10 414.01   3. S/P CABG (coronary artery bypass graft)  Z95.1 V45.81   4. Benign essential hypertension  I10 401.1   5. Controlled type 2 diabetes mellitus without complication, with long-term current use of insulin  E11.9 250.00    Z79.4 V58.67   6. Fall from slip, trip, or stumble, initial encounter  W01.0XXA E885.9   7. Closed fracture of right hip, initial encounter  S72.001A 820.8   8. Impaired functional mobility, balance, gait, and endurance [Z74.09 (ICD-10-CM)]  Z74.09 V49.89   9. Impaired mobility and ADLs [Z74.09, Z78.9 (ICD-10-CM)]  Z74.09 V49.89    Z78.9      Patient Active Problem List   Diagnosis    Coronary arteriosclerosis in native artery    S/P CABG (coronary artery bypass graft)    Benign essential hypertension    Hypercholesterolemia    Type 2 diabetes mellitus    Nuclear cataract    Controlled type 2 diabetes mellitus without complication    Pernicious anemia    Arthritis of knee    Chronic sinusitis    Encounter for screening mammogram for malignant neoplasm of breast    Generalized osteoarthritis    Iron deficiency anemia    Palpitations    Myocardial infarction    Hypertension    Hyperlipemia    History of echocardiogram    Diabetes mellitus    Coronary artery disease    Cataract    Chronic pain of both knees    Primary osteoarthritis of both knees    Claudication of lower extremity    Precordial pain    Fall from slip, trip, or stumble, initial encounter    Traumatic closed displaced fracture of neck of right femur, initial encounter    Hip fracture     Past Medical History:   Diagnosis Date    Cataract     Coronary artery disease     Diabetes mellitus      Type 2 diabetes mellitus - without retinopathy       History of echocardiogram 05/12/2014    Normal LV systolic function with EF of 55% with mild hypokinesis. Diastolic relaxation abnormality of left ventricle. Mild tricuspid regurg. Trace mitral regurg    Hyperlipemia     Hypertension     Myocardial infarction      Past Surgical History:   Procedure Laterality Date    BACK SURGERY      CARDIAC CATHETERIZATION  05/12/2014    Severe multivessel CAD with critical lesions noted in the LAD coronary artery, left circumflex coronary artery and RCA. Left ventriculogram no performed in view of elevated left ventricular end-diastolic pressure    CATARACT EXTRACTION W/ INTRAOCULAR LENS IMPLANT Left 2/2/2018    Procedure: CATARACT PHACO EXTRACTION WITH INTRAOCULAR LENS IMPLANT;  Surgeon: Gagan Lizarraga MD;  Location: [facility];  Service:     CATARACT EXTRACTION W/ INTRAOCULAR LENS IMPLANT Right 2/9/2018    Procedure: CATARACT PHACO EXTRACTION WITH INTRAOCULAR LENS IMPLANT;  Surgeon: Gagan Lizarraga MD;  Location: [facility];  Service:     CORONARY ARTERY BYPASS GRAFT      X 3 with LIMA to LAD, SVG to OMB and SVG to PDA.    DILATATION AND CURETTAGE      OTHER SURGICAL HISTORY  05/06/2014    INCISION OF EARDRUM GENERAL ANESTHETIC 66704 (1)    Bilateral myringotomy with tubes.     SINUS SURGERY      TONSILLECTOMY      TONSILLECTOMY AND ADENOIDECTOMY      TUBAL ABDOMINAL LIGATION  03/14/1978      General Information       Row Name 09/20/23 0839          OT Time and Intention    Document Type evaluation  -CM     Mode of Treatment physical therapy;occupational therapy  -CM       Row Name 09/20/23 0839          General Information    Patient Profile Reviewed yes  -CM     Prior Level of Function independent:;all household mobility;ADL's  -CM     Existing Precautions/Restrictions fall;hip, anterior  -CM       Row Name 09/20/23 0839          Living Environment    People in Home spouse  -CM       Row Name 09/20/23 0889           Home Main Entrance    Number of Stairs, Main Entrance two  -CM     Stair Railings, Main Entrance railings on both sides of stairs  -CM       Row Name 09/20/23 0839          Stairs Within Home, Primary    Stairs, Within Home, Primary (I) ambulation without an AD, occasionally uses SPC. Tall toilet. Hand rail in walk in shower, no shower chair. Needs FWW.  -CM     Number of Stairs, Within Home, Primary one  -CM       Row Name 09/20/23 0839          Cognition    Orientation Status (Cognition) oriented x 4  -CM       Row Name 09/20/23 0839          Safety Issues, Functional Mobility    Impairments Affecting Function (Mobility) strength;endurance/activity tolerance;pain  -CM               User Key  (r) = Recorded By, (t) = Taken By, (c) = Cosigned By      Initials Name Provider Type    Emily Bauer OT Occupational Therapist                     Mobility/ADL's       Row Name 09/20/23 0839          Bed Mobility    Bed Mobility supine-sit;sit-supine;scooting/bridging  -CM     Scooting/Bridging [place] (Bed Mobility) dependent (less than 25% patient effort);2 person assist  -CM     Supine-Sit [place] (Bed Mobility) moderate assist (50% patient effort);2 person assist  -CM     Sit-Supine [place] (Bed Mobility) moderate assist (50% patient effort);2 person assist  -CM     Assistive Device (Bed Mobility) head of bed elevated;bed rails  -CM       Row Name 09/20/23 0839          Transfers    Transfers sit-stand transfer;stand-sit transfer;toilet transfer  -CM       Row Name 09/20/23 0839          Sit-Stand Transfer    Sit-Stand [place] (Transfers) maximum assist (25% patient effort);2 person assist  -CM     Assistive Device (Sit-Stand Transfers) walker, front-wheeled  -CM       Row Name 09/20/23 0839          Toilet Transfer    Type (Toilet Transfer) sit-stand;stand-sit  -CM     [place] Level (Toilet Transfer) maximum assist (25% patient effort);1 person assist  -CM     Assistive Device (Toilet  Transfer) commode chair;walker, front-wheeled  -CM     Comment, (Toilet Transfer) BSC over toilet  -CM       Row Name 09/20/23 0839          Functional Mobility    Functional Mobility- Ind. Level maximum assist (25% patient effort)  -CM     Functional Mobility- Device walker, front-wheeled  -CM     Functional Mobility-Distance (Feet) 8  -CM       Row Name 09/20/23 0839          Activities of Daily Living    BADL Assessment/Intervention lower body dressing  -CM       Row Name 09/20/23 0839          Mobility    Extremity Weight-bearing Status right lower extremity  -CM     Right Lower Extremity (Weight-bearing Status) weight-bearing as tolerated (WBAT)  -CM       Row Name 09/20/23 0839          Lower Body Dressing Assessment/Training    Early Level (Lower Body Dressing) lower body dressing skills;doff;don;socks;dependent (less than 25% patient effort)  -CM     Position (Lower Body Dressing) supine  -CM               User Key  (r) = Recorded By, (t) = Taken By, (c) = Cosigned By      Initials Name Provider Type    Emily Bauer OT Occupational Therapist                   Obj/Interventions       Row Name 09/20/23 0839          Sensory Assessment (Somatosensory)    Sensory Assessment (Somatosensory) UE sensation intact  -CM       Row Name 09/20/23 0839          Range of Motion Comprehensive    General Range of Motion bilateral upper extremity ROM WFL  -CM       Row Name 09/20/23 0839          Strength Comprehensive (MMT)    Comment, General Manual Muscle Testing (MMT) Assessment B  and elbows 4-/5. B shoulders 3+/5.  -CM               User Key  (r) = Recorded By, (t) = Taken By, (c) = Cosigned By      Initials Name Provider Type    Eimly Bauer OT Occupational Therapist                   Goals/Plan       Row Name 09/20/23 1229          Transfer Goal 1 (OT)    Activity/Assistive Device (Transfer Goal 1, OT) toilet  -CM     Early Level/Cues Needed (Transfer Goal 1, OT) standby assist  -CM      Time Frame (Transfer Goal 1, OT) long term goal (LTG);by discharge  -CM     Progress/Outcome (Transfer Goal 1, OT) goal not met  -CM       Row Name 09/20/23 1229          Bathing Goal 1 (OT)    Activity/Device (Bathing Goal 1, OT) lower body bathing  -CM     [place] Level/Cues Needed (Bathing Goal 1, OT) moderate assist (50-74% patient effort)  -CM     Time Frame (Bathing Goal 1, OT) long term goal (LTG);by discharge  -CM     Strategies/Barriers (Bathing Goal 1, OT) Spouse can assist as needed  -CM     Progress/Outcomes (Bathing Goal 1, OT) goal not met  -CM       Row Name 09/20/23 1229          Dressing Goal 1 (OT)    Activity/Device (Dressing Goal 1, OT) lower body dressing  -CM     [place]/Cues Needed (Dressing Goal 1, OT) minimum assist (75% or more patient effort)  -CM     Time Frame (Dressing Goal 1, OT) long term goal (LTG);by discharge  -CM     Strategies/Barriers (Dressing Goal 1, OT) Spouse can assist as needed  -CM     Progress/Outcome (Dressing Goal 1, OT) goal not met  -CM       Row Name 09/20/23 1229          Strength Goal 1 (OT)    Strength Goal 1 (OT) Pt will be (I) with BUE strengthening HEP after demonstration for increased strength required for ADLs and mobility.  -CM     Time Frame (Strength Goal 1, OT) long term goal (LTG);by discharge  -CM     Progress/Outcome (Strength Goal 1, OT) goal not met  -CM       Row Name 09/20/23 1229          Therapy Assessment/Plan (OT)    Planned Therapy Interventions (OT) activity tolerance training;adaptive equipment training;BADL retraining;cognitive/visual perception retraining;manual therapy/joint mobilization;IADL retraining;functional balance retraining;edema control/reduction;occupation/activity based interventions;ROM/therapeutic exercise;patient/caregiver education/training;neuromuscular control/coordination retraining;passive ROM/stretching;strengthening exercise;transfer/mobility retraining  -CM               User Key  (r) = Recorded By, (t) =  Taken By, (c) = Cosigned By      Initials Name Provider Type    CM Emily Lehman, [facility] Occupational Therapist                   Clinical Impression       Row Name 09/20/23 0839          Pain Assessment    Pretreatment Pain Rating 5/10  -CM     Posttreatment Pain Rating 9/10  -CM     Pain Location - Side/Orientation Right  -CM     Pain Location - hip  -CM     Pain Intervention(s) Medication (See MAR);Repositioned;Ambulation/increased activity;Distraction  -CM       Row Name 09/20/23 0839          Plan of Care Review    Plan of Care Reviewed With patient;spouse  -CM     Outcome Evaluation OT eval completed. Co-eval with PT. Pt supine in bed upon arrival. Alert and agreeable to therapy. Pt is (I) with ADLs and mobility at baseline. Spouse at home can assist as needed. During session, pt was Mod A x2 for sup to sit. Pt sat EOB with CGA. Pt was dependent for donning socks. Pt with BUE ROM WFL. Pt with BUE weakness. Pt was Max A x2 for STS with FWW. Max A for mobility to the toilet with FWW with max verbal cues (BSC over toilet). Pt was Max A for toilet t/f. On the toilet, pt became dizzy with BP reading 92/51. Pts initial supine BP was 175/77. Pt was Max A safely back to bed with RN notified. Pt was left supine in bed with exit alarm on and all needs in reach. Pt will need shower chair at home after d/c. IP OT will follow/ Goals established. Anticipate rehab to home versus home with assist and HHOT pending progression.  -CM       Row Name 09/20/23 0839          Therapy Assessment/Plan (OT)    Patient/Family Therapy Goal Statement (OT) Get stronger  -CM     Rehab Potential (OT) good, to achieve stated therapy goals  -CM     Criteria for Skilled Therapeutic Interventions Met (OT) yes;meets criteria  -CM     Therapy Frequency (OT) daily  -CM     Predicted Duration of Therapy Intervention (OT) until d/c or all goals met  -CM       Row Name 09/20/23 0839          Therapy Plan Review/Discharge Plan (OT)    Equipment Needs Upon  Discharge (OT) shower chair  -CM     Anticipated Discharge Disposition (OT) home with 24/7 care;home with home health;other (see comments)  Possible rehah to home pending progression  -CM       Row Name 09/20/23 0839          Vital Signs    Pre Systolic BP Rehab 175  -CM     Pre Treatment Diastolic BP 77  -CM     Intra Systolic BP Rehab 92  -CM     Intra Treatment Diastolic BP 51  -CM     Post Systolic BP Rehab 125  -CM     Post Treatment Diastolic BP 59  -CM     Pretreatment Heart Rate (beats/min) 67  -CM     Intratreatment Heart Rate (beats/min) 73  -CM     Posttreatment Heart Rate (beats/min) 71  -CM     Pre SpO2 (%) 93  -CM     O2 Delivery Pre Treatment nasal cannula  -CM     Intra SpO2 (%) 98  -CM     O2 Delivery Intra Treatment nasal cannula  -CM     Post SpO2 (%) 99  -CM     O2 Delivery Post Treatment room air  -CM     Pre Patient Position Supine  -CM     Intra Patient Position Sitting  -CM     Post Patient Position Supine  -CM       Row Name 09/20/23 0839          Positioning and Restraints    Pre-Treatment Position in bed  -CM     Post Treatment Position bed  -CM     In Bed notified nsg;supine;call light within reach;exit alarm on;encouraged to call for assist;with family/caregiver;side rails up x2  -CM               User Key  (r) = Recorded By, (t) = Taken By, (c) = Cosigned By      Initials Name Provider Type    Emily Bauer OT Occupational Therapist                   Outcome Measures       Row Name 09/20/23 0839          How much help from another is currently needed...    Putting on and taking off regular lower body clothing? 1  -CM     Bathing (including washing, rinsing, and drying) 2  -CM     Toileting (which includes using toilet bed pan or urinal) 2  -CM     Putting on and taking off regular upper body clothing 3  -CM     Taking care of personal grooming (such as brushing teeth) 4  -CM     Eating meals 4  -CM     AM-PAC 6 Clicks Score (OT) 16  -CM       Row Name 09/20/23 0840 09/20/23 0828        How much help from another person do you currently need...    Turning from your back to your side while in flat bed without using bedrails? 2  -CZ 2  -JUDAH    Moving from lying on back to sitting on the side of a flat bed without bedrails? 2  -CZ 2  -JUDAH    Moving to and from a bed to a chair (including a wheelchair)? 2  -CZ 2  -JUDAH    Standing up from a chair using your arms (e.g., wheelchair, bedside chair)? 2  -CZ 2  -JUDAH    Climbing 3-5 steps with a railing? 1  -CZ 1  -JUDAH    To walk in hospital room? 2  -CZ 2  -JUDAH    AM-PAC 6 Clicks Score (PT) 11  -CZ 11  -JUDAH    Highest level of mobility 4 --> Transferred to chair/commode  -CZ 4 --> Transferred to chair/commode  -JUDAH      Row Name 09/20/23 0840 09/20/23 0839       Functional Assessment    Outcome Measure Options AM-PAC 6 Clicks Basic Mobility (PT)  -CZ AM-PAC 6 Clicks Daily Activity (OT)  -CM              User Key  (r) = Recorded By, (t) = Taken By, (c) = Cosigned By      Initials Name Provider Type    CZ John Bazan, PT Physical Therapist    Emily Bauer, OT Occupational Therapist    Shital Anton LPN Licensed Nurse                    Occupational Therapy Education       Title: PT OT SLP Therapies (In Progress)       Topic: Occupational Therapy (In Progress)       Point: ADL training (Done)       Description:   Instruct learner(s) on proper safety adaptation and remediation techniques during self care or transfers.   Instruct in proper use of assistive devices.                  Learning Progress Summary             Patient Acceptance, E,TB, VU by CM at 9/20/2023 1231    Comment: OT POC, Role of OT, d/c recommendations, AD/DME needs   Family Acceptance, E,TB, VU by CM at 9/20/2023 1231    Comment: OT POC, Role of OT, d/c recommendations, AD/DME needs                         Point: Home exercise program (Not Started)       Description:   Instruct learner(s) on appropriate technique for monitoring, assisting and/or progressing therapeutic  exercises/activities.                  Learner Progress:  Not documented in this visit.              Point: Precautions (Done)       Description:   Instruct learner(s) on prescribed precautions during self-care and functional transfers.                  Learning Progress Summary             Patient Acceptance, E,TB, VU by CM at 9/20/2023 1231    Comment: OT POC, Role of OT, d/c recommendations, AD/DME needs   Family Acceptance, E,TB, VU by CM at 9/20/2023 1231    Comment: OT POC, Role of OT, d/c recommendations, AD/DME needs                         Point: Body mechanics (Not Started)       Description:   Instruct learner(s) on proper positioning and spine alignment during self-care, functional mobility activities and/or exercises.                  Learner Progress:  Not documented in this visit.                              User Key       Initials Effective Dates Name Provider Type Discipline    HARRIET 11/18/22 -  Emily Lehman OT Occupational Therapist OT                  OT Recommendation and Plan  Planned Therapy Interventions (OT): activity tolerance training, adaptive equipment training, BADL retraining, cognitive/visual perception retraining, manual therapy/joint mobilization, IADL retraining, functional balance retraining, edema control/reduction, occupation/activity based interventions, ROM/therapeutic exercise, patient/caregiver education/training, neuromuscular control/coordination retraining, passive ROM/stretching, strengthening exercise, transfer/mobility retraining  Therapy Frequency (OT): daily  Plan of Care Review  Plan of Care Reviewed With: patient, spouse  Outcome Evaluation: OT eval completed. Co-eval with PT. Pt supine in bed upon arrival. Alert and agreeable to therapy. Pt is (I) with ADLs and mobility at baseline. Spouse at home can assist as needed. During session, pt was Mod A x2 for sup to sit. Pt sat EOB with CGA. Pt was dependent for donning socks. Pt with BUE ROM WFL. Pt with BUE weakness.  Pt was Max A x2 for STS with FWW. Max A for mobility to the toilet with FWW with max verbal cues (BSC over toilet). Pt was Max A for toilet t/f. On the toilet, pt became dizzy with BP reading 92/51. Pts initial supine BP was 175/77. Pt was Max A safely back to bed with RN notified. Pt was left supine in bed with exit alarm on and all needs in reach. Pt will need shower chair at home after d/c. IP OT will follow/ Goals established. Anticipate rehab to home versus home with assist and HHOT pending progression.     Time Calculation:   Evaluation Complexity (OT)  Review Occupational Profile/Medical/Therapy History Complexity: expanded/moderate complexity  Assessment, Occupational Performance/Identification of Deficit Complexity: 3-5 performance deficits  Clinical Decision Making Complexity (OT): detailed assessment/moderate complexity  Overall Complexity of Evaluation (OT): moderate complexity     Time Calculation- OT       Row Name 09/20/23 1232             Time Calculation- OT    OT Start Time 0840  -CM      OT Stop Time 0937  -CM      OT Time Calculation (min) 57 min  -CM      OT Received On 09/20/23  -CM      OT Goal Re-Cert Due Date 10/03/23  -CM         Untimed Charges    OT Eval/Re-eval Minutes 57  -CM         Total Minutes    Untimed Charges Total Minutes 57  -CM       Total Minutes 57  -CM                User Key  (r) = Recorded By, (t) = Taken By, (c) = Cosigned By      Initials Name Provider Type    Emily Bauer OT Occupational Therapist                  Therapy Charges for Today       Code Description Service Date Service Provider Modifiers Qty    [id number] HC OT EVAL MOD COMPLEXITY 4 9/20/2023 Emily Lehman OT GO 1                 Emily Lehman OT  9/20/2023

## 2023-09-20 NOTE — PLAN OF CARE
Goal Outcome Evaluation:  Plan of Care Reviewed With: patient, spouse           Outcome Evaluation: OT eval completed. Co-eval with PT. Pt supine in bed upon arrival. Alert and agreeable to therapy. Pt is (I) with ADLs and mobility at baseline. Spouse at home can assist as needed. During session, pt was Mod A x2 for sup to sit. Pt sat EOB with CGA. Pt was dependent for donning socks. Pt with BUE ROM WFL. Pt with BUE weakness. Pt was Max A x2 for STS with FWW. Max A for mobility to the toilet with FWW with max verbal cues (BSC over toilet). Pt was Max A for toilet t/f. On the toilet, pt became dizzy with BP reading 92/51. Pts initial supine BP was 175/77. Pt was Max A safely back to bed with RN notified. Pt was left supine in bed with exit alarm on and all needs in reach. Pt will need shower chair at home after d/c. IP OT will follow/ Goals established. Anticipate rehab to home versus home with assist and HHOT pending progression.      Anticipated Discharge Disposition (OT): home with 24/7 care, home with home health, other (see comments) (Possible rehah to home pending progression)

## 2023-09-20 NOTE — PROGRESS NOTES
ORTHOPEDIC PROGRESS NOTE:    Name:  Alexia Lopez  Date:    2023  Date of admission:  2023    Post op day:  1 Day Post-Op  Procedure:    Procedure(s) (LRB):  RIGHT HIP BIPOLAR ANTERIOR APPROACH (Right)    Subjective:  Pain is improved   No fever or chills  No new complaints      Vitals:     Vitals:    23 0307   BP: 150/70   Pulse: 67   Resp: 16   Temp: 98.2 °F (36.8 °C)   SpO2: 95%      Temp (24hrs), Av.9 °F (36.6 °C), Min:97.5 °F (36.4 °C), Max:98.3 °F (36.8 °C)      Exam:  Awake and alert  Toes up and down  Calves soft and nontender  Good distal pulses and sensation  Dressing clean , dry and intact.    ASSESSMENT:  Active Hospital Problems    Diagnosis  POA    **Traumatic closed displaced fracture of neck of right femur, initial encounter [S72.001A]  Yes    Fall from slip, trip, or stumble, initial encounter [W01.0XXA]  Yes    Hip fracture [S72.009A]  Yes    Controlled type 2 diabetes mellitus without complication [E11.9]  Yes    S/P CABG (coronary artery bypass graft) [Z95.1]  Not Applicable    Benign essential hypertension [I10]  Yes    Coronary arteriosclerosis in native artery [I25.10]  Yes         PLAN:    S/p bipolar endoprosthesis right hip  Mobilize with PT/OT  Slowly progress weight bearing as tolerated.  DVT prophylaxis for 30 days (start heparin Subq today and will switch to enoxaparin/warfarin/or eliquis once disposition is known).  Anticipate disposition in 1-2 days.  Disposition:  home with home yared, ARU, SNF    23 at 06:59 CDT by Cas Adler MD

## 2023-09-20 NOTE — PROGRESS NOTES
[facility] Medicine Services    INPATIENT PROGRESS NOTE    Length of Stay: 0  Date of Admission: 9/19/2023  Primary Care Physician: Munir Rodriguez MD    Subjective   Chief Complaint: fracture   HPI:      Hypotensive with therapy today, pain is worse with movement, better with rest     Review of Systems     All pertinent negatives and positives are as above. All other systems have been reviewed and are negative unless otherwise stated.     Objective    Temp:  [97.5 °F (36.4 °C)-98.6 °F (37 °C)] 98.6 °F (37 °C)  Heart Rate:  [56-71] 70  Resp:  [12-18] 16  BP: (126-192)/(61-89) 184/68  FiO2 (%):  [50 %] 50 %    Physical Exam  Vitals reviewed.   Constitutional:       General: She is not in acute distress.     Appearance: She is well-developed.   HENT:      Head: Normocephalic and atraumatic.      Nose: Nose normal.   Eyes:      Conjunctiva/sclera: Conjunctivae normal.   Cardiovascular:      Rate and Rhythm: Normal rate and regular rhythm.   Pulmonary:      Effort: Pulmonary effort is normal. No respiratory distress.      Breath sounds: Normal breath sounds. No wheezing or rales.   Musculoskeletal:         General: Tenderness present.      Cervical back: Normal range of motion and neck supple.   Skin:     General: Skin is warm and dry.   Neurological:      Mental Status: She is alert and oriented to person, place, and time.   Psychiatric:         Behavior: Behavior normal.       Results Review:  I have reviewed the labs, radiology results, and diagnostic studies.    Laboratory Data:   Results from last 7 days   Lab Units 09/20/23  0558 09/19/23  1235   SODIUM mmol/L 131* 133*   POTASSIUM mmol/L 5.0 4.5   CHLORIDE mmol/L 98 98   CO2 mmol/L 24.0 25.0   BUN mg/dL 17 17   CREATININE mg/dL 0.85 0.83   GLUCOSE mg/dL 151* 138*   CALCIUM mg/dL 9.2 9.8   BILIRUBIN mg/dL  --  0.3   ALK PHOS U/L  --  69   ALT (SGPT) U/L  --  9   AST (SGOT) U/L  --  20   ANION GAP mmol/L 9.0  10.0     Estimated Creatinine Clearance: 50.2 mL/min (by C-G formula based on SCr of 0.85 mg/dL).          Results from last 7 days   Lab Units 09/20/23  0558 09/19/23 2043 09/19/23  1235   WBC 10*3/mm3 13.22*  --  6.90   HEMOGLOBIN g/dL 10.1* 10.8* 10.9*   HEMATOCRIT % 29.9* 32.0* 32.3*   PLATELETS 10*3/mm3 157  --  158     Results from last 7 days   Lab Units 09/19/23  1235   INR  1.09       Culture Data:   No results found for: BLOODCX  No results found for: URINECX  No results found for: RESPCX  No results found for: WOUNDCX  No results found for: STOOLCX  No components found for: BODYFLD    Radiology Data:   Imaging Results (Last 24 Hours)       Procedure Component Value Units Date/Time    FL C Arm During Surgery [[id number]] Resulted: 09/20/23 0821     Updated: 09/20/23 0821                I have reviewed the patient current medications.     Assessment/Plan     Active Hospital Problems    Diagnosis  POA    **Traumatic closed displaced fracture of neck of right femur, initial encounter [S72.001A]  Yes    Fall from slip, trip, or stumble, initial encounter [W01.0XXA]  Yes    Hip fracture [S72.009A]  Yes    Controlled type 2 diabetes mellitus without complication [E11.9]  Yes    S/P CABG (coronary artery bypass graft) [Z95.1]  Not Applicable    Benign essential hypertension [I10]  Yes    Coronary arteriosclerosis in native artery [I25.10]  Yes       Plan:      Displaced right femoral neck fracture  -s/p bipolar endoprosthesis right hip  -ortho consulted  -PT, OT  -weight bearing as tolerated  -planning for DVT ppx for 30 days (heparin started, plans to switch to lovenox/warfarin or eliquis prior to discharge)    Hypotension  -d/c amlodipine  -decreased losartan  -continue BB  -d/c HCTZ  -resume as able     CAD  -ASA    Type 2 DM  -SSI    HLD  -statin         Medical Decision Making  Number and Complexity of problems: one moderate         Disposition  Pending PT, OT evaluations       I confirmed that the patient's  Advance Care Plan is present, code status is documented, or surrogate decision maker is listed in the patient's medical record.           This document has been electronically signed by Dinah Perry MD on September 20, 2023 12:39 CDT

## 2023-09-20 NOTE — PLAN OF CARE
Goal Outcome Evaluation:  Plan of Care Reviewed With: patient           Outcome Evaluation: Initial PT evaluation complete, co-evaluation with OT.  Patient is alert and cooperative.  She requires mod Ax1-2 with bed mobility, max Ax2 with transfers and gait. Patient ambulates 10'x1 with FWW, slow delvin, head down, decreased stance time on R, fearful of increasing pain. Hypotensive while sitting on toilet; safely returned to sitting on bed, 5'x1 with FWW. BP at rest: 175/77, 92/51 sitting on toilet, 125/59 supine in bed at end of session, RN present. Patient will need a FWW and at least assistance at home with O/P PT.  However, she is quite dependent currently and may require placement at a rehab facility prior to discharge home with spouse.  Goals established, continue skilled I/P PT.      Anticipated Discharge Disposition (PT): home with assist, home with outpatient therapy services, inpatient rehabilitation facility, skilled nursing facility

## 2023-09-20 NOTE — PLAN OF CARE
Problem: Adult Inpatient Plan of Care  Goal: Plan of Care Review  Outcome: Ongoing, Progressing  Flowsheets (Taken 9/20/2023 0348)  Progress: no change  Plan of Care Reviewed With: patient  Outcome Evaluation: Pt s/p R anterior bipolar hip. In and out cath x1. Pt hesitate to move r/t pain, PRN pain meds administered.   Goal Outcome Evaluation:  Plan of Care Reviewed With: patient        Progress: no change  Outcome Evaluation: Pt s/p R anterior bipolar hip. In and out cath x1. Pt hesitate to move r/t pain, PRN pain meds administered.

## 2023-09-21 LAB
ANION GAP SERPL CALCULATED.3IONS-SCNC: 11 MMOL/L (ref 5–15)
BASOPHILS # BLD AUTO: 0.02 10*3/MM3 (ref 0–0.2)
BASOPHILS NFR BLD AUTO: 0.2 % (ref 0–1.5)
BUN SERPL-MCNC: 16 MG/DL (ref 8–23)
BUN/CREAT SERPL: 19.8 (ref 7–25)
CALCIUM SPEC-SCNC: 8.7 MG/DL (ref 8.6–10.5)
CHLORIDE SERPL-SCNC: 98 MMOL/L (ref 98–107)
CO2 SERPL-SCNC: 19 MMOL/L (ref 22–29)
CREAT SERPL-MCNC: 0.81 MG/DL (ref 0.57–1)
DEPRECATED RDW RBC AUTO: 43.2 FL (ref 37–54)
EGFRCR SERPLBLD CKD-EPI 2021: 70.8 ML/MIN/1.73
EOSINOPHIL # BLD AUTO: 0.03 10*3/MM3 (ref 0–0.4)
EOSINOPHIL NFR BLD AUTO: 0.3 % (ref 0.3–6.2)
ERYTHROCYTE [DISTWIDTH] IN BLOOD BY AUTOMATED COUNT: 13.2 % (ref 12.3–15.4)
GLUCOSE BLDC GLUCOMTR-MCNC: 149 MG/DL (ref 70–130)
GLUCOSE BLDC GLUCOMTR-MCNC: 173 MG/DL (ref 70–130)
GLUCOSE BLDC GLUCOMTR-MCNC: 175 MG/DL (ref 70–130)
GLUCOSE BLDC GLUCOMTR-MCNC: 208 MG/DL (ref 70–130)
GLUCOSE SERPL-MCNC: 156 MG/DL (ref 65–99)
HCT VFR BLD AUTO: 24.9 % (ref 34–46.6)
HGB BLD-MCNC: 8.5 G/DL (ref 12–15.9)
IMM GRANULOCYTES # BLD AUTO: 0.05 10*3/MM3 (ref 0–0.05)
IMM GRANULOCYTES NFR BLD AUTO: 0.5 % (ref 0–0.5)
LYMPHOCYTES # BLD AUTO: 0.73 10*3/MM3 (ref 0.7–3.1)
LYMPHOCYTES NFR BLD AUTO: 7.7 % (ref 19.6–45.3)
MCH RBC QN AUTO: 30.8 PG (ref 26.6–33)
MCHC RBC AUTO-ENTMCNC: 34.1 G/DL (ref 31.5–35.7)
MCV RBC AUTO: 90.2 FL (ref 79–97)
MONOCYTES # BLD AUTO: 0.73 10*3/MM3 (ref 0.1–0.9)
MONOCYTES NFR BLD AUTO: 7.7 % (ref 5–12)
NEUTROPHILS NFR BLD AUTO: 7.88 10*3/MM3 (ref 1.7–7)
NEUTROPHILS NFR BLD AUTO: 83.6 % (ref 42.7–76)
NRBC BLD AUTO-RTO: 0 /100 WBC (ref 0–0.2)
PLATELET # BLD AUTO: 133 10*3/MM3 (ref 140–450)
PMV BLD AUTO: 12.1 FL (ref 6–12)
POTASSIUM SERPL-SCNC: 4.1 MMOL/L (ref 3.5–5.2)
RBC # BLD AUTO: 2.76 10*6/MM3 (ref 3.77–5.28)
SODIUM SERPL-SCNC: 128 MMOL/L (ref 136–145)
WBC NRBC COR # BLD: 9.44 10*3/MM3 (ref 3.4–10.8)

## 2023-09-21 PROCEDURE — G0378 HOSPITAL OBSERVATION PER HR: HCPCS

## 2023-09-21 PROCEDURE — 97110 THERAPEUTIC EXERCISES: CPT

## 2023-09-21 PROCEDURE — 82948 REAGENT STRIP/BLOOD GLUCOSE: CPT

## 2023-09-21 PROCEDURE — 97116 GAIT TRAINING THERAPY: CPT

## 2023-09-21 PROCEDURE — 97530 THERAPEUTIC ACTIVITIES: CPT

## 2023-09-21 PROCEDURE — 97535 SELF CARE MNGMENT TRAINING: CPT

## 2023-09-21 PROCEDURE — 25010000002 HEPARIN (PORCINE) PER 1000 UNITS: Performed by: ORTHOPAEDIC SURGERY

## 2023-09-21 PROCEDURE — 85025 COMPLETE CBC W/AUTO DIFF WBC: CPT | Performed by: ORTHOPAEDIC SURGERY

## 2023-09-21 PROCEDURE — 80048 BASIC METABOLIC PNL TOTAL CA: CPT | Performed by: ORTHOPAEDIC SURGERY

## 2023-09-21 PROCEDURE — 63710000001 INSULIN ASPART PER 5 UNITS: Performed by: STUDENT IN AN ORGANIZED HEALTH CARE EDUCATION/TRAINING PROGRAM

## 2023-09-21 PROCEDURE — 99024 POSTOP FOLLOW-UP VISIT: CPT | Performed by: ORTHOPAEDIC SURGERY

## 2023-09-21 RX ADMIN — ROSUVASTATIN CALCIUM 10 MG: 10 TABLET, FILM COATED ORAL at 21:58

## 2023-09-21 RX ADMIN — LABETALOL HYDROCHLORIDE 100 MG: 100 TABLET, FILM COATED ORAL at 21:59

## 2023-09-21 RX ADMIN — DOCUSATE SODIUM 50 MG AND SENNOSIDES 8.6 MG 2 TABLET: 8.6; 5 TABLET, FILM COATED ORAL at 21:59

## 2023-09-21 RX ADMIN — Medication 10 ML: at 22:00

## 2023-09-21 RX ADMIN — ACETAMINOPHEN 650 MG: 325 TABLET, FILM COATED ORAL at 17:34

## 2023-09-21 RX ADMIN — DOCUSATE SODIUM 50 MG AND SENNOSIDES 8.6 MG 2 TABLET: 8.6; 5 TABLET, FILM COATED ORAL at 09:28

## 2023-09-21 RX ADMIN — OXYCODONE HYDROCHLORIDE 5 MG: 5 TABLET ORAL at 04:04

## 2023-09-21 RX ADMIN — INSULIN ASPART 2 UNITS: 100 INJECTION, SOLUTION INTRAVENOUS; SUBCUTANEOUS at 17:35

## 2023-09-21 RX ADMIN — ASPIRIN 81 MG: 81 TABLET, CHEWABLE ORAL at 09:28

## 2023-09-21 RX ADMIN — ACETAMINOPHEN 650 MG: 325 TABLET, FILM COATED ORAL at 09:28

## 2023-09-21 RX ADMIN — LABETALOL HYDROCHLORIDE 100 MG: 100 TABLET, FILM COATED ORAL at 09:28

## 2023-09-21 RX ADMIN — Medication 10 ML: at 09:33

## 2023-09-21 RX ADMIN — AMLODIPINE BESYLATE 5 MG: 5 TABLET ORAL at 09:28

## 2023-09-21 RX ADMIN — INSULIN ASPART 2 UNITS: 100 INJECTION, SOLUTION INTRAVENOUS; SUBCUTANEOUS at 12:25

## 2023-09-21 RX ADMIN — HEPARIN SODIUM 5000 UNITS: 5000 INJECTION INTRAVENOUS; SUBCUTANEOUS at 21:59

## 2023-09-21 RX ADMIN — HEPARIN SODIUM 5000 UNITS: 5000 INJECTION INTRAVENOUS; SUBCUTANEOUS at 13:44

## 2023-09-21 RX ADMIN — ACETAMINOPHEN 650 MG: 325 TABLET, FILM COATED ORAL at 04:04

## 2023-09-21 RX ADMIN — OXYCODONE HYDROCHLORIDE 5 MG: 5 TABLET ORAL at 22:09

## 2023-09-21 RX ADMIN — ACETAMINOPHEN 650 MG: 325 TABLET, FILM COATED ORAL at 21:59

## 2023-09-21 RX ADMIN — OXYCODONE HYDROCHLORIDE 5 MG: 5 TABLET ORAL at 13:44

## 2023-09-21 RX ADMIN — HEPARIN SODIUM 5000 UNITS: 5000 INJECTION INTRAVENOUS; SUBCUTANEOUS at 05:34

## 2023-09-21 NOTE — PLAN OF CARE
Goal Outcome Evaluation:  Plan of Care Reviewed With: patient        Progress: improving    Up to the BR once with gait belt walker and 2 assist and to the bedside commode once with gait belt,walker and 1 assist. Tolerated well. A oxycodone given at bedtime and @ 0404 this am with good results. Ice to right hip intermittently. Rested well.  Voided without difficulty. Unable to void with the purewick. Up to the BSC. 1 assist.

## 2023-09-21 NOTE — PROGRESS NOTES
[facility] Medicine Services    INPATIENT PROGRESS NOTE    Length of Stay: 0  Date of Admission: 9/19/2023  Primary Care Physician: Munir Rodriguez MD    Subjective   Chief Complaint: fracture   HPI:      Sitting up in the chair, pain is worse when getting up, but overall better today     Review of Systems     All pertinent negatives and positives are as above. All other systems have been reviewed and are negative unless otherwise stated.     Objective    Temp:  [98 °F (36.7 °C)-98.6 °F (37 °C)] 98 °F (36.7 °C)  Heart Rate:  [64-84] 74  Resp:  [18] 18  BP: (123-168)/(56-76) 123/56    Physical Exam  Vitals reviewed.   Constitutional:       General: She is not in acute distress.     Appearance: She is well-developed.   HENT:      Head: Normocephalic and atraumatic.      Nose: Nose normal.   Eyes:      Conjunctiva/sclera: Conjunctivae normal.   Cardiovascular:      Rate and Rhythm: Normal rate and regular rhythm.   Pulmonary:      Effort: Pulmonary effort is normal. No respiratory distress.      Breath sounds: Normal breath sounds. No wheezing or rales.   Musculoskeletal:         General: Tenderness present.      Cervical back: Normal range of motion and neck supple.   Skin:     General: Skin is warm and dry.   Neurological:      Mental Status: She is alert and oriented to person, place, and time.   Psychiatric:         Behavior: Behavior normal.       Results Review:  I have reviewed the labs, radiology results, and diagnostic studies.    Laboratory Data:   Results from last 7 days   Lab Units 09/21/23  0524 09/20/23  0558 09/19/23  1235   SODIUM mmol/L 128* 131* 133*   POTASSIUM mmol/L 4.1 5.0 4.5   CHLORIDE mmol/L 98 98 98   CO2 mmol/L 19.0* 24.0 25.0   BUN mg/dL 16 17 17   CREATININE mg/dL 0.81 0.85 0.83   GLUCOSE mg/dL 156* 151* 138*   CALCIUM mg/dL 8.7 9.2 9.8   BILIRUBIN mg/dL  --   --  0.3   ALK PHOS U/L  --   --  69   ALT (SGPT) U/L  --   --  9   AST  (SGOT) U/L  --   --  20   ANION GAP mmol/L 11.0 9.0 10.0       Estimated Creatinine Clearance: 52.7 mL/min (by C-G formula based on SCr of 0.81 mg/dL).          Results from last 7 days   Lab Units 09/21/23  0524 09/20/23  0558 09/19/23  2043 09/19/23  1235   WBC 10*3/mm3 9.44 13.22*  --  6.90   HEMOGLOBIN g/dL 8.5* 10.1* 10.8* 10.9*   HEMATOCRIT % 24.9* 29.9* 32.0* 32.3*   PLATELETS 10*3/mm3 133* 157  --  158       Results from last 7 days   Lab Units 09/19/23  1235   INR  1.09         Culture Data:   No results found for: BLOODCX  No results found for: URINECX  No results found for: RESPCX  No results found for: WOUNDCX  No results found for: STOOLCX  No components found for: BODYFLD    Radiology Data:   Imaging Results (Last 24 Hours)       Procedure Component Value Units Date/Time    US Renal Bilateral [[id number]] Collected: 09/21/23 0839     Updated: 09/21/23 1210    Narrative:      PROCEDURE:  Complete retroperitoneal sonogram    COMPARISON:  No comparison    HISTORY:  Follow-up renal ultrasound.  No other history was given.    FINDINGS:  Realtime sonographic images are obtained of the kidneys and bladder.  The  kidneys are normal in size and echogenicity.  A 14 mm echogenic structure lower  left kidney is suspicious of a nonobstructing stone.  No hydronephrosis. The  right kidney measures 10.8 cm in length, the left kidney 10.2 cm in length.  Small right renal cyst is present.    The bladder is grossly unremarkable.      Impression:      1.  Kidneys are normal in size and demonstrate normal renal cortical  echogenicity.  No hydronephrosis.    2.  A 14 mm echogenic structure lower left kidney likely representing a  nonobstructing stone.    3.  Small right renal cyst.                I have reviewed the patient current medications.     Assessment/Plan     Active Hospital Problems    Diagnosis  POA    **Traumatic closed displaced fracture of neck of right femur, initial encounter [S72.001A]  Yes    Fall from  slip, trip, or stumble, initial encounter [W01.0XXA]  Yes    Hip fracture [S72.009A]  Yes    Controlled type 2 diabetes mellitus without complication [E11.9]  Yes    S/P CABG (coronary artery bypass graft) [Z95.1]  Not Applicable    Benign essential hypertension [I10]  Yes    Coronary arteriosclerosis in native artery [I25.10]  Yes       Plan:      Displaced right femoral neck fracture  -s/p bipolar endoprosthesis right hip  -ortho consulted  -PT, OT  -weight bearing as tolerated  -planning for DVT ppx for 30 days (heparin started, plans to switch to lovenox/warfarin or eliquis prior to discharge)    Hypotension  -amlodipine  -she states she is allergic to losartan  -continue BB  -d/c HCTZ  -resume as able     CAD  -ASA    Type 2 DM  -SSI    HLD  -statin     Kidney stone  -urology consulted         Medical Decision Making  Number and Complexity of problems: one moderate         Disposition  Likely will need placement       I confirmed that the patient's Advance Care Plan is present, code status is documented, or surrogate decision maker is listed in the patient's medical record.           This document has been electronically signed by Dinah Perry MD on September 21, 2023 12:22 CDT

## 2023-09-21 NOTE — THERAPY TREATMENT NOTE
Acute Care - Physical Therapy Treatment Note  [facility]     Patient Name: Alexia Lopez  : 1935  MRN: [id number]  Today's Date: 2023      Visit Dx:     ICD-10-CM ICD-9-CM   1. Traumatic closed displaced fracture of neck of right femur, initial encounter  S72.001A 820.8   2. Coronary arteriosclerosis in native artery  I25.10 414.01   3. S/P CABG (coronary artery bypass graft)  Z95.1 V45.81   4. Benign essential hypertension  I10 401.1   5. Controlled type 2 diabetes mellitus without complication, with long-term current use of insulin  E11.9 250.00    Z79.4 V58.67   6. Fall from slip, trip, or stumble, initial encounter  W01.0XXA E885.9   7. Closed fracture of right hip, initial encounter  S72.001A 820.8   8. Impaired functional mobility, balance, gait, and endurance [Z74.09 (ICD-10-CM)]  Z74.09 V49.89   9. Impaired mobility and ADLs [Z74.09, Z78.9 (ICD-10-CM)]  Z74.09 V49.89    Z78.9      Patient Active Problem List   Diagnosis    Coronary arteriosclerosis in native artery    S/P CABG (coronary artery bypass graft)    Benign essential hypertension    Hypercholesterolemia    Type 2 diabetes mellitus    Nuclear cataract    Controlled type 2 diabetes mellitus without complication    Pernicious anemia    Arthritis of knee    Chronic sinusitis    Encounter for screening mammogram for malignant neoplasm of breast    Generalized osteoarthritis    Iron deficiency anemia    Palpitations    Myocardial infarction    Hypertension    Hyperlipemia    History of echocardiogram    Diabetes mellitus    Coronary artery disease    Cataract    Chronic pain of both knees    Primary osteoarthritis of both knees    Claudication of lower extremity    Precordial pain    Fall from slip, trip, or stumble, initial encounter    Traumatic closed displaced fracture of neck of right femur, initial encounter    Hip fracture     Past Medical History:   Diagnosis Date    Cataract     Coronary artery disease     Diabetes  mellitus     Type 2 diabetes mellitus - without retinopathy       History of echocardiogram 05/12/2014    Normal LV systolic function with EF of 55% with mild hypokinesis. Diastolic relaxation abnormality of left ventricle. Mild tricuspid regurg. Trace mitral regurg    Hyperlipemia     Hypertension     Myocardial infarction      Past Surgical History:   Procedure Laterality Date    BACK SURGERY      CARDIAC CATHETERIZATION  05/12/2014    Severe multivessel CAD with critical lesions noted in the LAD coronary artery, left circumflex coronary artery and RCA. Left ventriculogram no performed in view of elevated left ventricular end-diastolic pressure    CATARACT EXTRACTION W/ INTRAOCULAR LENS IMPLANT Left 2/2/2018    Procedure: CATARACT PHACO EXTRACTION WITH INTRAOCULAR LENS IMPLANT;  Surgeon: Gagan Lizarraga MD;  Location: [facility];  Service:     CATARACT EXTRACTION W/ INTRAOCULAR LENS IMPLANT Right 2/9/2018    Procedure: CATARACT PHACO EXTRACTION WITH INTRAOCULAR LENS IMPLANT;  Surgeon: Gagan Lizarraga MD;  Location: [facility];  Service:     CORONARY ARTERY BYPASS GRAFT      X 3 with LIMA to LAD, SVG to OMB and SVG to PDA.    DILATATION AND CURETTAGE      OTHER SURGICAL HISTORY  05/06/2014    INCISION OF EARDRUM GENERAL ANESTHETIC 32076 (1)    Bilateral myringotomy with tubes.     SINUS SURGERY      TONSILLECTOMY      TONSILLECTOMY AND ADENOIDECTOMY      TUBAL ABDOMINAL LIGATION  03/14/1978     PT Assessment (last 12 hours)       PT Evaluation and Treatment       Row Name 09/21/23 1019          Physical Therapy Time and Intention    Document Type therapy note (daily note)  -NIDA     Mode of Treatment physical therapy;individual therapy  -NIDA     Patient Effort good  -NIDA       Row Name 09/21/23 1019          Pain    Pretreatment Pain Rating 4/10  more with movement  -NIDA     Posttreatment Pain Rating 4/10  -NIDA     Pain Location - Side/Orientation Right  -NIDA     Pain Location - hip  -NIDA     Pain  Intervention(s) Repositioned  nsg notified.  -       Row Name 09/21/23 1019          Cognition    Affect/Mental Status (Cognition) WFL  -     Orientation Status (Cognition) oriented x 4  -     Personal Safety Interventions fall prevention program maintained;gait belt;muscle strengthening facilitated;nonskid shoes/slippers when out of bed;supervised activity  -       Row Name 09/21/23 1019          Range of Motion Comprehensive    General Range of Motion bilateral lower extremity ROM WFL  -       Row Name 09/21/23 1019          Mobility    Extremity Weight-bearing Status right lower extremity  -     Right Lower Extremity (Weight-bearing Status) weight-bearing as tolerated (WBAT)  -       Row Name 09/21/23 1019          Transfers    Transfers sit-stand transfer;stand-sit transfer  -       Row Name 09/21/23 1019          Sit-Stand Transfer    Sit-Stand Levy (Transfers) moderate assist (50% patient effort)  -     Assistive Device (Sit-Stand Transfers) walker, front-wheeled  -       Row Name 09/21/23 1019          Stand-Sit Transfer    Stand-Sit Levy (Transfers) moderate assist (50% patient effort)  -     Assistive Device (Stand-Sit Transfers) walker, front-wheeled  -       Row Name 09/21/23 1019          Gait/Stairs (Locomotion)    Gait/Stairs Locomotion gait/ambulation assistive device;gait/ambulation independence;distance ambulated;gait pattern;gait deviations;stairs negotiation;maintains weight-bearing status  -     Levy Level (Gait) maximum assist (25% patient effort)  -     Assistive Device (Gait) walker, front-wheeled  [facility]     Distance in Feet (Gait) 12  -     Pattern (Gait) 3-point;step-to  -NIDA     Deviations/Abnormal Patterns (Gait) antalgic;delvin decreased;gait speed decreased;stride length decreased  -       Row Name 09/21/23 1019          Safety Issues, Functional Mobility    Impairments Affecting Function (Mobility) strength;endurance/activity  tolerance  -NIDA       Row Name 09/21/23 1019          Motor Skills    Therapeutic Exercise --  ankle DF/PF, QS, GS, hip Ab/Ad, HS, SAQ- 10-20x1 buffy AROM-AAROM.  -NIDA       Row Name             Wound 09/19/23 1848 Right hip Incision    Wound - Properties Group Placement Date: 09/19/23  -SP Placement Time: 1848  -SP Present on Hospital Admission: N  -SP Side: Right  -SP Location: hip  -SP Primary Wound Type: Incision  -SP Additional Comments: chas LANGLEY    Retired Wound - Properties Group Placement Date: 09/19/23  -SP Placement Time: 1848  -SP Present on Hospital Admission: N  -SP Side: Right  -SP Location: hip  -SP Primary Wound Type: Incision  -SP Additional Comments: chas LANGLEY    Retired Wound - Properties Group Date first assessed: 09/19/23  -SP Time first assessed: 1848 -SP Present on Hospital Admission: N  -SP Side: Right  -SP Location: hip  -SP Primary Wound Type: Incision  -SP Additional Comments: chas NúñezSP      Row Name 09/21/23 1019          Vital Signs    Pre Systolic BP Rehab 123  -NDIA     Pre Treatment Diastolic BP 56  -NIDA     Post Systolic BP Rehab 129  -NIDA     Post Treatment Diastolic BP 48  -NIDA     Pretreatment Heart Rate (beats/min) 74  -NIDA     Posttreatment Heart Rate (beats/min) 69  -NIDA     Pre SpO2 (%) 94  -NIDA     O2 Delivery Pre Treatment nasal cannula  -NIDA     Post SpO2 (%) 96  -NIDA     O2 Delivery Post Treatment nasal cannula  -NIDA     Pre Patient Position Sitting  -NIDA     Post Patient Position Sitting  -NIDA       Row Name 09/21/23 1019          Positioning and Restraints    Pre-Treatment Position sitting in chair/recliner  -NIDA     Post Treatment Position chair  -NIDA     In Chair reclined;call light within reach;encouraged to call for assist;exit alarm on  all needs met  -       Row Name 09/21/23 1019          Therapy Assessment/Plan (PT)    Rehab Potential (PT) good, to achieve stated therapy goals  -     Criteria for Skilled Interventions Met (PT) yes;skilled treatment is necessary  -      Therapy Frequency (PT) daily  -NIDA       Row Name 09/21/23 1019          Bed Mobility Goal 1 (PT)    Activity/Assistive Device (Bed Mobility Goal 1, PT) sit to supine/supine to sit  -NIDA     [place] Level/Cues Needed (Bed Mobility Goal 1, PT) independent  -NIDA     Time Frame (Bed Mobility Goal 1, PT) by discharge  -NIDA     Strategies/Barriers (Bed Mobility Goal 1, PT) HOB flat, no bed rails.  -NIDA     Progress/Outcomes (Bed Mobility Goal 1, PT) goal not met  -NIDA       Row Name 09/21/23 1019          Transfer Goal 1 (PT)    Activity/Assistive Device (Transfer Goal 1, PT) sit-to-stand/stand-to-sit;bed-to-chair/chair-to-bed;walker, rolling  -NIDA     [place] Level/Cues Needed (Transfer Goal 1, PT) modified independence  -NIDA     Time Frame (Transfer Goal 1, PT) by discharge  -NIDA     Strategies/Barriers (Transfers Goal 1, PT) R hip fx, SHEELA, anterior, WBAT.  -NIDA     Progress/Outcome (Transfer Goal 1, PT) goal not met  -NIDA       Row Name 09/21/23 1019          Gait Training Goal 1 (PT)    Activity/Assistive Device (Gait Training Goal 1, PT) walker, rolling  -NIDA     [place] Level (Gait Training Goal 1, PT) contact guard required  -NIDA     Distance (Gait Training Goal 1, PT) 25' x 2.  -NIDA     Time Frame (Gait Training Goal 1, PT) by discharge  -NIDA     Strategies/Barriers (Gait Training Goal 1, PT) R hip fx, SHEELA, anterior, WBAT.  -NIDA     Progress/Outcome (Gait Training Goal 1, PT) goal not met  -NIDA       Row Name 09/21/23 1019          Stairs Goal 1 (PT)    Activity/Assistive Device (Stairs Goal 1, PT) using handrail, right;using handrail, left  -NIDA     [place] Level/Cues Needed (Stairs Goal 1, PT) contact guard required  -NIDA     Number of Stairs (Stairs Goal 1, PT) 2 steps.  -NIDA     Time Frame (Stairs Goal 1, PT) by discharge  -NIDA     Strategies/Barriers (Stairs Goal 1, PT) R hip fx, SHEELA, anterior, WBAT.  -NIDA     Progress/Outcome (Stairs Goal 1, PT) goal not met  -NIDA               User Key  (r) = Recorded By, (t)  = Taken By, (c) = Cosigned By      Initials Name Provider Type    Eugenio Wasserman, PTA Physical Therapist Assistant    Alina Ho, RN Registered Nurse                    Physical Therapy Education       Title: PT OT SLP Therapies (In Progress)       Topic: Physical Therapy (In Progress)       Point: Mobility training (In Progress)       Learning Progress Summary             Patient Acceptance, E, NR by  at 9/20/2023 0990    Comment: PT POC, rehab process, hand placement with transfers, use of FWW, proper gait mechanics.                         Point: Home exercise program (Not Started)       Learner Progress:  Not documented in this visit.              Point: Body mechanics (Not Started)       Learner Progress:  Not documented in this visit.              Point: Precautions (Not Started)       Learner Progress:  Not documented in this visit.                              User Key       Initials Effective Dates Name Provider Type Discipline     07/11/23 -  John Bazan, PT Physical Therapist PT                  PT Recommendation and Plan  Anticipated Discharge Disposition (PT): home with assist, home with outpatient therapy services, inpatient rehabilitation facility, skilled nursing facility  Therapy Frequency (PT): daily  Plan of Care Reviewed With: patient  Progress: improving  Outcome Evaluation: pt responded well to PT with increased gait to 12 ft min A with RW. mod A for sit-stand-sit. pt participated in LE ther ex. good effort by patient. no new goals met at this time. pt would continue to benefit from PT services.   Outcome Measures       Row Name 09/21/23 1019             How much help from another person do you currently need...    Turning from your back to your side while in flat bed without using bedrails? 2  -NIDA      Moving from lying on back to sitting on the side of a flat bed without bedrails? 2  -NIDA      Moving to and from a bed to a chair (including a wheelchair)? 2  -NIDA       Standing up from a chair using your arms (e.g., wheelchair, bedside chair)? 2  -NIDA      Climbing 3-5 steps with a railing? 1  -NIDA      To walk in hospital room? 3  -NIDA      AM-PAC 6 Clicks Score (PT) 12  -         Functional Assessment    Outcome Measure Options AM-PAC 6 Clicks Basic Mobility (PT)  -NIDA                User Key  (r) = Recorded By, (t) = Taken By, (c) = Cosigned By      Initials Name Provider Type    Eugenio Wasserman PTA Physical Therapist Assistant                     Time Calculation:    PT Charges       Row Name 09/21/23 1047             Time Calculation    Start Time 1019  -NIDA      Stop Time 1047  -NIDA      Time Calculation (min) 28 min  -NIDA         Time Calculation- PT    Total Timed Code Minutes- PT 28 minute(s)  -NIDA         Timed Charges    12448 - PT Therapeutic Exercise Minutes 18  -NIDA      50434 - Gait Training Minutes  10  -NIDA         Total Minutes    Timed Charges Total Minutes 28  -NIDA       Total Minutes 28  -NIDA                User Key  (r) = Recorded By, (t) = Taken By, (c) = Cosigned By      Initials Name Provider Type    NIDA Eugenio Chavez PTA Physical Therapist Assistant                  Therapy Charges for Today       Code Description Service Date Service Provider Modifiers Qty    99727806011 HC PT THER PROC EA 15 MIN 9/21/2023 Eugenio Chavez PTA GP 1    69832795788 HC GAIT TRAINING EA 15 MIN 9/21/2023 Eugenio Chavez PTA GP 1            PT G-Codes  Outcome Measure Options: AM-PAC 6 Clicks Basic Mobility (PT)  AM-PAC 6 Clicks Score (PT): 12  AM-PAC 6 Clicks Score (OT): 16    Eugenio Chavez PTA  9/21/2023

## 2023-09-21 NOTE — PLAN OF CARE
Goal Outcome Evaluation:  Plan of Care Reviewed With: patient        Progress: improving  Outcome Evaluation: Pt up in chair upon arrival , reports bathing/toileting just before. Pt edu on use of LH reacher/sock aid, CGA to doff/evette socks. Pt performed sit>stand min A , able to take 3 steps forward/reverse with RW and 0 c/o diziness. Pt performed B UE ther ex via 2 x 10 2 lb HW. Pt pleasant, cooperative, and would benefit fromIRF vs rehab to home.      Anticipated Discharge Disposition (OT): home with 24/7 care, home with home health, other (see comments), inpatient rehabilitation facility (Possible rehah to home pending progression)

## 2023-09-21 NOTE — PLAN OF CARE
Goal Outcome Evaluation:  Plan of Care Reviewed With: patient        Progress: improving  Outcome Evaluation: Pt resting in bed at this time, pain controlled with medication and rest, no falls noted, no new skin issues, dressing CDI

## 2023-09-21 NOTE — THERAPY TREATMENT NOTE
Patient Name: Alexia Lopez  : 1935    MRN: [id number]                              Today's Date: 2023       Admit Date: 2023    Visit Dx:     ICD-10-CM ICD-9-CM   1. Traumatic closed displaced fracture of neck of right femur, initial encounter  S72.001A 820.8   2. Coronary arteriosclerosis in native artery  I25.10 414.01   3. S/P CABG (coronary artery bypass graft)  Z95.1 V45.81   4. Benign essential hypertension  I10 401.1   5. Controlled type 2 diabetes mellitus without complication, with long-term current use of insulin  E11.9 250.00    Z79.4 V58.67   6. Fall from slip, trip, or stumble, initial encounter  W01.0XXA E885.9   7. Closed fracture of right hip, initial encounter  S72.001A 820.8   8. Impaired functional mobility, balance, gait, and endurance [Z74.09 (ICD-10-CM)]  Z74.09 V49.89   9. Impaired mobility and ADLs [Z74.09, Z78.9 (ICD-10-CM)]  Z74.09 V49.89    Z78.9      Patient Active Problem List   Diagnosis    Coronary arteriosclerosis in native artery    S/P CABG (coronary artery bypass graft)    Benign essential hypertension    Hypercholesterolemia    Type 2 diabetes mellitus    Nuclear cataract    Controlled type 2 diabetes mellitus without complication    Pernicious anemia    Arthritis of knee    Chronic sinusitis    Encounter for screening mammogram for malignant neoplasm of breast    Generalized osteoarthritis    Iron deficiency anemia    Palpitations    Myocardial infarction    Hypertension    Hyperlipemia    History of echocardiogram    Diabetes mellitus    Coronary artery disease    Cataract    Chronic pain of both knees    Primary osteoarthritis of both knees    Claudication of lower extremity    Precordial pain    Fall from slip, trip, or stumble, initial encounter    Traumatic closed displaced fracture of neck of right femur, initial encounter    Hip fracture     Past Medical History:   Diagnosis Date    Cataract     Coronary artery disease     Diabetes mellitus      Type 2 diabetes mellitus - without retinopathy       History of echocardiogram 05/12/2014    Normal LV systolic function with EF of 55% with mild hypokinesis. Diastolic relaxation abnormality of left ventricle. Mild tricuspid regurg. Trace mitral regurg    Hyperlipemia     Hypertension     Myocardial infarction      Past Surgical History:   Procedure Laterality Date    BACK SURGERY      CARDIAC CATHETERIZATION  05/12/2014    Severe multivessel CAD with critical lesions noted in the LAD coronary artery, left circumflex coronary artery and RCA. Left ventriculogram no performed in view of elevated left ventricular end-diastolic pressure    CATARACT EXTRACTION W/ INTRAOCULAR LENS IMPLANT Left 2/2/2018    Procedure: CATARACT PHACO EXTRACTION WITH INTRAOCULAR LENS IMPLANT;  Surgeon: Gagan Lizarraga MD;  Location: [facility];  Service:     CATARACT EXTRACTION W/ INTRAOCULAR LENS IMPLANT Right 2/9/2018    Procedure: CATARACT PHACO EXTRACTION WITH INTRAOCULAR LENS IMPLANT;  Surgeon: Gagan Lizarraga MD;  Location: [facility];  Service:     CORONARY ARTERY BYPASS GRAFT      X 3 with LIMA to LAD, SVG to OMB and SVG to PDA.    DILATATION AND CURETTAGE      OTHER SURGICAL HISTORY  05/06/2014    INCISION OF EARDRUM GENERAL ANESTHETIC 30640 (1)    Bilateral myringotomy with tubes.     SINUS SURGERY      TONSILLECTOMY      TONSILLECTOMY AND ADENOIDECTOMY      TUBAL ABDOMINAL LIGATION  03/14/1978      General Information       Row Name 09/21/23 0900          OT Time and Intention    Document Type therapy note (daily note)  -TO     Mode of Treatment individual therapy;occupational therapy  -TO       Row Name 09/21/23 0900          General Information    Patient Profile Reviewed yes  -TO     Existing Precautions/Restrictions fall;hip, anterior  -TO       Row Name 09/21/23 0900          Cognition    Orientation Status (Cognition) oriented x 4  -TO       Row Name 09/21/23 0900          Safety Issues, Functional Mobility     Impairments Affecting Function (Mobility) strength;endurance/activity tolerance  -TO               User Key  (r) = Recorded By, (t) = Taken By, (c) = Cosigned By      Initials Name Provider Type    TO Marcellus Rivers COTA Occupational Therapist Assistant                     Mobility/ADL's       [facility] 09/21/23 0900          Transfers    Transfers sit-stand transfer;stand-sit transfer  -TO       Row Name 09/21/23 0900          Sit-Stand Transfer    Sit-Stand [place] (Transfers) minimum assist (75% patient effort)  -TO     Assistive Device (Sit-Stand Transfers) walker, front-wheeled  -TO       Row Name 09/21/23 0900          Stand-Sit Transfer    Stand-Sit [place] (Transfers) minimum assist (75% patient effort)  -TO     Assistive Device (Stand-Sit Transfers) walker, front-wheeled  -TO       Row Name 09/21/23 0900          Activities of Daily Living    BADL Assessment/Intervention lower body dressing;grooming  -TO       Row Name 09/21/23 0900          Mobility    Extremity Weight-bearing Status right lower extremity  -TO     Right Lower Extremity (Weight-bearing Status) weight-bearing as tolerated (WBAT)  -TO       Row Name 09/21/23 0900          Lower Body Dressing Assessment/Training    [place] Level (Lower Body Dressing) lower body dressing skills;doff;don;socks;minimum assist (75% patient effort)  -TO     Position (Lower Body Dressing) supported sitting  -TO       Row Name 09/21/23 0900          Grooming Assessment/Training    [place] Level (Grooming) grooming skills;oral care regimen;independent;hair care, combing/brushing  -TO     Position (Grooming) unsupported sitting  -TO               User Key  (r) = Recorded By, (t) = Taken By, (c) = Cosigned By      Initials Name Provider Type    TO Marcellus Rivers COTA Occupational Therapist Assistant                   Obj/Interventions       Row Name 09/21/23 0900          Range of Motion Comprehensive    General Range of Motion bilateral lower  extremity ROM WFL  -TO       Row Name 09/21/23 0900          Shoulder (Therapeutic Exercise)    Shoulder (Therapeutic Exercise) strengthening exercise  -TO     Shoulder Strengthening (Therapeutic Exercise) bilateral;flexion;extension;scapular stabilization;internal rotation;external rotation;10 repetitions;2 lb free weight  -TO       Row Name 09/21/23 0900          Elbow/Forearm (Therapeutic Exercise)    Elbow/Forearm (Therapeutic Exercise) strengthening exercise  -TO     Elbow/Forearm Strengthening (Therapeutic Exercise) bilateral;flexion;extension;10 repetitions;2 sets;2 lb free weight  -TO       Row Name 09/21/23 0900          Motor Skills    Therapeutic Exercise shoulder;elbow/forearm  -TO               User Key  (r) = Recorded By, (t) = Taken By, (c) = Cosigned By      Initials Name Provider Type    TO Marcellus Rivers COTA Occupational Therapist Assistant                   Goals/Plan       Row Name 09/21/23 0900          Transfer Goal 1 (OT)    Activity/Assistive Device (Transfer Goal 1, OT) toilet  -TO     [place] Level/Cues Needed (Transfer Goal 1, OT) standby assist  -TO     Time Frame (Transfer Goal 1, OT) long term goal (LTG);by discharge  -TO     Progress/Outcome (Transfer Goal 1, OT) goal not met  -TO       Row Name 09/21/23 0900          Bathing Goal 1 (OT)    Activity/Device (Bathing Goal 1, OT) lower body bathing  -TO     [place] Level/Cues Needed (Bathing Goal 1, OT) moderate assist (50-74% patient effort)  -TO     Time Frame (Bathing Goal 1, OT) long term goal (LTG);by discharge  -TO     Strategies/Barriers (Bathing Goal 1, OT) Spouse can assist as needed  -TO     Progress/Outcomes (Bathing Goal 1, OT) goal not met  -TO       Row Name 09/21/23 0900          Dressing Goal 1 (OT)    Activity/Device (Dressing Goal 1, OT) lower body dressing  -TO     [place]/Cues Needed (Dressing Goal 1, OT) minimum assist (75% or more patient effort)  -TO     Time Frame (Dressing Goal 1, OT) long term  goal (LTG);by discharge  -TO     Strategies/Barriers (Dressing Goal 1, OT) Spouse can assist as needed  -TO     Progress/Outcome (Dressing Goal 1, OT) goal not met  -TO       Row Name 09/21/23 0900          Strength Goal 1 (OT)    Strength Goal 1 (OT) Pt will be (I) with BUE strengthening HEP after demonstration for increased strength required for ADLs and mobility.  -TO     Time Frame (Strength Goal 1, OT) long term goal (LTG);by discharge  -TO     Progress/Outcome (Strength Goal 1, OT) goal not met  -TO               User Key  (r) = Recorded By, (t) = Taken By, (c) = Cosigned By      Initials Name Provider Type    TO Marcellus Rivers COTA Occupational Therapist Assistant                   Clinical Impression       Row Name 09/21/23 0900          Pain Assessment    Pretreatment Pain Rating 4/10  more with movement  -TO     Posttreatment Pain Rating 4/10  -TO       Row Name 09/21/23 0900          Plan of Care Review    Plan of Care Reviewed With patient  -TO     Progress improving  -TO     Outcome Evaluation Pt up in chair upon arrival , reports bathing/toileting just before. Pt edu on use of LH reacher/sock aid, CGA to doff/evette socks. Pt performed sit>stand min A , able to take 3 steps forward/reverse with RW and 0 c/o diziness. Pt performed B UE ther ex via 2 x 10 2 lb HW. Pt pleasant, cooperative, and would benefit fromIRF vs rehab to home.  -TO       Row Name 09/21/23 0900          Therapy Assessment/Plan (OT)    Rehab Potential (OT) good, to achieve stated therapy goals  -TO     Criteria for Skilled Therapeutic Interventions Met (OT) yes;meets criteria  -TO     Therapy Frequency (OT) daily  -TO       Row Name 09/21/23 0900          Therapy Plan Review/Discharge Plan (OT)    Anticipated Discharge Disposition (OT) home with 24/7 care;home with home health;other (see comments);inpatient rehabilitation facility  Possible rehah to home pending progression  -TO       Row Name 09/21/23 0900          Positioning and  Restraints    Pre-Treatment Position sitting in chair/recliner  -TO     Post Treatment Position chair  -TO     In Chair notified nsg;reclined;call light within reach;encouraged to call for assist  -TO               User Key  (r) = Recorded By, (t) = Taken By, (c) = Cosigned By      Initials Name Provider Type    TO Marcellus Rivers COTA Occupational Therapist Assistant                   Outcome Measures       Row Name 09/21/23 0900          How much help from another is currently needed...    Putting on and taking off regular lower body clothing? 3  -TO     Bathing (including washing, rinsing, and drying) 3  -TO     Toileting (which includes using toilet bed pan or urinal) 3  -TO     Putting on and taking off regular upper body clothing 4  -TO     Taking care of personal grooming (such as brushing teeth) 4  -TO     Eating meals 4  -TO     AM-PAC 6 Clicks Score (OT) 21  -TO       Row Name 09/21/23 1019 09/21/23 0800       How much help from another person do you currently need...    Turning from your back to your side while in flat bed without using bedrails? 2  - 2  -    Moving from lying on back to sitting on the side of a flat bed without bedrails? 2  - 2  -JH    Moving to and from a bed to a chair (including a wheelchair)? 2  - 2  -JH    Standing up from a chair using your arms (e.g., wheelchair, bedside chair)? 2  - 2  -    Climbing 3-5 steps with a railing? 1  - 1  -    To walk in hospital room? 3  - 3  -JH    AM-PAC 6 Clicks Score (PT) 12  - 12  -    Highest level of mobility 4 --> Transferred to chair/commode  - 4 --> Transferred to chair/commode  -      Row Name 09/21/23 1019          Functional Assessment    Outcome Measure Options AM-PAC 6 Clicks Basic Mobility (PT)  -               User Key  (r) = Recorded By, (t) = Taken By, (c) = Cosigned By      Initials Name Provider Type    Eugenio Wasserman PTA Physical Therapist Assistant    TO Marcellus Rivers COTA Occupational  Therapist Assistant    Carmen Kuhn LPN Licensed Nurse                    Occupational Therapy Education       Title: PT OT SLP Therapies (In Progress)       Topic: Occupational Therapy (In Progress)       Point: ADL training (Done)       Description:   Instruct learner(s) on proper safety adaptation and remediation techniques during self care or transfers.   Instruct in proper use of assistive devices.                  Learning Progress Summary             Patient Acceptance, E,TB, VU by CM at 9/20/2023 1231    Comment: OT POC, Role of OT, d/c recommendations, AD/DME needs   Family Acceptance, E,TB, VU by CM at 9/20/2023 1231    Comment: OT POC, Role of OT, d/c recommendations, AD/DME needs                         Point: Home exercise program (Not Started)       Description:   Instruct learner(s) on appropriate technique for monitoring, assisting and/or progressing therapeutic exercises/activities.                  Learner Progress:  Not documented in this visit.              Point: Precautions (Done)       Description:   Instruct learner(s) on prescribed precautions during self-care and functional transfers.                  Learning Progress Summary             Patient Acceptance, E,TB, VU by CM at 9/20/2023 1231    Comment: OT POC, Role of OT, d/c recommendations, AD/DME needs   Family Acceptance, E,TB, VU by CM at 9/20/2023 1231    Comment: OT POC, Role of OT, d/c recommendations, AD/DME needs                         Point: Body mechanics (Not Started)       Description:   Instruct learner(s) on proper positioning and spine alignment during self-care, functional mobility activities and/or exercises.                  Learner Progress:  Not documented in this visit.                              User Key       Initials Effective Dates Name Provider Type Discipline    HARRIET 11/18/22 -  Emily Lehman OT Occupational Therapist OT                  OT Recommendation and Plan  Therapy Frequency (OT): daily  Plan of  Care Review  Plan of Care Reviewed With: patient  Progress: improving  Outcome Evaluation: Pt up in chair upon arrival , reports bathing/toileting just before. Pt edu on use of LH reacher/sock aid, CGA to doff/evette socks. Pt performed sit>stand min A , able to take 3 steps forward/reverse with RW and 0 c/o diziness. Pt performed B UE ther ex via 2 x 10 2 lb HW. Pt pleasant, cooperative, and would benefit fromIRF vs rehab to home.     Time Calculation:         Time Calculation- OT       Row Name 09/21/23 1722 09/21/23 1047          Time Calculation- OT    OT Start Time 0900  -TO --     OT Stop Time 0953  -TO --     OT Time Calculation (min) 53 min  -TO --     Total Timed Code Minutes- OT 53 minute(s)  -TO --     OT Received On 09/21/23  -TO --        Timed Charges    22383 - OT Therapeutic Exercise Minutes 23  -TO --     21534 - Gait Training Minutes  -- 10  -NIDA     71473 - OT Therapeutic Activity Minutes 15  -TO --     36219 - OT Self Care/Mgmt Minutes 15  -TO --        Total Minutes    Timed Charges Total Minutes 53  -TO 10  -NIDA      Total Minutes 53  -TO 10  -NIDA               User Key  (r) = Recorded By, (t) = Taken By, (c) = Cosigned By      Initials Name Provider Type    NIDA Eugenio Chavez, BRITNEY Physical Therapist Assistant    TO Marcellus Rivers COTA Occupational Therapist Assistant                  Therapy Charges for Today       Code Description Service Date Service Provider Modifiers Qty    13066489647 HC OT THER PROC EA 15 MIN 9/21/2023 Marcellus Rivers COTA GO 2    29818491844 HC OT THERAPEUTIC ACT EA 15 MIN 9/21/2023 Marcellus Rivers COTA GO 1    94866118617 HC OT SELF CARE/MGMT/TRAIN EA 15 MIN 9/21/2023 Marcellus Rivers COTA GO 1                 CLEMENTE Levi  9/21/2023

## 2023-09-21 NOTE — PLAN OF CARE
Goal Outcome Evaluation:  Plan of Care Reviewed With: patient     Problem: Adult Inpatient Plan of Care  Goal: Plan of Care Review  Outcome: Ongoing, Progressing  Flowsheets (Taken 9/21/2023 1043)  Progress: improving  Plan of Care Reviewed With: patient  Outcome Evaluation: pt responded well to PT with increased gait to 12 ft min A with RW. mod A for sit-stand-sit. pt participated in LE ther ex. good effort by patient. no new goals met at this time. pt would continue to benefit from PT services.    Anticipated Discharge Disposition (PT): home with assist, home with outpatient therapy services, inpatient rehabilitation facility, skilled nursing facility

## 2023-09-21 NOTE — CONSULTS
Referring Provider: Specialist Dr. Adler  Reason for Consultation: History of kidney stone    Patient Care Team:  Munir Rodriguez MD as PCP - General  Praveen Rivas APRN as Nurse Practitioner (Orthopedic Surgery)    Chief complaint: History of kidney stone    Subjective .     History of present illness: Patient is a kidney stone but now has had a leg fracture hip fracture in this hospital he did have lithotripsy on Monday in [place]    Review of Systems:  Pertinent items are noted in HPI    History:  Past Medical History:   Diagnosis Date    Cataract     Coronary artery disease     Diabetes mellitus     Type 2 diabetes mellitus - without retinopathy       History of echocardiogram 05/12/2014    Normal LV systolic function with EF of 55% with mild hypokinesis. Diastolic relaxation abnormality of left ventricle. Mild tricuspid regurg. Trace mitral regurg    Hyperlipemia     Hypertension     Myocardial infarction      Past Surgical History:   Procedure Laterality Date    BACK SURGERY      CARDIAC CATHETERIZATION  05/12/2014    Severe multivessel CAD with critical lesions noted in the LAD coronary artery, left circumflex coronary artery and RCA. Left ventriculogram no performed in view of elevated left ventricular end-diastolic pressure    CATARACT EXTRACTION W/ INTRAOCULAR LENS IMPLANT Left 2/2/2018    Procedure: CATARACT PHACO EXTRACTION WITH INTRAOCULAR LENS IMPLANT;  Surgeon: Gagan Lizarraga MD;  Location: [facility];  Service:     CATARACT EXTRACTION W/ INTRAOCULAR LENS IMPLANT Right 2/9/2018    Procedure: CATARACT PHACO EXTRACTION WITH INTRAOCULAR LENS IMPLANT;  Surgeon: Gagan Lizarraga MD;  Location: [facility];  Service:     CORONARY ARTERY BYPASS GRAFT      X 3 with LIMA to LAD, SVG to OMB and SVG to PDA.    DILATATION AND CURETTAGE      OTHER SURGICAL HISTORY  05/06/2014    INCISION OF EARDRUM GENERAL ANESTHETIC 02181 (1)    Bilateral myringotomy with tubes.     SINUS SURGERY       TONSILLECTOMY      TONSILLECTOMY AND ADENOIDECTOMY      TUBAL ABDOMINAL LIGATION  03/14/1978     Family History   Problem Relation Age of Onset    Hypertension Mother     Coronary artery disease Father     Diabetes Other         grandmother    Cancer Other      Social History     Tobacco Use    Smoking status: Never    Smokeless tobacco: Never   Vaping Use    Vaping Use: Never used   Substance Use Topics    Alcohol use: No    Drug use: No     Medications Prior to Admission   Medication Sig Dispense Refill Last Dose    aspirin 81 MG disintegrating tablet Take 81 mg by mouth Daily.   9/19/2023    docusate sodium (COLACE) 100 MG capsule Take 1 capsule by mouth Daily.   9/18/2023    hydroCHLOROthiazide (HYDRODIURIL) 12.5 MG tablet Take 1 tablet by mouth Daily.   9/19/2023    labetalol (NORMODYNE) 200 MG tablet Take 0.5 tablets by mouth 2 (Two) Times a Day. .5   9/19/2023    amLODIPine (NORVASC) 5 MG tablet TAKE 1 TABLET BY MOUTH DAILY IN THE EVENING 90 tablet 2 Unknown    cefuroxime (CEFTIN) 500 MG tablet Take 1 tablet by mouth.   Unknown    CO ENZYME Q-10 PO Take 100 mg by mouth Daily.   Unknown    fluticasone (FLONASE) 50 MCG/ACT nasal spray 2 sprays into the nostril(s) as directed by provider Daily. 16 g 11 Unknown    loratadine (CLARITIN) 10 MG tablet Take 1 tablet by mouth Daily.   Unknown    losartan (COZAAR) 100 MG tablet Take 1 tablet by mouth Daily.   Unknown    metFORMIN ER (GLUCOPHAGE-XR) 750 MG 24 hr tablet Take 2 tablets by mouth Every Night.   Unknown    ONE TOUCH ULTRA TEST test strip USE TO TEST BLOOD SUGAR EVERY DAY DX.E11.9  5 Unknown    Polysaccharide Iron Complex (FERREX 150 PO) Take 150 mg by mouth Daily.   Unknown    rosuvastatin (CRESTOR) 10 MG tablet Take 1 tablet by mouth Every Night.   Unknown    SALINE NASAL SPRAY NA into the nostril(s) as directed by provider As Needed.   Unknown    vitamin B-12 (CYANOCOBALAMIN) 1000 MCG tablet Take 1 tablet by mouth Daily.   Unknown        Allergies: Cherry,  Peanut-containing drug products, Articaine, Lidocaine, Ciprofloxacin, and Losartan    Objective     Vital Signs:   Temp:  [97.5 °F (36.4 °C)-98.6 °F (37 °C)] 98.2 °F (36.8 °C)  Heart Rate:  [60-81] 81  Resp:  [16-18] 18  BP: (126-192)/(61-88) 168/76    Physical Exam:     General Appearance:    Alert, cooperative, in no acute distress.   Head:    Normocephalic, without obvious abnormality, atraumatic.   Eyes:            Lids and lashes normal, conjunctivae and sclerae normal, no   icterus, no pallor, corneas clear, PERRLA.   Ears:    Ears appear intact with no abnormalities noted.   Throat:   No oral lesions, no thrush, oral mucosa moist.   Lungs:     Clear to auscultation, respirations regular, even and                  unlabored.    Heart:    Regular rhythm and normal rate, normal S1 and S2, no            murmur, no gallop, no rub, no click.   Abdomen:     Normal bowel sounds, no masses, no organomegaly, soft        nontender, nondistended, no guarding, no rebound          tenderness.   Genitourinary: Urine clear.   Rectal:   Deferred.   Extremities:   Moves all extremities well, no edema, no cyanosis, no             redness.   Pulses:   Pulses palpable and equal bilaterally.   Skin:   No bleeding, bruising or rash.   Neurologic:   Cranial nerves 2 - 12 grossly intact, sensation intact, DTR       present and equal bilaterally.       Results Review:    Lab Results (last 24 hours)       Procedure Component Value Units Date/Time    POC Glucose Once [[id number]]  (Abnormal) Collected: 09/20/23 1935    Specimen: Blood Updated: 09/20/23 2032     Glucose 190 mg/dL      Comment: RN NotifiedOperator: [id number] GINGER [facility] ID: [id number]       POC Glucose Once [[id number]]  (Abnormal) Collected: 09/20/23 1651    Specimen: Blood Updated: 09/20/23 1708     Glucose 162 mg/dL      Comment: RN NotifiedOperator: [id number] MARISELA ALECCIAMeter ID: [id number]       POC Glucose Once [[id number]]  (Abnormal) Collected:  09/20/23 1051    Specimen: Blood Updated: 09/20/23 1120     Glucose 194 mg/dL      Comment: RN NotifiedOperator: [id number] ARANGO ALISEMeter ID: [id number]       POC Glucose Once [[id number]]  (Abnormal) Collected: 09/20/23 0721    Specimen: Blood Updated: 09/20/23 0756     Glucose 157 mg/dL      Comment: RN NotifiedOperator: [id number] ARANGO ALISEMeter ID: [id number]       TISSUE EXAM, [facility] ([facility]) [[id number]] Collected: 09/19/23 2002    Specimen: Bone from Hip, Right Updated: 09/20/23 0704    Basic Metabolic Panel [[id number]]  (Abnormal) Collected: 09/20/23 0558    Specimen: Blood Updated: 09/20/23 0650     Glucose 151 mg/dL      BUN 17 mg/dL      Creatinine 0.85 mg/dL      Sodium 131 mmol/L      Potassium 5.0 mmol/L      Chloride 98 mmol/L      CO2 24.0 mmol/L      Calcium 9.2 mg/dL      BUN/Creatinine Ratio 20.0     Anion Gap 9.0 mmol/L      eGFR 66.8 mL/min/1.73     Narrative:      GFR Normal >60  Chronic Kidney Disease <60  Kidney Failure <15    The GFR formula is only valid for adults with stable renal function between ages 18 and 70.    CBC & Differential [[id number]]  (Abnormal) Collected: 09/20/23 0558    Specimen: Blood Updated: 09/20/23 0648    Narrative:      The following orders were created for panel order CBC & Differential.  Procedure                               Abnormality         Status                     ---------                               -----------         ------                     CBC Auto Differential[[id number]]        Abnormal            Final result                 Please view results for these tests on the individual orders.    CBC Auto Differential [[id number]]  (Abnormal) Collected: 09/20/23 0558    Specimen: Blood Updated: 09/20/23 0648     WBC 13.22 10*3/mm3      RBC 3.23 10*6/mm3      Hemoglobin 10.1 g/dL      Hematocrit 29.9 %      MCV 92.6 fL      MCH 31.3 pg      MCHC 33.8 g/dL      RDW 13.1 %      RDW-SD 43.9 fl      MPV 12.1 fL      Platelets 157 10*3/mm3       Neutrophil % 91.9 %      Lymphocyte % 3.7 %      Monocyte % 3.9 %      Eosinophil % 0.0 %      Basophil % 0.2 %      Immature Grans % 0.3 %      Neutrophils, Absolute 12.14 10*3/mm3      Lymphocytes, Absolute 0.49 10*3/mm3      Monocytes, Absolute 0.52 10*3/mm3      Eosinophils, Absolute 0.00 10*3/mm3      Basophils, Absolute 0.03 10*3/mm3      Immature Grans, Absolute 0.04 10*3/mm3      nRBC 0.0 /100 WBC            Imaging Results (Last 24 Hours)       Procedure Component Value Units Date/Time    US Renal Bilateral [[id number]] Resulted: 09/20/23 1726     Updated: 09/20/23 1755    FL C Arm During Surgery [[id number]] Resulted: 09/20/23 0821     Updated: 09/20/23 0821            I reviewed the patient's new clinical results.  I reviewed the patient's new imaging results and agree with the interpretation.  I reviewed the patient's other test results and agree with the interpretation.      Assessment:    Kidney stone with leg fracture hip fracture    Plan:    We will circumvent kidney stone surgery to hold legs healed    I discussed the patient's findings and my recommendations with patient.     Cas Mandujano MD  09/20/23  21:49 CDT

## 2023-09-21 NOTE — PROGRESS NOTES
ORTHOPEDIC PROGRESS NOTE:    Name:  Alexia Lopez  Date:    2023  Date of admission:  2023    Post op day:  2 Days Post-Op  Procedure:    Procedure(s) (LRB):  RIGHT HIP BIPOLAR ANTERIOR APPROACH (Right)    Subjective:  Patient sleeping soundly.    Vitals:     Vitals:    23 0349   BP: 158/70   Pulse: 84   Resp: 18   Temp: 98 °F (36.7 °C)   SpO2: 93%      Temp (24hrs), Av.4 °F (36.9 °C), Min:98 °F (36.7 °C), Max:98.6 °F (37 °C)      Exam:  Calf is soft  Good distal pulses.      ASSESSMENT:  Active Hospital Problems    Diagnosis  POA    **Traumatic closed displaced fracture of neck of right femur, initial encounter [S72.001A]  Yes    Fall from slip, trip, or stumble, initial encounter [W01.0XXA]  Yes    Hip fracture [S72.009A]  Yes    Controlled type 2 diabetes mellitus without complication [E11.9]  Yes    S/P CABG (coronary artery bypass graft) [Z95.1]  Not Applicable    Benign essential hypertension [I10]  Yes    Coronary arteriosclerosis in native artery [I25.10]  Yes         PLAN:    No ortho changes.  Mobility is slow.  S/p bipolar endoprosthesis right hip  Mobilize with PT/OT  Slowly progress weight bearing as tolerated.  DVT prophylaxis for 30 days (start heparin Subq today and will switch to enoxaparin/warfarin/or eliquis once disposition is known).  Anticipate disposition in 1-2 days.  Disposition:  home with home yared, ARU, SNF    23 at 07:13 CDT by Cas Adler MD

## 2023-09-22 LAB
ANION GAP SERPL CALCULATED.3IONS-SCNC: 10 MMOL/L (ref 5–15)
ANISOCYTOSIS BLD QL: NORMAL
BASOPHILS # BLD AUTO: 0.03 10*3/MM3 (ref 0–0.2)
BASOPHILS NFR BLD AUTO: 0.4 % (ref 0–1.5)
BUN SERPL-MCNC: 19 MG/DL (ref 8–23)
BUN/CREAT SERPL: 22.1 (ref 7–25)
CALCIUM SPEC-SCNC: 9.1 MG/DL (ref 8.6–10.5)
CHLORIDE SERPL-SCNC: 99 MMOL/L (ref 98–107)
CO2 SERPL-SCNC: 22 MMOL/L (ref 22–29)
CREAT SERPL-MCNC: 0.86 MG/DL (ref 0.57–1)
DEPRECATED RDW RBC AUTO: 43.9 FL (ref 37–54)
EGFRCR SERPLBLD CKD-EPI 2021: 65.9 ML/MIN/1.73
EOSINOPHIL # BLD AUTO: 0.18 10*3/MM3 (ref 0–0.4)
EOSINOPHIL NFR BLD AUTO: 2.2 % (ref 0.3–6.2)
ERYTHROCYTE [DISTWIDTH] IN BLOOD BY AUTOMATED COUNT: 13.2 % (ref 12.3–15.4)
GLUCOSE BLDC GLUCOMTR-MCNC: 144 MG/DL (ref 70–130)
GLUCOSE BLDC GLUCOMTR-MCNC: 180 MG/DL (ref 70–130)
GLUCOSE BLDC GLUCOMTR-MCNC: 197 MG/DL (ref 70–130)
GLUCOSE SERPL-MCNC: 136 MG/DL (ref 65–99)
HCT VFR BLD AUTO: 22 % (ref 34–46.6)
HGB BLD-MCNC: 7.5 G/DL (ref 12–15.9)
HYPOCHROMIA BLD QL: NORMAL
IMM GRANULOCYTES # BLD AUTO: 0.05 10*3/MM3 (ref 0–0.05)
IMM GRANULOCYTES NFR BLD AUTO: 0.6 % (ref 0–0.5)
LYMPHOCYTES # BLD AUTO: 0.75 10*3/MM3 (ref 0.7–3.1)
LYMPHOCYTES NFR BLD AUTO: 9 % (ref 19.6–45.3)
MCH RBC QN AUTO: 30.7 PG (ref 26.6–33)
MCHC RBC AUTO-ENTMCNC: 34.1 G/DL (ref 31.5–35.7)
MCV RBC AUTO: 90.2 FL (ref 79–97)
MICROCYTES BLD QL: NORMAL
MONOCYTES # BLD AUTO: 0.75 10*3/MM3 (ref 0.1–0.9)
MONOCYTES NFR BLD AUTO: 9 % (ref 5–12)
NEUTROPHILS NFR BLD AUTO: 6.55 10*3/MM3 (ref 1.7–7)
NEUTROPHILS NFR BLD AUTO: 78.8 % (ref 42.7–76)
NRBC BLD AUTO-RTO: 0 /100 WBC (ref 0–0.2)
PLATELET # BLD AUTO: 123 10*3/MM3 (ref 140–450)
PMV BLD AUTO: 11.9 FL (ref 6–12)
POTASSIUM SERPL-SCNC: 3.8 MMOL/L (ref 3.5–5.2)
RBC # BLD AUTO: 2.44 10*6/MM3 (ref 3.77–5.28)
REF LAB TEST METHOD: NORMAL
SMALL PLATELETS BLD QL SMEAR: NORMAL
SODIUM SERPL-SCNC: 131 MMOL/L (ref 136–145)
WBC MORPH BLD: NORMAL
WBC NRBC COR # BLD: 8.31 10*3/MM3 (ref 3.4–10.8)

## 2023-09-22 PROCEDURE — G0378 HOSPITAL OBSERVATION PER HR: HCPCS

## 2023-09-22 PROCEDURE — 85007 BL SMEAR W/DIFF WBC COUNT: CPT | Performed by: ORTHOPAEDIC SURGERY

## 2023-09-22 PROCEDURE — 93005 ELECTROCARDIOGRAM TRACING: CPT | Performed by: FAMILY MEDICINE

## 2023-09-22 PROCEDURE — 97110 THERAPEUTIC EXERCISES: CPT

## 2023-09-22 PROCEDURE — 97116 GAIT TRAINING THERAPY: CPT

## 2023-09-22 PROCEDURE — 97535 SELF CARE MNGMENT TRAINING: CPT

## 2023-09-22 PROCEDURE — 25010000002 HEPARIN (PORCINE) PER 1000 UNITS: Performed by: ORTHOPAEDIC SURGERY

## 2023-09-22 PROCEDURE — 63710000001 INSULIN ASPART PER 5 UNITS: Performed by: STUDENT IN AN ORGANIZED HEALTH CARE EDUCATION/TRAINING PROGRAM

## 2023-09-22 PROCEDURE — 80048 BASIC METABOLIC PNL TOTAL CA: CPT | Performed by: ORTHOPAEDIC SURGERY

## 2023-09-22 PROCEDURE — 82948 REAGENT STRIP/BLOOD GLUCOSE: CPT

## 2023-09-22 PROCEDURE — 99024 POSTOP FOLLOW-UP VISIT: CPT | Performed by: ORTHOPAEDIC SURGERY

## 2023-09-22 PROCEDURE — 97530 THERAPEUTIC ACTIVITIES: CPT

## 2023-09-22 PROCEDURE — 85025 COMPLETE CBC W/AUTO DIFF WBC: CPT | Performed by: ORTHOPAEDIC SURGERY

## 2023-09-22 RX ADMIN — HEPARIN SODIUM 5000 UNITS: 5000 INJECTION INTRAVENOUS; SUBCUTANEOUS at 20:16

## 2023-09-22 RX ADMIN — AMLODIPINE BESYLATE 5 MG: 5 TABLET ORAL at 09:32

## 2023-09-22 RX ADMIN — ROSUVASTATIN CALCIUM 10 MG: 10 TABLET, FILM COATED ORAL at 20:16

## 2023-09-22 RX ADMIN — LABETALOL HYDROCHLORIDE 100 MG: 100 TABLET, FILM COATED ORAL at 20:16

## 2023-09-22 RX ADMIN — HEPARIN SODIUM 5000 UNITS: 5000 INJECTION INTRAVENOUS; SUBCUTANEOUS at 14:15

## 2023-09-22 RX ADMIN — OXYCODONE HYDROCHLORIDE 5 MG: 5 TABLET ORAL at 14:18

## 2023-09-22 RX ADMIN — DOCUSATE SODIUM 50 MG AND SENNOSIDES 8.6 MG 2 TABLET: 8.6; 5 TABLET, FILM COATED ORAL at 20:16

## 2023-09-22 RX ADMIN — INSULIN ASPART 2 UNITS: 100 INJECTION, SOLUTION INTRAVENOUS; SUBCUTANEOUS at 12:05

## 2023-09-22 RX ADMIN — OXYCODONE HYDROCHLORIDE 5 MG: 5 TABLET ORAL at 04:14

## 2023-09-22 RX ADMIN — ACETAMINOPHEN 650 MG: 325 TABLET, FILM COATED ORAL at 20:16

## 2023-09-22 RX ADMIN — LABETALOL HYDROCHLORIDE 100 MG: 100 TABLET, FILM COATED ORAL at 09:32

## 2023-09-22 RX ADMIN — Medication 10 ML: at 10:23

## 2023-09-22 RX ADMIN — INSULIN ASPART 2 UNITS: 100 INJECTION, SOLUTION INTRAVENOUS; SUBCUTANEOUS at 17:32

## 2023-09-22 RX ADMIN — ASPIRIN 81 MG: 81 TABLET, CHEWABLE ORAL at 09:32

## 2023-09-22 RX ADMIN — ACETAMINOPHEN 650 MG: 325 TABLET, FILM COATED ORAL at 16:23

## 2023-09-22 RX ADMIN — ACETAMINOPHEN 650 MG: 325 TABLET, FILM COATED ORAL at 04:14

## 2023-09-22 RX ADMIN — DOCUSATE SODIUM 50 MG AND SENNOSIDES 8.6 MG 2 TABLET: 8.6; 5 TABLET, FILM COATED ORAL at 09:32

## 2023-09-22 RX ADMIN — ACETAMINOPHEN 650 MG: 325 TABLET, FILM COATED ORAL at 10:22

## 2023-09-22 RX ADMIN — HEPARIN SODIUM 5000 UNITS: 5000 INJECTION INTRAVENOUS; SUBCUTANEOUS at 05:43

## 2023-09-22 NOTE — THERAPY TREATMENT NOTE
Acute Care - Physical Therapy Treatment Note  [facility]     Patient Name: Alexia Lopez  : 1935  MRN: [id number]  Today's Date: 2023      Visit Dx:     ICD-10-CM ICD-9-CM   1. Traumatic closed displaced fracture of neck of right femur, initial encounter  S72.001A 820.8   2. Coronary arteriosclerosis in native artery  I25.10 414.01   3. S/P CABG (coronary artery bypass graft)  Z95.1 V45.81   4. Benign essential hypertension  I10 401.1   5. Controlled type 2 diabetes mellitus without complication, with long-term current use of insulin  E11.9 250.00    Z79.4 V58.67   6. Fall from slip, trip, or stumble, initial encounter  W01.0XXA E885.9   7. Closed fracture of right hip, initial encounter  S72.001A 820.8   8. Impaired functional mobility, balance, gait, and endurance [Z74.09 (ICD-10-CM)]  Z74.09 V49.89   9. Impaired mobility and ADLs [Z74.09, Z78.9 (ICD-10-CM)]  Z74.09 V49.89    Z78.9      Patient Active Problem List   Diagnosis    Coronary arteriosclerosis in native artery    S/P CABG (coronary artery bypass graft)    Benign essential hypertension    Hypercholesterolemia    Type 2 diabetes mellitus    Nuclear cataract    Controlled type 2 diabetes mellitus without complication    Pernicious anemia    Arthritis of knee    Chronic sinusitis    Encounter for screening mammogram for malignant neoplasm of breast    Generalized osteoarthritis    Iron deficiency anemia    Palpitations    Myocardial infarction    Hypertension    Hyperlipemia    History of echocardiogram    Diabetes mellitus    Coronary artery disease    Cataract    Chronic pain of both knees    Primary osteoarthritis of both knees    Claudication of lower extremity    Precordial pain    Fall from slip, trip, or stumble, initial encounter    Traumatic closed displaced fracture of neck of right femur, initial encounter    Hip fracture     Past Medical History:   Diagnosis Date    Cataract     Coronary artery disease     Diabetes  mellitus     Type 2 diabetes mellitus - without retinopathy       History of echocardiogram 05/12/2014    Normal LV systolic function with EF of 55% with mild hypokinesis. Diastolic relaxation abnormality of left ventricle. Mild tricuspid regurg. Trace mitral regurg    Hyperlipemia     Hypertension     Myocardial infarction      Past Surgical History:   Procedure Laterality Date    BACK SURGERY      CARDIAC CATHETERIZATION  05/12/2014    Severe multivessel CAD with critical lesions noted in the LAD coronary artery, left circumflex coronary artery and RCA. Left ventriculogram no performed in view of elevated left ventricular end-diastolic pressure    CATARACT EXTRACTION W/ INTRAOCULAR LENS IMPLANT Left 2/2/2018    Procedure: CATARACT PHACO EXTRACTION WITH INTRAOCULAR LENS IMPLANT;  Surgeon: Gagan Lizarraga MD;  Location: [facility];  Service:     CATARACT EXTRACTION W/ INTRAOCULAR LENS IMPLANT Right 2/9/2018    Procedure: CATARACT PHACO EXTRACTION WITH INTRAOCULAR LENS IMPLANT;  Surgeon: Gagan Lizarraga MD;  Location: [facility];  Service:     CORONARY ARTERY BYPASS GRAFT      X 3 with LIMA to LAD, SVG to OMB and SVG to PDA.    DILATATION AND CURETTAGE      OTHER SURGICAL HISTORY  05/06/2014    INCISION OF EARDRUM GENERAL ANESTHETIC 34054 (1)    Bilateral myringotomy with tubes.     SINUS SURGERY      TONSILLECTOMY      TONSILLECTOMY AND ADENOIDECTOMY      TUBAL ABDOMINAL LIGATION  03/14/1978     PT Assessment (last 12 hours)       PT Evaluation and Treatment       Row Name 09/22/23 1356          Physical Therapy Time and Intention    Document Type therapy note (daily note)  -NIDA     Mode of Treatment physical therapy;individual therapy  -NIDA     Patient Effort good  -NIDA     Comment --  -NIDA       Row Name 09/22/23 1357          Pain    Pretreatment Pain Rating 4/10  -NIDA     Posttreatment Pain Rating 5/10  -NIDA     Pain Location - Side/Orientation Right  -NIDA     Pain Location - hip  -NIDA     Pain  Intervention(s) Repositioned  -[facility] Name 09/22/23 1356          Cognition    Affect/Mental Status (Cognition) WFL  -     Orientation Status (Cognition) oriented x 4  -     Personal Safety Interventions fall prevention program maintained;gait belt;muscle strengthening facilitated;nonskid shoes/slippers when out of bed;supervised activity  -[facility] Name 09/22/23 1356          Range of Motion Comprehensive    General Range of Motion bilateral lower extremity ROM WFL  -NIDA       Row Name 09/22/23 1356          Mobility    Extremity Weight-bearing Status right lower extremity  -     Right Lower Extremity (Weight-bearing Status) weight-bearing as tolerated (WBAT)  -NIDA       Row Name 09/22/23 1356          Transfers    Transfers sit-stand transfer;stand-sit transfer;chair-bed transfer  -NIDA       Row Name 09/22/23 1356          Chair-Bed Transfer    Chair-Bed Arma (Transfers) minimum assist (75% patient effort);moderate assist (50% patient effort)  -     Assistive Device (Chair-Bed Transfers) walker, front-wheeled  -NIDA       Row Name 09/22/23 1356          Sit-Stand Transfer    Sit-Stand Arma (Transfers) moderate assist (50% patient effort)  -     Assistive Device (Sit-Stand Transfers) walker, front-wheeled  -[facility] Name 09/22/23 1356          Stand-Sit Transfer    Stand-Sit Arma (Transfers) moderate assist (50% patient effort)  -     Assistive Device (Stand-Sit Transfers) walker, front-wheeled  -NIDA       Row Name 09/22/23 1356          Gait/Stairs (Locomotion)    Gait/Stairs Locomotion gait/ambulation assistive device;gait/ambulation independence;distance ambulated;gait pattern;gait deviations;stairs negotiation;maintains weight-bearing status  -     Arma Level (Gait) minimum assist (75% patient effort)  -     Assistive Device (Gait) walker, front-wheeled  -     Distance in Feet (Gait) 26 ft x2  -     Pattern (Gait) 3-point;step-to  -NIDA      Deviations/Abnormal Patterns (Gait) antalgic;delvin decreased;gait speed decreased;stride length decreased  -       Row Name 09/22/23 1356          Safety Issues, Functional Mobility    Impairments Affecting Function (Mobility) strength;endurance/activity tolerance  -       Row Name 09/22/23 1356          Motor Skills    Therapeutic Exercise --  ankle DF/PF, QS, GS, hip Ab/Ad, HS, SAQ- 15x1 buffy AAROM on R.  -       Row Name             Wound 09/19/23 1848 Right hip Incision    Wound - Properties Group Placement Date: 09/19/23  -SP Placement Time: 1848 -SP Present on Hospital Admission: N  -SP Side: Right  -SP Location: hip  -SP Primary Wound Type: Incision  -SP Additional Comments: katieo  -SP    Retired Wound - Properties Group Placement Date: 09/19/23  -SP Placement Time: 1848 -SP Present on Hospital Admission: N  -SP Side: Right  -SP Location: hip  -SP Primary Wound Type: Incision  -SP Additional Comments: prineo  -SP    Retired Wound - Properties Group Date first assessed: 09/19/23  -SP Time first assessed: 1848 -SP Present on Hospital Admission: N  -SP Side: Right  -SP Location: hip  -SP Primary Wound Type: Incision  -SP Additional Comments: prineo  -SP      Row Name 09/22/23 1356          Vital Signs    Pre Systolic BP Rehab 143  -NIDA     Pre Treatment Diastolic BP 65  -NIDA     Post Systolic BP Rehab 140  -NIDA     Post Treatment Diastolic BP 65  -NIDA     Pretreatment Heart Rate (beats/min) 82  -NIDA     Posttreatment Heart Rate (beats/min) 84  -NIDA     Pre SpO2 (%) 98  -NIDA     O2 Delivery Pre Treatment room air  -NIDA     Post SpO2 (%) 97  -NIDA     O2 Delivery Post Treatment room air  -NIDA     Pre Patient Position Sitting  -NIDA     Post Patient Position Supine  -NIDA       Row Name 09/22/23 1356          Therapy Assessment/Plan (PT)    Rehab Potential (PT) good, to achieve stated therapy goals  -NIDA     Criteria for Skilled Interventions Met (PT) yes;skilled treatment is necessary  -     Therapy Frequency (PT)  daily  -NIDA       Row Name 09/22/23 1356          Bed Mobility Goal 1 (PT)    Activity/Assistive Device (Bed Mobility Goal 1, PT) sit to supine/supine to sit  -NIDA     [place] Level/Cues Needed (Bed Mobility Goal 1, PT) independent  -NIDA     Time Frame (Bed Mobility Goal 1, PT) by discharge  -NIDA     Strategies/Barriers (Bed Mobility Goal 1, PT) HOB flat, no bed rails.  -NIDA     Progress/Outcomes (Bed Mobility Goal 1, PT) goal not met  -NIDA       Row Name 09/22/23 1356          Transfer Goal 1 (PT)    Activity/Assistive Device (Transfer Goal 1, PT) sit-to-stand/stand-to-sit;bed-to-chair/chair-to-bed;walker, rolling  -NIDA     [place] Level/Cues Needed (Transfer Goal 1, PT) modified independence  -NIDA     Time Frame (Transfer Goal 1, PT) by discharge  -NIDA     Strategies/Barriers (Transfers Goal 1, PT) R hip fx, SHEEAL, anterior, WBAT.  -NIDA     Progress/Outcome (Transfer Goal 1, PT) goal not met  -NIDA       Row Name 09/22/23 [facility]6          Gait Training Goal 1 (PT)    Activity/Assistive Device (Gait Training Goal 1, PT) walker, rolling  -NIDA     [place] Level (Gait Training Goal 1, PT) contact guard required  -NIDA     Distance (Gait Training Goal 1, PT) 25' x 2.  -NIDA     Time Frame (Gait Training Goal 1, PT) by discharge  -NIDA     Strategies/Barriers (Gait Training Goal 1, PT) R hip fx, SHEELA, anterior, WBAT.  -NIDA       Row Name 09/22/23 1356          Stairs Goal 1 (PT)    Activity/Assistive Device (Stairs Goal 1, PT) using handrail, right;using handrail, left  -NIDA     [place] Level/Cues Needed (Stairs Goal 1, PT) contact guard required  -NIDA     Number of Stairs (Stairs Goal 1, PT) 2 steps.  -NIDA     Time Frame (Stairs Goal 1, PT) by discharge  -NIDA     Strategies/Barriers (Stairs Goal 1, PT) R hip fx, SHEELA, anterior, WBAT.  -NIDA     Progress/Outcome (Stairs Goal 1, PT) goal not met  -NIDA               User Key  (r) = Recorded By, (t) = Taken By, (c) = Cosigned By      Initials Name Provider Type    NIDA Eugenio Chavez,  PTA Physical Therapist Assistant    Alina Ho, RN Registered Nurse                    Physical Therapy Education       Title: PT OT SLP Therapies (In Progress)       Topic: Physical Therapy (In Progress)       Point: Mobility training (In Progress)       Learning Progress Summary             Patient Acceptance, E, NR by  at 9/20/2023 0934    Comment: PT POC, rehab process, hand placement with transfers, use of FWW, proper gait mechanics.                         Point: Home exercise program (Not Started)       Learner Progress:  Not documented in this visit.              Point: Body mechanics (Not Started)       Learner Progress:  Not documented in this visit.              Point: Precautions (Not Started)       Learner Progress:  Not documented in this visit.                              User Key       Initials Effective Dates Name Provider Type Discipline     07/11/23 -  John Bazan, PT Physical Therapist PT                  PT Recommendation and Plan  Anticipated Discharge Disposition (PT): home with assist, home with outpatient therapy services, inpatient rehabilitation facility, skilled nursing facility  Therapy Frequency (PT): daily  Plan of Care Reviewed With: patient  Progress: improving  Outcome Evaluation: pt responded well to PT with increased gait to 26 ft x2 min A with RW. pt requires mod A for sit-stand-sit. min-mod A for t.f from chair to bed. mod A for sit-sup. pt participated in LE ther ex with good effort. no new goals met at this time. pt would continue to benefit from PT services.   Outcome Measures       Row Name 09/22/23 1356 09/21/23 1019          How much help from another person do you currently need...    Turning from your back to your side while in flat bed without using bedrails? 2  -NIDA 2  -NIDA     Moving from lying on back to sitting on the side of a flat bed without bedrails? 2  -NIDA 2  -NIDA     Moving to and from a bed to a chair (including a wheelchair)? 2  -NIDA 2  -NIDA      Standing up from a chair using your arms (e.g., wheelchair, bedside chair)? 2  -NIDA 2  -NIDA     Climbing 3-5 steps with a railing? 1  -NIDA 1  -NIDA     To walk in hospital room? 3  -NIDA 3  -NIDA     AM-PAC 6 Clicks Score (PT) 12  -NIDA 12  -NIDA        Functional Assessment    Outcome Measure Options AM-PAC 6 Clicks Basic Mobility (PT)  -NIDA AM-PAC 6 Clicks Basic Mobility (PT)  -NIDA               User Key  (r) = Recorded By, (t) = Taken By, (c) = Cosigned By      Initials Name Provider Type    Eugenio Wasserman PTA Physical Therapist Assistant                     Time Calculation:    PT Charges       Row Name 09/22/23 1637             Time Calculation    Start Time 1356  -NIDA      Stop Time 1434  -      Time Calculation (min) 38 min  -NIDA         Time Calculation- PT    Total Timed Code Minutes- PT 38 minute(s)  -NIDA         Timed Charges    11883 - PT Therapeutic Exercise Minutes 8  -NIDA      54741 - Gait Training Minutes  15  -      39420 - PT Therapeutic Activity Minutes 15  -NIDA         Total Minutes    Timed Charges Total Minutes 38  -NIDA       Total Minutes 38  -NIDA                User Key  (r) = Recorded By, (t) = Taken By, (c) = Cosigned By      Initials Name Provider Type    NIDA Eugenio Chavez PTA Physical Therapist Assistant                  Therapy Charges for Today       Code Description Service Date Service Provider Modifiers Qty    11896896815 HC PT THER PROC EA 15 MIN 9/21/2023 Eugenio Chavez, PTA GP 1    79027222210 HC GAIT TRAINING EA 15 MIN 9/21/2023 Eugenio Chavez, PTA GP 1    68137270108 HC PT THER PROC EA 15 MIN 9/22/2023 Eugenio Chavez, PTA GP 1    64785449536 HC PT THERAPEUTIC ACT EA 15 MIN 9/22/2023 Eugenio Chavez, PTA GP 1    29253878204 HC GAIT TRAINING EA 15 MIN 9/22/2023 Eugenio Chavez, PTA GP 1            PT G-Codes  Outcome Measure Options: AM-PAC 6 Clicks Basic Mobility (PT)  AM-PAC 6 Clicks Score (PT): 12  AM-PAC 6 Clicks Score (OT): 21    Eugenio Chavez PTA  9/22/2023

## 2023-09-22 NOTE — PLAN OF CARE
Goal Outcome Evaluation:  Plan of Care Reviewed With: patient        Progress: improving  Outcome Evaluation: Pt tolerated tx well this date. Pt was sitting up in chair upon arrival. Pt was min A with sit-stand-sit t/f x 5. Pt was mod A with toilet t/f. Pt completed an full ADL. No goals met this tx. Continue OT POC.      Anticipated Discharge Disposition (OT): home with 24/7 care, home with home health, other (see comments), inpatient rehabilitation facility

## 2023-09-22 NOTE — PROGRESS NOTES
[facility] Medicine Services    INPATIENT PROGRESS NOTE    Length of Stay: 0  Date of Admission: 9/19/2023  Primary Care Physician: Munir Rodriguez MD    Subjective   Chief Complaint: fracture   HPI:      Sitting up in the chair, states she is doing better, eating more, working with therapy     Review of Systems     All pertinent negatives and positives are as above. All other systems have been reviewed and are negative unless otherwise stated.     Objective    Temp:  [98.3 °F (36.8 °C)-99.3 °F (37.4 °C)] 98.4 °F (36.9 °C)  Heart Rate:  [72-80] 79  Resp:  [18-20] 18  BP: (131-168)/(61-77) 146/65    Physical Exam  Vitals reviewed.   Constitutional:       General: She is not in acute distress.     Appearance: She is well-developed.   HENT:      Head: Normocephalic and atraumatic.      Nose: Nose normal.   Eyes:      Conjunctiva/sclera: Conjunctivae normal.   Cardiovascular:      Rate and Rhythm: Normal rate and regular rhythm.   Pulmonary:      Effort: Pulmonary effort is normal. No respiratory distress.      Breath sounds: Normal breath sounds. No wheezing or rales.   Musculoskeletal:         General: Tenderness present.      Cervical back: Normal range of motion and neck supple.   Skin:     General: Skin is warm and dry.   Neurological:      Mental Status: She is alert and oriented to person, place, and time.   Psychiatric:         Behavior: Behavior normal.       Results Review:  I have reviewed the labs, radiology results, and diagnostic studies.    Laboratory Data:   Results from last 7 days   Lab Units 09/22/23  0540 09/21/23  0524 09/20/23  0558 09/19/23  1235   SODIUM mmol/L 131* 128* 131* 133*   POTASSIUM mmol/L 3.8 4.1 5.0 4.5   CHLORIDE mmol/L 99 98 98 98   CO2 mmol/L 22.0 19.0* 24.0 25.0   BUN mg/dL 19 16 17 17   CREATININE mg/dL 0.86 0.81 0.85 0.83   GLUCOSE mg/dL 136* 156* 151* 138*   CALCIUM mg/dL 9.1 8.7 9.2 9.8   BILIRUBIN mg/dL  --   --   --  0.3    ALK PHOS U/L  --   --   --  69   ALT (SGPT) U/L  --   --   --  9   AST (SGOT) U/L  --   --   --  20   ANION GAP mmol/L 10.0 11.0 9.0 10.0       Estimated Creatinine Clearance: 49.5 mL/min (by C-G formula based on SCr of 0.86 mg/dL).          Results from last 7 days   Lab Units 09/22/23  0540 09/21/23  0524 09/20/23  0558 09/19/23  2043 09/19/23  1235   WBC 10*3/mm3 8.31 9.44 13.22*  --  6.90   HEMOGLOBIN g/dL 7.5* 8.5* 10.1* 10.8* 10.9*   HEMATOCRIT % 22.0* 24.9* 29.9* 32.0* 32.3*   PLATELETS 10*3/mm3 123* 133* 157  --  158       Results from last 7 days   Lab Units 09/19/23  1235   INR  1.09         Culture Data:   No results found for: BLOODCX  No results found for: URINECX  No results found for: RESPCX  No results found for: WOUNDCX  No results found for: STOOLCX  No components found for: BODYFLD    Radiology Data:   Imaging Results (Last 24 Hours)       ** No results found for the last 24 hours. **                I have reviewed the patient current medications.     Assessment/Plan     Active Hospital Problems    Diagnosis  POA    **Traumatic closed displaced fracture of neck of right femur, initial encounter [S72.001A]  Yes    Fall from slip, trip, or stumble, initial encounter [W01.0XXA]  Yes    Hip fracture [S72.009A]  Yes    Controlled type 2 diabetes mellitus without complication [E11.9]  Yes    S/P CABG (coronary artery bypass graft) [Z95.1]  Not Applicable    Benign essential hypertension [I10]  Yes    Coronary arteriosclerosis in native artery [I25.10]  Yes       Plan:      Displaced right femoral neck fracture  -s/p bipolar endoprosthesis right hip  -ortho consulted  -PT, OT  -weight bearing as tolerated  -planning for DVT ppx for 30 days (heparin started, plans to switch to lovenox/warfarin or eliquis prior to discharge)    Hypotension  -amlodipine  -she states she is allergic to losartan  -continue BB  -d/c HCTZ  -resume as able     CAD  -ASA    Type 2 DM  -SSI    HLD  -statin     Kidney  stone  -urology consulted - monitor for retention of urine to recur        Medical Decision Making  Number and Complexity of problems: one moderate         Disposition  Planning for ARU      I confirmed that the patient's Advance Care Plan is present, code status is documented, or surrogate decision maker is listed in the patient's medical record.           This document has been electronically signed by Dinah Perry MD on September 22, 2023 12:48 CDT

## 2023-09-22 NOTE — PLAN OF CARE
Goal Outcome Evaluation:  Plan of Care Reviewed With: patient     Problem: Adult Inpatient Plan of Care  Goal: Plan of Care Review  Outcome: Ongoing, Progressing  Flowsheets (Taken 9/22/2023 4006)  Progress: improving  Plan of Care Reviewed With: patient  Outcome Evaluation: pt responded well to PT with increased gait to 26 ft x2 min A with RW. pt requires mod A for sit-stand-sit. min-mod A for t/f from chair to bed. mod A for sit-sup. pt participated in LE ther ex with good effort. no new goals met at this time. pt would continue to benefit from PT services. Recommend ARU upon DC.         Anticipated Discharge Disposition (PT): home with assist, home with outpatient therapy services, inpatient rehabilitation facility, skilled nursing facility

## 2023-09-22 NOTE — NURSING NOTE
Spoke with patient about possible admission to the Acute Rehab Unit and presented insurance benefits and she is agreeable. Reviewed chart with Dr. Echols and patient has been accepted. Pre-cert is required. Case management notified.

## 2023-09-22 NOTE — PROGRESS NOTES
ORTHOPEDIC PROGRESS NOTE:    Name:  Alexia Lopez  Date:    2023  Date of admission:  2023    Post op day:  3 Days Post-Op  Procedure:    Procedure(s) (LRB):  RIGHT HIP BIPOLAR ANTERIOR APPROACH (Right)    Subjective:  No new complaints  Pain is slowly improving    Vitals:     Vitals:    23 0359   BP: 150/65   Pulse: 76   Resp: 20   Temp: 98.6 °F (37 °C)   SpO2: 96%      Temp (24hrs), Av.6 °F (37 °C), Min:98 °F (36.7 °C), Max:99.3 °F (37.4 °C)      Exam:  Awake and alert  Toes up and down  Incision clean and dry  Calves soft and nontender      ASSESSMENT:  Active Hospital Problems    Diagnosis  POA    **Traumatic closed displaced fracture of neck of right femur, initial encounter [S72.001A]  Yes    Fall from slip, trip, or stumble, initial encounter [W01.0XXA]  Yes    Hip fracture [S72.009A]  Yes    Controlled type 2 diabetes mellitus without complication [E11.9]  Yes    S/P CABG (coronary artery bypass graft) [Z95.1]  Not Applicable    Benign essential hypertension [I10]  Yes    Coronary arteriosclerosis in native artery [I25.10]  Yes         PLAN:    No ortho changes.  S/p bipolar endoprosthesis right hip  Mobilize with PT/OT  Slowly progress weight bearing as tolerated.  DVT prophylaxis for 30 days (start heparin Subq today and will switch to enoxaparin/warfarin/or eliquis once disposition is known).  Anticipate disposition in 1-2 days.  Disposition:  home with home yared (unlikely), ARU best option, SNF    23 at 07:09 CDT by Cas Adler MD

## 2023-09-22 NOTE — PROGRESS NOTES
"   LOS: 0 days   Patient Care Team:  Munir Rodriguez MD as PCP - General  Praveen Rivas APRN as Nurse Practitioner (Orthopedic Surgery)    Subjective     Subjective:  Symptoms:  Stable.  (Patient says voiding fine, no feelings of retention of urine or UTI symptoms. Taking it \"one step at a time\" like Dr. Adler suggested. ).      History taken from: patient chart    Objective     Vital Signs  Temp:  [98 °F (36.7 °C)-99.3 °F (37.4 °C)] 99.3 °F (37.4 °C)  Heart Rate:  [68-84] 75  Resp:  [18] 18  BP: (123-158)/(56-70) 137/61    Objective:  General Appearance:  In no acute distress.    Vital signs: (most recent): Blood pressure 137/61, pulse 75, temperature 99.3 °F (37.4 °C), temperature source Temporal, resp. rate 18, height 170.2 cm (67\"), weight 74.8 kg (165 lb), SpO2 92 %, not currently breastfeeding.  Vital signs are normal.  No fever.    Output: Producing urine.    Abdomen: Abdomen is soft and non-distended.  There is no abdominal tenderness.  There is no suprapubic area tenderness.     Neurological: Patient is alert and oriented to person, place and time.    Pupils:  Pupils are equal, round, and reactive to light.    Skin:  Warm and dry.              Results Review:    Lab Results (last 24 hours)       Procedure Component Value Units Date/Time    POC Glucose Once [[id number]]  (Abnormal) Collected: 09/21/23 1931    Specimen: Blood Updated: 09/21/23 2025     Glucose 208 mg/dL      Comment: RN NotifiedOperator: [id number] GABRIEL OPALMeter ID: [id number]       POC Glucose Once [[id number]]  (Abnormal) Collected: 09/21/23 1603    Specimen: Blood Updated: 09/21/23 1640     Glucose 173 mg/dL      Comment: RN NotifiedOperator: [id number] BROWN ALECCIAMeter ID: [id number]       POC Glucose Once [[id number]]  (Abnormal) Collected: 09/21/23 1044    Specimen: Blood Updated: 09/21/23 1058     Glucose 175 mg/dL      Comment: RN NotifiedOperator: [id number] CAMILA CHRISTINAMeter ID: [id number]       POC Glucose Once " [[id number]]  (Abnormal) Collected: 09/21/23 0706    Specimen: Blood Updated: 09/21/23 0734     Glucose 149 mg/dL      Comment: RN NotifiedOperator: [id number] CAMILA Ibrahim ID: [id number]       CBC & Differential [[id number]]  (Abnormal) Collected: 09/21/23 0524    Specimen: Blood Updated: 09/21/23 0649    Narrative:      The following orders were created for panel order CBC & Differential.  Procedure                               Abnormality         Status                     ---------                               -----------         ------                     CBC Auto Differential[[id number]]        Abnormal            Final result                 Please view results for these tests on the individual orders.    CBC Auto Differential [[id number]]  (Abnormal) Collected: 09/21/23 0524    Specimen: Blood Updated: 09/21/23 0649     WBC 9.44 10*3/mm3      RBC 2.76 10*6/mm3      Hemoglobin 8.5 g/dL      Hematocrit 24.9 %      MCV 90.2 fL      MCH 30.8 pg      MCHC 34.1 g/dL      RDW 13.2 %      RDW-SD 43.2 fl      MPV 12.1 fL      Platelets 133 10*3/mm3      Neutrophil % 83.6 %      Lymphocyte % 7.7 %      Monocyte % 7.7 %      Eosinophil % 0.3 %      Basophil % 0.2 %      Immature Grans % 0.5 %      Neutrophils, Absolute 7.88 10*3/mm3      Lymphocytes, Absolute 0.73 10*3/mm3      Monocytes, Absolute 0.73 10*3/mm3      Eosinophils, Absolute 0.03 10*3/mm3      Basophils, Absolute 0.02 10*3/mm3      Immature Grans, Absolute 0.05 10*3/mm3      nRBC 0.0 /100 WBC     Basic Metabolic Panel [[id number]]  (Abnormal) Collected: 09/21/23 0524    Specimen: Blood Updated: 09/21/23 0635     Glucose 156 mg/dL      BUN 16 mg/dL      Creatinine 0.81 mg/dL      Sodium 128 mmol/L      Potassium 4.1 mmol/L      Chloride 98 mmol/L      CO2 19.0 mmol/L      Calcium 8.7 mg/dL      BUN/Creatinine Ratio 19.8     Anion Gap 11.0 mmol/L      eGFR 70.8 mL/min/1.73     Narrative:      GFR Normal >60  Chronic Kidney Disease <60  Kidney Failure  <15    The GFR formula is only valid for adults with stable renal function between ages 18 and 70.           Imaging Results (Last 24 Hours)       Procedure Component Value Units Date/Time    US Renal Bilateral [[id number]] Collected: 09/21/23 0839     Updated: 09/21/23 1210    Narrative:      PROCEDURE:  Complete retroperitoneal sonogram    COMPARISON:  No comparison    HISTORY:  Follow-up renal ultrasound.  No other history was given.    FINDINGS:  Realtime sonographic images are obtained of the kidneys and bladder.  The  kidneys are normal in size and echogenicity.  A 14 mm echogenic structure lower  left kidney is suspicious of a nonobstructing stone.  No hydronephrosis. The  right kidney measures 10.8 cm in length, the left kidney 10.2 cm in length.  Small right renal cyst is present.    The bladder is grossly unremarkable.      Impression:      1.  Kidneys are normal in size and demonstrate normal renal cortical  echogenicity.  No hydronephrosis.    2.  A 14 mm echogenic structure lower left kidney likely representing a  nonobstructing stone.    3.  Small right renal cyst.             I reviewed the patient's new clinical results.  I reviewed the patient's new imaging results and agree with the interpretation.  I reviewed the patient's other test results and agree with the interpretation      Assessment & Plan       Traumatic closed displaced fracture of neck of right femur, initial encounter    Coronary arteriosclerosis in native artery    S/P CABG (coronary artery bypass graft)    Benign essential hypertension    Controlled type 2 diabetes mellitus without complication    Fall from slip, trip, or stumble, initial encounter    Hip fracture      Assessment & Plan    Consulted to Urology for retention of urine post operatively, retention has resolved, no UTI symptoms, no hematuria, adequate urine output, Estimated Creatinine Clearance: 52.7 mL/min (by C-G formula based on SCr of 0.81 mg/dL).    Plan:  Monitor  for retention of urine to recur    Priya Vasquez, PEGGY  09/21/23  21:18 CDT

## 2023-09-22 NOTE — PLAN OF CARE
Goal Outcome Evaluation:         Patient has done well working with therapy this shift. She has no complaints at this time.

## 2023-09-22 NOTE — PLAN OF CARE
Goal Outcome Evaluation:           Progress: improving  Outcome Evaluation: VSS, pain medicated, resting now

## 2023-09-22 NOTE — THERAPY TREATMENT NOTE
Patient Name: Alexia Lopez  : 1935    MRN: [id number]                              Today's Date: 2023       Admit Date: 2023    Visit Dx:     ICD-10-CM ICD-9-CM   1. Traumatic closed displaced fracture of neck of right femur, initial encounter  S72.001A 820.8   2. Coronary arteriosclerosis in native artery  I25.10 414.01   3. S/P CABG (coronary artery bypass graft)  Z95.1 V45.81   4. Benign essential hypertension  I10 401.1   5. Controlled type 2 diabetes mellitus without complication, with long-term current use of insulin  E11.9 250.00    Z79.4 V58.67   6. Fall from slip, trip, or stumble, initial encounter  W01.0XXA E885.9   7. Closed fracture of right hip, initial encounter  S72.001A 820.8   8. Impaired functional mobility, balance, gait, and endurance [Z74.09 (ICD-10-CM)]  Z74.09 V49.89   9. Impaired mobility and ADLs [Z74.09, Z78.9 (ICD-10-CM)]  Z74.09 V49.89    Z78.9      Patient Active Problem List   Diagnosis    Coronary arteriosclerosis in native artery    S/P CABG (coronary artery bypass graft)    Benign essential hypertension    Hypercholesterolemia    Type 2 diabetes mellitus    Nuclear cataract    Controlled type 2 diabetes mellitus without complication    Pernicious anemia    Arthritis of knee    Chronic sinusitis    Encounter for screening mammogram for malignant neoplasm of breast    Generalized osteoarthritis    Iron deficiency anemia    Palpitations    Myocardial infarction    Hypertension    Hyperlipemia    History of echocardiogram    Diabetes mellitus    Coronary artery disease    Cataract    Chronic pain of both knees    Primary osteoarthritis of both knees    Claudication of lower extremity    Precordial pain    Fall from slip, trip, or stumble, initial encounter    Traumatic closed displaced fracture of neck of right femur, initial encounter    Hip fracture     Past Medical History:   Diagnosis Date    Cataract     Coronary artery disease     Diabetes mellitus      Type 2 diabetes mellitus - without retinopathy       History of echocardiogram 05/12/2014    Normal LV systolic function with EF of 55% with mild hypokinesis. Diastolic relaxation abnormality of left ventricle. Mild tricuspid regurg. Trace mitral regurg    Hyperlipemia     Hypertension     Myocardial infarction      Past Surgical History:   Procedure Laterality Date    BACK SURGERY      CARDIAC CATHETERIZATION  05/12/2014    Severe multivessel CAD with critical lesions noted in the LAD coronary artery, left circumflex coronary artery and RCA. Left ventriculogram no performed in view of elevated left ventricular end-diastolic pressure    CATARACT EXTRACTION W/ INTRAOCULAR LENS IMPLANT Left 2/2/2018    Procedure: CATARACT PHACO EXTRACTION WITH INTRAOCULAR LENS IMPLANT;  Surgeon: Gagan Lizarraga MD;  Location: [facility];  Service:     CATARACT EXTRACTION W/ INTRAOCULAR LENS IMPLANT Right 2/9/2018    Procedure: CATARACT PHACO EXTRACTION WITH INTRAOCULAR LENS IMPLANT;  Surgeon: Gagan Lizarraga MD;  Location: [facility];  Service:     CORONARY ARTERY BYPASS GRAFT      X 3 with LIMA to LAD, SVG to OMB and SVG to PDA.    DILATATION AND CURETTAGE      OTHER SURGICAL HISTORY  05/06/2014    INCISION OF EARDRUM GENERAL ANESTHETIC 65529 (1)    Bilateral myringotomy with tubes.     SINUS SURGERY      TONSILLECTOMY      TONSILLECTOMY AND ADENOIDECTOMY      TUBAL ABDOMINAL LIGATION  03/14/1978      General Information       Row Name 09/22/23 0840          OT Time and Intention    Document Type therapy note (daily note)  -CS     Mode of Treatment occupational therapy  -CS       Row Name 09/22/23 0840          General Information    Patient Profile Reviewed yes  -CS     Existing Precautions/Restrictions fall;hip, anterior  -CS       Row Name 09/22/23 0817          Cognition    Orientation Status (Cognition) oriented x 4  -CS       Row Name 09/22/23 0825          Safety Issues, Functional Mobility    Impairments Affecting  Function (Mobility) strength;endurance/activity tolerance  -               User Key  (r) = Recorded By, (t) = Taken By, (c) = Cosigned By      Initials Name Provider Type     Kristen Marrero COTA Occupational Therapist Assistant                     Mobility/ADL's       Row Name 09/22/23 0840          Transfers    Transfers sit-stand transfer;stand-sit transfer;toilet transfer  -       Row Name 09/22/23 0840          Sit-Stand Transfer    Sit-Stand [place] (Transfers) minimum assist (75% patient effort)  x5  -     Assistive Device (Sit-Stand Transfers) walker, front-wheeled  -       Row Name 09/22/23 0840          Stand-Sit Transfer    Stand-Sit [place] (Transfers) minimum assist (75% patient effort)  x5  -CS     Assistive Device (Stand-Sit Transfers) walker, front-wheeled  -       Row Name 09/22/23 0840          Toilet Transfer    Type (Toilet Transfer) sit-stand;stand-sit  -     [place] Level (Toilet Transfer) moderate assist (50% patient effort)  -     Assistive Device (Toilet Transfer) commode chair;walker, front-wheeled  -       Row Name 09/22/23 0840          Functional Mobility    Functional Mobility- Ind. Level minimum assist (75% patient effort)  -     Functional Mobility- Device walker, front-wheeled  -     Functional Mobility-Distance (Feet) 8  -       Row Name 09/22/23 0840          Activities of Daily Living    BADL Assessment/Intervention bathing;upper body dressing;lower body dressing;grooming;toileting  -       Row Name 09/22/23 0840          Mobility    Extremity Weight-bearing Status right lower extremity  -     Right Lower Extremity (Weight-bearing Status) weight-bearing as tolerated (WBAT)  -       Row Name 09/22/23 0840          Lower Body Dressing Assessment/Training    [place] Level (Lower Body Dressing) lower body dressing skills;doff;don;pants/bottoms;socks;minimum assist (75% patient effort)  underwear  -     Assistive Devices (Lower  Body Dressing) reacher;sock-aid  -CS     Position (Lower Body Dressing) supported sitting;supported standing  -CS       Row Name 09/22/23 0840          Grooming Assessment/Training    [place] Level (Grooming) grooming skills;hair care, combing/brushing;oral care regimen;wash face, hands;other (see comments)  apply deodorant/lotion  -CS     Position (Grooming) supported sitting  -CS       Row Name 09/22/23 0840          Bathing Assessment/Intervention    [place] Level (Bathing) bathing skills;upper body;supervision;lower body;minimum assist (75% patient effort)  -CS     Assistive Devices (Bathing) bath mitt;long-handled sponge  -CS     Position (Bathing) supported sitting;supported standing  -CS       Row Name 09/22/23 0840          Upper Body Dressing Assessment/Training    [place] Level (Upper Body Dressing) upper body dressing skills;doff;don;pajama/robe;pull-over garment;set up  HG  -CS     Position (Upper Body Dressing) supported sitting  -CS       Row Name 09/22/23 0840          Toileting Assessment/Training    [place] Level (Toileting) toileting skills;perform perineal hygiene;contact guard assist  -CS     Position (Toileting) supported standing  -CS               User Key  (r) = Recorded By, (t) = Taken By, (c) = Cosigned By      Initials Name Provider Type    CS Kristen Marrero COTA Occupational Therapist Assistant                   Obj/Interventions    No documentation.                  Goals/Plan       Row Name 09/22/23 0840          Transfer Goal 1 (OT)    Activity/Assistive Device (Transfer Goal 1, OT) toilet  -CS     [place] Level/Cues Needed (Transfer Goal 1, OT) standby assist  -CS     Time Frame (Transfer Goal 1, OT) long term goal (LTG);by discharge  -CS     Progress/Outcome (Transfer Goal 1, OT) goal not met  -CS       Row Name 09/22/23 0840          Bathing Goal 1 (OT)    Activity/Device (Bathing Goal 1, OT) lower body bathing  -CS     [place] Level/Cues Needed  (Bathing Goal 1, OT) moderate assist (50-74% patient effort)  -CS     Time Frame (Bathing Goal 1, OT) long term goal (LTG);by discharge  -CS     Strategies/Barriers (Bathing Goal 1, OT) Spouse can assist as needed  -CS     Progress/Outcomes (Bathing Goal 1, OT) goal not met  -CS       Row Name 09/22/23 0840          Dressing Goal 1 (OT)    Activity/Device (Dressing Goal 1, OT) lower body dressing  -CS     [place]/Cues Needed (Dressing Goal 1, OT) minimum assist (75% or more patient effort)  -CS     Time Frame (Dressing Goal 1, OT) long term goal (LTG);by discharge  -CS     Strategies/Barriers (Dressing Goal 1, OT) Spouse can assist as needed  -CS     Progress/Outcome (Dressing Goal 1, OT) goal not met  -CS       Row Name 09/22/23 0840          Strength Goal 1 (OT)    Strength Goal 1 (OT) Pt will be (I) with BUE strengthening HEP after demonstration for increased strength required for ADLs and mobility.  -CS     Time Frame (Strength Goal 1, OT) long term goal (LTG);by discharge  -CS     Progress/Outcome (Strength Goal 1, OT) goal not met  -CS               User Key  (r) = Recorded By, (t) = Taken By, (c) = Cosigned By      Initials Name Provider Type    CS Kristen aMrrero COTA Occupational Therapist Assistant                   Clinical Impression       Row Name 09/22/23 0840          Pain Assessment    Pretreatment Pain Rating 4/10  -CS     Posttreatment Pain Rating 8/10  -CS     Pain Location - Side/Orientation Right  -CS     Pain Location - hip  -CS     Pain Intervention(s) Medication (See MAR);Rest;Repositioned  -CS       Row Name 09/22/23 0840          Plan of Care Review    Plan of Care Reviewed With patient  -CS     Progress improving  -CS     Outcome Evaluation Pt tolerated tx well this date. Pt was sitting up in chair upon arrival. Pt was min A with sit-stand-sit t/f x 5. Pt was mod A with toilet t/f. Pt completed an full ADL. No goals met this tx. Continue OT POC.  -CS       Row Name 09/22/23 0840           Therapy Assessment/Plan (OT)    Rehab Potential (OT) good, to achieve stated therapy goals  -CS     Criteria for Skilled Therapeutic Interventions Met (OT) yes;meets criteria  -CS     Therapy Frequency (OT) daily  -CS       Row Name 09/22/23 0840          Therapy Plan Review/Discharge Plan (OT)    Anticipated Discharge Disposition (OT) home with 24/7 care;home with home health;other (see comments);inpatient rehabilitation facility  -CS       Row Name 09/22/23 0840          Vital Signs    Pre Patient Position Sitting  -CS     Post Patient Position Sitting  -CS       Row Name 09/22/23 0840          Positioning and Restraints    Pre-Treatment Position sitting in chair/recliner  -CS     Post Treatment Position chair  -CS     In Chair call light within reach;encouraged to call for assist;exit alarm on;reclined  -CS               User Key  (r) = Recorded By, (t) = Taken By, (c) = Cosigned By      Initials Name Provider Type    CS Kristen Marrero COTA Occupational Therapist Assistant                   Outcome Measures       Row Name 09/22/23 0840          How much help from another is currently needed...    Putting on and taking off regular lower body clothing? 3  -CS     Bathing (including washing, rinsing, and drying) 3  -CS     Toileting (which includes using toilet bed pan or urinal) 3  -CS     Putting on and taking off regular upper body clothing 4  -CS     Taking care of personal grooming (such as brushing teeth) 4  -CS     Eating meals 4  -CS     AM-PAC 6 Clicks Score (OT) 21  -CS       Row Name 09/22/23 1000          How much help from another person do you currently need...    Turning from your back to your side while in flat bed without using bedrails? 2  -DN     Moving from lying on back to sitting on the side of a flat bed without bedrails? 2  -DN     Moving to and from a bed to a chair (including a wheelchair)? 2  -DN     Standing up from a chair using your arms (e.g., wheelchair, bedside chair)? 2   -DN     Climbing 3-5 steps with a railing? 1  -DN     To walk in hospital room? 3  -DN     AM-PAC 6 Clicks Score (PT) 12  -DN     Highest level of mobility 4 --> Transferred to chair/commode  -DN               User Key  (r) = Recorded By, (t) = Taken By, (c) = Cosigned By      Initials Name Provider Type    Judi Landon, RN Registered Nurse    Kristen Blood COTA Occupational Therapist Assistant                    Occupational Therapy Education       Title: PT OT SLP Therapies (In Progress)       Topic: Occupational Therapy (In Progress)       Point: ADL training (Done)       Description:   Instruct learner(s) on proper safety adaptation and remediation techniques during self care or transfers.   Instruct in proper use of assistive devices.                  Learning Progress Summary             Patient Acceptance, E,TB, VU by CM at 9/20/2023 1231    Comment: OT POC, Role of OT, d/c recommendations, AD/DME needs   Family Acceptance, E,TB, VU by CM at 9/20/2023 1231    Comment: OT POC, Role of OT, d/c recommendations, AD/DME needs                         Point: Home exercise program (Not Started)       Description:   Instruct learner(s) on appropriate technique for monitoring, assisting and/or progressing therapeutic exercises/activities.                  Learner Progress:  Not documented in this visit.              Point: Precautions (Done)       Description:   Instruct learner(s) on prescribed precautions during self-care and functional transfers.                  Learning Progress Summary             Patient Acceptance, E,TB, VU by CM at 9/20/2023 1231    Comment: OT POC, Role of OT, d/c recommendations, AD/DME needs   Family Acceptance, E,TB, VU by CM at 9/20/2023 1231    Comment: OT POC, Role of OT, d/c recommendations, AD/DME needs                         Point: Body mechanics (Not Started)       Description:   Instruct learner(s) on proper positioning and spine alignment during self-care,  functional mobility activities and/or exercises.                  Learner Progress:  Not documented in this visit.                              User Key       Initials Effective Dates Name Provider Type Discipline     11/18/22 -  Emily Lehman OT Occupational Therapist OT                  OT Recommendation and Plan  Therapy Frequency (OT): daily  Plan of Care Review  Plan of Care Reviewed With: patient  Progress: improving  Outcome Evaluation: Pt tolerated tx well this date. Pt was sitting up in chair upon arrival. Pt was min A with sit-stand-sit t/f x 5. Pt was mod A with toilet t/f. Pt completed an full ADL. No goals met this tx. Continue OT POC.     Time Calculation:         Time Calculation- OT       Row Name 09/22/23 1053             Time Calculation- OT    OT Start Time 0840  -CS      OT Stop Time 0958  -CS      OT Time Calculation (min) 78 min  -CS      Total Timed Code Minutes- OT 78 minute(s)  -CS      OT Received On 09/22/23  -CS         Timed Charges    02502 - OT Self Care/Mgmt Minutes 78  -CS         Total Minutes    Timed Charges Total Minutes 78  -CS       Total Minutes 78  -CS                User Key  (r) = Recorded By, (t) = Taken By, (c) = Cosigned By      Initials Name Provider Type    CS Kristen Marrero COTA Occupational Therapist Assistant                  Therapy Charges for Today       Code Description Service Date Service Provider Modifiers Qty    [id number] HC OT SELF CARE/MGMT/TRAIN EA 15 MIN 9/22/2023 Kristen Marrero COTA GO 5                 CLEMENTE Sepulveda  9/22/2023

## 2023-09-23 LAB
ALBUMIN SERPL-MCNC: 3 G/DL (ref 3.5–5.2)
ALBUMIN/GLOB SERPL: 1 G/DL
ALP SERPL-CCNC: 65 U/L (ref 39–117)
ALT SERPL W P-5'-P-CCNC: <5 U/L (ref 1–33)
ANION GAP SERPL CALCULATED.3IONS-SCNC: 10 MMOL/L (ref 5–15)
ANION GAP SERPL CALCULATED.3IONS-SCNC: 12 MMOL/L (ref 5–15)
AST SERPL-CCNC: 18 U/L (ref 1–32)
BILIRUB SERPL-MCNC: 0.4 MG/DL (ref 0–1.2)
BUN SERPL-MCNC: 20 MG/DL (ref 8–23)
BUN SERPL-MCNC: 23 MG/DL (ref 8–23)
BUN/CREAT SERPL: 20.4 (ref 7–25)
BUN/CREAT SERPL: 23 (ref 7–25)
CALCIUM SPEC-SCNC: 9 MG/DL (ref 8.6–10.5)
CALCIUM SPEC-SCNC: 9.4 MG/DL (ref 8.6–10.5)
CHLORIDE SERPL-SCNC: 100 MMOL/L (ref 98–107)
CHLORIDE SERPL-SCNC: 99 MMOL/L (ref 98–107)
CO2 SERPL-SCNC: 22 MMOL/L (ref 22–29)
CO2 SERPL-SCNC: 24 MMOL/L (ref 22–29)
CREAT SERPL-MCNC: 0.98 MG/DL (ref 0.57–1)
CREAT SERPL-MCNC: 1 MG/DL (ref 0.57–1)
EGFRCR SERPLBLD CKD-EPI 2021: 55 ML/MIN/1.73
EGFRCR SERPLBLD CKD-EPI 2021: 56.3 ML/MIN/1.73
GLOBULIN UR ELPH-MCNC: 2.9 GM/DL
GLUCOSE BLDC GLUCOMTR-MCNC: 132 MG/DL (ref 70–130)
GLUCOSE BLDC GLUCOMTR-MCNC: 136 MG/DL (ref 70–130)
GLUCOSE BLDC GLUCOMTR-MCNC: 156 MG/DL (ref 70–130)
GLUCOSE SERPL-MCNC: 121 MG/DL (ref 65–99)
GLUCOSE SERPL-MCNC: 182 MG/DL (ref 65–99)
MAGNESIUM SERPL-MCNC: 2 MG/DL (ref 1.6–2.4)
POTASSIUM SERPL-SCNC: 4.3 MMOL/L (ref 3.5–5.2)
POTASSIUM SERPL-SCNC: 4.7 MMOL/L (ref 3.5–5.2)
PROT SERPL-MCNC: 5.9 G/DL (ref 6–8.5)
SODIUM SERPL-SCNC: 133 MMOL/L (ref 136–145)
SODIUM SERPL-SCNC: 134 MMOL/L (ref 136–145)

## 2023-09-23 PROCEDURE — 97530 THERAPEUTIC ACTIVITIES: CPT

## 2023-09-23 PROCEDURE — 97535 SELF CARE MNGMENT TRAINING: CPT

## 2023-09-23 PROCEDURE — 80053 COMPREHEN METABOLIC PANEL: CPT | Performed by: NURSE PRACTITIONER

## 2023-09-23 PROCEDURE — 25010000002 HEPARIN (PORCINE) PER 1000 UNITS: Performed by: ORTHOPAEDIC SURGERY

## 2023-09-23 PROCEDURE — 97110 THERAPEUTIC EXERCISES: CPT

## 2023-09-23 PROCEDURE — 83735 ASSAY OF MAGNESIUM: CPT | Performed by: NURSE PRACTITIONER

## 2023-09-23 PROCEDURE — 97116 GAIT TRAINING THERAPY: CPT

## 2023-09-23 PROCEDURE — 82948 REAGENT STRIP/BLOOD GLUCOSE: CPT

## 2023-09-23 PROCEDURE — 99024 POSTOP FOLLOW-UP VISIT: CPT | Performed by: ORTHOPAEDIC SURGERY

## 2023-09-23 PROCEDURE — 25010000002 HYDROMORPHONE 1 MG/ML SOLUTION: Performed by: ORTHOPAEDIC SURGERY

## 2023-09-23 RX ORDER — NITROGLYCERIN 0.4 MG/1
0.4 TABLET SUBLINGUAL
Status: DISCONTINUED | OUTPATIENT
Start: 2023-09-23 | End: 2023-09-29 | Stop reason: HOSPADM

## 2023-09-23 RX ADMIN — Medication 10 ML: at 08:34

## 2023-09-23 RX ADMIN — LABETALOL HYDROCHLORIDE 100 MG: 100 TABLET, FILM COATED ORAL at 21:27

## 2023-09-23 RX ADMIN — Medication 10 ML: at 00:09

## 2023-09-23 RX ADMIN — OXYCODONE HYDROCHLORIDE 5 MG: 5 TABLET ORAL at 00:10

## 2023-09-23 RX ADMIN — ACETAMINOPHEN 650 MG: 325 TABLET, FILM COATED ORAL at 09:33

## 2023-09-23 RX ADMIN — HYDROMORPHONE HYDROCHLORIDE 0.5 MG: 1 INJECTION, SOLUTION INTRAMUSCULAR; INTRAVENOUS; SUBCUTANEOUS at 02:54

## 2023-09-23 RX ADMIN — LABETALOL HYDROCHLORIDE 100 MG: 100 TABLET, FILM COATED ORAL at 08:34

## 2023-09-23 RX ADMIN — OXYCODONE HYDROCHLORIDE 5 MG: 5 TABLET ORAL at 05:56

## 2023-09-23 RX ADMIN — OXYCODONE HYDROCHLORIDE 5 MG: 5 TABLET ORAL at 21:27

## 2023-09-23 RX ADMIN — ACETAMINOPHEN 650 MG: 325 TABLET, FILM COATED ORAL at 21:27

## 2023-09-23 RX ADMIN — ACETAMINOPHEN 650 MG: 325 TABLET, FILM COATED ORAL at 15:50

## 2023-09-23 RX ADMIN — OXYCODONE HYDROCHLORIDE 5 MG: 5 TABLET ORAL at 12:38

## 2023-09-23 RX ADMIN — ROSUVASTATIN CALCIUM 10 MG: 10 TABLET, FILM COATED ORAL at 21:27

## 2023-09-23 RX ADMIN — AMLODIPINE BESYLATE 5 MG: 5 TABLET ORAL at 08:34

## 2023-09-23 RX ADMIN — HYDROMORPHONE HYDROCHLORIDE 0.5 MG: 1 INJECTION, SOLUTION INTRAMUSCULAR; INTRAVENOUS; SUBCUTANEOUS at 09:33

## 2023-09-23 RX ADMIN — DOCUSATE SODIUM 50 MG AND SENNOSIDES 8.6 MG 2 TABLET: 8.6; 5 TABLET, FILM COATED ORAL at 08:34

## 2023-09-23 RX ADMIN — HEPARIN SODIUM 5000 UNITS: 5000 INJECTION INTRAVENOUS; SUBCUTANEOUS at 05:50

## 2023-09-23 RX ADMIN — HEPARIN SODIUM 5000 UNITS: 5000 INJECTION INTRAVENOUS; SUBCUTANEOUS at 15:50

## 2023-09-23 RX ADMIN — ACETAMINOPHEN 650 MG: 325 TABLET, FILM COATED ORAL at 05:50

## 2023-09-23 RX ADMIN — METOPROLOL TARTRATE 5 MG: 5 INJECTION INTRAVENOUS at 00:09

## 2023-09-23 RX ADMIN — ASPIRIN 81 MG: 81 TABLET, CHEWABLE ORAL at 08:34

## 2023-09-23 RX ADMIN — Medication 10 ML: at 21:27

## 2023-09-23 RX ADMIN — HEPARIN SODIUM 5000 UNITS: 5000 INJECTION INTRAVENOUS; SUBCUTANEOUS at 21:27

## 2023-09-23 RX ADMIN — DOCUSATE SODIUM 50 MG AND SENNOSIDES 8.6 MG 2 TABLET: 8.6; 5 TABLET, FILM COATED ORAL at 21:27

## 2023-09-23 NOTE — PLAN OF CARE
Goal Outcome Evaluation:  Plan of Care Reviewed With: patient        Progress: no change  Outcome Evaluation: Patient complaining of increased pain this shift, also waking up feeling like her heart is beating real fast and BP elevated, MD notified and metoprolol given, EKG showed no change, pt rested after dose of dilaudid, will continue to monitor

## 2023-09-23 NOTE — THERAPY TREATMENT NOTE
Acute Care - Physical Therapy Treatment Note  [facility]     Patient Name: Alexia Lopez  : 1935  MRN: [id number]  Today's Date: 2023      Visit Dx:     ICD-10-CM ICD-9-CM   1. Traumatic closed displaced fracture of neck of right femur, initial encounter  S72.001A 820.8   2. Coronary arteriosclerosis in native artery  I25.10 414.01   3. S/P CABG (coronary artery bypass graft)  Z95.1 V45.81   4. Benign essential hypertension  I10 401.1   5. Controlled type 2 diabetes mellitus without complication, with long-term current use of insulin  E11.9 250.00    Z79.4 V58.67   6. Fall from slip, trip, or stumble, initial encounter  W01.0XXA E885.9   7. Closed fracture of right hip, initial encounter  S72.001A 820.8   8. Impaired functional mobility, balance, gait, and endurance [Z74.09 (ICD-10-CM)]  Z74.09 V49.89   9. Impaired mobility and ADLs [Z74.09, Z78.9 (ICD-10-CM)]  Z74.09 V49.89    Z78.9      Patient Active Problem List   Diagnosis    Coronary arteriosclerosis in native artery    S/P CABG (coronary artery bypass graft)    Benign essential hypertension    Hypercholesterolemia    Type 2 diabetes mellitus    Nuclear cataract    Controlled type 2 diabetes mellitus without complication    Pernicious anemia    Arthritis of knee    Chronic sinusitis    Encounter for screening mammogram for malignant neoplasm of breast    Generalized osteoarthritis    Iron deficiency anemia    Palpitations    Myocardial infarction    Hypertension    Hyperlipemia    History of echocardiogram    Diabetes mellitus    Coronary artery disease    Cataract    Chronic pain of both knees    Primary osteoarthritis of both knees    Claudication of lower extremity    Precordial pain    Fall from slip, trip, or stumble, initial encounter    Traumatic closed displaced fracture of neck of right femur, initial encounter    Hip fracture     Past Medical History:   Diagnosis Date    Cataract     Coronary artery disease     Diabetes  mellitus     Type 2 diabetes mellitus - without retinopathy       History of echocardiogram 05/12/2014    Normal LV systolic function with EF of 55% with mild hypokinesis. Diastolic relaxation abnormality of left ventricle. Mild tricuspid regurg. Trace mitral regurg    Hyperlipemia     Hypertension     Myocardial infarction      Past Surgical History:   Procedure Laterality Date    BACK SURGERY      CARDIAC CATHETERIZATION  05/12/2014    Severe multivessel CAD with critical lesions noted in the LAD coronary artery, left circumflex coronary artery and RCA. Left ventriculogram no performed in view of elevated left ventricular end-diastolic pressure    CATARACT EXTRACTION W/ INTRAOCULAR LENS IMPLANT Left 2/2/2018    Procedure: CATARACT PHACO EXTRACTION WITH INTRAOCULAR LENS IMPLANT;  Surgeon: Gagan Lizarraga MD;  Location: [facility];  Service:     CATARACT EXTRACTION W/ INTRAOCULAR LENS IMPLANT Right 2/9/2018    Procedure: CATARACT PHACO EXTRACTION WITH INTRAOCULAR LENS IMPLANT;  Surgeon: Gagan Lizarraga MD;  Location: [facility];  Service:     CORONARY ARTERY BYPASS GRAFT      X 3 with LIMA to LAD, SVG to OMB and SVG to PDA.    DILATATION AND CURETTAGE      OTHER SURGICAL HISTORY  05/06/2014    INCISION OF EARDRUM GENERAL ANESTHETIC 71197 (1)    Bilateral myringotomy with tubes.     SINUS SURGERY      TONSILLECTOMY      TONSILLECTOMY AND ADENOIDECTOMY      TUBAL ABDOMINAL LIGATION  03/14/1978     PT Assessment (last 12 hours)       PT Evaluation and Treatment       Row Name 09/23/23 1545          Physical Therapy Time and Intention    Subjective Information complains of;pain  -LN     Document Type therapy note (daily note)  -LN     Mode of Treatment physical therapy;individual therapy  -LN     Patient Effort good  -LN       Row Name 09/23/23 1545          Pain    Pretreatment Pain Rating 4/10  -LN     Posttreatment Pain Rating 5/10  -LN     Pain Location - Side/Orientation Right  -LN     Pain Location -  hip  -LN     Pain Intervention(s) Medication (See MAR);Nursing Notified  -LN       Row Name 09/23/23 1545          Cognition    Affect/Mental Status (Cognition) WFL  -LN     Orientation Status (Cognition) oriented x 4  -LN       Row Name 09/23/23 1545          Range of Motion Comprehensive    General Range of Motion bilateral lower extremity ROM WFL  -LN       [facility] Name 09/23/23 1545          Mobility    Extremity Weight-bearing Status right lower extremity  -LN     Right Lower Extremity (Weight-bearing Status) weight-bearing as tolerated (WBAT)  -LN       Row Name 09/23/23 1545          Bed Mobility    Supine-Sit [place] (Bed Mobility) not tested  -LN     Sit-Supine [place] (Bed Mobility) minimum assist (75% patient effort)  -LN       Row Name 09/23/23 1545          Transfers    Transfers sit-stand transfer;stand-sit transfer;chair-bed transfer  -LN       Row Name 09/23/23 1545          Chair-Bed Transfer    Chair-Bed [place] (Transfers) minimum assist (75% patient effort)  -LN     Assistive Device (Chair-Bed Transfers) walker, front-wheeled  -LN       Row Name 09/23/23 1545          Sit-Stand Transfer    Sit-Stand [place] (Transfers) minimum assist (75% patient effort)  -LN     Assistive Device (Sit-Stand Transfers) walker, front-wheeled  -LN       Row Name 09/23/23 1545          Stand-Sit Transfer    Stand-Sit [place] (Transfers) minimum assist (75% patient effort)  -LN     Assistive Device (Stand-Sit Transfers) walker, front-wheeled  -LN       Row Name 09/23/23 1545          Toilet Transfer    Type (Toilet Transfer) sit-stand;stand-sit  -LN     [place] Level (Toilet Transfer) minimum assist (75% patient effort)  -LN     Assistive Device (Toilet Transfer) commode, bedside without drop arms;walker, front-wheeled  -LN     Comment, (Toilet Transfer) sba pericare.min to assist pulling up pants  -       Row Name 09/23/23 1545          Gait/Stairs (Locomotion)    Gait/Stairs Locomotion  gait/ambulation assistive device;gait/ambulation independence;distance ambulated;gait pattern;gait deviations;stairs negotiation;maintains weight-bearing status  -LN     [place] Level (Gait) minimum assist (75% patient effort)  -LN     Assistive Device (Gait) walker, front-wheeled  -LN     Distance in Feet (Gait) 70  -LN     Pattern (Gait) 3-point;step-to  -LN     Deviations/Abnormal Patterns (Gait) antalgic;delvin decreased;gait speed decreased;stride length decreased  -LN       Row Name 09/23/23 1545          Safety Issues, Functional Mobility    Impairments Affecting Function (Mobility) strength;endurance/activity tolerance  -LN       Row Name             Wound 09/19/23 1848 Right hip Incision    Wound - Properties Group Placement Date: 09/19/23  -SP Placement Time: 1848  -SP Present on Hospital Admission: N  -SP Side: Right  -SP Location: hip  -SP Primary Wound Type: Incision  -SP Additional Comments: prineo  -SP    Retired Wound - Properties Group Placement Date: 09/19/23  -SP Placement Time: 1848 -SP Present on Hospital Admission: N  -SP Side: Right  -SP Location: hip  -SP Primary Wound Type: Incision  -SP Additional Comments: prineo  -SP    Retired Wound - Properties Group Date first assessed: 09/19/23  -SP Time first assessed: 1848  -SP Present on Hospital Admission: N  -SP Side: Right  -SP Location: hip  -SP Primary Wound Type: Incision  -SP Additional Comments: prineo  -SP      Row Name 09/23/23 1545          Plan of Care Review    Plan of Care Reviewed With patient  -LN     Outcome Evaluation sit-stand-sit min of 1,sit-sup min of 1 for r le;amb 70' with rw and min of 1  -LN       Row Name 09/23/23 1545          Vital Signs    Pre Patient Position Sitting  -LN     Intra Patient Position Standing  -LN     Post Patient Position Supine  -LN       Row Name 09/23/23 1545          Positioning and Restraints    In Bed notified nsg;supine;call light within reach;encouraged to call for assist;exit alarm  on;with family/caregiver  -LN       Row Name 09/23/23 1545          Therapy Assessment/Plan (PT)    Rehab Potential (PT) good, to achieve stated therapy goals  -LN     Criteria for Skilled Interventions Met (PT) yes;skilled treatment is necessary  -LN     Therapy Frequency (PT) daily  -LN       Row Name 09/23/23 1545          Bed Mobility Goal 1 (PT)    Activity/Assistive Device (Bed Mobility Goal 1, PT) sit to supine/supine to sit  -LN     [place] Level/Cues Needed (Bed Mobility Goal 1, PT) independent  -LN     Time Frame (Bed Mobility Goal 1, PT) by discharge  -LN     Strategies/Barriers (Bed Mobility Goal 1, PT) HOB flat, no bed rails.  -LN     Progress/Outcomes (Bed Mobility Goal 1, PT) goal not met  -LN       Row Name 09/23/23 1545          Transfer Goal 1 (PT)    Activity/Assistive Device (Transfer Goal 1, PT) sit-to-stand/stand-to-sit;bed-to-chair/chair-to-bed;walker, rolling  -LN     [place] Level/Cues Needed (Transfer Goal 1, PT) modified independence  -LN     Time Frame (Transfer Goal 1, PT) by discharge  -LN     Strategies/Barriers (Transfers Goal 1, PT) R hip fx, SHEELA, anterior, WBAT.  -LN     Progress/Outcome (Transfer Goal 1, PT) goal not met  -LN       Row Name 09/23/23 1545          Gait Training Goal 1 (PT)    Activity/Assistive Device (Gait Training Goal 1, PT) walker, rolling  -LN     [place] Level (Gait Training Goal 1, PT) contact guard required  -LN     Distance (Gait Training Goal 1, PT) 25' x 2.  -LN     Time Frame (Gait Training Goal 1, PT) by discharge  -LN     Strategies/Barriers (Gait Training Goal 1, PT) R hip fx, SHEELA, anterior, WBAT.  -LN     Progress/Outcome (Gait Training Goal 1, PT) goal partially met  -LN       Row Name 09/23/23 1545          Stairs Goal 1 (PT)    Activity/Assistive Device (Stairs Goal 1, PT) using handrail, right;using handrail, left  -LN     [place] Level/Cues Needed (Stairs Goal 1, PT) contact guard required  -LN     Number of Stairs (Stairs  Goal 1, PT) 2 steps.  -LN     Time Frame (Stairs Goal 1, PT) by discharge  -LN     Strategies/Barriers (Stairs Goal 1, PT) R hip fx, SHEELA, anterior, WBAT.  -LN     Progress/Outcome (Stairs Goal 1, PT) goal not met  -LN               User Key  (r) = Recorded By, (t) = Taken By, (c) = Cosigned By      Initials Name Provider Type    LN Janna Whiteside PTA Physical Therapist Assistant    Alina Ho RN Registered Nurse                    Physical Therapy Education       Title: PT OT SLP Therapies (In Progress)       Topic: Physical Therapy (In Progress)       Point: Mobility training (In Progress)       Learning Progress Summary             Patient Acceptance, E, NR by  at 9/20/2023 0942    Comment: PT POC, rehab process, hand placement with transfers, use of FWW, proper gait mechanics.                         Point: Home exercise program (Not Started)       Learner Progress:  Not documented in this visit.              Point: Body mechanics (Not Started)       Learner Progress:  Not documented in this visit.              Point: Precautions (Not Started)       Learner Progress:  Not documented in this visit.                              User Key       Initials Effective Dates Name Provider Type Discipline     07/11/23 -  John Bazan, PT Physical Therapist PT                  PT Recommendation and Plan  Anticipated Discharge Disposition (PT): home with assist, home with outpatient therapy services, inpatient rehabilitation facility, skilled nursing facility  Therapy Frequency (PT): daily  Plan of Care Reviewed With: patient  Outcome Evaluation: sit-stand-sit min of 1,sit-sup min of 1 for r le;amb 70' with rw and min of 1   Outcome Measures       Row Name 09/23/23 1545 09/22/23 1356 09/21/23 1019       How much help from another person do you currently need...    Turning from your back to your side while in flat bed without using bedrails? 3  -LN 2  -NIDA 2  -NIDA    Moving from lying on back to sitting on the  side of a flat bed without bedrails? 3  -LN 2  -NIDA 2  -NIDA    Moving to and from a bed to a chair (including a wheelchair)? 3  -LN 2  -NIDA 2  -NIDA    Standing up from a chair using your arms (e.g., wheelchair, bedside chair)? 3  -LN 2  -NIDA 2  -NIDA    Climbing 3-5 steps with a railing? 1  -LN 1  -NIDA 1  -NIDA    To walk in hospital room? 3  -LN 3  -NIDA 3  -NIDA    AM-PAC 6 Clicks Score (PT) 16  -LN 12  -NIDA 12  -NIDA       Functional Assessment    Outcome Measure Options AM-PAC 6 Clicks Basic Mobility (PT)  -LN AM-PAC 6 Clicks Basic Mobility (PT)  -NIDA AM-PAC 6 Clicks Basic Mobility (PT)  -NIDA              User Key  (r) = Recorded By, (t) = Taken By, (c) = Cosigned By      Initials Name Provider Type    Eugenio Wasserman PTA Physical Therapist Assistant    LN Janna Whiteside PTA Physical Therapist Assistant                     Time Calculation:    PT Charges       Row Name 09/23/23 1726             Time Calculation    Start Time 1545  -LN      Stop Time 1615  -LN      Time Calculation (min) 30 min  -LN      PT Received On 09/23/23  -LN         Time Calculation- PT    Total Timed Code Minutes- PT 30 minute(s)  -LN                User Key  (r) = Recorded By, (t) = Taken By, (c) = Cosigned By      Initials Name Provider Type    Janna Thompson PTA Physical Therapist Assistant                  Therapy Charges for Today       Code Description Service Date Service Provider Modifiers Qty    05647955527 HC GAIT TRAINING EA 15 MIN 9/23/2023 Janna Whiteside PTA GP 1    73144922079 HC PT THERAPEUTIC ACT EA 15 MIN 9/23/2023 Janna Whiteside, BRITNEY GP 1            PT G-Codes  Outcome Measure Options: AM-PAC 6 Clicks Basic Mobility (PT)  AM-PAC 6 Clicks Score (PT): 16  AM-PAC 6 Clicks Score (OT): 21    Janna Whiteside PTA  9/23/2023

## 2023-09-23 NOTE — PROGRESS NOTES
LOS: 0 days     Patient Care Team:  Munir Rodriguez MD as PCP - General  Praveen Rivas APRN as Nurse Practitioner (Orthopedic Surgery)      Subjective     Post ureteral stent.  For stone with infection    Objective       Vital Signs  Temp:  [96.2 °F (35.7 °C)-98.7 °F (37.1 °C)] 98.4 °F (36.9 °C)  Heart Rate:  [] 69  Resp:  [18-20] 18  BP: (142-192)/(64-80) 142/64    Physical Exam:        General Appearance:  No acute distress     Respiratory:    UNLABORED RESPIRATIONS.     Abdomen:     SOFT.       Genitourinary: Levi draining well     Rectal:     DEFERRED       Results Review:       Imaging Results (Last 24 Hours)       ** No results found for the last 24 hours. **          Lab Results (last 24 hours)       Procedure Component Value Units Date/Time    POC Glucose Once [[id number]]  (Abnormal) Collected: 09/23/23 1137    Specimen: Blood Updated: 09/23/23 1150     Glucose 156 mg/dL      Comment: RN NotifiedOperator: [id number] FRANCISCOJALEESA JO ANNITAMeter ID: [id number]       POC Glucose Once [[id number]]  (Abnormal) Collected: 09/23/23 0737    Specimen: Blood Updated: 09/23/23 0830     Glucose 132 mg/dL      Comment: RN NotifiedOperator: [id number] GABRIEL OPALMeter ID: [id number]       POC Glucose Once [[id number]]  (Abnormal) Collected: 09/22/23 1629    Specimen: Blood Updated: 09/22/23 1652     Glucose 180 mg/dL      Comment: RN NotifiedOperator: [id number] WOODS DEISIFERMeter ID: [id number]                 I reviewed the patient's new clinical results.  I reviewed the patient's new imaging results and agree with the interpretation.  I reviewed the patient's other test results and agree with the interpretation        Assessment:    Ureteral stone with stent UTI possible resolving abscess    Plan:     Renal ultrasound      Cas Mandujano MD  09/23/23  13:21 CDT

## 2023-09-23 NOTE — PLAN OF CARE
Goal Outcome Evaluation:  Plan of Care Reviewed With: patient        Progress: improving  Outcome Evaluation: Pt tolerated tx well this date. Pt was min A with sup-sit. Pt was mod A with sit-stand; min A with stand-sit. Pt was min A with bed-chair t/f. Pt completed an ADL. Pt completed an few UE ther ex. No goals met this tx. Continue OT POC.      Anticipated Discharge Disposition (OT): home with 24/7 care, home with home health, other (see comments), inpatient rehabilitation facility

## 2023-09-23 NOTE — PLAN OF CARE
Goal Outcome Evaluation:  Plan of Care Reviewed With: patient           Outcome Evaluation: sit-stand-sit min of 1,sit-sup min of 1 for r le;amb 70' with rw and min of 1      Anticipated Discharge Disposition (PT): home with assist, home with outpatient therapy services, inpatient rehabilitation facility, skilled nursing facility

## 2023-09-23 NOTE — PROGRESS NOTES
ORTHOPEDIC PROGRESS NOTE:    Name:  Alexia Lopez  Date:    2023  Date of admission:  2023    Post op day:  4 Days Post-Op  Procedure:    Procedure(s) (LRB):  RIGHT HIP BIPOLAR ANTERIOR APPROACH (Right)    Subjective:  No new complaints  Pain controlled.    Vitals:     Vitals:    23 0938   BP: 142/64   Pulse:    Resp:    Temp:    SpO2:       Temp (24hrs), Av °F (36.7 °C), Min:96.2 °F (35.7 °C), Max:98.7 °F (37.1 °C)    Exam:  Awake and alert  Toes up and down  Incision clean and dry  Calves soft and nontender    ASSESSMENT:  Active Hospital Problems    Diagnosis  POA    **Traumatic closed displaced fracture of neck of right femur, initial encounter [S72.001A]  Yes    Fall from slip, trip, or stumble, initial encounter [W01.0XXA]  Yes    Hip fracture [S72.009A]  Yes    Controlled type 2 diabetes mellitus without complication [E11.9]  Yes    S/P CABG (coronary artery bypass graft) [Z95.1]  Not Applicable    Benign essential hypertension [I10]  Yes    Coronary arteriosclerosis in native artery [I25.10]  Yes       PLAN:    No ortho changes.  S/p bipolar endoprosthesis right hip  Mobilize with PT/OT  Slowly progress weight bearing as tolerated.  DVT prophylaxis for 30 days (start heparin Subq today and will switch to enoxaparin/warfarin/or eliquis once disposition is known).  Anticipate disposition in 1-2 days.  Disposition:  home with home yared (unlikely), ARU best option, SNF    23 at 07:09 CDT by Fidel Herrera DO

## 2023-09-23 NOTE — THERAPY TREATMENT NOTE
Patient Name: Alexia Lopez  : 1935    MRN: [id number]                              Today's Date: 2023       Admit Date: 2023    Visit Dx:     ICD-10-CM ICD-9-CM   1. Traumatic closed displaced fracture of neck of right femur, initial encounter  S72.001A 820.8   2. Coronary arteriosclerosis in native artery  I25.10 414.01   3. S/P CABG (coronary artery bypass graft)  Z95.1 V45.81   4. Benign essential hypertension  I10 401.1   5. Controlled type 2 diabetes mellitus without complication, with long-term current use of insulin  E11.9 250.00    Z79.4 V58.67   6. Fall from slip, trip, or stumble, initial encounter  W01.0XXA E885.9   7. Closed fracture of right hip, initial encounter  S72.001A 820.8   8. Impaired functional mobility, balance, gait, and endurance [Z74.09 (ICD-10-CM)]  Z74.09 V49.89   9. Impaired mobility and ADLs [Z74.09, Z78.9 (ICD-10-CM)]  Z74.09 V49.89    Z78.9      Patient Active Problem List   Diagnosis    Coronary arteriosclerosis in native artery    S/P CABG (coronary artery bypass graft)    Benign essential hypertension    Hypercholesterolemia    Type 2 diabetes mellitus    Nuclear cataract    Controlled type 2 diabetes mellitus without complication    Pernicious anemia    Arthritis of knee    Chronic sinusitis    Encounter for screening mammogram for malignant neoplasm of breast    Generalized osteoarthritis    Iron deficiency anemia    Palpitations    Myocardial infarction    Hypertension    Hyperlipemia    History of echocardiogram    Diabetes mellitus    Coronary artery disease    Cataract    Chronic pain of both knees    Primary osteoarthritis of both knees    Claudication of lower extremity    Precordial pain    Fall from slip, trip, or stumble, initial encounter    Traumatic closed displaced fracture of neck of right femur, initial encounter    Hip fracture     Past Medical History:   Diagnosis Date    Cataract     Coronary artery disease     Diabetes mellitus      Type 2 diabetes mellitus - without retinopathy       History of echocardiogram 05/12/2014    Normal LV systolic function with EF of 55% with mild hypokinesis. Diastolic relaxation abnormality of left ventricle. Mild tricuspid regurg. Trace mitral regurg    Hyperlipemia     Hypertension     Myocardial infarction      Past Surgical History:   Procedure Laterality Date    BACK SURGERY      CARDIAC CATHETERIZATION  05/12/2014    Severe multivessel CAD with critical lesions noted in the LAD coronary artery, left circumflex coronary artery and RCA. Left ventriculogram no performed in view of elevated left ventricular end-diastolic pressure    CATARACT EXTRACTION W/ INTRAOCULAR LENS IMPLANT Left 2/2/2018    Procedure: CATARACT PHACO EXTRACTION WITH INTRAOCULAR LENS IMPLANT;  Surgeon: Gagan Lizarraga MD;  Location: [facility];  Service:     CATARACT EXTRACTION W/ INTRAOCULAR LENS IMPLANT Right 2/9/2018    Procedure: CATARACT PHACO EXTRACTION WITH INTRAOCULAR LENS IMPLANT;  Surgeon: Gagan Lizarraga MD;  Location: [facility];  Service:     CORONARY ARTERY BYPASS GRAFT      X 3 with LIMA to LAD, SVG to OMB and SVG to PDA.    DILATATION AND CURETTAGE      OTHER SURGICAL HISTORY  05/06/2014    INCISION OF EARDRUM GENERAL ANESTHETIC 83614 (1)    Bilateral myringotomy with tubes.     SINUS SURGERY      TONSILLECTOMY      TONSILLECTOMY AND ADENOIDECTOMY      TUBAL ABDOMINAL LIGATION  03/14/1978      General Information       Row Name 09/23/23 1044          OT Time and Intention    Document Type therapy note (daily note)  -CS     Mode of Treatment occupational therapy  -CS       Row Name 09/23/23 1044          General Information    Patient Profile Reviewed yes  -CS     Existing Precautions/Restrictions fall;hip, anterior  -CS       Row Name 09/23/23 1044          Cognition    Orientation Status (Cognition) oriented x 4  -CS       Row Name 09/23/23 1044          Safety Issues, Functional Mobility    Impairments Affecting  Function (Mobility) strength;endurance/activity tolerance  -               User Key  (r) = Recorded By, (t) = Taken By, (c) = Cosigned By      Initials Name Provider Type     Kristen Marrero COTA Occupational Therapist Assistant                     Mobility/ADL's       [facility] Name 09/23/23 1044          Bed Mobility    Supine-Sit Will (Bed Mobility) minimum assist (75% patient effort)  -     Assistive Device (Bed Mobility) head of bed elevated;bed rails  -CS       Row Name 09/23/23 1044          Transfers    Transfers bed-chair transfer;sit-stand transfer;stand-sit transfer  -CS       Row Name 09/23/23 1044          Bed-Chair Transfer    Bed-Chair Will (Transfers) minimum assist (75% patient effort)  -     Assistive Device (Bed-Chair Transfers) walker, front-wheeled  -[facility] Name 09/23/23 1044          Sit-Stand Transfer    Sit-Stand Will (Transfers) moderate assist (50% patient effort)  -     Assistive Device (Sit-Stand Transfers) walker, front-wheeled  -CS       Row Name 09/23/23 1044          Stand-Sit Transfer    Stand-Sit Will (Transfers) minimum assist (75% patient effort)  -CS       Row Name 09/23/23 1044          Activities of Daily Living    BADL Assessment/Intervention bathing;upper body dressing;lower body dressing;grooming  -CS       Row Name 09/23/23 1044          Mobility    Extremity Weight-bearing Status right lower extremity  -     Right Lower Extremity (Weight-bearing Status) weight-bearing as tolerated (WBAT)  -[facility] Name 09/23/23 1044          Lower Body Dressing Assessment/Training    Will Level (Lower Body Dressing) lower body dressing skills;doff;don;pants/bottoms;socks;minimum assist (75% patient effort)  -     Position (Lower Body Dressing) supported sitting;supported standing  -[facility] Name 09/23/23 1044          Grooming Assessment/Training    Will Level (Grooming) grooming skills;hair care,  combing/brushing;oral care regimen;wash face, hands;other (see comments);set up  -CS     Position (Grooming) supported sitting  -       Row Name 09/23/23 1044          Bathing Assessment/Intervention    [place] Level (Bathing) bathing skills;upper body;supervision;lower body;minimum assist (75% patient effort)  -     Assistive Devices (Bathing) bath mitt;long-handled sponge  -CS     Position (Bathing) supported sitting;supported standing  -       Row Name 09/23/23 1044          Upper Body Dressing Assessment/Training    [place] Level (Upper Body Dressing) upper body dressing skills;doff;don;pull-over garment;set up  -CS     Position (Upper Body Dressing) supported sitting  -               User Key  (r) = Recorded By, (t) = Taken By, (c) = Cosigned By      Initials Name Provider Type    Kristen Blood COTA Occupational Therapist Assistant                   Obj/Interventions       Row Name 09/23/23 1044          Shoulder (Therapeutic Exercise)    Shoulder (Therapeutic Exercise) AROM (active range of motion)  -     Shoulder Strengthening (Therapeutic Exercise) bilateral;flexion;extension;external rotation;internal rotation  -       Row Name 09/23/23 1044          Elbow/Forearm (Therapeutic Exercise)    Elbow/Forearm (Therapeutic Exercise) AROM (active range of motion)  -     Elbow/Forearm AROM (Therapeutic Exercise) bilateral;flexion;extension;supination;pronation  -     Elbow/Forearm Strengthening (Therapeutic Exercise) bilateral;flexion;extension;supination;pronation  -       Row Name 09/23/23 1044          Motor Skills    Therapeutic Exercise shoulder;elbow/forearm  -               User Key  (r) = Recorded By, (t) = Taken By, (c) = Cosigned By      Initials Name Provider Type    Kristen Blood COTA Occupational Therapist Assistant                   Goals/Plan       Row Name 09/23/23 1044          Transfer Goal 1 (OT)    Activity/Assistive Device (Transfer Goal 1, OT)  toilet  -CS     [place] Level/Cues Needed (Transfer Goal 1, OT) standby assist  -CS     Time Frame (Transfer Goal 1, OT) long term goal (LTG);by discharge  -CS     Progress/Outcome (Transfer Goal 1, OT) goal not met  -CS       Row Name 09/23/23 1044          Bathing Goal 1 (OT)    Activity/Device (Bathing Goal 1, OT) lower body bathing  -CS     [place] Level/Cues Needed (Bathing Goal 1, OT) moderate assist (50-74% patient effort)  -CS     Time Frame (Bathing Goal 1, OT) long term goal (LTG);by discharge  -CS     Strategies/Barriers (Bathing Goal 1, OT) Spouse can assist as needed  -CS     Progress/Outcomes (Bathing Goal 1, OT) goal not met  -CS       Row Name 09/23/23 1044          Dressing Goal 1 (OT)    Activity/Device (Dressing Goal 1, OT) lower body dressing  -CS     [place]/Cues Needed (Dressing Goal 1, OT) minimum assist (75% or more patient effort)  -CS     Time Frame (Dressing Goal 1, OT) long term goal (LTG);by discharge  -CS     Strategies/Barriers (Dressing Goal 1, OT) Spouse can assist as needed  -CS     Progress/Outcome (Dressing Goal 1, OT) goal not met  -CS       Row Name 09/23/23 1044          Strength Goal 1 (OT)    Strength Goal 1 (OT) Pt will be (I) with BUE strengthening HEP after demonstration for increased strength required for ADLs and mobility.  -CS     Time Frame (Strength Goal 1, OT) long term goal (LTG);by discharge  -CS     Progress/Outcome (Strength Goal 1, OT) goal not met  -CS               User Key  (r) = Recorded By, (t) = Taken By, (c) = Cosigned By      Initials Name Provider Type    CS Kristen Marrero COTA Occupational Therapist Assistant                   Clinical Impression       Row Name 09/23/23 1044          Pain Assessment    Pretreatment Pain Rating 2/10  -CS     Posttreatment Pain Rating 2/10  -CS     Pain Location - Side/Orientation Right  -CS     Pain Location - hip  -CS     Pain Intervention(s) Medication (See MAR);Rest;Repositioned  -CS       Row Name  09/23/23 1044          Plan of Care Review    Plan of Care Reviewed With patient  -CS     Progress improving  -CS     Outcome Evaluation Pt tolerated tx well this date. Pt was min A with sup-sit. Pt was mod A with sit-stand; min A with stand-sit. Pt was min A with bed-chair t/f. Pt completed an ADL. Pt completed an few UE ther ex. No goals met this tx. Continue OT POC.  -CS       Row Name 09/23/23 1044          Therapy Assessment/Plan (OT)    Rehab Potential (OT) good, to achieve stated therapy goals  -CS     Criteria for Skilled Therapeutic Interventions Met (OT) yes;meets criteria  -CS     Therapy Frequency (OT) daily  -CS       Row Name 09/23/23 1044          Therapy Plan Review/Discharge Plan (OT)    Anticipated Discharge Disposition (OT) home with 24/7 care;home with home health;other (see comments);inpatient rehabilitation facility  -CS       Row Name 09/23/23 1044          Vital Signs    Pre Patient Position Supine  -CS     Post Patient Position Sitting  -CS       Row Name 09/23/23 1044          Positioning and Restraints    Pre-Treatment Position in bed  -CS     Post Treatment Position chair  -CS     In Chair sitting;call light within reach;encouraged to call for assist;exit alarm on  -CS               User Key  (r) = Recorded By, (t) = Taken By, (c) = Cosigned By      Initials Name Provider Type    CS Kristen Marrero COTA Occupational Therapist Assistant                   Outcome Measures       Row Name 09/23/23 1044          How much help from another is currently needed...    Putting on and taking off regular lower body clothing? 3  -CS     Bathing (including washing, rinsing, and drying) 3  -CS     Toileting (which includes using toilet bed pan or urinal) 3  -CS     Putting on and taking off regular upper body clothing 4  -CS     Taking care of personal grooming (such as brushing teeth) 4  -CS     Eating meals 4  -CS     AM-PAC 6 Clicks Score (OT) 21  -CS       Row Name 09/23/23 0832          How much  help from another person do you currently need...    Turning from your back to your side while in flat bed without using bedrails? 3  -JH     Moving from lying on back to sitting on the side of a flat bed without bedrails? 2  -JH     Moving to and from a bed to a chair (including a wheelchair)? 2  -JH     Standing up from a chair using your arms (e.g., wheelchair, bedside chair)? 3  -JH     Climbing 3-5 steps with a railing? 1  -JH     To walk in hospital room? 2  -JH     AM-PAC 6 Clicks Score (PT) 13  -JH     Highest level of mobility 4 --> Transferred to chair/commode  -               User Key  (r) = Recorded By, (t) = Taken By, (c) = Cosigned By      Initials Name Provider Type    Kristen Blood COTA Occupational Therapist Assistant     Fidelia Shipley, RN Registered Nurse                    Occupational Therapy Education       Title: PT OT SLP Therapies (In Progress)       Topic: Occupational Therapy (In Progress)       Point: ADL training (Done)       Description:   Instruct learner(s) on proper safety adaptation and remediation techniques during self care or transfers.   Instruct in proper use of assistive devices.                  Learning Progress Summary             Patient Acceptance, E,TB, VU by CM at 9/20/2023 1231    Comment: OT POC, Role of OT, d/c recommendations, AD/DME needs   Family Acceptance, E,TB, VU by CM at 9/20/2023 1231    Comment: OT POC, Role of OT, d/c recommendations, AD/DME needs                         Point: Home exercise program (Not Started)       Description:   Instruct learner(s) on appropriate technique for monitoring, assisting and/or progressing therapeutic exercises/activities.                  Learner Progress:  Not documented in this visit.              Point: Precautions (Done)       Description:   Instruct learner(s) on prescribed precautions during self-care and functional transfers.                  Learning Progress Summary             Patient Acceptance,  E,TB, VU by CM at 9/20/2023 1231    Comment: OT POC, Role of OT, d/c recommendations, AD/DME needs   Family Acceptance, E,TB, VU by CM at 9/20/2023 1231    Comment: OT POC, Role of OT, d/c recommendations, AD/DME needs                         Point: Body mechanics (Not Started)       Description:   Instruct learner(s) on proper positioning and spine alignment during self-care, functional mobility activities and/or exercises.                  Learner Progress:  Not documented in this visit.                              User Key       Initials Effective Dates Name Provider Type Discipline     11/18/22 -  Emily Lehman OT Occupational Therapist OT                  OT Recommendation and Plan  Therapy Frequency (OT): daily  Plan of Care Review  Plan of Care Reviewed With: patient  Progress: improving  Outcome Evaluation: Pt tolerated tx well this date. Pt was min A with sup-sit. Pt was mod A with sit-stand; min A with stand-sit. Pt was min A with bed-chair t/f. Pt completed an ADL. Pt completed an few UE ther ex. No goals met this tx. Continue OT POC.     Time Calculation:         Time Calculation- OT       Row Name 09/23/23 1206             Time Calculation- OT    OT Start Time 1044  -CS      OT Stop Time 1140  -CS      OT Time Calculation (min) 56 min  -CS      Total Timed Code Minutes- OT 56 minute(s)  -CS      OT Received On 09/23/23  -CS         Timed Charges    09989 - OT Therapeutic Exercise Minutes 16  -CS      69549 - OT Self Care/Mgmt Minutes 40  -CS         Total Minutes    Timed Charges Total Minutes 56  -CS       Total Minutes 56  -CS                User Key  (r) = Recorded By, (t) = Taken By, (c) = Cosigned By      Initials Name Provider Type    CS Kristen Marrero COTA Occupational Therapist Assistant                  Therapy Charges for Today       Code Description Service Date Service Provider Modifiers Qty    50400220262 HC OT SELF CARE/MGMT/TRAIN EA 15 MIN 9/22/2023 Kristen Marrero COTA GO 5     52460533573 HC OT THER PROC EA 15 MIN 9/23/2023 Kristen Marrero COTA GO 1    50492856156 HC OT SELF CARE/MGMT/TRAIN EA 15 MIN 9/23/2023 Kristen Marrero COTA GO 3                 CLEMENTE Sepulveda  9/23/2023

## 2023-09-23 NOTE — PROGRESS NOTES
"    [facility] Medicine Services  INPATIENT PROGRESS NOTE    Length of Stay: 0  Date of Admission: 9/19/2023  Primary Care Physician: Munir Rodriguez MD    Subjective   Chief Complaint: \"heart beating fast overnight\"   HPI:  88 year old female with past medical history of CAD, DM, HTN, HLD who presented on 9/19/23 with complaints of right hip pain r/t fall at home.  She is currently admitted for issues including right femoral neck fracture that required surgical repair.  During today's visit, she reports having an episode overnight where she felt as if her heart was beating fast making her anxious.      Review of Systems   Constitutional:  Negative for chills and fever.   HENT:  Negative for congestion, rhinorrhea and sore throat.    Respiratory:  Negative for cough, chest tightness, shortness of breath and wheezing.    Cardiovascular:  Positive for palpitations. Negative for chest pain and leg swelling.   Gastrointestinal:  Negative for abdominal pain, diarrhea, nausea and vomiting.   Musculoskeletal:  Negative for back pain and neck pain.        Right hip post op pain   Skin:  Negative for pallor.   Neurological:  Negative for dizziness, weakness and headaches.   Psychiatric/Behavioral:  Negative for confusion. The patient is not nervous/anxious.       All pertinent negatives and positives are as above. All other systems have been reviewed and are negative unless otherwise stated.     Objective    Temp:  [96.2 °F (35.7 °C)-98.7 °F (37.1 °C)] 98.7 °F (37.1 °C)  Heart Rate:  [] 80  Resp:  [18-20] 20  BP: (146-192)/(65-80) 170/72    Physical Exam  Vitals and nursing note reviewed.   Constitutional:       General: She is not in acute distress.     Appearance: She is not ill-appearing.   HENT:      Head: Normocephalic and atraumatic.      Right Ear: External ear normal.      Left Ear: External ear normal.      Nose: Nose normal.      Mouth/Throat:      Mouth: Mucous membranes " are moist.      Pharynx: Oropharynx is clear.   Eyes:      General: No scleral icterus.        Right eye: No discharge.         Left eye: No discharge.      Conjunctiva/sclera: Conjunctivae normal.   Cardiovascular:      Rate and Rhythm: Normal rate and regular rhythm.      Heart sounds: Normal heart sounds. No murmur heard.    No friction rub. No gallop.   Pulmonary:      Effort: Pulmonary effort is normal. No respiratory distress.      Breath sounds: Normal breath sounds. No stridor. No wheezing, rhonchi or rales.   Abdominal:      General: Bowel sounds are normal. There is no distension.      Palpations: Abdomen is soft.      Tenderness: There is no abdominal tenderness.   Musculoskeletal:         General: Normal range of motion.      Cervical back: Normal range of motion.      Right lower leg: No edema.      Left lower leg: No edema.   Skin:     General: Skin is warm and dry.      Comments: Right hip incision intact.  No bleeding, swelling, drainage noted   Neurological:      General: No focal deficit present.      Mental Status: She is alert and oriented to person, place, and time.   Psychiatric:         Behavior: Behavior normal.           Results Review:  I have reviewed the labs, radiology results, and diagnostic studies.    Laboratory Data:   Results from last 7 days   Lab Units 09/22/23  0540 09/21/23  0524 09/20/23  0558 09/19/23  1235   SODIUM mmol/L 131* 128* 131* 133*   POTASSIUM mmol/L 3.8 4.1 5.0 4.5   CHLORIDE mmol/L 99 98 98 98   CO2 mmol/L 22.0 19.0* 24.0 25.0   BUN mg/dL 19 16 17 17   CREATININE mg/dL 0.86 0.81 0.85 0.83   GLUCOSE mg/dL 136* 156* 151* 138*   CALCIUM mg/dL 9.1 8.7 9.2 9.8   BILIRUBIN mg/dL  --   --   --  0.3   ALK PHOS U/L  --   --   --  69   ALT (SGPT) U/L  --   --   --  9   AST (SGOT) U/L  --   --   --  20   ANION GAP mmol/L 10.0 11.0 9.0 10.0     Estimated Creatinine Clearance: 53.1 mL/min (by C-G formula based on SCr of 0.86 mg/dL).          Results from last 7 days   Lab  Units 09/22/23  0540 09/21/23  0524 09/20/23  0558 09/19/23  2043 09/19/23  1235   WBC 10*3/mm3 8.31 9.44 13.22*  --  6.90   HEMOGLOBIN g/dL 7.5* 8.5* 10.1* 10.8* 10.9*   HEMATOCRIT % 22.0* 24.9* 29.9* 32.0* 32.3*   PLATELETS 10*3/mm3 123* 133* 157  --  158     Results from last 7 days   Lab Units 09/19/23  1235   INR  1.09       Culture Data:   No results found for: BLOODCX  No results found for: URINECX  No results found for: RESPCX  No results found for: WOUNDCX  No results found for: STOOLCX  No components found for: BODYFLD    Radiology Data:   Imaging Results (Last 24 Hours)       ** No results found for the last 24 hours. **            I have reviewed the patient's current medications.     Assessment/Plan     Active Hospital Problems    Diagnosis     **Traumatic closed displaced fracture of neck of right femur, initial encounter     Fall from slip, trip, or stumble, initial encounter     Hip fracture     Controlled type 2 diabetes mellitus without complication     S/P CABG (coronary artery bypass graft)     Benign essential hypertension     Coronary arteriosclerosis in native artery        Plan:    Right femoral neck fracture: s/p ORIF on 9/19 with Dr. Adler.  Continue PT/OT, pain control.  Heparin for DVT prevention per ortho with plan to transition to Lovenox/Eliquis/warfarin at discharge (30 days of treatment)  CAD: continue ASA 81 mg daily, Labetalol 100 mg BID, Crestor 5 mg daily   HTN: continue Norvasc 5 mg daily, labetalol 100 mg BID.   Type 2 DM: FSBS AC and HS with SSI.   HLD: continue Crestor 5 mg nightly.   Palpitations: EKG reviewed.  Will place on telemetry overnight.  Check Magnesium and potassium and replace if needed.       Medical Decision Making  Number and Complexity of problems: 6 moderate     Conditions and Status:        Condition is improving.     MDM Data  External documents reviewed: ED provider notes, ortho notes.   My EKG interpretation: NSR  My US interpretation: US renal - non  obstructing left kidney stone, small right renal cysts.  No hydronephrosis.   Tests considered but not ordered: n/a     Decision rules/scores evaluated (example [id number], [place], etc): n/a      Discussed with: patient      Care Planning  Code status and discussions: Full code status on file    Disposition  Social Determinants of Health that impact treatment or disposition: impaired mobility   I expect the patient to be discharged to ARU in 2 days.     This document has been electronically signed by PEGGY Ceron on September 23, 2023 08:20 CDT

## 2023-09-23 NOTE — PROGRESS NOTES
LOS: 0 days     Patient Care Team:  Ilia Rodriguez MD as PCP - General  Praveen Rivas APRN as Nurse Practitioner (Orthopedic Surgery)      Subjective     Hip fracture with kidney stone    Objective       Vital Signs  Temp:  [96.2 °F (35.7 °C)-98.7 °F (37.1 °C)] 96.2 °F (35.7 °C)  Heart Rate:  [72-81] 81  Resp:  [18-20] 20  BP: (131-168)/(65-77) 148/66    Physical Exam:        General Appearance:  Sitting up in chair     Respiratory:    UNLABORED RESPIRATIONS.     Abdomen:     SOFT.       Genitourinary: Urine clear     Rectal:     DEFERRED       Results Review:       Imaging Results (Last 24 Hours)       ** No results found for the last 24 hours. **          Lab Results (last 24 hours)       Procedure Component Value Units Date/Time    POC Glucose Once [[id number]]  (Abnormal) Collected: 23 1629    Specimen: Blood Updated: 23 1652     Glucose 180 mg/dL      Comment: RN NotifiedOperator: [id number] WOODSGISELLE LIPSCOMBDignity Health St. Joseph's Hospital and Medical CenterMeter ID: [id number]       TISSUE EXAM, [facility]) [[id number]] Collected: 23    Specimen: Bone from Hip, Right Updated: 23 1301     Reference Lab Report --     [facility]  [address] [place] [address]  Phone: [phone] or [phone]  Fax: [phone]  Abhilash Wharton M.D., Medical Director    PATIENT NAME                           LABORATORY NO.  [id number]                  [id number]  [id number]                         AGE              SEX  SSN           CLIENT REF #  [facility]           88      1935  F    [id number]   [id number]    [place]                       REQUESTING M.D.     ATTENDING MRadhaD.     COPY TO[address]                 SKINNY, ILIA RODRIGES,  [place] [address]                                                    CORINE ALBERTO ELIZABETH  DATE COLLECTED      DATE RECEIVED      DATE REPORTED  2023           "09/20/2023 09/22/2023    DIAGNOSIS:  FEMORAL HEAD, RIGHT:  Fracture site changes  Osteoarthritis  Negative for significant inflammation or atypia    RLL    CLINICAL HISTORY:  Traumatic closed displaced fracture of neck of right femur, initial encounter,  coronary arteriosclerosis in native artery, status post CABG (coronary artery  bypass graft), benign essential hypertension, controlled type 2 diabetes mellitus  without complication, with long-term current use of insulin, fall from slip, trip,  or stumble, initial encounter    SPECIMENS RECEIVED:  FEMORAL HEAD, RIGHT    MICROSCOPIC DESCRIPTION:  Tissue blocks are prepared and slides are examined microscopically on all  specimens. See diagnosis for details.    Professional interpretation rendered by Abhilash Wharton M.D., F.C.A.P. at  [employer] [email or web address], [address] [place] [address].    GROSS DESCRIPTION:  Received in formalin labeled \"femoral head\" is a portion of femoral head  measuring 4.6 x 4.2 x 4.0 cm and a separate aggregate of bone and soft tissue  measuring 5.5 x 5.1 x 2.5 cm.  The articular surface is tan-brown and roughened.  The cut surface is red-brown and jagged with a surgical defect measuring 0.7 x  0.7 cm.  The specimen is sectioned to reveal a slightly hemorrhagic cut surface.  The uninvolved bone is tan-yellow and gritty.  Representative sections of the  hemorrhagic areas are submitted in A1-A2, following decalcification.  [place]    REVIEWED, DIAGNOSED AND ELECTRONICALLY  SIGNED BY:    Abhilash Wharton M.D., F.C.A.P.  CPT CODES:  84638, 88311x2      POC Glucose Once [[id number]]  (Abnormal) Collected: 09/22/23 1124    Specimen: Blood Updated: 09/22/23 1147     Glucose 197 mg/dL      Comment: RN NotifiedOperator: [id number] SYDNEY LEARYSelect [facility] Special[facility] ID: [id number]       POC Glucose Once [[id number]]  (Abnormal) Collected: 09/22/23 0740    Specimen: Blood Updated: 09/22/23 0810     Glucose 144 mg/dL      Comment: RN NotifiedOperator: " [id number] CLAIRE Frost ID: [id number]       CBC & Differential [[id number]]  (Abnormal) Collected: 09/22/23 0540    Specimen: Blood Updated: 09/22/23 0749    Narrative:      The following orders were created for panel order CBC & Differential.  Procedure                               Abnormality         Status                     ---------                               -----------         ------                     CBC Auto Differential[[id number]]        Abnormal            Final result               Scan Slide[[id number]]                                       Final result                 Please view results for these tests on the individual orders.    Scan Slide [[id number]] Collected: 09/22/23 0540    Specimen: Blood Updated: 09/22/23 0749     Anisocytosis Slight/1+     Hypochromia Slight/1+     Microcytes Slight/1+     WBC Morphology Normal     Platelet Estimate Decreased    CBC Auto Differential [[id number]]  (Abnormal) Collected: 09/22/23 0540    Specimen: Blood Updated: 09/22/23 0646     WBC 8.31 10*3/mm3      RBC 2.44 10*6/mm3      Hemoglobin 7.5 g/dL      Hematocrit 22.0 %      MCV 90.2 fL      MCH 30.7 pg      MCHC 34.1 g/dL      RDW 13.2 %      RDW-SD 43.9 fl      MPV 11.9 fL      Platelets 123 10*3/mm3      Neutrophil % 78.8 %      Lymphocyte % 9.0 %      Monocyte % 9.0 %      Eosinophil % 2.2 %      Basophil % 0.4 %      Immature Grans % 0.6 %      Neutrophils, Absolute 6.55 10*3/mm3      Lymphocytes, Absolute 0.75 10*3/mm3      Monocytes, Absolute 0.75 10*3/mm3      Eosinophils, Absolute 0.18 10*3/mm3      Basophils, Absolute 0.03 10*3/mm3      Immature Grans, Absolute 0.05 10*3/mm3      nRBC 0.0 /100 WBC     Basic Metabolic Panel [[id number]]  (Abnormal) Collected: 09/22/23 0540    Specimen: Blood Updated: 09/22/23 0636     Glucose 136 mg/dL      BUN 19 mg/dL      Creatinine 0.86 mg/dL      Sodium 131 mmol/L      Potassium 3.8 mmol/L      Chloride 99 mmol/L      CO2 22.0 mmol/L      Calcium 9.1  mg/dL      BUN/Creatinine Ratio 22.1     Anion Gap 10.0 mmol/L      eGFR 65.9 mL/min/1.73     Narrative:      GFR Normal >60  Chronic Kidney Disease <60  Kidney Failure <15    The GFR formula is only valid for adults with stable renal function between ages 18 and 70.              I reviewed the patient's new clinical results.  I reviewed the patient's new imaging results and agree with the interpretation.  I reviewed the patient's other test results and agree with the interpretation        Assessment:    Hip fracture kidney stone    Plan:     No plans for treatment of kidney stone at present      Cas Mandujano MD  09/22/23  20:49 CDT

## 2023-09-24 LAB
GLUCOSE BLDC GLUCOMTR-MCNC: 149 MG/DL (ref 70–130)
GLUCOSE BLDC GLUCOMTR-MCNC: 158 MG/DL (ref 70–130)
GLUCOSE BLDC GLUCOMTR-MCNC: 180 MG/DL (ref 70–130)
QT INTERVAL: 370 MS
QTC INTERVAL: 445 MS

## 2023-09-24 PROCEDURE — 25010000002 HYDRALAZINE PER 20 MG: Performed by: NURSE PRACTITIONER

## 2023-09-24 PROCEDURE — 25010000002 HYDROMORPHONE 1 MG/ML SOLUTION: Performed by: ORTHOPAEDIC SURGERY

## 2023-09-24 PROCEDURE — 63710000001 INSULIN ASPART PER 5 UNITS: Performed by: STUDENT IN AN ORGANIZED HEALTH CARE EDUCATION/TRAINING PROGRAM

## 2023-09-24 PROCEDURE — 97110 THERAPEUTIC EXERCISES: CPT

## 2023-09-24 PROCEDURE — 82948 REAGENT STRIP/BLOOD GLUCOSE: CPT

## 2023-09-24 PROCEDURE — 25010000002 HEPARIN (PORCINE) PER 1000 UNITS: Performed by: ORTHOPAEDIC SURGERY

## 2023-09-24 PROCEDURE — 97116 GAIT TRAINING THERAPY: CPT

## 2023-09-24 PROCEDURE — 97535 SELF CARE MNGMENT TRAINING: CPT

## 2023-09-24 RX ORDER — HYDRALAZINE HYDROCHLORIDE 20 MG/ML
10 INJECTION INTRAMUSCULAR; INTRAVENOUS EVERY 6 HOURS PRN
Status: DISCONTINUED | OUTPATIENT
Start: 2023-09-24 | End: 2023-09-29 | Stop reason: HOSPADM

## 2023-09-24 RX ADMIN — AMLODIPINE BESYLATE 5 MG: 5 TABLET ORAL at 09:10

## 2023-09-24 RX ADMIN — OXYCODONE HYDROCHLORIDE 5 MG: 5 TABLET ORAL at 19:33

## 2023-09-24 RX ADMIN — HYDROMORPHONE HYDROCHLORIDE 0.5 MG: 1 INJECTION, SOLUTION INTRAMUSCULAR; INTRAVENOUS; SUBCUTANEOUS at 23:34

## 2023-09-24 RX ADMIN — DOCUSATE SODIUM 50 MG AND SENNOSIDES 8.6 MG 2 TABLET: 8.6; 5 TABLET, FILM COATED ORAL at 20:00

## 2023-09-24 RX ADMIN — Medication 10 ML: at 20:00

## 2023-09-24 RX ADMIN — INSULIN ASPART 3 UNITS: 100 INJECTION, SOLUTION INTRAVENOUS; SUBCUTANEOUS at 17:32

## 2023-09-24 RX ADMIN — HEPARIN SODIUM 5000 UNITS: 5000 INJECTION INTRAVENOUS; SUBCUTANEOUS at 14:10

## 2023-09-24 RX ADMIN — HYDROMORPHONE HYDROCHLORIDE 0.5 MG: 1 INJECTION, SOLUTION INTRAMUSCULAR; INTRAVENOUS; SUBCUTANEOUS at 03:43

## 2023-09-24 RX ADMIN — OXYCODONE HYDROCHLORIDE 5 MG: 5 TABLET ORAL at 05:21

## 2023-09-24 RX ADMIN — DOCUSATE SODIUM 50 MG AND SENNOSIDES 8.6 MG 2 TABLET: 8.6; 5 TABLET, FILM COATED ORAL at 09:10

## 2023-09-24 RX ADMIN — OXYCODONE HYDROCHLORIDE 5 MG: 5 TABLET ORAL at 14:10

## 2023-09-24 RX ADMIN — LABETALOL HYDROCHLORIDE 100 MG: 100 TABLET, FILM COATED ORAL at 20:00

## 2023-09-24 RX ADMIN — HYDROMORPHONE HYDROCHLORIDE 0.5 MG: 1 INJECTION, SOLUTION INTRAMUSCULAR; INTRAVENOUS; SUBCUTANEOUS at 13:03

## 2023-09-24 RX ADMIN — ACETAMINOPHEN 650 MG: 325 TABLET, FILM COATED ORAL at 16:27

## 2023-09-24 RX ADMIN — ACETAMINOPHEN 650 MG: 325 TABLET, FILM COATED ORAL at 21:37

## 2023-09-24 RX ADMIN — ROSUVASTATIN CALCIUM 10 MG: 10 TABLET, FILM COATED ORAL at 20:00

## 2023-09-24 RX ADMIN — HEPARIN SODIUM 5000 UNITS: 5000 INJECTION INTRAVENOUS; SUBCUTANEOUS at 21:37

## 2023-09-24 RX ADMIN — LABETALOL HYDROCHLORIDE 100 MG: 100 TABLET, FILM COATED ORAL at 09:10

## 2023-09-24 RX ADMIN — HYDROMORPHONE HYDROCHLORIDE 0.5 MG: 1 INJECTION, SOLUTION INTRAMUSCULAR; INTRAVENOUS; SUBCUTANEOUS at 17:32

## 2023-09-24 RX ADMIN — HYDROMORPHONE HYDROCHLORIDE 0.5 MG: 1 INJECTION, SOLUTION INTRAMUSCULAR; INTRAVENOUS; SUBCUTANEOUS at 09:11

## 2023-09-24 RX ADMIN — HEPARIN SODIUM 5000 UNITS: 5000 INJECTION INTRAVENOUS; SUBCUTANEOUS at 05:21

## 2023-09-24 RX ADMIN — ACETAMINOPHEN 650 MG: 325 TABLET, FILM COATED ORAL at 05:21

## 2023-09-24 RX ADMIN — Medication 10 ML: at 09:58

## 2023-09-24 RX ADMIN — ASPIRIN 81 MG: 81 TABLET, CHEWABLE ORAL at 09:11

## 2023-09-24 RX ADMIN — HYDRALAZINE HYDROCHLORIDE 10 MG: 20 INJECTION INTRAMUSCULAR; INTRAVENOUS at 19:33

## 2023-09-24 NOTE — PLAN OF CARE
Goal Outcome Evaluation:  Plan of Care Reviewed With: patient        Progress: improving  Outcome Evaluation: Pt tolerated tx well this date. Pt was sitting up in chair upon arrival. Pt was min A with with sit-stand-sit. Pt completed an full ADL. No goals met this tx. Continue OT POC.      Anticipated Discharge Disposition (OT): home with 24/7 care, home with home health, other (see comments), inpatient rehabilitation facility

## 2023-09-24 NOTE — THERAPY TREATMENT NOTE
Patient Name: Alexia Lopez  : 1935    MRN: [id number]                              Today's Date: 2023       Admit Date: 2023    Visit Dx:     ICD-10-CM ICD-9-CM   1. Traumatic closed displaced fracture of neck of right femur, initial encounter  S72.001A 820.8   2. Coronary arteriosclerosis in native artery  I25.10 414.01   3. S/P CABG (coronary artery bypass graft)  Z95.1 V45.81   4. Benign essential hypertension  I10 401.1   5. Controlled type 2 diabetes mellitus without complication, with long-term current use of insulin  E11.9 250.00    Z79.4 V58.67   6. Fall from slip, trip, or stumble, initial encounter  W01.0XXA E885.9   7. Closed fracture of right hip, initial encounter  S72.001A 820.8   8. Impaired functional mobility, balance, gait, and endurance [Z74.09 (ICD-10-CM)]  Z74.09 V49.89   9. Impaired mobility and ADLs [Z74.09, Z78.9 (ICD-10-CM)]  Z74.09 V49.89    Z78.9      Patient Active Problem List   Diagnosis    Coronary arteriosclerosis in native artery    S/P CABG (coronary artery bypass graft)    Benign essential hypertension    Hypercholesterolemia    Type 2 diabetes mellitus    Nuclear cataract    Controlled type 2 diabetes mellitus without complication    Pernicious anemia    Arthritis of knee    Chronic sinusitis    Encounter for screening mammogram for malignant neoplasm of breast    Generalized osteoarthritis    Iron deficiency anemia    Palpitations    Myocardial infarction    Hypertension    Hyperlipemia    History of echocardiogram    Diabetes mellitus    Coronary artery disease    Cataract    Chronic pain of both knees    Primary osteoarthritis of both knees    Claudication of lower extremity    Precordial pain    Fall from slip, trip, or stumble, initial encounter    Traumatic closed displaced fracture of neck of right femur, initial encounter    Hip fracture     Past Medical History:   Diagnosis Date    Cataract     Coronary artery disease     Diabetes mellitus      Type 2 diabetes mellitus - without retinopathy       History of echocardiogram 05/12/2014    Normal LV systolic function with EF of 55% with mild hypokinesis. Diastolic relaxation abnormality of left ventricle. Mild tricuspid regurg. Trace mitral regurg    Hyperlipemia     Hypertension     Myocardial infarction      Past Surgical History:   Procedure Laterality Date    BACK SURGERY      CARDIAC CATHETERIZATION  05/12/2014    Severe multivessel CAD with critical lesions noted in the LAD coronary artery, left circumflex coronary artery and RCA. Left ventriculogram no performed in view of elevated left ventricular end-diastolic pressure    CATARACT EXTRACTION W/ INTRAOCULAR LENS IMPLANT Left 2/2/2018    Procedure: CATARACT PHACO EXTRACTION WITH INTRAOCULAR LENS IMPLANT;  Surgeon: Gagan Lizarraga MD;  Location: [facility];  Service:     CATARACT EXTRACTION W/ INTRAOCULAR LENS IMPLANT Right 2/9/2018    Procedure: CATARACT PHACO EXTRACTION WITH INTRAOCULAR LENS IMPLANT;  Surgeon: Gagan Lizarraga MD;  Location: [facility];  Service:     CORONARY ARTERY BYPASS GRAFT      X 3 with LIMA to LAD, SVG to OMB and SVG to PDA.    DILATATION AND CURETTAGE      OTHER SURGICAL HISTORY  05/06/2014    INCISION OF EARDRUM GENERAL ANESTHETIC 41524 (1)    Bilateral myringotomy with tubes.     SINUS SURGERY      TONSILLECTOMY      TONSILLECTOMY AND ADENOIDECTOMY      TUBAL ABDOMINAL LIGATION  03/14/1978      General Information       Row Name 09/24/23 1019          OT Time and Intention    Mode of Treatment occupational therapy  -CS       Row Name 09/24/23 1019          General Information    Patient Profile Reviewed yes  -CS     Existing Precautions/Restrictions fall;hip, anterior  -CS       Row Name 09/24/23 1019          Cognition    Orientation Status (Cognition) oriented x 4  -CS       Row Name 09/24/23 1019          Safety Issues, Functional Mobility    Impairments Affecting Function (Mobility) strength;endurance/activity  tolerance  -               User Key  (r) = Recorded By, (t) = Taken By, (c) = Cosigned By      Initials Name Provider Type    CS Kristen Marrero COTA Occupational Therapist Assistant                     Mobility/ADL's       Row Name 09/24/23 1019          Sit-Stand Transfer    Sit-Stand [place] (Transfers) minimum assist (75% patient effort)  -     Assistive Device (Sit-Stand Transfers) walker, front-wheeled  -CS       Row Name 09/24/23 1019          Stand-Sit Transfer    Stand-Sit [place] (Transfers) minimum assist (75% patient effort)  -     Assistive Device (Stand-Sit Transfers) walker, front-wheeled  -CS       Row Name 09/24/23 1019          Toilet Transfer    [place] Level (Toilet Transfer) minimum assist (75% patient effort)  -     Assistive Device (Toilet Transfer) commode, bedside without drop arms;walker, front-wheeled  -       Row Name 09/24/23 1019          Activities of Daily Living    BADL Assessment/Intervention bathing;upper body dressing;lower body dressing;grooming  -       Row Name 09/24/23 1019          Mobility    Extremity Weight-bearing Status right lower extremity  -     Right Lower Extremity (Weight-bearing Status) weight-bearing as tolerated (WBAT)  -       Row Name 09/24/23 1019          Lower Body Dressing Assessment/Training    [place] Level (Lower Body Dressing) lower body dressing skills;doff;don;pants/bottoms;socks;minimum assist (75% patient effort)  -     Position (Lower Body Dressing) supported sitting;supported standing  -       Row Name 09/24/23 1019          Grooming Assessment/Training    [place] Level (Grooming) grooming skills;hair care, combing/brushing;oral care regimen;wash face, hands;other (see comments);set up  -CS     Position (Grooming) supported sitting  -       Row Name 09/24/23 1019          Bathing Assessment/Intervention    [place] Level (Bathing) bathing skills;upper body;supervision;lower body;minimum assist  (75% patient effort)  -CS     Assistive Devices (Bathing) bath mitt;long-handled sponge  -CS     Position (Bathing) supported sitting;supported standing  -CS       Row Name 09/24/23 1019          Upper Body Dressing Assessment/Training    [place] Level (Upper Body Dressing) upper body dressing skills;doff;don;pull-over garment;set up  -CS     Position (Upper Body Dressing) supported sitting  -CS       Row Name 09/24/23 1019          Toileting Assessment/Training    [place] Level (Toileting) toileting skills;perform perineal hygiene;contact guard assist  -CS     Position (Toileting) supported standing  -CS               User Key  (r) = Recorded By, (t) = Taken By, (c) = Cosigned By      Initials Name Provider Type    Kristen Blood COTA Occupational Therapist Assistant                   Obj/Interventions    No documentation.                  Goals/Plan       Row Name 09/24/23 1019          Transfer Goal 1 (OT)    Activity/Assistive Device (Transfer Goal 1, OT) toilet  -CS     [place] Level/Cues Needed (Transfer Goal 1, OT) standby assist  -CS     Time Frame (Transfer Goal 1, OT) long term goal (LTG);by discharge  -CS     Progress/Outcome (Transfer Goal 1, OT) goal not met  -CS       Row Name 09/24/23 1019          Bathing Goal 1 (OT)    Activity/Device (Bathing Goal 1, OT) lower body bathing  -CS     [place] Level/Cues Needed (Bathing Goal 1, OT) moderate assist (50-74% patient effort)  -CS     Time Frame (Bathing Goal 1, OT) long term goal (LTG);by discharge  -CS     Strategies/Barriers (Bathing Goal 1, OT) Spouse can assist as needed  -CS     Progress/Outcomes (Bathing Goal 1, OT) goal not met  -CS       Row Name 09/24/23 1019          Dressing Goal 1 (OT)    Activity/Device (Dressing Goal 1, OT) lower body dressing  -CS     [place]/Cues Needed (Dressing Goal 1, OT) minimum assist (75% or more patient effort)  -CS     Time Frame (Dressing Goal 1, OT) long term goal (LTG);by discharge   -CS     Strategies/Barriers (Dressing Goal 1, OT) Spouse can assist as needed  -CS     Progress/Outcome (Dressing Goal 1, OT) goal not met  -CS       Row Name 09/24/23 1019          Strength Goal 1 (OT)    Strength Goal 1 (OT) Pt will be (I) with BUE strengthening HEP after demonstration for increased strength required for ADLs and mobility.  -CS     Time Frame (Strength Goal 1, OT) long term goal (LTG);by discharge  -CS     Progress/Outcome (Strength Goal 1, OT) goal not met  -CS               User Key  (r) = Recorded By, (t) = Taken By, (c) = Cosigned By      Initials Name Provider Type    CS Kristen Marrero COTA Occupational Therapist Assistant                   Clinical Impression       Row Name 09/24/23 1019          Pain Assessment    Pretreatment Pain Rating 3/10  -CS     Posttreatment Pain Rating 3/10  -CS     Pain Location - Side/Orientation Right  -CS     Pain Location - hip  -CS     Pain Intervention(s) Medication (See MAR);Rest;Repositioned  -CS       Row Name 09/24/23 1019          Plan of Care Review    Plan of Care Reviewed With patient  -CS     Progress improving  -CS     Outcome Evaluation Pt tolerated tx well this date. Pt was sitting up in chair upon arrival. Pt was min A with with sit-stand-sit. Pt completed an full ADL. No goals met this tx. Continue OT POC.  -CS       Row Name 09/24/23 1019          Therapy Assessment/Plan (OT)    Rehab Potential (OT) good, to achieve stated therapy goals  -CS     Criteria for Skilled Therapeutic Interventions Met (OT) yes;meets criteria  -CS     Therapy Frequency (OT) daily  -CS       Row Name 09/24/23 1019          Therapy Plan Review/Discharge Plan (OT)    Anticipated Discharge Disposition (OT) home with 24/7 care;home with home health;other (see comments);inpatient rehabilitation facility  -CS       Row Name 09/24/23 1019          Vital Signs    Pre Patient Position Sitting  -CS     Post Patient Position Sitting  -CS       Row Name 09/24/23 1019           Positioning and Restraints    Pre-Treatment Position sitting in chair/recliner  -CS     Post Treatment Position chair  -CS     In Chair reclined;call light within reach;encouraged to call for assist;exit alarm on  -CS               User Key  (r) = Recorded By, (t) = Taken By, (c) = Cosigned By      Initials Name Provider Type    CS Kristen Marrero COTA Occupational Therapist Assistant                   Outcome Measures       Row Name 09/24/23 1019          How much help from another is currently needed...    Putting on and taking off regular lower body clothing? 3  -CS     Bathing (including washing, rinsing, and drying) 3  -CS     Toileting (which includes using toilet bed pan or urinal) 3  -CS     Putting on and taking off regular upper body clothing 4  -CS     Taking care of personal grooming (such as brushing teeth) 4  -CS     Eating meals 4  -CS     AM-PAC 6 Clicks Score (OT) 21  -CS       Row Name 09/24/23 0919 09/24/23 0910       How much help from another person do you currently need...    Turning from your back to your side while in flat bed without using bedrails? 3  -NIDA 3  -LM    Moving from lying on back to sitting on the side of a flat bed without bedrails? 3  -NIDA 3  -LM    Moving to and from a bed to a chair (including a wheelchair)? 3  -NIDA 3  -LM    Standing up from a chair using your arms (e.g., wheelchair, bedside chair)? 3  -NIDA 3  -LM    Climbing 3-5 steps with a railing? 3  -NIAD 2  -LM    To walk in hospital room? 3  -NIDA 3  -LM    AM-PAC 6 Clicks Score (PT) 18  -NIDA 17  -LM    Highest level of mobility 6 --> Walked 10 steps or more  -NIDA 5 --> Static standing  -LM      Row Name 09/24/23 0919          Functional Assessment    Outcome Measure Options AM-PAC 6 Clicks Basic Mobility (PT)  -NIDA               User Key  (r) = Recorded By, (t) = Taken By, (c) = Cosigned By      Initials Name Provider Type    Eugenio Wasserman PTA Physical Therapist Assistant    Kristen Blood COTA Occupational  Therapist Assistant    Ramya Borrego, RN Registered Nurse                    Occupational Therapy Education       Title: PT OT SLP Therapies (In Progress)       Topic: Occupational Therapy (In Progress)       Point: ADL training (Done)       Description:   Instruct learner(s) on proper safety adaptation and remediation techniques during self care or transfers.   Instruct in proper use of assistive devices.                  Learning Progress Summary             Patient Acceptance, E,TB, VU by CM at 9/20/2023 1231    Comment: OT POC, Role of OT, d/c recommendations, AD/DME needs   Family Acceptance, E,TB, VU by CM at 9/20/2023 1231    Comment: OT POC, Role of OT, d/c recommendations, AD/DME needs                         Point: Home exercise program (Not Started)       Description:   Instruct learner(s) on appropriate technique for monitoring, assisting and/or progressing therapeutic exercises/activities.                  Learner Progress:  Not documented in this visit.              Point: Precautions (Done)       Description:   Instruct learner(s) on prescribed precautions during self-care and functional transfers.                  Learning Progress Summary             Patient Acceptance, E,TB, VU by CM at 9/20/2023 1231    Comment: OT POC, Role of OT, d/c recommendations, AD/DME needs   Family Acceptance, E,TB, VU by CM at 9/20/2023 1231    Comment: OT POC, Role of OT, d/c recommendations, AD/DME needs                         Point: Body mechanics (Not Started)       Description:   Instruct learner(s) on proper positioning and spine alignment during self-care, functional mobility activities and/or exercises.                  Learner Progress:  Not documented in this visit.                              User Key       Initials Effective Dates Name Provider Type Discipline    HARRIET 11/18/22 -  Emily Lehman OT Occupational Therapist OT                  OT Recommendation and Plan  Therapy Frequency (OT): daily  Plan of  Care Review  Plan of Care Reviewed With: patient  Progress: improving  Outcome Evaluation: Pt tolerated tx well this date. Pt was sitting up in chair upon arrival. Pt was min A with with sit-stand-sit. Pt completed an full ADL. No goals met this tx. Continue OT POC.     Time Calculation:         Time Calculation- OT       Row Name 09/24/23 1306 09/24/23 1247          Time Calculation- OT    OT Start Time -- 1019  -CS     OT Stop Time -- 1115  -CS     OT Time Calculation (min) -- 56 min  -CS     Total Timed Code Minutes- OT -- 56 minute(s)  -CS     OT Received On -- 09/24/23  -CS        Timed Charges    09275 - Gait Training Minutes  15  -NIDA --     90509 - OT Self Care/Mgmt Minutes -- 56  -CS        Total Minutes    Timed Charges Total Minutes 15  -NIDA 56  -CS      Total Minutes 15  -NIDA 56  -CS               User Key  (r) = Recorded By, (t) = Taken By, (c) = Cosigned By      Initials Name Provider Type    NIDA Eugenio Chavez, PTA Physical Therapist Assistant     Kristen Marrero COTA Occupational Therapist Assistant                  Therapy Charges for Today       Code Description Service Date Service Provider Modifiers Qty    76005083339 HC OT THER PROC EA 15 MIN 9/23/2023 Kristen Marrero COTA GO 1    82477300305 HC OT SELF CARE/MGMT/TRAIN EA 15 MIN 9/23/2023 Kristen Marrero COTA GO 3    29652675964 HC OT SELF CARE/MGMT/TRAIN EA 15 MIN 9/24/2023 Kristen Marrero COTA GO 4                 CLEMENTE Sepulveda  9/24/2023

## 2023-09-24 NOTE — THERAPY TREATMENT NOTE
Acute Care - Physical Therapy Treatment Note  [facility]     Patient Name: Alexia Lopez  : 1935  MRN: [id number]  Today's Date: 2023      Visit Dx:     ICD-10-CM ICD-9-CM   1. Traumatic closed displaced fracture of neck of right femur, initial encounter  S72.001A 820.8   2. Coronary arteriosclerosis in native artery  I25.10 414.01   3. S/P CABG (coronary artery bypass graft)  Z95.1 V45.81   4. Benign essential hypertension  I10 401.1   5. Controlled type 2 diabetes mellitus without complication, with long-term current use of insulin  E11.9 250.00    Z79.4 V58.67   6. Fall from slip, trip, or stumble, initial encounter  W01.0XXA E885.9   7. Closed fracture of right hip, initial encounter  S72.001A 820.8   8. Impaired functional mobility, balance, gait, and endurance [Z74.09 (ICD-10-CM)]  Z74.09 V49.89   9. Impaired mobility and ADLs [Z74.09, Z78.9 (ICD-10-CM)]  Z74.09 V49.89    Z78.9      Patient Active Problem List   Diagnosis    Coronary arteriosclerosis in native artery    S/P CABG (coronary artery bypass graft)    Benign essential hypertension    Hypercholesterolemia    Type 2 diabetes mellitus    Nuclear cataract    Controlled type 2 diabetes mellitus without complication    Pernicious anemia    Arthritis of knee    Chronic sinusitis    Encounter for screening mammogram for malignant neoplasm of breast    Generalized osteoarthritis    Iron deficiency anemia    Palpitations    Myocardial infarction    Hypertension    Hyperlipemia    History of echocardiogram    Diabetes mellitus    Coronary artery disease    Cataract    Chronic pain of both knees    Primary osteoarthritis of both knees    Claudication of lower extremity    Precordial pain    Fall from slip, trip, or stumble, initial encounter    Traumatic closed displaced fracture of neck of right femur, initial encounter    Hip fracture     Past Medical History:   Diagnosis Date    Cataract     Coronary artery disease     Diabetes  mellitus     Type 2 diabetes mellitus - without retinopathy       History of echocardiogram 05/12/2014    Normal LV systolic function with EF of 55% with mild hypokinesis. Diastolic relaxation abnormality of left ventricle. Mild tricuspid regurg. Trace mitral regurg    Hyperlipemia     Hypertension     Myocardial infarction      Past Surgical History:   Procedure Laterality Date    BACK SURGERY      CARDIAC CATHETERIZATION  05/12/2014    Severe multivessel CAD with critical lesions noted in the LAD coronary artery, left circumflex coronary artery and RCA. Left ventriculogram no performed in view of elevated left ventricular end-diastolic pressure    CATARACT EXTRACTION W/ INTRAOCULAR LENS IMPLANT Left 2/2/2018    Procedure: CATARACT PHACO EXTRACTION WITH INTRAOCULAR LENS IMPLANT;  Surgeon: Gagan Lizarraga MD;  Location: [facility];  Service:     CATARACT EXTRACTION W/ INTRAOCULAR LENS IMPLANT Right 2/9/2018    Procedure: CATARACT PHACO EXTRACTION WITH INTRAOCULAR LENS IMPLANT;  Surgeon: Gagan Lizarraga MD;  Location: [facility];  Service:     CORONARY ARTERY BYPASS GRAFT      X 3 with LIMA to LAD, SVG to OMB and SVG to PDA.    DILATATION AND CURETTAGE      OTHER SURGICAL HISTORY  05/06/2014    INCISION OF EARDRUM GENERAL ANESTHETIC 10966 (1)    Bilateral myringotomy with tubes.     SINUS SURGERY      TONSILLECTOMY      TONSILLECTOMY AND ADENOIDECTOMY      TUBAL ABDOMINAL LIGATION  03/14/1978     PT Assessment (last 12 hours)       PT Evaluation and Treatment       Row Name 09/24/23 1051 09/24/23 0919       Physical Therapy Time and Intention    Document Type --  -NIDA therapy note (daily note)  -NIDA    Mode of Treatment --  -NIDA physical therapy;individual therapy  -NIDA    Patient Effort --  -NIDA good  -NIDA    Comment -- --  -NIDA      Row Name 09/24/23 1051 09/24/23 0919       Pain    Pretreatment Pain Rating --  -NIDA 5/10  -NIDA    Posttreatment Pain Rating --  -NIDA 3/10  -NIDA    Pain Location - Side/Orientation --  Right  -    Pain Location - -- hip  -    Pain Intervention(s) -- Repositioned  -NIDA      Row Name 09/24/23 1051 09/24/23 0919       Cognition    Affect/Mental Status (Cognition) --  - WF  -    Orientation Status (Cognition) --  - oriented x 4  -    Personal Safety Interventions -- fall prevention program maintained;gait belt;muscle strengthening facilitated;nonskid shoes/slippers when out of bed;supervised activity  -      Row Name 09/24/23 1051 09/24/23 0919       Range of Motion Comprehensive    General Range of Motion --  - bilateral lower extremity ROM WFL  -      Row Name 09/24/23 1051 09/24/23 0919       Mobility    Extremity Weight-bearing Status --  - right lower extremity  -    Right Lower Extremity (Weight-bearing Status) --  - weight-bearing as tolerated (WBAT)  -      Row Name 09/24/23 1051 09/24/23 0919       Bed Mobility    Bed Mobility -- supine-sit  -    Supine-Sit [place] (Bed Mobility) --  - minimum assist (75% patient effort)  -    Sit-Supine [place] (Bed Mobility) --  - --  -NIDA      Row Name 09/24/23 1051 09/24/23 0919       Transfers    Transfers --  - sit-stand transfer;stand-sit transfer;chair-bed transfer  -NIDA      Row Name 09/24/23 1051 09/24/23 0919       Chair-Bed Transfer    Chair-Bed [place] (Transfers) --  - minimum assist (75% patient effort)  -    Assistive Device (Chair-Bed Transfers) --  - walker, front-wheeled  -NIDA      Row Name 09/24/23 1051 09/24/23 0919       Sit-Stand Transfer    Sit-Stand [place] (Transfers) --  - minimum assist (75% patient effort)  -    Assistive Device (Sit-Stand Transfers) --  - walker, front-wheeled  -NIDA      Row Name 09/24/23 1051 09/24/23 0919       Stand-Sit Transfer    Stand-Sit [place] (Transfers) --  - minimum assist (75% patient effort)  -    Assistive Device (Stand-Sit Transfers) --  - walker, front-wheeled  -NIDA      Row Name 09/24/23 0919          Toilet Transfer     Type (Toilet Transfer) --  -NIDA     [place] Level (Toilet Transfer) --  -NIDA     Assistive Device (Toilet Transfer) --  -NIDA       Row Name 09/24/23 1051 09/24/23 0919       Gait/Stairs (Locomotion)    Gait/Stairs Locomotion --  -NIDA gait/ambulation assistive device;gait/ambulation independence;distance ambulated;gait pattern;gait deviations;stairs negotiation;maintains weight-bearing status  -NIDA    [place] Level (Gait) --  -NIDA minimum assist (75% patient effort)  -NIDA    Assistive Device (Gait) --  -NIDA walker, front-wheeled  -NIDA    Distance in Feet (Gait) -- 50  -NIDA    Pattern (Gait) --  -NIDA 3-point;step-to  -NIDA    Deviations/Abnormal Patterns (Gait) --  -NIDA antalgic;delvin decreased;gait speed decreased;stride length decreased  -NIDA      Row Name 09/24/23 1051 09/24/23 0919       Safety Issues, Functional Mobility    Impairments Affecting Function (Mobility) --  -NIDA strength;endurance/activity tolerance  -NIDA      Row Name 09/24/23 0919          Motor Skills    Therapeutic Exercise --  ankle DF/PF, QS, GS, hip Ab/Ad, HS, SAQ- 15x1 buffy AAROM-AROM.  -NIDA       Row Name             Wound 09/19/23 1848 Right hip Incision    Wound - Properties Group Placement Date: 09/19/23  -SP Placement Time: 1848 -SP Present on Hospital Admission: N  -SP Side: Right  -SP Location: hip  -SP Primary Wound Type: Incision  -SP Additional Comments: chas LANGLEY    Retired Wound - Properties Group Placement Date: 09/19/23  -SP Placement Time: 1848 -SP Present on Hospital Admission: N  -SP Side: Right  -SP Location: hip  -SP Primary Wound Type: Incision  -SP Additional Comments: maxwello  -SP    Retired Wound - Properties Group Date first assessed: 09/19/23  -SP Time first assessed: 1848 -SP Present on Hospital Admission: N  -SP Side: Right  -SP Location: hip  -SP Primary Wound Type: Incision  -SP Additional Comments: maxwello  -SP      Row Name 09/24/23 1051 09/24/23 0919       Vital Signs    Pre Systolic BP Rehab --  -NIDA 137  -NIDA    Pre  Treatment Diastolic BP --  -NIDA 59  -NIDA    Post Systolic BP Rehab -- 147  -NIDA    Post Treatment Diastolic BP -- 68  -NIDA    Pretreatment Heart Rate (beats/min) --  -NIDA 77  -NIDA    Posttreatment Heart Rate (beats/min) -- 77  -NIDA    Pre SpO2 (%) --  -NIDA 97  -NIDA    O2 Delivery Pre Treatment --  -NIDA room air  -NIDA    Post SpO2 (%) -- 96  -NIDA    O2 Delivery Post Treatment -- room air  -NIDA    Pre Patient Position --  -NIDA Sitting  EOB  -NIDA    Post Patient Position -- Sitting  -NIDA      Row Name 09/24/23 1051 09/24/23 0919       Positioning and Restraints    Pre-Treatment Position --  -NIDA in bed  -NIDA    Post Treatment Position -- chair  -NIDA    In Chair -- reclined;call light within reach;encouraged to call for assist;exit alarm on  all needs met  -NIDA      Row Name 09/24/23 1051 09/24/23 0919       Therapy Assessment/Plan (PT)    Rehab Potential (PT) --  -NIDA good, to achieve stated therapy goals  -NIDA    Criteria for Skilled Interventions Met (PT) --  -NIDA yes;skilled treatment is necessary  -NIDA    Therapy Frequency (PT) --  -NIDA daily  -NIDA      Row Name 09/24/23 1051 09/24/23 0919       Bed Mobility Goal 1 (PT)    Activity/Assistive Device (Bed Mobility Goal 1, PT) sit to supine/supine to sit  -NIDA sit to supine/supine to sit  -NIDA    [place] Level/Cues Needed (Bed Mobility Goal 1, PT) independent  -NIDA independent  -NIDA    Time Frame (Bed Mobility Goal 1, PT) by discharge  -NIDA by discharge  -NIDA    Strategies/Barriers (Bed Mobility Goal 1, PT) HOB flat, no bed rails.  -NIDA HOB flat, no bed rails.  -NIDA    Progress/Outcomes (Bed Mobility Goal 1, PT) goal not met  -NIDA goal not met  -NIDA      Row Name 09/24/23 1051 09/24/23 0919       Transfer Goal 1 (PT)    Activity/Assistive Device (Transfer Goal 1, PT) sit-to-stand/stand-to-sit;bed-to-chair/chair-to-bed;walker, rolling  -NIDA sit-to-stand/stand-to-sit;bed-to-chair/chair-to-bed;walker, rolling  -NIDA    [place] Level/Cues Needed (Transfer Goal 1, PT) modified independence  -NIDA  modified independence  -NIDA    Time Frame (Transfer Goal 1, PT) by discharge  -NIDA by discharge  -NIDA    Strategies/Barriers (Transfers Goal 1, PT) R hip fx, SHEELA, anterior, WBAT.  -NIDA R hip fx, SHEELA, anterior, WBAT.  -NIDA    Progress/Outcome (Transfer Goal 1, PT) goal not met  -NIDA goal not met  -NIDA      Row Name 09/24/23 1051 09/24/23 0919       Gait Training Goal 1 (PT)    Activity/Assistive Device (Gait Training Goal 1, PT) walker, rolling  -NIDA walker, rolling  -NIDA    [place] Level (Gait Training Goal 1, PT) contact guard required  -NIDA contact guard required  -NIDA    Distance (Gait Training Goal 1, PT) 25' x 2.  -NIDA 25' x 2.  -NIDA    Time Frame (Gait Training Goal 1, PT) by discharge  -NIDA by discharge  -NIDA    Strategies/Barriers (Gait Training Goal 1, PT) R hip fx, SHEELA, anterior, WBAT.  -NIDA R hip fx, SHEELA, anterior, WBAT.  -NIDA    Progress/Outcome (Gait Training Goal 1, PT) goal partially met  -NIDA goal partially met  -NIDA      Row Name 09/24/23 1051 09/24/23 0919       Stairs Goal 1 (PT)    Activity/Assistive Device (Stairs Goal 1, PT) using handrail, right;using handrail, left  -NIDA using handrail, right;using handrail, left  -NIDA    [place] Level/Cues Needed (Stairs Goal 1, PT) contact guard required  -NIDA contact guard required  -NIDA    Number of Stairs (Stairs Goal 1, PT) 2 steps.  -NIDA 2 steps.  -NIDA    Time Frame (Stairs Goal 1, PT) by discharge  -NIDA by discharge  -NIDA    Strategies/Barriers (Stairs Goal 1, PT) R hip fx, SHEELA, anterior, WBAT.  -NIDA R hip fx, SHEELA, anterior, WBAT.  -NIDA    Progress/Outcome (Stairs Goal 1, PT) goal not met  -NIDA goal not met  -NIDA              User Key  (r) = Recorded By, (t) = Taken By, (c) = Cosigned By      Initials Name Provider Type    NIDA Eugenio Chavez, PTA Physical Therapist Assistant    Alina Ho RN Registered Nurse                    Physical Therapy Education       Title: PT OT SLP Therapies (In Progress)       Topic: Physical Therapy (In Progress)       Point:  Mobility training (In Progress)       Learning Progress Summary             Patient Acceptance, E, NR by NIDA at 9/24/2023 1259    Acceptance, E, NR by CZ at 9/20/2023 0965    Comment: PT POC, rehab process, hand placement with transfers, use of FWW, proper gait mechanics.                         Point: Home exercise program (Not Started)       Learner Progress:  Not documented in this visit.              Point: Body mechanics (Not Started)       Learner Progress:  Not documented in this visit.              Point: Precautions (Not Started)       Learner Progress:  Not documented in this visit.                              User Key       Initials Effective Dates Name Provider Type Discipline     06/16/21 -  Eugenio Chavez, PTA Physical Therapist Assistant PT    CZ 07/11/23 -  John Bazna, PT Physical Therapist PT                  PT Recommendation and Plan  Anticipated Discharge Disposition (PT): home with assist, home with outpatient therapy services, inpatient rehabilitation facility, skilled nursing facility  Therapy Frequency (PT): daily  Plan of Care Reviewed With: patient  Progress: improving  Outcome Evaluation: pt responded well to PT. pt requires min A for bed mobility and sit-stand-sit. pt t/f to recliner min A with RW. pt participated in gait training- 50 ft min A with RW. pt participated in LE ther ex, AAROM with good effort. no new goals met at this time. pt is pleasant and cooperative and would be an excellent candidate for inpatient rehab.   Outcome Measures       Row Name 09/24/23 0919 09/23/23 1545 09/22/23 1356       How much help from another person do you currently need...    Turning from your back to your side while in flat bed without using bedrails? 3  -NIDA 3  -LN 2  -NIDA    Moving from lying on back to sitting on the side of a flat bed without bedrails? 3  -NIDA 3  -LN 2  -NIDA    Moving to and from a bed to a chair (including a wheelchair)? 3  -NIDA 3  -LN 2  -NIDA    Standing up from a chair  using your arms (e.g., wheelchair, bedside chair)? 3  -NIDA 3  -LN 2  -NIDA    Climbing 3-5 steps with a railing? 3  -NIDA 1  -LN 1  -NIDA    To walk in hospital room? 3  -NIDA 3  -LN 3  -NIDA    AM-PAC 6 Clicks Score (PT) 18  -NIDA 16  -LN 12  -NIDA       Functional Assessment    Outcome Measure Options AM-PAC 6 Clicks Basic Mobility (PT)  -NIDA AM-PAC 6 Clicks Basic Mobility (PT)  -LN AM-PAC 6 Clicks Basic Mobility (PT)  -NIDA              User Key  (r) = Recorded By, (t) = Taken By, (c) = Cosigned By      Initials Name Provider Type    Eugenio Wasserman PTA Physical Therapist Assistant    Janna Thompson PTA Physical Therapist Assistant                     Time Calculation:    PT Charges       Row Name 09/24/23 1306             Time Calculation    Start Time 0919  -NIDA      Stop Time 1003  -NIDA      Time Calculation (min) 44 min  -NIDA         Time Calculation- PT    Total Timed Code Minutes- PT 44 minute(s)  -NIDA         Timed Charges    98120 - PT Therapeutic Exercise Minutes 23  -NIDA      36338 - Gait Training Minutes  15  -NIDA      40517 - PT Therapeutic Activity Minutes 6  -NIDA         Total Minutes    Timed Charges Total Minutes 44  -NIDA       Total Minutes 44  -NIDA                User Key  (r) = Recorded By, (t) = Taken By, (c) = Cosigned By      Initials Name Provider Type     Eugenio Chavez PTA Physical Therapist Assistant                  Therapy Charges for Today       Code Description Service Date Service Provider Modifiers Qty    88531205794 HC PT THER PROC EA 15 MIN 9/24/2023 Eugenio Chavez PTA GP 2    57546311594 HC GAIT TRAINING EA 15 MIN 9/24/2023 Eugenio Chavez PTA GP 1            PT G-Codes  Outcome Measure Options: AM-PAC 6 Clicks Basic Mobility (PT)  AM-PAC 6 Clicks Score (PT): 18  AM-PAC 6 Clicks Score (OT): 21    Eugenio Chavez PTA  9/24/2023

## 2023-09-24 NOTE — PROGRESS NOTES
"    [facility] Medicine Services  INPATIENT PROGRESS NOTE    Length of Stay: 1  Date of Admission: 9/19/2023  Primary Care Physician: Munir Rodriguez MD    Subjective   Chief Complaint: constipation\"  HPI:  88 year old female with past medical history of CAD, DM, HTN, HLD who presented on 9/19/23 with complaints of right hip pain r/t fall at home.  She is currently admitted for issues including right femoral neck fracture that required surgical repair.  During today's visit, she denies any further palpitations since yesterday.  She reports today that she is constipated.    Review of Systems   Constitutional:  Negative for chills and fever.   HENT:  Negative for congestion, rhinorrhea and sore throat.    Respiratory:  Negative for cough, chest tightness, shortness of breath and wheezing.    Cardiovascular:  Negative for chest pain, palpitations and leg swelling.   Gastrointestinal:  Positive for constipation. Negative for abdominal pain, diarrhea, nausea and vomiting.   Musculoskeletal:  Negative for back pain and neck pain.        Right hip post op pain   Skin:  Negative for pallor.   Neurological:  Negative for dizziness, weakness and headaches.   Psychiatric/Behavioral:  Negative for confusion. The patient is not nervous/anxious.       All pertinent negatives and positives are as above. All other systems have been reviewed and are negative unless otherwise stated.     Objective    Temp:  [98.4 °F (36.9 °C)-98.7 °F (37.1 °C)] 98.7 °F (37.1 °C)  Heart Rate:  [] 84  Resp:  [18-20] 20  BP: (120-182)/(54-83) 182/83    Physical Exam  Vitals and nursing note reviewed.   Constitutional:       General: She is not in acute distress.     Appearance: She is not ill-appearing.   HENT:      Head: Normocephalic and atraumatic.      Right Ear: External ear normal.      Left Ear: External ear normal.      Nose: Nose normal.      Mouth/Throat:      Mouth: Mucous membranes are moist.      " Pharynx: Oropharynx is clear.   Eyes:      General: No scleral icterus.        Right eye: No discharge.         Left eye: No discharge.      Conjunctiva/sclera: Conjunctivae normal.   Cardiovascular:      Rate and Rhythm: Normal rate and regular rhythm.      Heart sounds: Normal heart sounds. No murmur heard.    No friction rub. No gallop.   Pulmonary:      Effort: Pulmonary effort is normal. No respiratory distress.      Breath sounds: Normal breath sounds. No stridor. No wheezing, rhonchi or rales.   Abdominal:      General: Bowel sounds are normal. There is no distension.      Palpations: Abdomen is soft.      Tenderness: There is no abdominal tenderness.   Musculoskeletal:         General: Normal range of motion.      Cervical back: Normal range of motion.      Right lower leg: No edema.      Left lower leg: No edema.   Skin:     General: Skin is warm and dry.      Comments: Right hip incision intact.  No bleeding, swelling, drainage noted   Neurological:      General: No focal deficit present.      Mental Status: She is alert and oriented to person, place, and time.   Psychiatric:         Behavior: Behavior normal.           Results Review:  I have reviewed the labs, radiology results, and diagnostic studies.    Laboratory Data:   Results from last 7 days   Lab Units 09/23/23  1644 09/23/23  1308 09/22/23  0540 09/20/23  0558 09/19/23  1235   SODIUM mmol/L 133* 134* 131*   < > 133*   POTASSIUM mmol/L 4.3 4.7 3.8   < > 4.5   CHLORIDE mmol/L 99 100 99   < > 98   CO2 mmol/L 22.0 24.0 22.0   < > 25.0   BUN mg/dL 23 20 19   < > 17   CREATININE mg/dL 1.00 0.98 0.86   < > 0.83   GLUCOSE mg/dL 121* 182* 136*   < > 138*   CALCIUM mg/dL 9.0 9.4 9.1   < > 9.8   BILIRUBIN mg/dL  --  0.4  --   --  0.3   ALK PHOS U/L  --  65  --   --  69   ALT (SGPT) U/L  --  <5  --   --  9   AST (SGOT) U/L  --  18  --   --  20   ANION GAP mmol/L 12.0 10.0 10.0   < > 10.0    < > = values in this interval not displayed.       Estimated  Creatinine Clearance: 45.1 mL/min (by C-G formula based on SCr of 1 mg/dL).  Results from last 7 days   Lab Units 09/23/23  1308   MAGNESIUM mg/dL 2.0         Results from last 7 days   Lab Units 09/22/23  0540 09/21/23  0524 09/20/23  0558 09/19/23  2043 09/19/23  1235   WBC 10*3/mm3 8.31 9.44 13.22*  --  6.90   HEMOGLOBIN g/dL 7.5* 8.5* 10.1* 10.8* 10.9*   HEMATOCRIT % 22.0* 24.9* 29.9* 32.0* 32.3*   PLATELETS 10*3/mm3 123* 133* 157  --  158       Results from last 7 days   Lab Units 09/19/23  1235   INR  1.09         Culture Data:   No results found for: BLOODCX  No results found for: URINECX  No results found for: RESPCX  No results found for: WOUNDCX  No results found for: STOOLCX  No components found for: BODYFLD    Radiology Data:   Imaging Results (Last 24 Hours)       ** No results found for the last 24 hours. **            I have reviewed the patient's current medications.     Assessment/Plan     Active Hospital Problems    Diagnosis    • **Traumatic closed displaced fracture of neck of right femur, initial encounter    • Fall from slip, trip, or stumble, initial encounter    • Hip fracture    • Controlled type 2 diabetes mellitus without complication    • S/P CABG (coronary artery bypass graft)    • Benign essential hypertension    • Coronary arteriosclerosis in native artery        Plan:    Right femoral neck fracture: s/p ORIF on 9/19 with Dr. Adler.  Continue PT/OT, pain control.  Heparin for DVT prevention per ortho with plan to transition to Lovenox/Eliquis/warfarin at discharge (30 days of treatment)  CAD: continue ASA 81 mg daily, Labetalol 100 mg BID, Crestor 5 mg daily   HTN: continue Norvasc 5 mg daily, labetalol 100 mg BID.   Type 2 DM: FSBS AC and HS with SSI.   HLD: continue Crestor 5 mg nightly.   Palpitations: EKG reviewed.  Will place on telemetry overnight. Magnesium and potassium normal\      Medical Decision Making  Number and Complexity of problems: 6 moderate     Conditions and  Status:        Condition is improving.     [facility] Data  External documents reviewed: ED provider notes, ortho notes.   My EKG interpretation: NSR  My US interpretation: US renal - non obstructing left kidney stone, small right renal cysts.  No hydronephrosis.   Tests considered but not ordered: n/a     Decision rules/scores evaluated (example [id number], [place], etc): n/a      Discussed with: patient      Care Planning  Code status and discussions: Full code status on file    Disposition  Social Determinants of Health that impact treatment or disposition: impaired mobility   I expect the patient to be discharged to ARU in 1 days.     This document has been electronically signed by PEGGY Ceron on September 24, 2023 12:50 CDT

## 2023-09-24 NOTE — PLAN OF CARE
Goal Outcome Evaluation:              Outcome Evaluation: BP running elevated this shift-- APRN aware, new orders were placed. PRN medication given throughout shift for pain control

## 2023-09-24 NOTE — PROGRESS NOTES
ORTHOPEDIC PROGRESS NOTE:    Name:  Alexia Lopez  Date:    2023  Date of admission:  2023    Post op day:  5 Days Post-Op  Procedure:    Procedure(s) (LRB):  RIGHT HIP BIPOLAR ANTERIOR APPROACH (Right)    Subjective:  No new complaints  Pain controlled.    Vitals:     Vitals:    23 0858   BP: 176/72   Pulse: 108   Resp: 20   Temp: 98.6 °F (37 °C)   SpO2: 98%      Temp (24hrs), Av.6 °F (37 °C), Min:98.4 °F (36.9 °C), Max:98.7 °F (37.1 °C)    Exam:  Awake and alert  Toes up and down  Incision clean and dry  Calves soft and nontender    ASSESSMENT:  Active Hospital Problems    Diagnosis  POA    **Traumatic closed displaced fracture of neck of right femur, initial encounter [S72.001A]  Yes    Fall from slip, trip, or stumble, initial encounter [W01.0XXA]  Yes    Hip fracture [S72.009A]  Yes    Controlled type 2 diabetes mellitus without complication [E11.9]  Yes    S/P CABG (coronary artery bypass graft) [Z95.1]  Not Applicable    Benign essential hypertension [I10]  Yes    Coronary arteriosclerosis in native artery [I25.10]  Yes       PLAN:    No ortho changes.  S/p bipolar endoprosthesis right hip  Mobilize with PT/OT  Slowly progress weight bearing as tolerated.  DVT prophylaxis for 30 days (start heparin Subq today and will switch to enoxaparin/warfarin/or eliquis once disposition is known).  Anticipate disposition in 1-2 days.  Disposition:  home with home yared (unlikely), ARU best option, SNF    23 at 07:09 CDT by Major Velasquez MD

## 2023-09-24 NOTE — PLAN OF CARE
Goal Outcome Evaluation:  Plan of Care Reviewed With: patient        Progress: no change  Outcome Evaluation: BP elevated, pt resting much better this shift, will continue to monitor

## 2023-09-24 NOTE — PLAN OF CARE
Goal Outcome Evaluation:  Plan of Care Reviewed With: patient     Problem: Adult Inpatient Plan of Care  Goal: Plan of Care Review  Outcome: Ongoing, Progressing  Flowsheets (Taken 9/24/2023 1300)  Progress: improving  Plan of Care Reviewed With: patient  Outcome Evaluation: pt responded well to PT. pt requires min A for bed mobility and sit-stand-sit. pt t/f to recliner min A with RW. pt participated in gait training- 50 ft min A with RW. pt participated in LE ther ex, AAROM with good effort. no new goals met at this time. pt is pleasant and cooperative and would be an excellent candidate for inpatient rehab.      Anticipated Discharge Disposition (PT): home with assist, home with outpatient therapy services, inpatient rehabilitation facility, skilled nursing facility

## 2023-09-25 ENCOUNTER — APPOINTMENT (OUTPATIENT)
Dept: GENERAL RADIOLOGY | Facility: HOSPITAL | Age: 88
DRG: 536 | End: 2023-09-25
Payer: MEDICARE

## 2023-09-25 LAB
ANION GAP SERPL CALCULATED.3IONS-SCNC: 14 MMOL/L (ref 5–15)
B PARAPERT DNA SPEC QL NAA+PROBE: NOT DETECTED
B PERT DNA SPEC QL NAA+PROBE: NOT DETECTED
BACTERIA UR QL AUTO: ABNORMAL /HPF
BILIRUB UR QL STRIP: NEGATIVE
BUN SERPL-MCNC: 22 MG/DL (ref 8–23)
BUN/CREAT SERPL: 21.4 (ref 7–25)
C PNEUM DNA NPH QL NAA+NON-PROBE: NOT DETECTED
CALCIUM SPEC-SCNC: 9 MG/DL (ref 8.6–10.5)
CHLORIDE SERPL-SCNC: 94 MMOL/L (ref 98–107)
CLARITY UR: ABNORMAL
CO2 SERPL-SCNC: 19 MMOL/L (ref 22–29)
COLOR UR: ABNORMAL
CREAT SERPL-MCNC: 1.03 MG/DL (ref 0.57–1)
DEPRECATED RDW RBC AUTO: 46.3 FL (ref 37–54)
EGFRCR SERPLBLD CKD-EPI 2021: 53.1 ML/MIN/1.73
ERYTHROCYTE [DISTWIDTH] IN BLOOD BY AUTOMATED COUNT: 13.8 % (ref 12.3–15.4)
FLUAV SUBTYP SPEC NAA+PROBE: NOT DETECTED
FLUBV RNA ISLT QL NAA+PROBE: NOT DETECTED
GLUCOSE BLDC GLUCOMTR-MCNC: 120 MG/DL (ref 70–130)
GLUCOSE BLDC GLUCOMTR-MCNC: 133 MG/DL (ref 70–130)
GLUCOSE BLDC GLUCOMTR-MCNC: 167 MG/DL (ref 70–130)
GLUCOSE BLDC GLUCOMTR-MCNC: 169 MG/DL (ref 70–130)
GLUCOSE BLDC GLUCOMTR-MCNC: 171 MG/DL (ref 70–130)
GLUCOSE BLDC GLUCOMTR-MCNC: 206 MG/DL (ref 70–130)
GLUCOSE SERPL-MCNC: 174 MG/DL (ref 65–99)
GLUCOSE UR STRIP-MCNC: NEGATIVE MG/DL
GRAN CASTS URNS QL MICRO: ABNORMAL /LPF
HADV DNA SPEC NAA+PROBE: NOT DETECTED
HCOV 229E RNA SPEC QL NAA+PROBE: NOT DETECTED
HCOV HKU1 RNA SPEC QL NAA+PROBE: NOT DETECTED
HCOV NL63 RNA SPEC QL NAA+PROBE: NOT DETECTED
HCOV OC43 RNA SPEC QL NAA+PROBE: NOT DETECTED
HCT VFR BLD AUTO: 23.5 % (ref 34–46.6)
HGB BLD-MCNC: 7.9 G/DL (ref 12–15.9)
HGB UR QL STRIP.AUTO: NEGATIVE
HMPV RNA NPH QL NAA+NON-PROBE: NOT DETECTED
HPIV1 RNA ISLT QL NAA+PROBE: NOT DETECTED
HPIV2 RNA SPEC QL NAA+PROBE: NOT DETECTED
HPIV3 RNA NPH QL NAA+PROBE: NOT DETECTED
HPIV4 P GENE NPH QL NAA+PROBE: NOT DETECTED
HYALINE CASTS UR QL AUTO: ABNORMAL /LPF
KETONES UR QL STRIP: ABNORMAL
LEUKOCYTE ESTERASE UR QL STRIP.AUTO: ABNORMAL
M PNEUMO IGG SER IA-ACNC: NOT DETECTED
MCH RBC QN AUTO: 30.7 PG (ref 26.6–33)
MCHC RBC AUTO-ENTMCNC: 33.6 G/DL (ref 31.5–35.7)
MCV RBC AUTO: 91.4 FL (ref 79–97)
NITRITE UR QL STRIP: NEGATIVE
PH UR STRIP.AUTO: <=5 [PH] (ref 5–9)
PLATELET # BLD AUTO: 199 10*3/MM3 (ref 140–450)
PMV BLD AUTO: 11.4 FL (ref 6–12)
POTASSIUM SERPL-SCNC: 4.3 MMOL/L (ref 3.5–5.2)
PROT UR QL STRIP: ABNORMAL
RBC # BLD AUTO: 2.57 10*6/MM3 (ref 3.77–5.28)
RBC # UR STRIP: ABNORMAL /HPF
REF LAB TEST METHOD: ABNORMAL
RHINOVIRUS RNA SPEC NAA+PROBE: NOT DETECTED
RSV RNA NPH QL NAA+NON-PROBE: NOT DETECTED
SARS-COV-2 RNA NPH QL NAA+NON-PROBE: NOT DETECTED
SODIUM SERPL-SCNC: 127 MMOL/L (ref 136–145)
SP GR UR STRIP: 1.02 (ref 1–1.03)
SQUAMOUS #/AREA URNS HPF: ABNORMAL /HPF
UROBILINOGEN UR QL STRIP: ABNORMAL
WBC # UR STRIP: ABNORMAL /HPF
WBC NRBC COR # BLD: 13.16 10*3/MM3 (ref 3.4–10.8)

## 2023-09-25 PROCEDURE — 97530 THERAPEUTIC ACTIVITIES: CPT

## 2023-09-25 PROCEDURE — 84300 ASSAY OF URINE SODIUM: CPT | Performed by: NURSE PRACTITIONER

## 2023-09-25 PROCEDURE — 87040 BLOOD CULTURE FOR BACTERIA: CPT | Performed by: NURSE PRACTITIONER

## 2023-09-25 PROCEDURE — 97535 SELF CARE MNGMENT TRAINING: CPT

## 2023-09-25 PROCEDURE — 63710000001 INSULIN ASPART PER 5 UNITS: Performed by: STUDENT IN AN ORGANIZED HEALTH CARE EDUCATION/TRAINING PROGRAM

## 2023-09-25 PROCEDURE — 71045 X-RAY EXAM CHEST 1 VIEW: CPT

## 2023-09-25 PROCEDURE — 85027 COMPLETE CBC AUTOMATED: CPT | Performed by: NURSE PRACTITIONER

## 2023-09-25 PROCEDURE — 82948 REAGENT STRIP/BLOOD GLUCOSE: CPT

## 2023-09-25 PROCEDURE — 81001 URINALYSIS AUTO W/SCOPE: CPT | Performed by: NURSE PRACTITIONER

## 2023-09-25 PROCEDURE — 97110 THERAPEUTIC EXERCISES: CPT

## 2023-09-25 PROCEDURE — 0202U NFCT DS 22 TRGT SARS-COV-2: CPT | Performed by: NURSE PRACTITIONER

## 2023-09-25 PROCEDURE — 25010000002 HEPARIN (PORCINE) PER 1000 UNITS: Performed by: ORTHOPAEDIC SURGERY

## 2023-09-25 PROCEDURE — 80048 BASIC METABOLIC PNL TOTAL CA: CPT | Performed by: NURSE PRACTITIONER

## 2023-09-25 RX ORDER — POLYETHYLENE GLYCOL 3350 17 G/17G
17 POWDER, FOR SOLUTION ORAL ONCE
Status: COMPLETED | OUTPATIENT
Start: 2023-09-25 | End: 2023-09-25

## 2023-09-25 RX ADMIN — Medication 10 ML: at 21:06

## 2023-09-25 RX ADMIN — INSULIN ASPART 2 UNITS: 100 INJECTION, SOLUTION INTRAVENOUS; SUBCUTANEOUS at 17:49

## 2023-09-25 RX ADMIN — ROSUVASTATIN CALCIUM 10 MG: 10 TABLET, FILM COATED ORAL at 21:05

## 2023-09-25 RX ADMIN — ACETAMINOPHEN 650 MG: 325 TABLET, FILM COATED ORAL at 15:28

## 2023-09-25 RX ADMIN — POLYETHYLENE GLYCOL 3350 17 G: 17 POWDER, FOR SOLUTION ORAL at 10:06

## 2023-09-25 RX ADMIN — OXYCODONE HYDROCHLORIDE 5 MG: 5 TABLET ORAL at 13:43

## 2023-09-25 RX ADMIN — LABETALOL HYDROCHLORIDE 100 MG: 100 TABLET, FILM COATED ORAL at 21:05

## 2023-09-25 RX ADMIN — ACETAMINOPHEN 650 MG: 325 TABLET, FILM COATED ORAL at 21:14

## 2023-09-25 RX ADMIN — ACETAMINOPHEN 650 MG: 325 TABLET, FILM COATED ORAL at 10:05

## 2023-09-25 RX ADMIN — HEPARIN SODIUM 5000 UNITS: 5000 INJECTION INTRAVENOUS; SUBCUTANEOUS at 21:05

## 2023-09-25 RX ADMIN — AMLODIPINE BESYLATE 5 MG: 5 TABLET ORAL at 08:53

## 2023-09-25 RX ADMIN — OXYCODONE HYDROCHLORIDE 5 MG: 5 TABLET ORAL at 21:05

## 2023-09-25 RX ADMIN — ACETAMINOPHEN 650 MG: 325 TABLET, FILM COATED ORAL at 05:10

## 2023-09-25 RX ADMIN — ASPIRIN 81 MG: 81 TABLET, CHEWABLE ORAL at 08:30

## 2023-09-25 RX ADMIN — HEPARIN SODIUM 5000 UNITS: 5000 INJECTION INTRAVENOUS; SUBCUTANEOUS at 13:43

## 2023-09-25 RX ADMIN — INSULIN ASPART 2 UNITS: 100 INJECTION, SOLUTION INTRAVENOUS; SUBCUTANEOUS at 11:33

## 2023-09-25 RX ADMIN — LABETALOL HYDROCHLORIDE 100 MG: 100 TABLET, FILM COATED ORAL at 08:30

## 2023-09-25 RX ADMIN — HEPARIN SODIUM 5000 UNITS: 5000 INJECTION INTRAVENOUS; SUBCUTANEOUS at 05:10

## 2023-09-25 RX ADMIN — DOCUSATE SODIUM 50 MG AND SENNOSIDES 8.6 MG 2 TABLET: 8.6; 5 TABLET, FILM COATED ORAL at 08:30

## 2023-09-25 RX ADMIN — Medication 10 ML: at 09:33

## 2023-09-25 RX ADMIN — OXYCODONE HYDROCHLORIDE 5 MG: 5 TABLET ORAL at 08:30

## 2023-09-25 RX ADMIN — OXYCODONE HYDROCHLORIDE 5 MG: 5 TABLET ORAL at 02:00

## 2023-09-25 NOTE — THERAPY TREATMENT NOTE
Patient Name: Alexia Lopez  : 1935    MRN: [id number]                              Today's Date: 2023       Admit Date: 2023    Visit Dx:     ICD-10-CM ICD-9-CM   1. Traumatic closed displaced fracture of neck of right femur, initial encounter  S72.001A 820.8   2. Coronary arteriosclerosis in native artery  I25.10 414.01   3. S/P CABG (coronary artery bypass graft)  Z95.1 V45.81   4. Benign essential hypertension  I10 401.1   5. Controlled type 2 diabetes mellitus without complication, with long-term current use of insulin  E11.9 250.00    Z79.4 V58.67   6. Fall from slip, trip, or stumble, initial encounter  W01.0XXA E885.9   7. Closed fracture of right hip, initial encounter  S72.001A 820.8   8. Impaired functional mobility, balance, gait, and endurance [Z74.09 (ICD-10-CM)]  Z74.09 V49.89   9. Impaired mobility and ADLs [Z74.09, Z78.9 (ICD-10-CM)]  Z74.09 V49.89    Z78.9      Patient Active Problem List   Diagnosis    Coronary arteriosclerosis in native artery    S/P CABG (coronary artery bypass graft)    Benign essential hypertension    Hypercholesterolemia    Type 2 diabetes mellitus    Nuclear cataract    Controlled type 2 diabetes mellitus without complication    Pernicious anemia    Arthritis of knee    Chronic sinusitis    Encounter for screening mammogram for malignant neoplasm of breast    Generalized osteoarthritis    Iron deficiency anemia    Palpitations    Myocardial infarction    Hypertension    Hyperlipemia    History of echocardiogram    Diabetes mellitus    Coronary artery disease    Cataract    Chronic pain of both knees    Primary osteoarthritis of both knees    Claudication of lower extremity    Precordial pain    Fall from slip, trip, or stumble, initial encounter    Traumatic closed displaced fracture of neck of right femur, initial encounter    Hip fracture     Past Medical History:   Diagnosis Date    Cataract     Coronary artery disease     Diabetes mellitus      Type 2 diabetes mellitus - without retinopathy       History of echocardiogram 05/12/2014    Normal LV systolic function with EF of 55% with mild hypokinesis. Diastolic relaxation abnormality of left ventricle. Mild tricuspid regurg. Trace mitral regurg    Hyperlipemia     Hypertension     Myocardial infarction      Past Surgical History:   Procedure Laterality Date    BACK SURGERY      CARDIAC CATHETERIZATION  05/12/2014    Severe multivessel CAD with critical lesions noted in the LAD coronary artery, left circumflex coronary artery and RCA. Left ventriculogram no performed in view of elevated left ventricular end-diastolic pressure    CATARACT EXTRACTION W/ INTRAOCULAR LENS IMPLANT Left 2/2/2018    Procedure: CATARACT PHACO EXTRACTION WITH INTRAOCULAR LENS IMPLANT;  Surgeon: Gagan Lizarraga MD;  Location: [facility];  Service:     CATARACT EXTRACTION W/ INTRAOCULAR LENS IMPLANT Right 2/9/2018    Procedure: CATARACT PHACO EXTRACTION WITH INTRAOCULAR LENS IMPLANT;  Surgeon: Gagan Lizarraga MD;  Location: [facility];  Service:     CORONARY ARTERY BYPASS GRAFT      X 3 with LIMA to LAD, SVG to OMB and SVG to PDA.    DILATATION AND CURETTAGE      OTHER SURGICAL HISTORY  05/06/2014    INCISION OF EARDRUM GENERAL ANESTHETIC 81109 (1)    Bilateral myringotomy with tubes.     SINUS SURGERY      TONSILLECTOMY      TONSILLECTOMY AND ADENOIDECTOMY      TUBAL ABDOMINAL LIGATION  03/14/1978      General Information       Row Name 09/25/23 0745          OT Time and Intention    Document Type therapy note (daily note)  -KD     Mode of Treatment occupational therapy  -KD       Row Name 09/25/23 0745          General Information    Patient Profile Reviewed yes  -KD     Existing Precautions/Restrictions fall;hip, anterior  -KD       Row Name 09/25/23 0745          Cognition    Orientation Status (Cognition) oriented x 4  -KD       Row Name 09/25/23 0745          Safety Issues, Functional Mobility    Impairments Affecting  Function (Mobility) strength;endurance/activity tolerance  -               User Key  (r) = Recorded By, (t) = Taken By, (c) = Cosigned By      Initials Name Provider Type     Barb Oneil COTA Occupational Therapist Assistant                     Mobility/ADL's       Row Name 09/25/23 0745          Bed Mobility    Comment, (Bed Mobility) Pt seated in recliner upon entry  -       Row Name 09/25/23 0745          Transfers    Transfers toilet transfer  -       Row Name 09/25/23 0745          Sit-Stand Transfer    Sit-Stand [place] (Transfers) minimum assist (75% patient effort)  -     Assistive Device (Sit-Stand Transfers) walker, front-wheeled  -       Row Name 09/25/23 0745          Stand-Sit Transfer    Stand-Sit [place] (Transfers) minimum assist (75% patient effort)  -     Assistive Device (Stand-Sit Transfers) walker, front-wheeled  -       Row Name 09/25/23 0745          Toilet Transfer    [place] Level (Toilet Transfer) minimum assist (75% patient effort)  -     Assistive Device (Toilet Transfer) commode, bedside without drop arms;walker, front-wheeled  -       Row Name 09/25/23 07          Functional Mobility    Functional Mobility- Ind. Level contact guard assist  -     Functional Mobility- Device walker, front-wheeled  -     Functional Mobility-Distance (Feet) 18  -       Row Name 09/25/23 0745          Activities of Daily Living    BADL Assessment/Intervention toileting  -       Row Name 09/25/23 0745          Mobility    Extremity Weight-bearing Status right lower extremity  -     Right Lower Extremity (Weight-bearing Status) weight-bearing as tolerated (WBAT)  -       Row Name 09/25/23 0745          Grooming Assessment/Training    [place] Level (Grooming) grooming skills;hair care, combing/brushing;wash face, hands;set up  -     Position (Grooming) supported standing  -       Row Name 09/25/23 0745          Toileting Assessment/Training     [place] Level (Toileting) toileting skills;adjust/manage clothing;perform perineal hygiene;contact guard assist  -KD     Assistive Devices (Toileting) commode, bedside with drop arms  -KD     Position (Toileting) supported standing  -KD               User Key  (r) = Recorded By, (t) = Taken By, (c) = Cosigned By      Initials Name Provider Type     Barb Oneil COTA Occupational Therapist Assistant                   Obj/Interventions    No documentation.                  Goals/Plan       Row Name 09/25/23 0745          Transfer Goal 1 (OT)    Activity/Assistive Device (Transfer Goal 1, OT) toilet  -KD     [place] Level/Cues Needed (Transfer Goal 1, OT) standby assist  -KD     Time Frame (Transfer Goal 1, OT) long term goal (LTG);by discharge  -KD     Progress/Outcome (Transfer Goal 1, OT) goal not met  -KD       Row Name 09/25/23 0745          Bathing Goal 1 (OT)    Activity/Device (Bathing Goal 1, OT) lower body bathing  -KD     [place] Level/Cues Needed (Bathing Goal 1, OT) moderate assist (50-74% patient effort)  -KD     Time Frame (Bathing Goal 1, OT) long term goal (LTG);by discharge  -KD     Strategies/Barriers (Bathing Goal 1, OT) Spouse can assist as needed  -KD     Progress/Outcomes (Bathing Goal 1, OT) goal not met  -KD       Row Name 09/25/23 0745          Dressing Goal 1 (OT)    Activity/Device (Dressing Goal 1, OT) lower body dressing  -KD     [place]/Cues Needed (Dressing Goal 1, OT) minimum assist (75% or more patient effort)  -KD     Time Frame (Dressing Goal 1, OT) long term goal (LTG);by discharge  -KD     Strategies/Barriers (Dressing Goal 1, OT) Spouse can assist as needed  -KD     Progress/Outcome (Dressing Goal 1, OT) goal not met  -KD       Row Name 09/25/23 0745          Strength Goal 1 (OT)    Strength Goal 1 (OT) Pt will be (I) with BUE strengthening HEP after demonstration for increased strength required for ADLs and mobility.  -KD     Time Frame (Strength Goal 1,  OT) long term goal (LTG);by discharge  -KD     Progress/Outcome (Strength Goal 1, OT) goal not met  -KD               User Key  (r) = Recorded By, (t) = Taken By, (c) = Cosigned By      Initials Name Provider Type    KD Barb Oneil COTA Occupational Therapist Assistant                   Clinical Impression       Row Name 09/25/23 0745          Pain Assessment    Pretreatment Pain Rating 4/10  -KD     Posttreatment Pain Rating 3/10  -KD     Pain Location - Side/Orientation Right  -KD     Pain Location - hip  -KD     Pain Intervention(s) Nursing Notified  -KD       Row Name 09/25/23 0745          Plan of Care Review    Plan of Care Reviewed With patient  -KD     Progress improving  -KD     Outcome Evaluation Pt seated in recliner upon entry and agreebale to OT. Pt had ADL earlier this AM. Sit>stand-Min A. T/F from chair to BSC.CGA. Pericare-set up w/ supported standing. Grooming-set up. Fxl mobility ~18' w/ Min /CGA of 1 w/ RW. Pt then requested to sit and chair was placed behind her. Stand>sit-Min A. pt w/ all needs in reach upon exit. Cont OT POC.  -KD       Row Name 09/25/23 0745          Therapy Assessment/Plan (OT)    Therapy Frequency (OT) daily  -KD       Row Name 09/25/23 0745          Therapy Plan Review/Discharge Plan (OT)    Anticipated Discharge Disposition (OT) home with 24/7 care;home with home health;other (see comments);inpatient rehabilitation facility  -KD       Row Name 09/25/23 0745          Vital Signs    Pre Systolic BP Rehab 143  -KD     Pre Treatment Diastolic BP 64  -KD     Pretreatment Heart Rate (beats/min) 84  -KD     Pre SpO2 (%) 94  -KD     O2 Delivery Pre Treatment room air  -KD     Pre Patient Position Sitting  -KD     Intra Patient Position Standing  -KD     Post Patient Position Sitting  -KD       Row Name 09/25/23 0745          Positioning and Restraints    Pre-Treatment Position sitting in chair/recliner  -KD     Post Treatment Position chair  -KD     In Chair sitting;call  light within reach;encouraged to call for assist;exit alarm on  -KD               User Key  (r) = Recorded By, (t) = Taken By, (c) = Cosigned By      Initials Name Provider Type    Barb Mims COTA Occupational Therapist Assistant                   Outcome Measures       Row Name 09/25/23 0745          How much help from another is currently needed...    Putting on and taking off regular lower body clothing? 3  -KD     Bathing (including washing, rinsing, and drying) 3  -KD     Toileting (which includes using toilet bed pan or urinal) 3  -KD     Putting on and taking off regular upper body clothing 4  -KD     Taking care of personal grooming (such as brushing teeth) 4  -KD     Eating meals 4  -KD     AM-PAC 6 Clicks Score (OT) 21  -KD               User Key  (r) = Recorded By, (t) = Taken By, (c) = Cosigned By      Initials Name Provider Type    Barb Mims COTA Occupational Therapist Assistant                    Occupational Therapy Education       Title: PT OT SLP Therapies (In Progress)       Topic: Occupational Therapy (In Progress)       Point: ADL training (Done)       Description:   Instruct learner(s) on proper safety adaptation and remediation techniques during self care or transfers.   Instruct in proper use of assistive devices.                  Learning Progress Summary             Patient Acceptance, E,TB, VU by CM at 9/20/2023 1231    Comment: OT POC, Role of OT, d/c recommendations, AD/DME needs   Family Acceptance, E,TB, VU by CM at 9/20/2023 1231    Comment: OT POC, Role of OT, d/c recommendations, AD/DME needs                         Point: Home exercise program (Not Started)       Description:   Instruct learner(s) on appropriate technique for monitoring, assisting and/or progressing therapeutic exercises/activities.                  Learner Progress:  Not documented in this visit.              Point: Precautions (Done)       Description:   Instruct learner(s) on prescribed  precautions during self-care and functional transfers.                  Learning Progress Summary             Patient Acceptance, E,TB, VU by CM at 9/20/2023 1231    Comment: OT POC, Role of OT, d/c recommendations, AD/DME needs   Family Acceptance, E,TB, VU by CM at 9/20/2023 1231    Comment: OT POC, Role of OT, d/c recommendations, AD/DME needs                         Point: Body mechanics (Not Started)       Description:   Instruct learner(s) on proper positioning and spine alignment during self-care, functional mobility activities and/or exercises.                  Learner Progress:  Not documented in this visit.                              User Key       Initials Effective Dates Name Provider Type Discipline     11/18/22 -  Emily Lehman OT Occupational Therapist OT                  OT Recommendation and Plan  Therapy Frequency (OT): daily  Plan of Care Review  Plan of Care Reviewed With: patient  Progress: improving  Outcome Evaluation: Pt seated in recliner upon entry and agreebale to OT. Pt had ADL earlier this AM. Sit>stand-Min A. T/F from chair to BSC.CGA. Pericare-set up w/ supported standing. Grooming-set up. Fxl mobility ~18' w/ Min /CGA of 1 w/ RW. Pt then requested to sit and chair was placed behind her. Stand>sit-Min A. pt w/ all needs in reach upon exit. Cont OT POC.     Time Calculation:         Time Calculation- OT       Row Name 09/25/23 0745             Time Calculation- OT    OT Start Time 0745  -KD      OT Stop Time 0823  -KD      OT Time Calculation (min) 38 min  -KD      Total Timed Code Minutes- OT 38 minute(s)  -KD      OT Received On 09/25/23  -KD         Timed Charges    97258 - OT Self Care/Mgmt Minutes 38  -KD         Total Minutes    Timed Charges Total Minutes 38  -KD       Total Minutes 38  -KD                User Key  (r) = Recorded By, (t) = Taken By, (c) = Cosigned By      Initials Name Provider Type    Barb Mims COTA Occupational Therapist Assistant                   Therapy Charges for Today       Code Description Service Date Service Provider Modifiers Qty    [id number] HC OT SELF CARE/MGMT/TRAIN EA 15 MIN 9/25/2023 Barb Oneil, CLEMENTE GO 3                 CLEMENTE Villavicencio  9/25/2023

## 2023-09-25 NOTE — PLAN OF CARE
Goal Outcome Evaluation:      Patient is on isolation pending a respiratory panel. Continues on routine tylenol and prn pain meds. Dry cough noted. Continue plan of care.

## 2023-09-25 NOTE — PLAN OF CARE
Goal Outcome Evaluation:  Plan of Care Reviewed With: patient        Progress: improving  Outcome Evaluation: Pt seated in recliner upon entry and agreebale to OT. Pt had ADL earlier this AM. Sit>stand-Min A. T/F from chair to BSC.CGA. Pericare-set up w/ supported standing. Grooming-set up. Fxl mobility ~18' w/ Min /CGA of 1 w/ RW. Pt then requested to sit and chair was placed behind her. Stand>sit-Min A. pt w/ all needs in reach upon exit. Cont OT POC.      Anticipated Discharge Disposition (OT): home with 24/7 care, home with home health, other (see comments), inpatient rehabilitation facility

## 2023-09-25 NOTE — PLAN OF CARE
Goal Outcome Evaluation:  Plan of Care Reviewed With: patient        Progress: improving  Outcome Evaluation: patient is seated up in the recliner upon entry and agreeable to PT treatment. she demonstrates the ability to complete sit to stand with FWW with CGA. she ambulates 44 feet x 2 with CGA and FWW. she does have antalgic gait with slow delvin and decreased stride length. completes seated LE therex in bedside chair. does have difficulty with hip flexion on R LE for strengthening and unable to elevate it very high to hold. no difficulty with remaining LE therex. pt wishes to remain in bedside chair following treatment. all needs in reach.      Anticipated Discharge Disposition (PT): home with assist, home with outpatient therapy services, inpatient rehabilitation facility, skilled nursing facility

## 2023-09-25 NOTE — DISCHARGE PLACEMENT REQUEST
"Brian Lpoezdaya Vivas (88 y.o. Female)       Date of Birth   1935    Social Security Number       Address   [address] [place] [id number]    Home Phone   [phone]    MRN   [id number]       [employer]   Zoroastrianism    Marital Status                               Admission Date   9/19/23    Admission Type   Emergency    Admitting Provider   Red Castaneda MD    Attending Provider   Dinah Perry MD    Department, Room/Bed   [address], 405/1       Discharge Date       Discharge Disposition       Discharge Destination                                 Attending Provider: Dinah Perry MD    Allergies: Cherry, Peanut-containing Drug Products, Articaine, Lidocaine, Ciprofloxacin, Losartan    Isolation: None   Infection: None   Code Status: CPR    Ht: 170.2 cm (67\")   Wt: 70.7 kg (155 lb 12.8 oz)    Admission Cmt: None   Principal Problem: Traumatic closed displaced fracture of neck of right femur, initial encounter [S72.001A]                   Active Insurance as of 9/19/2023       Primary Coverage       Payor Plan Insurance Group Employer/Plan Group    [facility] MEDICARE REPLACEMENT [facility] MEDICARE REPLACEMENT [id number]       Payor Plan Address Payor Plan Phone Number Payor Plan Fax Number Effective Dates    [address] [address]   1/1/2017 - None Entered    [facility] [id number]         Subscriber Name Subscriber Birth Date Member ID       ALEXIA LOPEZ 1935 [id number]                     Emergency Contacts        (Rel.) Home Phone Work Phone Mobile Phone    Arcenio Lopez Sr (Spouse) [phone] -- [phone]    Arcenio Lopez Jr (Son) [phone] -- [phone]                "

## 2023-09-25 NOTE — THERAPY TREATMENT NOTE
Patient Name: Alexia Lopez  : 1935    MRN: [id number]                              Today's Date: 2023       Physical Therapy Treatment Note    Admit Date: 2023    Visit Dx:     ICD-10-CM ICD-9-CM   1. Traumatic closed displaced fracture of neck of right femur, initial encounter  S72.001A 820.8   2. Coronary arteriosclerosis in native artery  I25.10 414.01   3. S/P CABG (coronary artery bypass graft)  Z95.1 V45.81   4. Benign essential hypertension  I10 401.1   5. Controlled type 2 diabetes mellitus without complication, with long-term current use of insulin  E11.9 250.00    Z79.4 V58.67   6. Fall from slip, trip, or stumble, initial encounter  W01.0XXA E885.9   7. Closed fracture of right hip, initial encounter  S72.001A 820.8   8. Impaired functional mobility, balance, gait, and endurance [Z74.09 (ICD-10-CM)]  Z74.09 V49.89   9. Impaired mobility and ADLs [Z74.09, Z78.9 (ICD-10-CM)]  Z74.09 V49.89    Z78.9      Patient Active Problem List   Diagnosis    Coronary arteriosclerosis in native artery    S/P CABG (coronary artery bypass graft)    Benign essential hypertension    Hypercholesterolemia    Type 2 diabetes mellitus    Nuclear cataract    Controlled type 2 diabetes mellitus without complication    Pernicious anemia    Arthritis of knee    Chronic sinusitis    Encounter for screening mammogram for malignant neoplasm of breast    Generalized osteoarthritis    Iron deficiency anemia    Palpitations    Myocardial infarction    Hypertension    Hyperlipemia    History of echocardiogram    Diabetes mellitus    Coronary artery disease    Cataract    Chronic pain of both knees    Primary osteoarthritis of both knees    Claudication of lower extremity    Precordial pain    Fall from slip, trip, or stumble, initial encounter    Traumatic closed displaced fracture of neck of right femur, initial encounter    Hip fracture     Past Medical History:   Diagnosis Date    Cataract     Coronary artery  disease     Diabetes mellitus     Type 2 diabetes mellitus - without retinopathy       History of echocardiogram 05/12/2014    Normal LV systolic function with EF of 55% with mild hypokinesis. Diastolic relaxation abnormality of left ventricle. Mild tricuspid regurg. Trace mitral regurg    Hyperlipemia     Hypertension     Myocardial infarction      Past Surgical History:   Procedure Laterality Date    BACK SURGERY      CARDIAC CATHETERIZATION  05/12/2014    Severe multivessel CAD with critical lesions noted in the LAD coronary artery, left circumflex coronary artery and RCA. Left ventriculogram no performed in view of elevated left ventricular end-diastolic pressure    CATARACT EXTRACTION W/ INTRAOCULAR LENS IMPLANT Left 2/2/2018    Procedure: CATARACT PHACO EXTRACTION WITH INTRAOCULAR LENS IMPLANT;  Surgeon: Gagan Lizarraga MD;  Location: [facility];  Service:     CATARACT EXTRACTION W/ INTRAOCULAR LENS IMPLANT Right 2/9/2018    Procedure: CATARACT PHACO EXTRACTION WITH INTRAOCULAR LENS IMPLANT;  Surgeon: Gagan Lizarraga MD;  Location: [facility];  Service:     CORONARY ARTERY BYPASS GRAFT      X 3 with LIMA to LAD, SVG to OMB and SVG to PDA.    DILATATION AND CURETTAGE      OTHER SURGICAL HISTORY  05/06/2014    INCISION OF EARDRUM GENERAL ANESTHETIC 50918 (1)    Bilateral myringotomy with tubes.     SINUS SURGERY      TONSILLECTOMY      TONSILLECTOMY AND ADENOIDECTOMY      TUBAL ABDOMINAL LIGATION  03/14/1978      General Information       Row Name 09/25/23 0939          Physical Therapy Time and Intention    Document Type therapy note (daily note)  -     Mode of Treatment individual therapy;physical therapy  -       Row Name 09/25/23 0930          General Information    Patient Profile Reviewed yes  -     Existing Precautions/Restrictions fall;hip, anterior  -       Row Name 09/25/23 0939          Cognition    Orientation Status (Cognition) oriented x 4  -       Row Name 09/25/23 0991           Safety Issues, Functional Mobility    Impairments Affecting Function (Mobility) strength;endurance/activity tolerance  -               User Key  (r) = Recorded By, (t) = Taken By, (c) = Cosigned By      Initials Name Provider Type     Brianda Young PTA Physical Therapist Assistant                   Mobility       Row Name 09/25/23 0941          Bed Mobility    Bed Mobility bed mobility (all) activities  -     All Activities, Omer (Bed Mobility) not tested  -     Comment, (Bed Mobility) pt seated up in recliner upon entry  -       Row Name 09/25/23 0941          Bed-Chair Transfer    Bed-Chair Omer (Transfers) contact guard  -     Assistive Device (Bed-Chair Transfers) walker, front-wheeled  -       Row Name 09/25/23 0941          Sit-Stand Transfer    Sit-Stand Omer (Transfers) contact guard  -     Assistive Device (Sit-Stand Transfers) walker, front-wheeled  -       Row Name 09/25/23 0941          Gait/Stairs (Locomotion)    [place] Level (Gait) contact [facility]  -     Assistive Device (Gait) walker, front-wheeled  -     Distance in Feet (Gait) 44 feet + 44 feet  -     Deviations/Abnormal Patterns (Gait) antalgic;delvin decreased;gait speed decreased;stride length decreased  -     [place]mer Level (Stairs) not tested  -       Row Name 09/25/23 0941          Mobility    Extremity Weight-bearing Status right lower extremity  -     Right Lower Extremity (Weight-bearing Status) weight-bearing as tolerated (WBAT)  -               User Key  (r) = Recorded By, (t) = Taken By, (c) = Cosigned By      Initials Name Provider Type     Brianda Young PTA Physical Therapist Assistant                   Obj/Interventions       Row Name 09/25/23 1001          Motor Skills    Therapeutic Exercise hip;knee;ankle  -       Row Name 09/25/23 1001          Hip (Therapeutic Exercise)    Hip (Therapeutic Exercise) strengthening exercise;isometric exercises  -     Hip  Isometrics (Therapeutic Exercise) bilateral;aDduction;10 repetitions;2 sets;sitting;5 second hold;gluteal sets  -     Hip Strengthening (Therapeutic Exercise) bilateral;flexion;sitting;2 sets;10 repetitions  -       Row Name 09/25/23 1001          Knee (Therapeutic Exercise)    Knee (Therapeutic Exercise) strengthening exercise  -     Knee Strengthening (Therapeutic Exercise) bilateral;LAQ (long arc quad);20 repititions;5 second hold;sitting  -       Row Name 09/25/23 1001          Ankle (Therapeutic Exercise)    Ankle (Therapeutic Exercise) AROM (active range of motion)  -     Ankle AROM (Therapeutic Exercise) bilateral;dorsiflexion;plantarflexion;20 repititions;sitting  -               User Key  (r) = Recorded By, (t) = Taken By, (c) = Cosigned By      Initials Name Provider Type     Brianda Young PTA Physical Therapist Assistant                   Goals/Plan       Row Name 09/25/23 1017          Bed Mobility Goal 1 (PT)    Activity/Assistive Device (Bed Mobility Goal 1, PT) sit to supine/supine to sit  -     [place] Level/Cues Needed (Bed Mobility Goal 1, PT) independent  -     Time Frame (Bed Mobility Goal 1, PT) by discharge  -     Strategies/Barriers (Bed Mobility Goal 1, PT) HOB flat, no bed rails.  -     Progress/Outcomes (Bed Mobility Goal 1, PT) goal not met  -       Row Name 09/25/23 1017          Transfer Goal 1 (PT)    Activity/Assistive Device (Transfer Goal 1, PT) sit-to-stand/stand-to-sit;bed-to-chair/chair-to-bed;walker, rolling  -     [place] Level/Cues Needed (Transfer Goal 1, PT) modified independence  -     Time Frame (Transfer Goal 1, PT) by discharge  -     Strategies/Barriers (Transfers Goal 1, PT) R hip fx, SHEELA, anterior, WBAT.  -     Progress/Outcome (Transfer Goal 1, PT) goal not met  -       Row Name 09/25/23 1017          Gait Training Goal 1 (PT)    Activity/Assistive Device (Gait Training Goal 1, PT) walker, rolling  -     [place]  Level (Gait Training Goal 1, PT) contact guard required  -     Distance (Gait Training Goal 1, PT) 25' x 2.  -     Time Frame (Gait Training Goal 1, PT) by discharge  -     Strategies/Barriers (Gait Training Goal 1, PT) R hip fx, SHEELA, anterior, WBAT.  -     Progress/Outcome (Gait Training Goal 1, PT) goal met   -       Row Name 09/25/23 1017          Stairs Goal 1 (PT)    Activity/Assistive Device (Stairs Goal 1, PT) using handrail, right;using handrail, left  -     [place] Level/Cues Needed (Stairs Goal 1, PT) contact guard required  -     Number of Stairs (Stairs Goal 1, PT) 2 steps.  -     Time Frame (Stairs Goal 1, PT) by discharge  -     Strategies/Barriers (Stairs Goal 1, PT) R hip fx, SHEELA, anterior, WBAT.  -     Progress/Outcome (Stairs Goal 1, PT) goal not met  -               User Key  (r) = Recorded By, (t) = Taken By, (c) = Cosigned By      Initials Name Provider Type     Brianda Young PTA Physical Therapist Assistant                   Clinical Impression       Row Name 09/25/23 0918          Pain    Pretreatment Pain Rating 4/10  -     Posttreatment Pain Rating 4/10  -     Pain Location - Side/Orientation Right  -     Pain Location - hip  -       Row Name 09/25/23 0953          Plan of Care Review    Plan of Care Reviewed With patient  -     Progress improving  -     Outcome Evaluation patient is seated up in the recliner upon entry and agreeable to PT treatment. she demonstrates the ability to complete sit to stand with FWW with CGA. she ambulates 44 feet x 2 with CGA and FWW. she does have antalgic gait with slow delvin and decreased stride length. completes seated LE therex in bedside chair. does have difficulty with hip flexion on R LE for strengthening and unable to elevate it very high to hold. no difficulty with remaining LE therex. pt wishes to remain in bedside chair following treatment. all needs in reach.  -       Row Name 09/25/23 0980           Therapy Assessment/Plan (PT)    Rehab Potential (PT) good, to achieve stated therapy goals  -     Criteria for Skilled Interventions Met (PT) yes;skilled treatment is necessary  -     Therapy Frequency (PT) daily  -       Row Name 09/25/23 0939          Vital Signs    Pre Systolic BP Rehab 105  -MH     Pre Treatment Diastolic BP 44  -MH     Post Systolic BP Rehab 98  -MH     Post Treatment Diastolic BP 49  -MH     Pretreatment Heart Rate (beats/min) 81  -MH     Posttreatment Heart Rate (beats/min) 77  -MH     Pre SpO2 (%) 96  -MH     O2 Delivery Pre Treatment room air  -MH     Post SpO2 (%) 97  -MH     O2 Delivery Post Treatment room air  -MH     Pre Patient Position Sitting  -MH     Post Patient Position Sitting  -       Row Name 09/25/23 0939          Positioning and Restraints    Pre-Treatment Position sitting in chair/recliner  -MH     Post Treatment Position chair  -MH     In Chair notified nsg;reclined;call light within reach;encouraged to call for assist;exit alarm on  -               User Key  (r) = Recorded By, (t) = Taken By, (c) = Cosigned By      Initials Name Provider Type     Brianda Young, PTA Physical Therapist Assistant                   Outcome Measures       Row Name 09/25/23 1018 09/25/23 0830       How much help from another person do you currently need...    Turning from your back to your side while in flat bed without using bedrails? 3  -MH 3  -CS    Moving from lying on back to sitting on the side of a flat bed without bedrails? 3  - 3  -CS    Moving to and from a bed to a chair (including a wheelchair)? 3  - 3  -CS    Standing up from a chair using your arms (e.g., wheelchair, bedside chair)? 3  - 3  -CS    Climbing 3-5 steps with a railing? 3  -MH 3  -CS    To walk in hospital room? 3  -MH 3  -CS    AM-PAC 6 Clicks Score (PT) 18  -MH 18  -CS    Highest level of mobility 6 --> Walked 10 steps or more  - 6 --> Walked 10 steps or more  -CS              User Key  (r) =  Recorded By, (t) = Taken By, (c) = Cosigned By      Initials Name Provider Type     Brianda Young PTA Physical Therapist Assistant    Fatimah Blood RN Registered Nurse                                 Physical Therapy Education       Title: PT OT SLP Therapies (In Progress)       Topic: Physical Therapy (In Progress)       Point: Mobility training (In Progress)       Learning Progress Summary             Patient Acceptance, E, NR by NIDA at 9/24/2023 1259    Acceptance, E, NR by JIE at 9/20/2023 0933    Comment: PT POC, rehab process, hand placement with transfers, use of FWW, proper gait mechanics.                         Point: Home exercise program (Not Started)       Learner Progress:  Not documented in this visit.              Point: Body mechanics (Not Started)       Learner Progress:  Not documented in this visit.              Point: Precautions (Not Started)       Learner Progress:  Not documented in this visit.                              User Key       Initials Effective Dates Name Provider Type Discipline     06/16/21 -  Eugenio Chavez PTA Physical Therapist Assistant PT    JIE 07/11/23 -  John Bazan, PT Physical Therapist PT                  PT Recommendation and Plan     Plan of Care Reviewed With: patient  Progress: improving  Outcome Evaluation: patient is seated up in the recliner upon entry and agreeable to PT treatment. she demonstrates the ability to complete sit to stand with FWW with CGA. she ambulates 44 feet x 2 with CGA and FWW. she does have antalgic gait with slow delvin and decreased stride length. completes seated LE therex in bedside chair. does have difficulty with hip flexion on R LE for strengthening and unable to elevate it very high to hold. no difficulty with remaining LE therex. pt wishes to remain in bedside chair following treatment. all needs in reach.     Time Calculation:         PT Charges       Row Name 09/25/23 1002             Time Calculation    Start  Time 0935  -      Stop Time 1019  -      Time Calculation (min) 44 min  -MH      PT Received On 09/25/23  -         Time Calculation- PT    Total Timed Code Minutes- PT 44 minute(s)  -MH         Timed Charges    39716 - PT Therapeutic Exercise Minutes 21  -MH      14536 - PT Therapeutic Activity Minutes 23  -MH         Total Minutes    Timed Charges Total Minutes 44  -MH       Total Minutes 44  -MH                User Key  (r) = Recorded By, (t) = Taken By, (c) = Cosigned By      Initials Name Provider Type     Brianda Young PTA Physical Therapist Assistant                  Therapy Charges for Today       Code Description Service Date Service Provider Modifiers Qty    43373494275 HC PT THER PROC EA 15 MIN 9/25/2023 Brianda Young PTA GP 1    24093547650 HC PT THERAPEUTIC ACT EA 15 MIN 9/25/2023 Brianda Young PTA GP 2            PT G-Codes  Outcome Measure Options: AM-PAC 6 Clicks Basic Mobility (PT)  AM-PAC 6 Clicks Score (PT): 18  AM-PAC 6 Clicks Score (OT): 21  PT Discharge Summary  Anticipated Discharge Disposition (PT): home with assist, home with outpatient therapy services, inpatient rehabilitation facility, skilled nursing facility    Brianda Young PTA  9/25/2023

## 2023-09-25 NOTE — PROGRESS NOTES
[facility] Medicine Services  INPATIENT PROGRESS NOTE    Length of Stay: 2  Date of Admission: 9/19/2023  Primary Care Physician: Munir Rodriguez MD    Subjective   Chief Complaint: constipation  HPI:  88 year old female with past medical history of CAD, DM, HTN, HLD who presented on 9/19/23 with complaints of right hip pain r/t fall at home.  She is currently admitted for issues including right femoral neck fracture that required surgical repair.  During today's visit, she reports constipation continues.  She had slight fever overnight.    Review of Systems   Constitutional:  Negative for chills and fever.   HENT:  Negative for congestion, rhinorrhea and sore throat.    Respiratory:  Negative for cough, chest tightness, shortness of breath and wheezing.    Cardiovascular:  Negative for chest pain, palpitations and leg swelling.   Gastrointestinal:  Positive for constipation. Negative for abdominal pain, diarrhea, nausea and vomiting.   Genitourinary:  Negative for difficulty urinating, flank pain and pelvic pain.   Musculoskeletal:  Negative for back pain and neck pain.        Right hip post op pain   Skin:  Negative for pallor.   Neurological:  Negative for dizziness, weakness and headaches.   Psychiatric/Behavioral:  Negative for confusion. The patient is not nervous/anxious.       All pertinent negatives and positives are as above. All other systems have been reviewed and are negative unless otherwise stated.     Objective    Temp:  [98.5 °F (36.9 °C)-101.1 °F (38.4 °C)] 98.5 °F (36.9 °C)  Heart Rate:  [] 88  Resp:  [18-20] 18  BP: (142-186)/(58-83) 146/82    Physical Exam  Vitals and nursing note reviewed.   Constitutional:       General: She is not in acute distress.     Appearance: She is not ill-appearing.   HENT:      Head: Normocephalic and atraumatic.      Right Ear: External ear normal.      Left Ear: External ear normal.      Nose: Nose normal.       Mouth/Throat:      Mouth: Mucous membranes are moist.      Pharynx: Oropharynx is clear.   Eyes:      General: No scleral icterus.        Right eye: No discharge.         Left eye: No discharge.      Conjunctiva/sclera: Conjunctivae normal.   Cardiovascular:      Rate and Rhythm: Normal rate and regular rhythm.      Heart sounds: Normal heart sounds. No murmur heard.    No friction rub. No gallop.   Pulmonary:      Effort: Pulmonary effort is normal. No respiratory distress.      Breath sounds: Normal breath sounds. No stridor. No wheezing, rhonchi or rales.   Abdominal:      General: Bowel sounds are normal. There is no distension.      Palpations: Abdomen is soft.      Tenderness: There is no abdominal tenderness.   Musculoskeletal:         General: Normal range of motion.      Cervical back: Normal range of motion.      Right lower leg: No edema.      Left lower leg: No edema.   Skin:     General: Skin is warm and dry.      Comments: Right hip incision intact.  No bleeding, swelling, drainage noted   Neurological:      General: No focal deficit present.      Mental Status: She is alert and oriented to person, place, and time.   Psychiatric:         Behavior: Behavior normal.           Results Review:  I have reviewed the labs, radiology results, and diagnostic studies.    Laboratory Data:   Results from last 7 days   Lab Units 09/25/23  0835 09/23/23  1644 09/23/23  1308 09/20/23  0558 09/19/23  1235   SODIUM mmol/L 127* 133* 134*   < > 133*   POTASSIUM mmol/L 4.3 4.3 4.7   < > 4.5   CHLORIDE mmol/L 94* 99 100   < > 98   CO2 mmol/L 19.0* 22.0 24.0   < > 25.0   BUN mg/dL 22 23 20   < > 17   CREATININE mg/dL 1.03* 1.00 0.98   < > 0.83   GLUCOSE mg/dL 174* 121* 182*   < > 138*   CALCIUM mg/dL 9.0 9.0 9.4   < > 9.8   BILIRUBIN mg/dL  --   --  0.4  --  0.3   ALK PHOS U/L  --   --  65  --  69   ALT (SGPT) U/L  --   --  <5  --  9   AST (SGOT) U/L  --   --  18  --  20   ANION GAP mmol/L 14.0 12.0 10.0   < > 10.0    < > =  values in this interval not displayed.       Estimated Creatinine Clearance: 43.8 mL/min (A) (by C-G formula based on SCr of 1.03 mg/dL (H)).  Results from last 7 days   Lab Units 09/23/23  1308   MAGNESIUM mg/dL 2.0           Results from last 7 days   Lab Units 09/25/23  0835 09/22/23  0540 09/21/23  0524 09/20/23  0558 09/19/23  2043 09/19/23  1235   WBC 10*3/mm3 13.16* 8.31 9.44 13.22*  --  6.90   HEMOGLOBIN g/dL 7.9* 7.5* 8.5* 10.1* 10.8* 10.9*   HEMATOCRIT % 23.5* 22.0* 24.9* 29.9* 32.0* 32.3*   PLATELETS 10*3/mm3 199 123* 133* 157  --  158       Results from last 7 days   Lab Units 09/19/23  1235   INR  1.09         Culture Data:   No results found for: BLOODCX  No results found for: URINECX  No results found for: RESPCX  No results found for: WOUNDCX  No results found for: STOOLCX  No components found for: BODYFLD    Radiology Data:   Imaging Results (Last 24 Hours)       Procedure Component Value Units Date/Time    XR Chest 1 View [[id number]] Collected: 09/25/23 0902     Updated: 09/25/23 0919    Narrative:      INDICATION:  Fever.    FINDINGS:  No infiltrate, effusion or pneumothorax is seen.  Heart size and pulmonary  vascularity are normal.  The lungs are clear.  The mediastinum, cyndie and  visualized osseous structures are unremarkable.      Impression:      No significant abnormality of the chest.            I have reviewed the patient's current medications.     Assessment/Plan     Active Hospital Problems    Diagnosis    • **Traumatic closed displaced fracture of neck of right femur, initial encounter    • Fall from slip, trip, or stumble, initial encounter    • Hip fracture    • Controlled type 2 diabetes mellitus without complication    • S/P CABG (coronary artery bypass graft)    • Benign essential hypertension    • Coronary arteriosclerosis in native artery        Plan:    Right femoral neck fracture: s/p ORIF on 9/19 with Dr. Adler.  Continue PT/OT, pain control.  Heparin for DVT prevention per  ortho with plan to transition to Lovenox/Eliquis/warfarin at discharge (30 days of treatment)  CAD: continue ASA 81 mg daily, Labetalol 100 mg BID, Crestor 5 mg daily   HTN: continue Norvasc 5 mg daily, labetalol 100 mg BID.   Type 2 DM: FSBS AC and HS with SSI.   HLD: continue Crestor 5 mg nightly.   Palpitations: EKG reviewed.  Continue telemetry monitoring. Magnesium and potassium normal  Fever: Check CXR, UA. Add incentive spirometer.       Medical Decision Making  Number and Complexity of problems: 6 moderate     Conditions and Status:        Condition is improving.     MDM Data  External documents reviewed: ED provider notes, ortho notes.   My EKG interpretation: NSR  My US interpretation: US renal - non obstructing left kidney stone, small right renal cysts.  No hydronephrosis.   Tests considered but not ordered: n/a     Decision rules/scores evaluated (example [id number], Wells, etc): n/a      Discussed with: patient      Care Planning  Code status and discussions: Full code status on file    Disposition  Social Determinants of Health that impact treatment or disposition: impaired mobility   I expect the patient to be discharged to ARU in 1-2 days.     This document has been electronically signed by PEGGY Ceron on September 25, 2023 09:20 CDT

## 2023-09-26 LAB
ANION GAP SERPL CALCULATED.3IONS-SCNC: 14 MMOL/L (ref 5–15)
BUN SERPL-MCNC: 30 MG/DL (ref 8–23)
BUN/CREAT SERPL: 23.1 (ref 7–25)
CALCIUM SPEC-SCNC: 8.7 MG/DL (ref 8.6–10.5)
CHLORIDE SERPL-SCNC: 93 MMOL/L (ref 98–107)
CO2 SERPL-SCNC: 19 MMOL/L (ref 22–29)
CREAT SERPL-MCNC: 1.3 MG/DL (ref 0.57–1)
DEPRECATED RDW RBC AUTO: 45.5 FL (ref 37–54)
EGFRCR SERPLBLD CKD-EPI 2021: 40.1 ML/MIN/1.73
ERYTHROCYTE [DISTWIDTH] IN BLOOD BY AUTOMATED COUNT: 13.8 % (ref 12.3–15.4)
GLUCOSE BLDC GLUCOMTR-MCNC: 119 MG/DL (ref 70–130)
GLUCOSE BLDC GLUCOMTR-MCNC: 123 MG/DL (ref 70–130)
GLUCOSE BLDC GLUCOMTR-MCNC: 136 MG/DL (ref 70–130)
GLUCOSE BLDC GLUCOMTR-MCNC: 169 MG/DL (ref 70–130)
GLUCOSE BLDC GLUCOMTR-MCNC: 180 MG/DL (ref 70–130)
GLUCOSE SERPL-MCNC: 145 MG/DL (ref 65–99)
HCT VFR BLD AUTO: 22.4 % (ref 34–46.6)
HGB BLD-MCNC: 7.5 G/DL (ref 12–15.9)
MCH RBC QN AUTO: 30.1 PG (ref 26.6–33)
MCHC RBC AUTO-ENTMCNC: 33.5 G/DL (ref 31.5–35.7)
MCV RBC AUTO: 90 FL (ref 79–97)
OSMOLALITY UR: 326 MOSM/KG (ref 38–1400)
PLATELET # BLD AUTO: 198 10*3/MM3 (ref 140–450)
PMV BLD AUTO: 10.9 FL (ref 6–12)
POTASSIUM SERPL-SCNC: 4 MMOL/L (ref 3.5–5.2)
RBC # BLD AUTO: 2.49 10*6/MM3 (ref 3.77–5.28)
SODIUM SERPL-SCNC: 126 MMOL/L (ref 136–145)
SODIUM SERPL-SCNC: 128 MMOL/L (ref 136–145)
SODIUM UR-SCNC: 67 MMOL/L
WBC NRBC COR # BLD: 10.76 10*3/MM3 (ref 3.4–10.8)

## 2023-09-26 PROCEDURE — 80048 BASIC METABOLIC PNL TOTAL CA: CPT | Performed by: NURSE PRACTITIONER

## 2023-09-26 PROCEDURE — 84295 ASSAY OF SERUM SODIUM: CPT | Performed by: NURSE PRACTITIONER

## 2023-09-26 PROCEDURE — 97535 SELF CARE MNGMENT TRAINING: CPT

## 2023-09-26 PROCEDURE — 97116 GAIT TRAINING THERAPY: CPT

## 2023-09-26 PROCEDURE — 82948 REAGENT STRIP/BLOOD GLUCOSE: CPT

## 2023-09-26 PROCEDURE — 97110 THERAPEUTIC EXERCISES: CPT

## 2023-09-26 PROCEDURE — 83935 ASSAY OF URINE OSMOLALITY: CPT | Performed by: NURSE PRACTITIONER

## 2023-09-26 PROCEDURE — 25010000002 HEPARIN (PORCINE) PER 1000 UNITS: Performed by: ORTHOPAEDIC SURGERY

## 2023-09-26 PROCEDURE — 85027 COMPLETE CBC AUTOMATED: CPT | Performed by: NURSE PRACTITIONER

## 2023-09-26 RX ORDER — SODIUM CHLORIDE 9 MG/ML
100 INJECTION, SOLUTION INTRAVENOUS CONTINUOUS
Status: DISCONTINUED | OUTPATIENT
Start: 2023-09-26 | End: 2023-09-27

## 2023-09-26 RX ADMIN — ACETAMINOPHEN 650 MG: 325 TABLET, FILM COATED ORAL at 05:32

## 2023-09-26 RX ADMIN — ROSUVASTATIN CALCIUM 10 MG: 10 TABLET, FILM COATED ORAL at 20:58

## 2023-09-26 RX ADMIN — LABETALOL HYDROCHLORIDE 100 MG: 100 TABLET, FILM COATED ORAL at 20:58

## 2023-09-26 RX ADMIN — HEPARIN SODIUM 5000 UNITS: 5000 INJECTION INTRAVENOUS; SUBCUTANEOUS at 05:32

## 2023-09-26 RX ADMIN — SODIUM CHLORIDE 100 ML/HR: 9 INJECTION, SOLUTION INTRAVENOUS at 12:09

## 2023-09-26 RX ADMIN — Medication 10 ML: at 20:59

## 2023-09-26 RX ADMIN — HEPARIN SODIUM 5000 UNITS: 5000 INJECTION INTRAVENOUS; SUBCUTANEOUS at 21:00

## 2023-09-26 RX ADMIN — AMLODIPINE BESYLATE 5 MG: 5 TABLET ORAL at 08:25

## 2023-09-26 RX ADMIN — LABETALOL HYDROCHLORIDE 100 MG: 100 TABLET, FILM COATED ORAL at 08:25

## 2023-09-26 RX ADMIN — OXYCODONE HYDROCHLORIDE 5 MG: 5 TABLET ORAL at 06:04

## 2023-09-26 RX ADMIN — ACETAMINOPHEN 650 MG: 325 TABLET, FILM COATED ORAL at 21:00

## 2023-09-26 RX ADMIN — ASPIRIN 81 MG: 81 TABLET, CHEWABLE ORAL at 08:25

## 2023-09-26 RX ADMIN — HEPARIN SODIUM 5000 UNITS: 5000 INJECTION INTRAVENOUS; SUBCUTANEOUS at 13:58

## 2023-09-26 RX ADMIN — ACETAMINOPHEN 650 MG: 325 TABLET, FILM COATED ORAL at 15:39

## 2023-09-26 RX ADMIN — DOCUSATE SODIUM 50 MG AND SENNOSIDES 8.6 MG 2 TABLET: 8.6; 5 TABLET, FILM COATED ORAL at 20:58

## 2023-09-26 RX ADMIN — OXYCODONE HYDROCHLORIDE 5 MG: 5 TABLET ORAL at 12:08

## 2023-09-26 RX ADMIN — OXYCODONE HYDROCHLORIDE 5 MG: 5 TABLET ORAL at 19:55

## 2023-09-26 RX ADMIN — Medication 10 ML: at 08:25

## 2023-09-26 RX ADMIN — ACETAMINOPHEN 650 MG: 325 TABLET, FILM COATED ORAL at 09:56

## 2023-09-26 NOTE — CONSULTS
"Adult Nutrition  Assessment/PES    Patient Name:  Alexia Lopez  YOB: 1935  MRN: [id number]  Admit Date:  9/19/2023    Assessment Date:  9/26/2023    Comments: This resident is on a consistent carbohydrate, regular texture, with thin liquids. Her average meal intake has been 62% over two meals. Ht: 67 inches wt: 148.9 BMI:23.32 She is at risk for altered nutrition status r/t CAD, DM type 2, and hyperlipidemia. Patient provided with a menu on a consistent carbohydrate diet.      Reason for Assessment       Row Name 09/26/23 1130          Reason for Assessment    Reason For Assessment per organizational policy  length of stay     Diagnosis diabetes diagnosis/complications  right hip fracture     Identified At Risk by Screening Criteria no indicators present                    Nutrition/Diet History       Row Name 09/26/23 1133          Nutrition/Diet History    Typical Intake (Food/Fluid/EN/PN) 62% of meals on average.     Typical Activity Patterns sedentary     Factors Affecting Nutritional Intake restricted diet                    Labs/Tests/Procedures/Meds       Row Name 09/26/23 1136          Labs/Procedures/Meds    Lab Results Reviewed reviewed     Lab Results Comments 9/26/23 sodium 126, Cl 93, Co2 19, BUN 30, creatinine 1.0, GFR 40.1, glucose 145, 9/23/23 albumin 3.0        Diagnostic Tests/Procedures    Diagnostic Test/Procedure Reviewed reviewed        Medications    Pertinent Medications Reviewed reviewed     Pertinent Medications Comments Novolog, crestor, pericolace                    Physical Findings       Row Name 09/26/23 1139          Physical Findings    Overall Physical Appearance normal appearance BMI 23.32                    Estimated/Assessed Needs - Anthropometrics       Row Name 09/26/23 1139          Anthropometrics    Height 170.2 cm (67\")     Weight 67.5 kg (148 lb 14.4 oz)     Weight for Calculation 67.5 kg (148 lb 14.4 oz)        Estimated/Assessed Needs    Additional " Documentation KCAL/KG (Group);Protein Requirements (Group);Fluid Requirements (Group)        KCAL/KG    KCAL/KG 25 Kcal/Kg (kcal);30 Kcal/Kg (kcal)     25 Kcal/Kg (kcal) 1688.525     30 Kcal/Kg (kcal) 2026.23        Protein Requirements    Weight Used For Protein Calculations 67.5 kg (148 lb 14.4 oz)     Est Protein Requirement Amount (gms/kg) 1.2 gm protein  1.0-1.2 grams/kg= 67-81 grams     Estimated Protein Requirements (gms/day) 81.05        Fluid Requirements    Fluid Requirements (mL/day) 2026     Estimated Fluid Requirement Method other (see comments)  25-30 ml/kg= 4902-3013 ml     RDA Method (mL) 2026                    Nutrition Prescription Ordered       Row Name 09/26/23 1143          Nutrition Prescription PO    Current PO Diet Regular     Fluid Consistency Thin     Common Modifiers Consistent Carbohydrate                    Evaluation of Received Nutrient/Fluid Intake       Row Name 09/26/23 1143          PO Evaluation    Number of Days PO Intake Evaluated 2 days     Number of Meals 2     % PO Intake 62.5                       Problem/Interventions:   Problem 1       Row Name 09/26/23 1144          Nutrition Diagnoses Problem 1    Problem 1 Nutrition Appropriate for Condition at this Time     Etiology (related to) MNT for Treatment/Condition     Signs/Symptoms (evidenced by) PO Intake     Percent (%) intake recorded 62 %     Over number of meals 2                          Intervention Goal       Row Name 09/26/23 1145          Intervention Goal    General Meet nutritional needs for age/condition     PO Maintain intake;Increase intake     Weight Maintain weight                    Nutrition Intervention       Row Name 09/26/23 1145          Nutrition Intervention    RD/Tech Action Menu provided                    Nutrition Prescription       Row Name 09/26/23 1145          Nutrition Prescription PO    PO Prescription --  Continue her current diet     Fluid Consistency Thin     Common Modifiers Consistent  Carbohydrate                    Education/Evaluation       Row Name 09/26/23 1146          Monitor/Evaluation    Monitor PO intake;Pertinent labs;Weight                     Electronically signed by:  Jere Magana RD  09/26/23 12:14 CDT

## 2023-09-26 NOTE — PLAN OF CARE
Goal Outcome Evaluation:  Plan of Care Reviewed With: patient        Progress: improving  Outcome Evaluation: OT tx complete this date. Sup>sit-CGA. Sit>stand-CGA. Toilet t/f-CGA. Pericare hygiene-SBA. Grooming tasks-set up sitting EOB. Fxl mobility 24' x2 and then 12' x2 w/ CGA ond RW.. Pt sitting in recliner at exit w/ all needs in reach. Cont OT POC.      Anticipated Discharge Disposition (OT): home with 24/7 care, home with home health, other (see comments), inpatient rehabilitation facility

## 2023-09-26 NOTE — THERAPY TREATMENT NOTE
Acute Care - Physical Therapy Treatment Note  [facility]     Patient Name: Alexia Lopez  : 1935  MRN: [id number]  Today's Date: 2023      Visit Dx:     ICD-10-CM ICD-9-CM   1. Traumatic closed displaced fracture of neck of right femur, initial encounter  S72.001A 820.8   2. Coronary arteriosclerosis in native artery  I25.10 414.01   3. S/P CABG (coronary artery bypass graft)  Z95.1 V45.81   4. Benign essential hypertension  I10 401.1   5. Controlled type 2 diabetes mellitus without complication, with long-term current use of insulin  E11.9 250.00    Z79.4 V58.67   6. Fall from slip, trip, or stumble, initial encounter  W01.0XXA E885.9   7. Closed fracture of right hip, initial encounter  S72.001A 820.8   8. Impaired functional mobility, balance, gait, and endurance [Z74.09 (ICD-10-CM)]  Z74.09 V49.89   9. Impaired mobility and ADLs [Z74.09, Z78.9 (ICD-10-CM)]  Z74.09 V49.89    Z78.9      Patient Active Problem List   Diagnosis    Coronary arteriosclerosis in native artery    S/P CABG (coronary artery bypass graft)    Benign essential hypertension    Hypercholesterolemia    Type 2 diabetes mellitus    Nuclear cataract    Controlled type 2 diabetes mellitus without complication    Pernicious anemia    Arthritis of knee    Chronic sinusitis    Encounter for screening mammogram for malignant neoplasm of breast    Generalized osteoarthritis    Iron deficiency anemia    Palpitations    Myocardial infarction    Hypertension    Hyperlipemia    History of echocardiogram    Diabetes mellitus    Coronary artery disease    Cataract    Chronic pain of both knees    Primary osteoarthritis of both knees    Claudication of lower extremity    Precordial pain    Fall from slip, trip, or stumble, initial encounter    Traumatic closed displaced fracture of neck of right femur, initial encounter    Hip fracture     Past Medical History:   Diagnosis Date    Cataract     Coronary artery disease     Diabetes  mellitus     Type 2 diabetes mellitus - without retinopathy       History of echocardiogram 05/12/2014    Normal LV systolic function with EF of 55% with mild hypokinesis. Diastolic relaxation abnormality of left ventricle. Mild tricuspid regurg. Trace mitral regurg    Hyperlipemia     Hypertension     Myocardial infarction      Past Surgical History:   Procedure Laterality Date    BACK SURGERY      CARDIAC CATHETERIZATION  05/12/2014    Severe multivessel CAD with critical lesions noted in the LAD coronary artery, left circumflex coronary artery and RCA. Left ventriculogram no performed in view of elevated left ventricular end-diastolic pressure    CATARACT EXTRACTION W/ INTRAOCULAR LENS IMPLANT Left 2/2/2018    Procedure: CATARACT PHACO EXTRACTION WITH INTRAOCULAR LENS IMPLANT;  Surgeon: Gagan Lizarraga MD;  Location: [facility];  Service:     CATARACT EXTRACTION W/ INTRAOCULAR LENS IMPLANT Right 2/9/2018    Procedure: CATARACT PHACO EXTRACTION WITH INTRAOCULAR LENS IMPLANT;  Surgeon: Gagan Lizarraga MD;  Location: [facility];  Service:     CORONARY ARTERY BYPASS GRAFT      X 3 with LIMA to LAD, SVG to OMB and SVG to PDA.    DILATATION AND CURETTAGE      OTHER SURGICAL HISTORY  05/06/2014    INCISION OF EARDRUM GENERAL ANESTHETIC 49424 (1)    Bilateral myringotomy with tubes.     SINUS SURGERY      TONSILLECTOMY      TONSILLECTOMY AND ADENOIDECTOMY      TUBAL ABDOMINAL LIGATION  03/14/1978     PT Assessment (last 12 hours)       PT Evaluation and Treatment       Row Name 09/26/23 1250          Physical Therapy Time and Intention    Subjective Information no complaints  -CA     Document Type therapy note (daily note)  -CA     Mode of Treatment individual therapy;physical therapy  -CA       Row Name 09/26/23 1250          General Information    Existing Precautions/Restrictions fall;hip, anterior  -CA       Row Name 09/26/23 1250          Pain    Pretreatment Pain Rating 2/10  -CA     Posttreatment Pain  Rating 2/10  -CA     Pain Location - Side/Orientation Right  -CA     Pain Location - hip  -CA       Row Name 09/26/23 1250          Cognition    Affect/Mental Status (Cognition) WFL  -CA     Orientation Status (Cognition) oriented x 4  -CA       Row Name 09/26/23 1250          Mobility    Extremity Weight-bearing Status right lower extremity  -CA     Right Lower Extremity (Weight-bearing Status) weight-bearing as tolerated (WBAT)  -CA       Row Name 09/26/23 1250          Bed Mobility    Comment, (Bed Mobility) Pt. sitting up in recliner upon entry  -CA       Row Name 09/26/23 1250          Transfers    Transfers sit-stand transfer;stand-sit transfer  -CA       Row Name 09/26/23 1250          Sit-Stand Transfer    Sit-Stand McCormick (Transfers) contact guard  -CA     Assistive Device (Sit-Stand Transfers) walker, front-wheeled  -CA       Row Name 09/26/23 1250          Stand-Sit Transfer    Stand-Sit [place]ormick (Transfers) contact guard  -CA     Assistive Device (Stand-Sit Transfers) walker, front-wheeled  -CA       Row Name 09/26/23 1250          Gait/Stairs (Locomotion)    McCormick Level (Gait) contact guard  -CA     Assistive Device (Gait) walker, front-wheeled  -CA     Distance in Feet (Gait) 58  -CA     Pattern (Gait) step-to  -CA     Deviations/Abnormal Patterns (Gait) antalgic  -CA     McCormick Level (Stairs) not tested  -CA       Row Name 09/26/23 1250          Motor Skills    Therapeutic Exercise knee;ankle  -CA       Row Name 09/26/23 1250          Knee (Therapeutic Exercise)    Knee (Therapeutic Exercise) AROM (active range of motion)  -CA     Knee AROM (Therapeutic Exercise) bilateral;LAQ (long arc quad);other (see comments)  quad sets  -CA       Row Name 09/26/23 1250          Ankle (Therapeutic Exercise)    Ankle (Therapeutic Exercise) AROM (active range of motion)  -CA     Ankle AROM (Therapeutic Exercise) bilateral;dorsiflexion;plantarflexion  -CA       Row Name             Wound  09/19/23 1848 Right hip Incision    Wound - Properties Group Placement Date: 09/19/23  -SP Placement Time: 1848  -SP Present on Hospital Admission: N  -SP Side: Right  -SP Location: hip  -SP Primary Wound Type: Incision  -SP Additional Comments: chas LANGLEY    Retired Wound - Properties Group Placement Date: 09/19/23  -SP Placement Time: 1848 -SP Present on Hospital Admission: N  -SP Side: Right  -SP Location: hip  -SP Primary Wound Type: Incision  -SP Additional Comments: chas LANGLEY    Retired Wound - Properties Group Date first assessed: 09/19/23  -SP Time first assessed: 1848  -SP Present on Hospital Admission: N  -SP Side: Right  -SP Location: hip  -SP Primary Wound Type: Incision  -SP Additional Comments: chas LANGLEY      Row Name 09/26/23 1250          Vital Signs    Pre Patient Position Sitting  -CA     Intra Patient Position Standing  -CA     Post Patient Position Sitting  -CA       Row Name 09/26/23 1250          Positioning and Restraints    Pre-Treatment Position sitting in chair/recliner  -CA     Post Treatment Position chair  -CA     In Chair reclined;call light within reach;encouraged to call for assist  -CA               User Key  (r) = Recorded By, (t) = Taken By, (c) = Cosigned By      Initials Name Provider Type    Raymond Arias PTA Physical Therapist Assistant    Alina Ho, RN Registered Nurse                    Physical Therapy Education       Title: PT OT SLP Therapies (In Progress)       Topic: Physical Therapy (In Progress)       Point: Mobility training (In Progress)       Learning Progress Summary             Patient Acceptance, E, NR by NIDA at 9/24/2023 1259    Acceptance, E, NR by JIE at 9/20/2023 0933    Comment: PT POC, rehab process, hand placement with transfers, use of FWW, proper gait mechanics.                         Point: Home exercise program (Not Started)       Learner Progress:  Not documented in this visit.              Point: Body mechanics (Not Started)        Learner Progress:  Not documented in this visit.              Point: Precautions (Not Started)       Learner Progress:  Not documented in this visit.                              User Key       Initials Effective Dates Name Provider Type Discipline     06/16/21 -  Eugenio Chavez, BRITNEY Physical Therapist Assistant PT    CZ 07/11/23 -  John Bazan, PT Physical Therapist PT                  PT Recommendation and Plan     Plan of Care Reviewed With: patient  Progress: improving  Outcome Evaluation: Pt. sitting up in recliner upon entry and was agreeable to tx. this pm.  Pt. was cga for tranfers and gait this tx.  Pt. amb. 58' with antalgic gait and step to with decreased gait speed.  Pt. returned to recliner post gait and performed B LE therex in chair.  No new goals met and pt. would benefit from continued rehab   Outcome Measures       Row Name 09/26/23 1250 09/24/23 0919          How much help from another person do you currently need...    Turning from your back to your side while in flat bed without using bedrails? 3  -CA 3  -NIDA     Moving from lying on back to sitting on the side of a flat bed without bedrails? 3  -CA 3  -NIDA     Moving to and from a bed to a chair (including a wheelchair)? 3  -CA 3  -NIDA     Standing up from a chair using your arms (e.g., wheelchair, bedside chair)? 3  -CA 3  -NIDA     Climbing 3-5 steps with a railing? 3  -CA 3  -NIDA     To walk in hospital room? 3  -CA 3  -NIDA     AM-PAC 6 Clicks Score (PT) 18  -CA 18  -NIDA        Functional Assessment    Outcome Measure Options AM-PAC 6 Clicks Basic Mobility (PT)  -CA AM-PAC 6 Clicks Basic Mobility (PT)  -NIDA               User Key  (r) = Recorded By, (t) = Taken By, (c) = Cosigned By      Initials Name Provider Type    CA Raymond Sullivan, PTA Physical Therapist Assistant    Eugenio Wasserman, PTA Physical Therapist Assistant                     Time Calculation:    PT Charges       Row Name 09/26/23 9554             Time Calculation    Start  Time 1250  -CA      Stop Time 1316  -CA      Time Calculation (min) 26 min  -CA      PT Received On 09/26/23  -CA         Time Calculation- PT    Total Timed Code Minutes- PT 26 minute(s)  -CA                User Key  (r) = Recorded By, (t) = Taken By, (c) = Cosigned By      Initials Name Provider Type    CA Raymond Sullivan PTA Physical Therapist Assistant                  Therapy Charges for Today       Code Description Service Date Service Provider Modifiers Qty    59446781074 HC GAIT TRAINING EA 15 MIN 9/26/2023 Raymond Sullivan PTA GP 1    63383491354 HC PT THER PROC EA 15 MIN 9/26/2023 Raymond Sullivan, BRITNEY GP 1            PT G-Codes  Outcome Measure Options: AM-PAC 6 Clicks Basic Mobility (PT)  AM-PAC 6 Clicks Score (PT): 18  AM-PAC 6 Clicks Score (OT): 21    Raymond Sullivan PTA  9/26/2023

## 2023-09-26 NOTE — PROGRESS NOTES
[facility] Medicine Services  INPATIENT PROGRESS NOTE    Length of Stay: 3  Date of Admission: 9/19/2023  Primary Care Physician: Munir Rodriguez MD    Subjective   Chief Complaint: no complaints   HPI:  88 year old female with past medical history of CAD, DM, HTN, HLD who presented on 9/19/23 with complaints of right hip pain r/t fall at home.  She is currently admitted for issues including right femoral neck fracture that required surgical repair.  During today's visit, she reports constipation has resolved.  Sodium level has trended downward.  Patient reports she has not been eating and drinking as much as her baseline.     Review of Systems   Constitutional:  Negative for chills and fever.   HENT:  Negative for congestion, rhinorrhea and sore throat.    Respiratory:  Negative for cough, chest tightness, shortness of breath and wheezing.    Cardiovascular:  Negative for chest pain, palpitations and leg swelling.   Gastrointestinal:  Negative for abdominal pain, constipation, diarrhea, nausea and vomiting.   Genitourinary:  Negative for difficulty urinating, flank pain and pelvic pain.   Musculoskeletal:  Negative for back pain and neck pain.        Right hip post op pain   Skin:  Negative for pallor.   Neurological:  Negative for dizziness, weakness and headaches.   Psychiatric/Behavioral:  Negative for confusion. The patient is not nervous/anxious.       All pertinent negatives and positives are as above. All other systems have been reviewed and are negative unless otherwise stated.     Objective    Temp:  [97.7 °F (36.5 °C)-100.6 °F (38.1 °C)] 98 °F (36.7 °C)  Heart Rate:  [67-89] 68  Resp:  [17-18] 18  BP: (107-150)/(55-78) 107/55  As of 9/26  Physical Exam  Vitals and nursing note reviewed.   Constitutional:       General: She is not in acute distress.     Appearance: She is not ill-appearing.   HENT:      Head: Normocephalic and atraumatic.      Right Ear: External  ear normal.      Left Ear: External ear normal.      Nose: Nose normal.      Mouth/Throat:      Mouth: Mucous membranes are moist.      Pharynx: Oropharynx is clear.   Eyes:      General: No scleral icterus.        Right eye: No discharge.         Left eye: No discharge.      Conjunctiva/sclera: Conjunctivae normal.   Cardiovascular:      Rate and Rhythm: Normal rate and regular rhythm.      Heart sounds: Normal heart sounds. No murmur heard.    No friction rub. No gallop.   Pulmonary:      Effort: Pulmonary effort is normal. No respiratory distress.      Breath sounds: Normal breath sounds. No stridor. No wheezing, rhonchi or rales.   Abdominal:      General: Bowel sounds are normal. There is no distension.      Palpations: Abdomen is soft.      Tenderness: There is no abdominal tenderness.   Musculoskeletal:         General: Normal range of motion.      Cervical back: Normal range of motion.      Right lower leg: No edema.      Left lower leg: No edema.   Skin:     General: Skin is warm and dry.      Comments: Right hip incision intact.  No bleeding, swelling, drainage noted   Neurological:      General: No focal deficit present.      Mental Status: She is alert and oriented to person, place, and time.   Psychiatric:         Behavior: Behavior normal.           Results Review:  I have reviewed the labs, radiology results, and diagnostic studies.    Laboratory Data:   Results from last 7 days   Lab Units 09/26/23  0834 09/25/23  0835 09/23/23  1644 09/23/23  1308 09/20/23  0558 09/19/23  1235   SODIUM mmol/L 126* 127* 133* 134*   < > 133*   POTASSIUM mmol/L 4.0 4.3 4.3 4.7   < > 4.5   CHLORIDE mmol/L 93* 94* 99 100   < > 98   CO2 mmol/L 19.0* 19.0* 22.0 24.0   < > 25.0   BUN mg/dL 30* 22 23 20   < > 17   CREATININE mg/dL 1.30* 1.03* 1.00 0.98   < > 0.83   GLUCOSE mg/dL 145* 174* 121* 182*   < > 138*   CALCIUM mg/dL 8.7 9.0 9.0 9.4   < > 9.8   BILIRUBIN mg/dL  --   --   --  0.4  --  0.3   ALK PHOS U/L  --   --   --   65  --  69   ALT (SGPT) U/L  --   --   --  <5  --  9   AST (SGOT) U/L  --   --   --  18  --  20   ANION GAP mmol/L 14.0 14.0 12.0 10.0   < > 10.0    < > = values in this interval not displayed.       Estimated Creatinine Clearance: 33.1 mL/min (A) (by C-G formula based on SCr of 1.3 mg/dL (H)).  Results from last 7 days   Lab Units 09/23/23  1308   MAGNESIUM mg/dL 2.0           Results from last 7 days   Lab Units 09/26/23  0834 09/25/23  0835 09/22/23  0540 09/21/23  0524 09/20/23  0558   WBC 10*3/mm3 10.76 13.16* 8.31 9.44 13.22*   HEMOGLOBIN g/dL 7.5* 7.9* 7.5* 8.5* 10.1*   HEMATOCRIT % 22.4* 23.5* 22.0* 24.9* 29.9*   PLATELETS 10*3/mm3 198 199 123* 133* 157       Results from last 7 days   Lab Units 09/19/23  1235   INR  1.09         Culture Data:   No results found for: BLOODCX  No results found for: URINECX  No results found for: RESPCX  No results found for: WOUNDCX  No results found for: STOOLCX  No components found for: BODYFLD    Radiology Data:   Imaging Results (Last 24 Hours)       ** No results found for the last 24 hours. **            I have reviewed the patient's current medications.     Assessment/Plan     Active Hospital Problems    Diagnosis     **Traumatic closed displaced fracture of neck of right femur, initial encounter     Fall from slip, trip, or stumble, initial encounter     Hip fracture     Controlled type 2 diabetes mellitus without complication     S/P CABG (coronary artery bypass graft)     Benign essential hypertension     Coronary arteriosclerosis in native artery        Plan:    Right femoral neck fracture: s/p ORIF on 9/19 with Dr. Adler.  Continue PT/OT, pain control.  Heparin for DVT prevention per ortho with plan to transition to Lovenox/Eliquis/warfarin at discharge (30 days of treatment)  CAD: continue ASA 81 mg daily, Labetalol 100 mg BID, Crestor 5 mg daily   HTN: continue Norvasc 5 mg daily, labetalol 100 mg BID.   Type 2 DM: FSBS AC and HS with SSI.   HLD: continue  Crestor 5 mg nightly.   Palpitations: EKG reviewed.  Continue telemetry monitoring. Magnesium and potassium normal  Fever: CXR unremarkable, UA unremarkable. Incentive spirometer in use.  Blood culture pending.  Hyponatremia: patient reports poor appetite and fluid intake since surgery.  Will plan for one liter of IV saline.  Check urine sodium, osmo        Medical Decision Making  Number and Complexity of problems: 8 moderate     Conditions and Status:        Condition is improving.     MDM Data  External documents reviewed: ED provider notes, ortho notes.   My EKG interpretation: NSR  My US interpretation: US renal - non obstructing left kidney stone, small right renal cysts.  No hydronephrosis.   Tests considered but not ordered: n/a     Decision rules/scores evaluated (example [id number], Wells, etc): n/a      Discussed with: patient      Care Planning  Code status and discussions: Full code status on file    Disposition  Social Determinants of Health that impact treatment or disposition: impaired mobility   I expect the patient to be discharged to ARU in 1-2 days.     This document has been electronically signed by PEGGY Ceron on September 26, 2023 10:35 CDT

## 2023-09-26 NOTE — THERAPY TREATMENT NOTE
Patient Name: Alexia Lopez  : 1935    MRN: [id number]                              Today's Date: 2023       Admit Date: 2023    Visit Dx:     ICD-10-CM ICD-9-CM   1. Traumatic closed displaced fracture of neck of right femur, initial encounter  S72.001A 820.8   2. Coronary arteriosclerosis in native artery  I25.10 414.01   3. S/P CABG (coronary artery bypass graft)  Z95.1 V45.81   4. Benign essential hypertension  I10 401.1   5. Controlled type 2 diabetes mellitus without complication, with long-term current use of insulin  E11.9 250.00    Z79.4 V58.67   6. Fall from slip, trip, or stumble, initial encounter  W01.0XXA E885.9   7. Closed fracture of right hip, initial encounter  S72.001A 820.8   8. Impaired functional mobility, balance, gait, and endurance [Z74.09 (ICD-10-CM)]  Z74.09 V49.89   9. Impaired mobility and ADLs [Z74.09, Z78.9 (ICD-10-CM)]  Z74.09 V49.89    Z78.9      Patient Active Problem List   Diagnosis    Coronary arteriosclerosis in native artery    S/P CABG (coronary artery bypass graft)    Benign essential hypertension    Hypercholesterolemia    Type 2 diabetes mellitus    Nuclear cataract    Controlled type 2 diabetes mellitus without complication    Pernicious anemia    Arthritis of knee    Chronic sinusitis    Encounter for screening mammogram for malignant neoplasm of breast    Generalized osteoarthritis    Iron deficiency anemia    Palpitations    Myocardial infarction    Hypertension    Hyperlipemia    History of echocardiogram    Diabetes mellitus    Coronary artery disease    Cataract    Chronic pain of both knees    Primary osteoarthritis of both knees    Claudication of lower extremity    Precordial pain    Fall from slip, trip, or stumble, initial encounter    Traumatic closed displaced fracture of neck of right femur, initial encounter    Hip fracture     Past Medical History:   Diagnosis Date    Cataract     Coronary artery disease     Diabetes mellitus      Type 2 diabetes mellitus - without retinopathy       History of echocardiogram 05/12/2014    Normal LV systolic function with EF of 55% with mild hypokinesis. Diastolic relaxation abnormality of left ventricle. Mild tricuspid regurg. Trace mitral regurg    Hyperlipemia     Hypertension     Myocardial infarction      Past Surgical History:   Procedure Laterality Date    BACK SURGERY      CARDIAC CATHETERIZATION  05/12/2014    Severe multivessel CAD with critical lesions noted in the LAD coronary artery, left circumflex coronary artery and RCA. Left ventriculogram no performed in view of elevated left ventricular end-diastolic pressure    CATARACT EXTRACTION W/ INTRAOCULAR LENS IMPLANT Left 2/2/2018    Procedure: CATARACT PHACO EXTRACTION WITH INTRAOCULAR LENS IMPLANT;  Surgeon: Gagan Lizarraga MD;  Location: [facility];  Service:     CATARACT EXTRACTION W/ INTRAOCULAR LENS IMPLANT Right 2/9/2018    Procedure: CATARACT PHACO EXTRACTION WITH INTRAOCULAR LENS IMPLANT;  Surgeon: Gagan Lizarraga MD;  Location: [facility];  Service:     CORONARY ARTERY BYPASS GRAFT      X 3 with LIMA to LAD, SVG to OMB and SVG to PDA.    DILATATION AND CURETTAGE      OTHER SURGICAL HISTORY  05/06/2014    INCISION OF EARDRUM GENERAL ANESTHETIC 03840 (1)    Bilateral myringotomy with tubes.     SINUS SURGERY      TONSILLECTOMY      TONSILLECTOMY AND ADENOIDECTOMY      TUBAL ABDOMINAL LIGATION  03/14/1978      General Information       Row Name 09/26/23 1100          OT Time and Intention    Document Type therapy note (daily note)  -KD     Mode of Treatment individual therapy;occupational therapy  -KD       Row Name 09/26/23 1100          General Information    Patient Profile Reviewed yes  -KD     Existing Precautions/Restrictions fall;hip, anterior  -KD       Row Name 09/26/23 1100          Cognition    Orientation Status (Cognition) oriented x 4  -KD       Row Name 09/26/23 1100          Safety Issues, Functional Mobility     Impairments Affecting Function (Mobility) strength;endurance/activity tolerance  -               User Key  (r) = Recorded By, (t) = Taken By, (c) = Cosigned By      Initials Name Provider Type     Barb Oneil COTA Occupational Therapist Assistant                     Mobility/ADL's       Row Name 09/26/23 1100          Bed Mobility    Supine-Sit [place] (Bed Mobility) contact guard  -     Assistive Device (Bed Mobility) head of bed elevated;bed rails  -       Row Name 09/26/23 1100          Sit-Stand Transfer    Sit-Stand [place] (Transfers) contact guard  -KD     Assistive Device (Sit-Stand Transfers) walker, front-wheeled  -       Row Name 09/26/23 1100          Stand-Sit Transfer    Stand-Sit [place] (Transfers) contact guard  -     Assistive Device (Stand-Sit Transfers) walker, front-wheeled  -       Row Name 09/26/23 1100          Toilet Transfer    Type (Toilet Transfer) sit-stand;stand-sit  -     [place] Level (Toilet Transfer) contact guard  -     Assistive Device (Toilet Transfer) commode  -       Row Name 09/26/23 1100          Functional Mobility    Functional Mobility- Ind. Level contact guard assist  -     Functional Mobility- Device walker, front-wheeled  -     Functional Mobility-Distance (Feet) 24  24, 12, 12  -       Row Name 09/26/23 1100          Activities of Daily Living    BADL Assessment/Intervention toileting;grooming  -       Row Name 09/26/23 1100          Mobility    Extremity Weight-bearing Status right lower extremity  -     Right Lower Extremity (Weight-bearing Status) weight-bearing as tolerated (WBAT)  -       Row Name 09/26/23 1100          Grooming Assessment/Training    [place] Level (Grooming) grooming skills;hair care, combing/brushing;wash face, hands;set up  -     Position (Grooming) edge of bed sitting  -       Row Name 09/26/23 1100          Toileting Assessment/Training    Assistive Devices (Toileting) commode   -KD               User Key  (r) = Recorded By, (t) = Taken By, (c) = Cosigned By      Initials Name Provider Type    Barb Mims COTA Occupational Therapist Assistant                   Obj/Interventions    No documentation.                  Goals/Plan       Row Name 09/26/23 1100          Transfer Goal 1 (OT)    Activity/Assistive Device (Transfer Goal 1, OT) toilet  -KD     [place] Level/Cues Needed (Transfer Goal 1, OT) standby assist  -KD     Time Frame (Transfer Goal 1, OT) long term goal (LTG);by discharge  -KD     Progress/Outcome (Transfer Goal 1, OT) goal not met  -KD       Row Name 09/26/23 1100          Bathing Goal 1 (OT)    Activity/Device (Bathing Goal 1, OT) lower body bathing  -KD     [place] Level/Cues Needed (Bathing Goal 1, OT) moderate assist (50-74% patient effort)  -KD     Time Frame (Bathing Goal 1, OT) long term goal (LTG);by discharge  -KD     Strategies/Barriers (Bathing Goal 1, OT) Spouse can assist as needed  -KD     Progress/Outcomes (Bathing Goal 1, OT) goal not met  -KD       Row Name 09/26/23 1100          Dressing Goal 1 (OT)    Activity/Device (Dressing Goal 1, OT) lower body dressing  -KD     [place]/Cues Needed (Dressing Goal 1, OT) minimum assist (75% or more patient effort)  -KD     Time Frame (Dressing Goal 1, OT) long term goal (LTG);by discharge  -KD     Strategies/Barriers (Dressing Goal 1, OT) Spouse can assist as needed  -KD     Progress/Outcome (Dressing Goal 1, OT) goal not met  -KD       Row Name 09/26/23 1100          Strength Goal 1 (OT)    Strength Goal 1 (OT) Pt will be (I) with BUE strengthening HEP after demonstration for increased strength required for ADLs and mobility.  -KD     Time Frame (Strength Goal 1, OT) long term goal (LTG);by discharge  -KD     Progress/Outcome (Strength Goal 1, OT) goal not met  -KD               User Key  (r) = Recorded By, (t) = Taken By, (c) = Cosigned By      Initials Name Provider Type    Barb Mims  CLEMENTE Occupational Therapist Assistant                   Clinical Impression       Row Name 09/26/23 1100          Pain Assessment    Pretreatment Pain Rating 0/10 - no pain  -KD     Posttreatment Pain Rating 0/10 - no pain  -KD       Row Name 09/26/23 1100          Plan of Care Review    Plan of Care Reviewed With patient  -KD     Progress improving  -KD     Outcome Evaluation OT tx complete this date. Sup>sit-CGA. Sit>stand-CGA. Toilet t/f-CGA. Pericare hygiene-SBA. Grooming tasks-set up sitting EOB. Fxl mobility 24' x2 and then 12' x2 w/ CGA ond RW.. Pt sitting in recliner at exit w/ all needs in reach. Cont OT POC.  -KD       Row Name 09/26/23 1100          Therapy Assessment/Plan (OT)    Therapy Frequency (OT) daily  -KD       Row Name 09/26/23 1100          Therapy Plan Review/Discharge Plan (OT)    Anticipated Discharge Disposition (OT) home with 24/7 care;home with home health;other (see comments);inpatient rehabilitation facility  -KD       Row Name 09/26/23 1100          Vital Signs    Pre Systolic BP Rehab 142  -KD     Pre Treatment Diastolic BP 64  -KD     Pretreatment Heart Rate (beats/min) 78  -KD     Pre SpO2 (%) 96  -KD     O2 Delivery Pre Treatment room air  -KD     Pre Patient Position Supine  -KD     Intra Patient Position Standing  -KD     Post Patient Position Sitting  -KD       Row Name 09/26/23 1100          Positioning and Restraints    Pre-Treatment Position in bed  -KD     Post Treatment Position chair  -KD     In Chair sitting;call light within reach;encouraged to call for assist;exit alarm on  -KD               User Key  (r) = Recorded By, (t) = Taken By, (c) = Cosigned By      Initials Name Provider Type    KD Barb Oneil COTA Occupational Therapist Assistant                   Outcome Measures       Row Name 09/26/23 1100          How much help from another is currently needed...    Putting on and taking off regular lower body clothing? 3  -KD     Bathing (including washing, rinsing,  and drying) 3  -KD     Toileting (which includes using toilet bed pan or urinal) 3  -KD     Putting on and taking off regular upper body clothing 4  -KD     Taking care of personal grooming (such as brushing teeth) 4  -KD     Eating meals 4  -KD     AM-PAC 6 Clicks Score (OT) 21  -KD       Row Name 09/26/23 1250 09/26/23 0820       How much help from another person do you currently need...    Turning from your back to your side while in flat bed without using bedrails? 3  -CA 3  -CS    Moving from lying on back to sitting on the side of a flat bed without bedrails? 3  -CA 3  -CS    Moving to and from a bed to a chair (including a wheelchair)? 3  -CA 3  -CS    Standing up from a chair using your arms (e.g., wheelchair, bedside chair)? 3  -CA 3  -CS    Climbing 3-5 steps with a railing? 3  -CA 3  -CS    To walk in hospital room? 3  -CA 3  -CS    AM-PAC 6 Clicks Score (PT) 18  -CA 18  -CS    Highest level of mobility 6 --> Walked 10 steps or more  -CA 6 --> Walked 10 steps or more  -CS      Row Name 09/26/23 1250          Functional Assessment    Outcome Measure Options AM-PAC 6 Clicks Basic Mobility (PT)  -CA               User Key  (r) = Recorded By, (t) = Taken By, (c) = Cosigned By      Initials Name Provider Type    CA Raymond Sullivan, PTA Physical Therapist Assistant    KD Barb Oneil COTA Occupational Therapist Assistant    Fatimah Blood, RN Registered Nurse                    Occupational Therapy Education       Title: PT OT SLP Therapies (In Progress)       Topic: Occupational Therapy (In Progress)       Point: ADL training (Done)       Description:   Instruct learner(s) on proper safety adaptation and remediation techniques during self care or transfers.   Instruct in proper use of assistive devices.                  Learning Progress Summary             Patient Acceptance, E,TB, VU by CM at 9/20/2023 1231    Comment: OT POC, Role of OT, d/c recommendations, AD/DME needs   Family Acceptance, E,TB, VU  by CM at 9/20/2023 1231    Comment: OT POC, Role of OT, d/c recommendations, AD/DME needs                         Point: Home exercise program (Not Started)       Description:   Instruct learner(s) on appropriate technique for monitoring, assisting and/or progressing therapeutic exercises/activities.                  Learner Progress:  Not documented in this visit.              Point: Precautions (Done)       Description:   Instruct learner(s) on prescribed precautions during self-care and functional transfers.                  Learning Progress Summary             Patient Acceptance, E,TB, VU by CM at 9/20/2023 1231    Comment: OT POC, Role of OT, d/c recommendations, AD/DME needs   Family Acceptance, E,TB, VU by CM at 9/20/2023 1231    Comment: OT POC, Role of OT, d/c recommendations, AD/DME needs                         Point: Body mechanics (Not Started)       Description:   Instruct learner(s) on proper positioning and spine alignment during self-care, functional mobility activities and/or exercises.                  Learner Progress:  Not documented in this visit.                              User Key       Initials Effective Dates Name Provider Type Discipline     11/18/22 -  Emily Lehman, OT Occupational Therapist OT                  OT Recommendation and Plan  Therapy Frequency (OT): daily  Plan of Care Review  Plan of Care Reviewed With: patient  Progress: improving  Outcome Evaluation: OT tx complete this date. Sup>sit-CGA. Sit>stand-CGA. Toilet t/f-CGA. Pericare hygiene-SBA. Grooming tasks-set up sitting EOB. Fxl mobility 24' x2 and then 12' x2 w/ CGA ond RW.. Pt sitting in recliner at exit w/ all needs in reach. Cont OT POC.     Time Calculation:         Time Calculation- OT       Row Name 09/26/23 1100             Time Calculation- OT    OT Start Time 1100  -KD      OT Stop Time 1139  -KD      OT Time Calculation (min) 39 min  -KD      Total Timed Code Minutes- OT 39 minute(s)  -KD      OT Received  On 09/26/23  -KD         Timed Charges    99261 - OT Self Care/Mgmt Minutes 39  -KD         Total Minutes    Timed Charges Total Minutes 39  -KD       Total Minutes 39  -KD                User Key  (r) = Recorded By, (t) = Taken By, (c) = Cosigned By      Initials Name Provider Type    Barb Mims COTA Occupational Therapist Assistant                  Therapy Charges for Today       Code Description Service Date Service Provider Modifiers Qty    02040873807 HC OT SELF CARE/MGMT/TRAIN EA 15 MIN 9/25/2023 Barb Oneil COTA GO 3    63238470385 HC OT SELF CARE/MGMT/TRAIN EA 15 MIN 9/26/2023 Barb Oneil COTA GO 3                 CLEMENTE Villavicencio  9/26/2023

## 2023-09-26 NOTE — PLAN OF CARE
Goal Outcome Evaluation:      New orders for normal saline at 100mL/hr related to low sodium. Pain controlled with prn pain meds. Redraw ordered for this afternoon. V/S WNL Will continue to monitor.

## 2023-09-26 NOTE — PLAN OF CARE
Goal Outcome Evaluation:  Plan of Care Reviewed With: patient        Progress: improving  Outcome Evaluation: Pt. sitting up in recliner upon entry and was agreeable to tx. this pm.  Pt. was cga for tranfers and gait this tx.  Pt. amb. 58' with antalgic gait and step to with decreased gait speed.  Pt. returned to recliner post gait and performed B LE therex in chair.  No new goals met and pt. would benefit from continued rehab

## 2023-09-26 NOTE — PLAN OF CARE
Goal Outcome Evaluation:  Plan of Care Reviewed With: patient        Progress: improving  Outcome Evaluation: vss, c/o pain in right hip, prn PO meds given, BM x2 this shift, up to bsc with strong assist x1, adequate urine output, incision cdi, pt resting between care

## 2023-09-26 NOTE — PLAN OF CARE
Goal Outcome Evaluation:  Plan of Care Reviewed With: patient        Comments: This resident is on a consistent carbohydrate, regular texture, with thin liquids. Her average meal intake has been 62% over two meals. Ht: 67 inches wt: 148.9 BMI:23.32 She is at risk for altered nutrition status r/t CAD, DM type 2, and hyperlipidemia. Patient provided with a menu on a consistent carbohydrate diet.

## 2023-09-27 PROBLEM — E44.0 MODERATE MALNUTRITION: Status: ACTIVE | Noted: 2023-09-27

## 2023-09-27 LAB
ABO GROUP BLD: NORMAL
ANION GAP SERPL CALCULATED.3IONS-SCNC: 14 MMOL/L (ref 5–15)
BLD GP AB SCN SERPL QL: NEGATIVE
BUN SERPL-MCNC: 27 MG/DL (ref 8–23)
BUN/CREAT SERPL: 25.2 (ref 7–25)
CALCIUM SPEC-SCNC: 8.3 MG/DL (ref 8.6–10.5)
CHLORIDE SERPL-SCNC: 94 MMOL/L (ref 98–107)
CO2 SERPL-SCNC: 19 MMOL/L (ref 22–29)
CREAT SERPL-MCNC: 1.07 MG/DL (ref 0.57–1)
DEPRECATED RDW RBC AUTO: 47.1 FL (ref 37–54)
EGFRCR SERPLBLD CKD-EPI 2021: 50.7 ML/MIN/1.73
ERYTHROCYTE [DISTWIDTH] IN BLOOD BY AUTOMATED COUNT: 14 % (ref 12.3–15.4)
GLUCOSE BLDC GLUCOMTR-MCNC: 114 MG/DL (ref 70–130)
GLUCOSE BLDC GLUCOMTR-MCNC: 116 MG/DL (ref 70–130)
GLUCOSE BLDC GLUCOMTR-MCNC: 131 MG/DL (ref 70–130)
GLUCOSE BLDC GLUCOMTR-MCNC: 145 MG/DL (ref 70–130)
GLUCOSE SERPL-MCNC: 100 MG/DL (ref 65–99)
HCT VFR BLD AUTO: 20.3 % (ref 34–46.6)
HCT VFR BLD AUTO: 21.1 % (ref 34–46.6)
HEMOCCULT STL QL: POSITIVE
HGB BLD-MCNC: 6.9 G/DL (ref 12–15.9)
HGB BLD-MCNC: 7.1 G/DL (ref 12–15.9)
Lab: NORMAL
MCH RBC QN AUTO: 30.7 PG (ref 26.6–33)
MCHC RBC AUTO-ENTMCNC: 33.6 G/DL (ref 31.5–35.7)
MCV RBC AUTO: 91.3 FL (ref 79–97)
OSMOLALITY SERPL: 272 MOSM/KG (ref 280–301)
PLATELET # BLD AUTO: 226 10*3/MM3 (ref 140–450)
PMV BLD AUTO: 11.2 FL (ref 6–12)
POTASSIUM SERPL-SCNC: 3.8 MMOL/L (ref 3.5–5.2)
RBC # BLD AUTO: 2.31 10*6/MM3 (ref 3.77–5.28)
RH BLD: POSITIVE
SODIUM SERPL-SCNC: 127 MMOL/L (ref 136–145)
T&S EXPIRATION DATE: NORMAL
T4 FREE SERPL-MCNC: 1.27 NG/DL (ref 0.93–1.7)
TSH SERPL DL<=0.05 MIU/L-ACNC: 1.8 UIU/ML (ref 0.27–4.2)
WBC NRBC COR # BLD: 12.2 10*3/MM3 (ref 3.4–10.8)

## 2023-09-27 PROCEDURE — 82948 REAGENT STRIP/BLOOD GLUCOSE: CPT

## 2023-09-27 PROCEDURE — 97530 THERAPEUTIC ACTIVITIES: CPT

## 2023-09-27 PROCEDURE — 86900 BLOOD TYPING SEROLOGIC ABO: CPT | Performed by: NURSE PRACTITIONER

## 2023-09-27 PROCEDURE — 84443 ASSAY THYROID STIM HORMONE: CPT | Performed by: NURSE PRACTITIONER

## 2023-09-27 PROCEDURE — 85027 COMPLETE CBC AUTOMATED: CPT | Performed by: NURSE PRACTITIONER

## 2023-09-27 PROCEDURE — 83930 ASSAY OF BLOOD OSMOLALITY: CPT | Performed by: NURSE PRACTITIONER

## 2023-09-27 PROCEDURE — 36430 TRANSFUSION BLD/BLD COMPNT: CPT

## 2023-09-27 PROCEDURE — 86850 RBC ANTIBODY SCREEN: CPT | Performed by: NURSE PRACTITIONER

## 2023-09-27 PROCEDURE — 86900 BLOOD TYPING SEROLOGIC ABO: CPT

## 2023-09-27 PROCEDURE — 97116 GAIT TRAINING THERAPY: CPT

## 2023-09-27 PROCEDURE — 86923 COMPATIBILITY TEST ELECTRIC: CPT

## 2023-09-27 PROCEDURE — 97110 THERAPEUTIC EXERCISES: CPT

## 2023-09-27 PROCEDURE — 86901 BLOOD TYPING SEROLOGIC RH(D): CPT | Performed by: NURSE PRACTITIONER

## 2023-09-27 PROCEDURE — 85014 HEMATOCRIT: CPT | Performed by: NURSE PRACTITIONER

## 2023-09-27 PROCEDURE — 25010000002 HEPARIN (PORCINE) PER 1000 UNITS: Performed by: ORTHOPAEDIC SURGERY

## 2023-09-27 PROCEDURE — P9016 RBC LEUKOCYTES REDUCED: HCPCS

## 2023-09-27 PROCEDURE — 82272 OCCULT BLD FECES 1-3 TESTS: CPT | Performed by: NURSE PRACTITIONER

## 2023-09-27 PROCEDURE — 80048 BASIC METABOLIC PNL TOTAL CA: CPT | Performed by: NURSE PRACTITIONER

## 2023-09-27 PROCEDURE — 99222 1ST HOSP IP/OBS MODERATE 55: CPT | Performed by: INTERNAL MEDICINE

## 2023-09-27 PROCEDURE — 84439 ASSAY OF FREE THYROXINE: CPT | Performed by: NURSE PRACTITIONER

## 2023-09-27 PROCEDURE — 85018 HEMOGLOBIN: CPT | Performed by: NURSE PRACTITIONER

## 2023-09-27 RX ORDER — PANTOPRAZOLE SODIUM 40 MG/10ML
40 INJECTION, POWDER, LYOPHILIZED, FOR SOLUTION INTRAVENOUS
Status: DISCONTINUED | OUTPATIENT
Start: 2023-09-27 | End: 2023-09-29 | Stop reason: HOSPADM

## 2023-09-27 RX ORDER — SODIUM CHLORIDE 9 MG/ML
INJECTION, SOLUTION INTRAVENOUS
Status: COMPLETED
Start: 2023-09-27 | End: 2023-09-27

## 2023-09-27 RX ADMIN — Medication 10 ML: at 21:01

## 2023-09-27 RX ADMIN — OXYCODONE HYDROCHLORIDE 5 MG: 5 TABLET ORAL at 02:03

## 2023-09-27 RX ADMIN — ROSUVASTATIN CALCIUM 10 MG: 10 TABLET, FILM COATED ORAL at 21:11

## 2023-09-27 RX ADMIN — ACETAMINOPHEN 650 MG: 325 TABLET, FILM COATED ORAL at 05:07

## 2023-09-27 RX ADMIN — LABETALOL HYDROCHLORIDE 100 MG: 100 TABLET, FILM COATED ORAL at 08:05

## 2023-09-27 RX ADMIN — ACETAMINOPHEN 650 MG: 325 TABLET, FILM COATED ORAL at 15:47

## 2023-09-27 RX ADMIN — ASPIRIN 81 MG: 81 TABLET, CHEWABLE ORAL at 08:05

## 2023-09-27 RX ADMIN — HEPARIN SODIUM 5000 UNITS: 5000 INJECTION INTRAVENOUS; SUBCUTANEOUS at 05:07

## 2023-09-27 RX ADMIN — HEPARIN SODIUM 5000 UNITS: 5000 INJECTION INTRAVENOUS; SUBCUTANEOUS at 14:01

## 2023-09-27 RX ADMIN — HEPARIN SODIUM 5000 UNITS: 5000 INJECTION INTRAVENOUS; SUBCUTANEOUS at 21:11

## 2023-09-27 RX ADMIN — AMLODIPINE BESYLATE 5 MG: 5 TABLET ORAL at 08:06

## 2023-09-27 RX ADMIN — ACETAMINOPHEN 650 MG: 325 TABLET, FILM COATED ORAL at 11:01

## 2023-09-27 RX ADMIN — DOCUSATE SODIUM 50 MG AND SENNOSIDES 8.6 MG 2 TABLET: 8.6; 5 TABLET, FILM COATED ORAL at 08:05

## 2023-09-27 RX ADMIN — ACETAMINOPHEN 650 MG: 325 TABLET, FILM COATED ORAL at 21:11

## 2023-09-27 RX ADMIN — LABETALOL HYDROCHLORIDE 100 MG: 100 TABLET, FILM COATED ORAL at 21:11

## 2023-09-27 RX ADMIN — Medication 10 ML: at 08:06

## 2023-09-27 RX ADMIN — SODIUM CHLORIDE: 9 INJECTION, SOLUTION INTRAVENOUS at 19:08

## 2023-09-27 RX ADMIN — OXYCODONE HYDROCHLORIDE 5 MG: 5 TABLET ORAL at 15:47

## 2023-09-27 RX ADMIN — PANTOPRAZOLE SODIUM 40 MG: 40 INJECTION, POWDER, FOR SOLUTION INTRAVENOUS at 14:01

## 2023-09-27 RX ADMIN — OXYCODONE HYDROCHLORIDE 5 MG: 5 TABLET ORAL at 08:05

## 2023-09-27 NOTE — PROGRESS NOTES
Adult Nutrition  Assessment/PES    Patient Name:  Alexia Lopez  YOB: 1935  MRN: [id number]  Admit Date:  9/19/2023    Assessment Date:  9/27/2023    Comments: This resident is on a regular texture, consistent carbohydrate, thin liquids diet. Her average meal intake has been 60-70% of meals the last seven days. She has increased needs for nutrients due to having had a hip fracture surgically repaired. She has had significant weight loss of greater than 7.5% in three months. She meets ASPEN criteria for moderate acute malnutrition. She has been encouraged to eat high calorie and high protein foods status post discharge for weight maintenance and for surgical wound healing. Will attach diet information to print with discharge papers.      Reason for Assessment       Row Name 09/27/23 1234          Reason for Assessment    Reason For Assessment follow-up protocol     Diagnosis trauma     Identified At Risk by Screening Criteria unintentional loss of 10 lbs or more in the past 2 mos                    Nutrition/Diet History       Row Name 09/27/23 1235          Nutrition/Diet History    Typical Intake (Food/Fluid/EN/PN) 60-65% of meals consumed the last seven days     Typical Activity Patterns sedentary     Factors Affecting Nutritional Intake appetite;fatigue                    Labs/Tests/Procedures/Meds       Row Name 09/27/23 1236          Labs/Procedures/Meds    Lab Results Comments 9/27/13 Sodium 127, Cl 94, co2 19, bun 27, creatinine 1.07, eGFR 50.7, calcium 8.3, hgb 7.1, hct 21.1        Diagnostic Tests/Procedures    Diagnostic Test/Procedure Reviewed reviewed        Medications    Pertinent Medications Reviewed reviewed     Pertinent Medications Comments novolog, crestor, pericolace                    Physical Findings       Row Name 09/27/23 1238          Physical Findings    Overall Physical Appearance normal appearance BMI greater than 20, less than 25                     "Estimated/Assessed Needs - Anthropometrics       Row Name 09/27/23 1238          Anthropometrics    Height 170.2 cm (67\")     Weight 68.4 kg (150 lb 11.2 oz)     Weight for Calculation 68.4 kg (150 lb 11.2 oz)        Estimated/Assessed Needs    Additional Documentation KCAL/KG (Group)        KCAL/KG    KCAL/KG 25 Kcal/Kg (kcal);30 Kcal/Kg (kcal)     25 Kcal/Kg (kcal) 1708.925     30 Kcal/Kg (kcal) 2050.71        Protein Requirements    Weight Used For Protein Calculations 68.4 kg (150 lb 11.2 oz)     Est Protein Requirement Amount (gms/kg) 1.2 gm protein  1.0-1.2 grams/kg = =68-82 grams protein     Estimated Protein Requirements (gms/day) 82.03        Fluid Requirements    Fluid Requirements (mL/day) 2051     Estimated Fluid Requirement Method --  25-30 ml/kg= 1708- 2051 ml per day     RDA Method (mL) 2051                    Nutrition Prescription Ordered       Row Name 09/27/23 1242          Nutrition Prescription PO    Current PO Diet Regular     Fluid Consistency Thin     Common Modifiers Consistent Carbohydrate                      Malnutrition Severity Assessment       Row Name 09/27/23 1242          Malnutrition Severity Assessment    Malnutrition Type Acute Disease or Injury - Related Malnutrition        Insufficient Energy Intake     Insufficient Energy Intake Findings Mild     Insufficient Energy Intake  --  intake below for 75% for three months.        Unintentional Weight Loss     Unintentional Weight Loss Findings Severe     Unintentional Weight Loss  Weight loss greater than 7.5% in three months  approximately 13-15% in three months.        Muscle Loss    Loss of Muscle Mass Findings Mild     Gnosticism Region --  mild     Clavicle Bone Region --  mild     Acromion Bone Region --  mild     Dorsal Hand Region --  mild     Patellar Region --  mild     Anterior Thigh Region --  mild     Posterior Calf Region None        Fat Loss    Subcutaneous Fat Loss Findings Mild     Orbital Region  --  mild     Upper Arm " Region Moderate - some fat tissue, not ample     Thoracic & Lumbar Region --  mild        Criteria Met (Must meet criteria for severity in at least 2 of these categories: M Wasting, Fat Loss, Fluid, Secondary Signs, Wt. Status, Intake)    Patient meets criteria for  Moderate (non-severe) Malnutrition                     Problem/Interventions:   Problem 1       Row Name 09/27/23 1253          Nutrition Diagnoses Problem 1    Problem 1 Malnutrition  moderate acute malnutrition     Etiology (related to) Factors Affecting Nutrition     Reported/Observed By Patient     Appetite Fair     Signs/Symptoms (evidenced by) Unintended Weight Change;Other (comment)  various fat loss and muscle wasting     Percent (%) intake recorded 63 %     Over number of meals 21     Unintended Weight Change Loss     Number of Pounds Lost 13-15%     Weight loss time period 3 months                    Problem 2       Row Name 09/27/23 1300          Nutrition Diagnoses Problem 2    Problem 2 Increased Nutrient Needs     Macronutrient Protein;Kcal     Etiology (related to) Medical Diagnosis     Ortho Fracture     Skin Surgical wound     Signs/Symptoms (evidenced by) Unintended Weight Change     Unintended Weight Change Loss                        Intervention Goal       Row Name 09/27/23 1256          Intervention Goal    General Maintain nutrition;Meet nutritional needs for age/condition     PO Increase intake     Weight Maintain weight                    Nutrition Intervention       Row Name 09/27/23 1257          Nutrition Intervention    RD/Tech Action Follow Tx progress;Encourage intake;Care plan reviewd                      Education/Evaluation       Row Name 09/27/23 1258          Education    Education Provided education regarding;Education topics;Advised regarding habits/behavior     Provided education regarding Nutrition for age     Education Topics Weight management - maintain;Protein;Basic nutrition     Advised Regarding  Habits/Behavior Food choices;Increased nutrient density;Self monitor        Monitor/Evaluation    Monitor PO intake;Pertinent labs;Weight;Skin status                     Electronically signed by:  Jere Magana RD  09/27/23 13:01 CDT

## 2023-09-27 NOTE — THERAPY TREATMENT NOTE
Acute Care - Physical Therapy Treatment Note  [facility]     Patient Name: Alexia Lopez  : 1935  MRN: [id number]  Today's Date: 2023      Visit Dx:     ICD-10-CM ICD-9-CM   1. Traumatic closed displaced fracture of neck of right femur, initial encounter  S72.001A 820.8   2. Coronary arteriosclerosis in native artery  I25.10 414.01   3. S/P CABG (coronary artery bypass graft)  Z95.1 V45.81   4. Benign essential hypertension  I10 401.1   5. Controlled type 2 diabetes mellitus without complication, with long-term current use of insulin  E11.9 250.00    Z79.4 V58.67   6. Fall from slip, trip, or stumble, initial encounter  W01.0XXA E885.9   7. Closed fracture of right hip, initial encounter  S72.001A 820.8   8. Impaired functional mobility, balance, gait, and endurance [Z74.09 (ICD-10-CM)]  Z74.09 V49.89   9. Impaired mobility and ADLs [Z74.09, Z78.9 (ICD-10-CM)]  Z74.09 V49.89    Z78.9      Patient Active Problem List   Diagnosis    Coronary arteriosclerosis in native artery    S/P CABG (coronary artery bypass graft)    Benign essential hypertension    Hypercholesterolemia    Type 2 diabetes mellitus    Nuclear cataract    Controlled type 2 diabetes mellitus without complication    Pernicious anemia    Arthritis of knee    Chronic sinusitis    Encounter for screening mammogram for malignant neoplasm of breast    Generalized osteoarthritis    Iron deficiency anemia    Palpitations    Myocardial infarction    Hypertension    Hyperlipemia    History of echocardiogram    Diabetes mellitus    Coronary artery disease    Cataract    Chronic pain of both knees    Primary osteoarthritis of both knees    Claudication of lower extremity    Precordial pain    Fall from slip, trip, or stumble, initial encounter    Traumatic closed displaced fracture of neck of right femur, initial encounter    Hip fracture    Moderate malnutrition     Past Medical History:   Diagnosis Date    Cataract     Coronary artery  disease     Diabetes mellitus     Type 2 diabetes mellitus - without retinopathy       History of echocardiogram 05/12/2014    Normal LV systolic function with EF of 55% with mild hypokinesis. Diastolic relaxation abnormality of left ventricle. Mild tricuspid regurg. Trace mitral regurg    Hyperlipemia     Hypertension     Myocardial infarction      Past Surgical History:   Procedure Laterality Date    BACK SURGERY      CARDIAC CATHETERIZATION  05/12/2014    Severe multivessel CAD with critical lesions noted in the LAD coronary artery, left circumflex coronary artery and RCA. Left ventriculogram no performed in view of elevated left ventricular end-diastolic pressure    CATARACT EXTRACTION W/ INTRAOCULAR LENS IMPLANT Left 2/2/2018    Procedure: CATARACT PHACO EXTRACTION WITH INTRAOCULAR LENS IMPLANT;  Surgeon: Gagan Lizarraga MD;  Location: [facility];  Service:     CATARACT EXTRACTION W/ INTRAOCULAR LENS IMPLANT Right 2/9/2018    Procedure: CATARACT PHACO EXTRACTION WITH INTRAOCULAR LENS IMPLANT;  Surgeon: Gagan Lizarraga MD;  Location: [facility];  Service:     CORONARY ARTERY BYPASS GRAFT      X 3 with LIMA to LAD, SVG to OMB and SVG to PDA.    DILATATION AND CURETTAGE      OTHER SURGICAL HISTORY  05/06/2014    INCISION OF EARDRUM GENERAL ANESTHETIC 35415 (1)    Bilateral myringotomy with tubes.     SINUS SURGERY      TONSILLECTOMY      TONSILLECTOMY AND ADENOIDECTOMY      TUBAL ABDOMINAL LIGATION  03/14/1978     PT Assessment (last 12 hours)       PT Evaluation and Treatment       Row Name 09/27/23 0917          Physical Therapy Time and Intention    Subjective Information no complaints  -CA     Document Type therapy note (daily note)  -CA     Mode of Treatment individual therapy;physical therapy  -CA     Patient Effort good  -CA       Row Name 09/27/23 0077          General Information    Existing Precautions/Restrictions fall;hip, anterior  -CA       Row Name 09/27/23 3306          Pain     Pretreatment Pain Rating 3/10  -CA     Posttreatment Pain Rating 3/10  -CA       Row Name 09/27/23 0930          Cognition    Affect/Mental Status (Cognition) WFL  -CA     Orientation Status (Cognition) oriented x 4  -CA     Personal Safety Interventions fall prevention program maintained;gait belt;muscle strengthening facilitated;nonskid shoes/slippers when out of bed  -CA       Row Name 09/27/23 0930          Mobility    Extremity Weight-bearing Status right lower extremity  -CA     Right Lower Extremity (Weight-bearing Status) weight-bearing as tolerated (WBAT)  -CA       Row Name 09/27/23 0930          Bed Mobility    Comment, (Bed Mobility) Pt. sitting up in recliner upon entry  -CA       Row Name 09/27/23 0930          Transfers    Transfers sit-stand transfer;stand-sit transfer  -CA       Row Name 09/27/23 0930          Sit-Stand Transfer    Sit-Stand [place] (Transfers) standby assist;contact guard  -CA     Assistive Device (Sit-Stand Transfers) walker, front-wheeled  -CA       Row Name 09/27/23 0930          Stand-Sit Transfer    Stand-Sit [place] (Transfers) standby assist;contact guard  -CA     Assistive Device (Stand-Sit Transfers) walker, front-wheeled  -CA       Row Name 09/27/23 0930          Gait/Stairs (Locomotion)    [place] Level (Gait) contact guard  -CA     Assistive Device (Gait) walker, front-wheeled  -CA     Distance in Feet (Gait) 68  -CA     Pattern (Gait) step-through  -CA     Deviations/Abnormal Patterns (Gait) antalgic;gait speed decreased  -CA     [place] Level (Stairs) not tested  -CA       Row Name 09/27/23 0930          Knee (Therapeutic Exercise)    Knee (Therapeutic Exercise) AROM (active range of motion)  -CA     Knee AROM (Therapeutic Exercise) bilateral;other (see comments)  quad sets  -CA       Row Name 09/27/23 0930          Ankle (Therapeutic Exercise)    Ankle (Therapeutic Exercise) AROM (active range of motion)  -CA     Ankle AROM (Therapeutic Exercise)  bilateral;dorsiflexion;plantarflexion  -CA       Row Name             Wound 09/19/23 1848 Right hip Incision    Wound - Properties Group Placement Date: 09/19/23 -SP Placement Time: 1848 -SP Present on Hospital Admission: N  -SP Side: Right  -SP Location: hip  -SP Primary Wound Type: Incision  -SP Additional Comments: chas LANGLEY    Retired Wound - Properties Group Placement Date: 09/19/23 -SP Placement Time: 1848 -SP Present on Hospital Admission: N  -SP Side: Right  -SP Location: hip  -SP Primary Wound Type: Incision  -SP Additional Comments: maxwello  -SP    Retired Wound - Properties Group Date first assessed: 09/19/23 -SP Time first assessed: 1848 -SP Present on Hospital Admission: N  -SP Side: Right  -SP Location: hip  -SP Primary Wound Type: Incision  -SP Additional Comments: prineo  -SP      Row Name 09/27/23 0930          Vital Signs    Pre Patient Position Sitting  -CA     Intra Patient Position Standing  -CA     Post Patient Position Sitting  -CA       Row Name 09/27/23 0930          Positioning and Restraints    Pre-Treatment Position sitting in chair/recliner  -CA     Post Treatment Position chair  -CA     In Chair reclined;call light within reach;encouraged to call for assist;with family/caregiver  -CA               User Key  (r) = Recorded By, (t) = Taken By, (c) = Cosigned By      Initials Name Provider Type    Raymond Arias, PTA Physical Therapist Assistant    Alina Ho, RN Registered Nurse                    Physical Therapy Education       Title: PT OT SLP Therapies (In Progress)       Topic: Physical Therapy (In Progress)       Point: Mobility training (In Progress)       Learning Progress Summary             Patient Acceptance, E, NR by NIDA at 9/24/2023 1259    Acceptance, E, NR by JIE at 9/20/2023 0933    Comment: PT POC, rehab process, hand placement with transfers, use of FWW, proper gait mechanics.                         Point: Home exercise program (Not Started)        Learner Progress:  Not documented in this visit.              Point: Body mechanics (Not Started)       Learner Progress:  Not documented in this visit.              Point: Precautions (Not Started)       Learner Progress:  Not documented in this visit.                              User Key       Initials Effective Dates Name Provider Type Discipline    NIDA 06/16/21 -  Eugenio Chavez PTA Physical Therapist Assistant PT    CZ 07/11/23 -  John Bazan, PT Physical Therapist PT                  PT Recommendation and Plan     Plan of Care Reviewed With: patient  Progress: improving  Outcome Evaluation: Pt. was sba/cga for transfers and was cga for gait this tx.  Pt. amb. 68' with RW and antalgic with decreased gait speed.  Pt. did have some c/o of increased pain in knees with gait.  Pt. returned to recliner post gait and performed some B LE therex.  No new goals met and pt. improving with mobility.   Outcome Measures       Row Name 09/27/23 0930 09/26/23 1250          How much help from another person do you currently need...    Turning from your back to your side while in flat bed without using bedrails? 3  -CA 3  -CA     Moving from lying on back to sitting on the side of a flat bed without bedrails? 3  -CA 3  -CA     Moving to and from a bed to a chair (including a wheelchair)? 3  -CA 3  -CA     Standing up from a chair using your arms (e.g., wheelchair, bedside chair)? 3  -CA 3  -CA     Climbing 3-5 steps with a railing? 3  -CA 3  -CA     To walk in hospital room? 3  -CA 3  -CA     AM-PAC 6 Clicks Score (PT) 18  -CA 18  -CA        Functional Assessment    Outcome Measure Options AM-PAC 6 Clicks Basic Mobility (PT)  -CA AM-PAC 6 Clicks Basic Mobility (PT)  -CA               User Key  (r) = Recorded By, (t) = Taken By, (c) = Cosigned By      Initials Name Provider Type    CA Raymond Sullivan PTA Physical Therapist Assistant                     Time Calculation:    PT Charges       Row Name 09/27/23 9600              Time Calculation    Start Time 0930  -CA      Stop Time 0955  -CA      Time Calculation (min) 25 min  -CA      PT Received On 09/27/23  -CA         Time Calculation- PT    Total Timed Code Minutes- PT 25 minute(s)  -CA                User Key  (r) = Recorded By, (t) = Taken By, (c) = Cosigned By      Initials Name Provider Type    Raymond Arias PTA Physical Therapist Assistant                  Therapy Charges for Today       Code Description Service Date Service Provider Modifiers Qty    15985733236 HC GAIT TRAINING EA 15 MIN 9/26/2023 Raymond Sullivan, PTA GP 1    35487132264 HC PT THER PROC EA 15 MIN 9/26/2023 Raymond Sullivan, PTA GP 1    88789787496 HC GAIT TRAINING EA 15 MIN 9/27/2023 Raymond Sullivan, PTA GP 1    45738098922 HC PT THERAPEUTIC ACT EA 15 MIN 9/27/2023 Raymond Sullivan, PTA GP 1            PT G-Codes  Outcome Measure Options: AM-PAC 6 Clicks Basic Mobility (PT)  AM-PAC 6 Clicks Score (PT): 18  AM-PAC 6 Clicks Score (OT): 21    Raymond Sullivan PTA  9/27/2023

## 2023-09-27 NOTE — PROGRESS NOTES
[facility] Medicine Services  INPATIENT PROGRESS NOTE    Length of Stay: 4  Date of Admission: 9/19/2023  Primary Care Physician: Munir Rodriguez MD    Subjective   Chief Complaint:  fatigue, blood in stool  HPI:  88 year old female with past medical history of CAD, DM, HTN, HLD who presented on 9/19/23 with complaints of right hip pain r/t fall at home.  She is currently admitted for issues including right femoral neck fracture that required surgical repair.  During today's visit, she reports being fatigued.  She also noted having a bowel movement with blood present this morning.     Review of Systems   Constitutional:  Positive for appetite change and fatigue. Negative for chills and fever.   HENT:  Negative for congestion, rhinorrhea and sore throat.    Respiratory:  Negative for cough, chest tightness, shortness of breath and wheezing.    Cardiovascular:  Negative for chest pain, palpitations and leg swelling.   Gastrointestinal:  Positive for blood in stool. Negative for abdominal pain, constipation, diarrhea, nausea and vomiting.   Genitourinary:  Negative for difficulty urinating, flank pain and pelvic pain.   Musculoskeletal:  Negative for back pain and neck pain.        Right hip post op pain   Skin:  Negative for pallor.   Neurological:  Negative for dizziness, weakness and headaches.   Psychiatric/Behavioral:  Negative for confusion. The patient is not nervous/anxious.       All pertinent negatives and positives are as above. All other systems have been reviewed and are negative unless otherwise stated.     Objective    Temp:  [96.9 °F (36.1 °C)-98.4 °F (36.9 °C)] 98.4 °F (36.9 °C)  Heart Rate:  [71-91] 91  Resp:  [18] 18  BP: (116-154)/(56-76) 152/68  As of 9/27  Physical Exam  Vitals and nursing note reviewed.   Constitutional:       General: She is not in acute distress.     Appearance: She is not ill-appearing.   HENT:      Head: Normocephalic and atraumatic.       Right Ear: External ear normal.      Left Ear: External ear normal.      Nose: Nose normal.      Mouth/Throat:      Mouth: Mucous membranes are moist.      Pharynx: Oropharynx is clear.   Eyes:      General: No scleral icterus.        Right eye: No discharge.         Left eye: No discharge.      Conjunctiva/sclera: Conjunctivae normal.   Cardiovascular:      Rate and Rhythm: Normal rate and regular rhythm.      Heart sounds: Normal heart sounds. No murmur heard.    No friction rub. No gallop.   Pulmonary:      Effort: Pulmonary effort is normal. No respiratory distress.      Breath sounds: Normal breath sounds. No stridor. No wheezing, rhonchi or rales.   Abdominal:      General: Bowel sounds are normal. There is no distension.      Palpations: Abdomen is soft.      Tenderness: There is no abdominal tenderness.   Musculoskeletal:         General: Normal range of motion.      Cervical back: Normal range of motion.      Right lower leg: Edema present.      Comments: RLE edema   Skin:     General: Skin is warm and dry.      Comments: Right hip incision intact.  No bleeding, swelling, drainage noted   Neurological:      General: No focal deficit present.      Mental Status: She is alert and oriented to person, place, and time.   Psychiatric:         Behavior: Behavior normal.           Results Review:  I have reviewed the labs, radiology results, and diagnostic studies.    Laboratory Data:   Results from last 7 days   Lab Units 09/27/23  0523 09/26/23  1550 09/26/23  0834 09/25/23  0835 09/23/23  1644 09/23/23  1308   SODIUM mmol/L 127* 128* 126* 127*   < > 134*   POTASSIUM mmol/L 3.8  --  4.0 4.3   < > 4.7   CHLORIDE mmol/L 94*  --  93* 94*   < > 100   CO2 mmol/L 19.0*  --  19.0* 19.0*   < > 24.0   BUN mg/dL 27*  --  30* 22   < > 20   CREATININE mg/dL 1.07*  --  1.30* 1.03*   < > 0.98   GLUCOSE mg/dL 100*  --  145* 174*   < > 182*   CALCIUM mg/dL 8.3*  --  8.7 9.0   < > 9.4   BILIRUBIN mg/dL  --   --   --   --    --  0.4   ALK PHOS U/L  --   --   --   --   --  65   ALT (SGPT) U/L  --   --   --   --   --  <5   AST (SGOT) U/L  --   --   --   --   --  18   ANION GAP mmol/L 14.0  --  14.0 14.0   < > 10.0    < > = values in this interval not displayed.       Estimated Creatinine Clearance: 40.8 mL/min (A) (by C-G formula based on SCr of 1.07 mg/dL (H)).  Results from last 7 days   Lab Units 09/23/23  1308   MAGNESIUM mg/dL 2.0           Results from last 7 days   Lab Units 09/27/23  0523 09/26/23  0834 09/25/23  0835 09/22/23  0540 09/21/23  0524   WBC 10*3/mm3 12.20* 10.76 13.16* 8.31 9.44   HEMOGLOBIN g/dL 7.1* 7.5* 7.9* 7.5* 8.5*   HEMATOCRIT % 21.1* 22.4* 23.5* 22.0* 24.9*   PLATELETS 10*3/mm3 226 198 199 123* 133*               Culture Data:   Blood Culture   Date Value Ref Range Status   09/25/2023 No growth at 24 hours  Preliminary   09/25/2023 No growth at 24 hours  Preliminary     No results found for: URINECX  No results found for: RESPCX  No results found for: WOUNDCX  No results found for: STOOLCX  No components found for: BODYFLD    Radiology Data:   Imaging Results (Last 24 Hours)       ** No results found for the last 24 hours. **            I have reviewed the patient's current medications.     Assessment/Plan     Active Hospital Problems    Diagnosis     **Traumatic closed displaced fracture of neck of right femur, initial encounter     Moderate malnutrition     Fall from slip, trip, or stumble, initial encounter     Hip fracture     Controlled type 2 diabetes mellitus without complication     S/P CABG (coronary artery bypass graft)     Benign essential hypertension     Coronary arteriosclerosis in native artery        Plan:    Right femoral neck fracture: s/p ORIF on 9/19 with Dr. Adler.  Continue PT/OT, pain control.  Heparin for DVT prevention per ortho with plan to transition to Lovenox/Eliquis/warfarin at discharge (30 days of treatment)  CAD: Hold ASA due to reported blood in stool this morning.  Continue.   Labetalol 100 mg BID, Crestor 5 mg daily   HTN: continue Norvasc 5 mg daily, labetalol 100 mg BID.   Type 2 DM: FSBS AC and HS with SSI.   HLD: continue Crestor 5 mg nightly.   Palpitations: EKG reviewed.  Continue telemetry monitoring. Magnesium and potassium normal  Fever: CXR unremarkable, UA unremarkable. Incentive spirometer in use.  Blood culture negative at 24 hours.  No further fevers noted.   Hyponatremia: remains low despite small amount of IV fluids given overnight.  Check urine sodium, osmo, serum osmo, TSH, T4.  Will consult nephrology for assistance.   Blood in stool: small amount noted while using restroom this morning.  patient reports history of hemorrhoids and has had recent issues with constipation post op.  Will check hemoccult on next stool.  Hold ASA.  Start Protonix 40 mg daily.  May require GI consult if issue continues.  Will recheck Hgb this afternoon to assess for drop.  Currently 7.1.         Medical Decision Making  Number and Complexity of problems: 9 moderate     Conditions and Status:        Condition is improving.     MDM Data  External documents reviewed: ED provider notes, ortho notes.   My EKG interpretation: NSR  My US interpretation: US renal - non obstructing left kidney stone, small right renal cysts.  No hydronephrosis.   Tests considered but not ordered: n/a     Decision rules/scores evaluated (example [id number], Wells, etc): n/a      Discussed with: patient      Care Planning  Code status and discussions: Full code status on file    Disposition  Social Determinants of Health that impact treatment or disposition: impaired mobility   I expect the patient to be discharged to [facility] in 1-2 days.     This document has been electronically signed by PEGGY Ceron on September 27, 2023 15:34 CDT

## 2023-09-27 NOTE — PLAN OF CARE
Goal Outcome Evaluation:  Plan of Care Reviewed With: patient        Progress: improving  Outcome Evaluation: Pt seated in recliner upon entry. Pt agreeable to ther ex to BUE's. Pt tolerated 2 sets x 20 reps ther ex to BUE's in all planes w/ 2lb HW and fair tolerance w/ RB's PRN. No goals met this date. Cont OT POC.      Anticipated Discharge Disposition (OT): home with 24/7 care, home with home health, other (see comments), inpatient rehabilitation facility

## 2023-09-27 NOTE — PLAN OF CARE
Goal Outcome Evaluation:           Progress: no change  Outcome Evaluation: VSS. IV fluids infusing per order. Pain controlled with PRN pain meds. Resting well between care.

## 2023-09-27 NOTE — CONSULTS
SUBJECTIVE:   9/27/2023    Name: Alexia Lopez  DOD: 1935    REASON FOR CONSULT: Blood in the stool    Chief Complaint:     Chief Complaint   Patient presents with    Hip Pain    Fall       Subjective     Patient is 88 y.o. female presents with fracture of the hip.  Patient had surgery for this and had been doing well but developed constipation.  Patient states that after straining she passed blood in her stool.  Patient has been having a slight decrease in her hemoglobin.  Patient's only medication is aspirin and heparin..      ROS/HISTORY/ CURRENT MEDICATIONS/OBJECTIVE/VS/PE:   Review of Systems:   Review of Systems   Constitutional:  Negative for activity change, appetite change, chills, diaphoresis, fatigue, fever and unexpected weight change.   HENT:  Negative for sore throat and trouble swallowing.    Respiratory:  Negative for shortness of breath.    Gastrointestinal:  Negative for abdominal distention, abdominal pain, anal bleeding, blood in stool, constipation, diarrhea, nausea, rectal pain and vomiting.   Endocrine: Negative for polydipsia, polyphagia and polyuria.   Genitourinary:  Negative for difficulty urinating.   Musculoskeletal:  Negative for arthralgias.   Skin:  Negative for pallor.   Allergic/Immunologic: Negative for food allergies.   Neurological:  Negative for weakness and light-headedness.   Psychiatric/Behavioral:  Negative for behavioral problems.      History:     Past Medical History:   Diagnosis Date    Cataract     Coronary artery disease     Diabetes mellitus     Type 2 diabetes mellitus - without retinopathy       History of echocardiogram 05/12/2014    Normal LV systolic function with EF of 55% with mild hypokinesis. Diastolic relaxation abnormality of left ventricle. Mild tricuspid regurg. Trace mitral regurg    Hyperlipemia     Hypertension     Myocardial infarction      Past Surgical History:   Procedure Laterality Date    BACK SURGERY      CARDIAC CATHETERIZATION   2014    Severe multivessel CAD with critical lesions noted in the LAD coronary artery, left circumflex coronary artery and RCA. Left ventriculogram no performed in view of elevated left ventricular end-diastolic pressure    CATARACT EXTRACTION W/ INTRAOCULAR LENS IMPLANT Left 2018    Procedure: CATARACT PHACO EXTRACTION WITH INTRAOCULAR LENS IMPLANT;  Surgeon: Gagan Lizarraga MD;  Location: [facility];  Service:     CATARACT EXTRACTION W/ INTRAOCULAR LENS IMPLANT Right 2018    Procedure: CATARACT PHACO EXTRACTION WITH INTRAOCULAR LENS IMPLANT;  Surgeon: Gagan Lizarraga MD;  Location: [facility];  Service:     CORONARY ARTERY BYPASS GRAFT      X 3 with LIMA to LAD, SVG to OMB and SVG to PDA.    DILATATION AND CURETTAGE      OTHER SURGICAL HISTORY  2014    INCISION OF EARDRUM GENERAL ANESTHETIC 41877 (1)    Bilateral myringotomy with tubes.     SINUS SURGERY      TONSILLECTOMY      TONSILLECTOMY AND ADENOIDECTOMY      TUBAL ABDOMINAL LIGATION  1978     Family History   Problem Relation Age of Onset    Hypertension Mother     Coronary artery disease Father     Diabetes Other         grandmother    Cancer Other      Social History     Tobacco Use    Smoking status: Never    Smokeless tobacco: Never   Vaping Use    Vaping Use: Never used   Substance Use Topics    Alcohol use: No    Drug use: No     Medications Prior to Admission   Medication Sig Dispense Refill Last Dose    aspirin 81 MG disintegrating tablet Take 81 mg by mouth Daily.   2023    docusate sodium (COLACE) 100 MG capsule Take 1 capsule by mouth Daily.   2023    hydroCHLOROthiazide (HYDRODIURIL) 12.5 MG tablet Take 1 tablet by mouth Daily.   2023    labetalol (NORMODYNE) 200 MG tablet Take 0.5 tablets by mouth 2 (Two) Times a Day. .5   2023    amLODIPine (NORVASC) 5 MG tablet TAKE 1 TABLET BY MOUTH DAILY IN THE EVENING 90 tablet 2 Unknown    [] cefuroxime (CEFTIN) 500 MG tablet Take 1 tablet by  mouth.   Unknown    CO ENZYME Q-10 PO Take 100 mg by mouth Daily.   Unknown    fluticasone (FLONASE) 50 MCG/ACT nasal spray 2 sprays into the nostril(s) as directed by provider Daily. 16 g 11 Unknown    loratadine (CLARITIN) 10 MG tablet Take 1 tablet by mouth Daily.   Unknown    losartan (COZAAR) 100 MG tablet Take 1 tablet by mouth Daily.   Unknown    metFORMIN ER (GLUCOPHAGE-XR) 750 MG 24 hr tablet Take 2 tablets by mouth Every Night.   Unknown    ONE TOUCH ULTRA TEST test strip USE TO TEST BLOOD SUGAR EVERY DAY DX.E11.9  5 Unknown    Polysaccharide Iron Complex (FERREX 150 PO) Take 150 mg by mouth Daily.   Unknown    rosuvastatin (CRESTOR) 10 MG tablet Take 1 tablet by mouth Every Night.   Unknown    SALINE NASAL SPRAY NA into the nostril(s) as directed by provider As Needed.   Unknown    vitamin B-12 (CYANOCOBALAMIN) 1000 MCG tablet Take 1 tablet by mouth Daily.   Unknown     Allergies:  Cherry, Peanut-containing drug products, Articaine, Lidocaine, Ciprofloxacin, and Losartan    I have reviewed the patient's medical history, surgical history and family history in the available medical record system.     Current Medications:     Current Facility-Administered Medications   Medication Dose Route Frequency Provider Last Rate Last Admin    acetaminophen (TYLENOL) tablet 650 mg  650 mg Oral Q6H Cas Adler MD   650 mg at 09/27/23 1547    amLODIPine (NORVASC) tablet 5 mg  5 mg Oral Q24H Norris Brown MD   5 mg at 09/27/23 0806    sennosides-docusate (PERICOLACE) 8.6-50 MG per tablet 2 tablet  2 tablet Oral BID Cas Adler MD   2 tablet at 09/27/23 0805    And    polyethylene glycol (MIRALAX) packet 17 g  17 g Oral Daily PRN Cas Adler MD        And    bisacodyl (DULCOLAX) EC tablet 5 mg  5 mg Oral Daily PRN Cas Adler MD        And    bisacodyl (DULCOLAX) suppository 10 mg  10 mg Rectal Daily PRN Cas Adler MD        Calcium Replacement - Follow  Nurse / BPA Driven Protocol   Does not apply PRN Urmila Vallejo APRN        dextrose (D50W) (25 g/50 mL) IV injection 25 g  25 g Intravenous Q15 Min PRN Dinah Perry MD        dextrose (GLUTOSE) oral gel 15 g  15 g Oral Q15 Min PRN Dinah Perry MD        glucagon HCl (Diagnostic) injection 1 mg  1 mg Intramuscular Q15 Min PRN Dinah Perry MD        heparin (porcine) 5000 UNIT/ML injection 5,000 Units  5,000 Units Subcutaneous Q8H Cas Adler MD   5,000 Units at 09/27/23 1401    hydrALAZINE (APRESOLINE) injection 10 mg  10 mg Intravenous Q6H PRN Urmila Vallejo APRN   10 mg at 09/24/23 1933    Insulin Aspart (novoLOG) injection 0-7 Units  0-7 Units Subcutaneous TID Dinah Bernard MD   2 Units at 09/25/23 1749    labetalol (NORMODYNE) tablet 100 mg  100 mg Oral Q12H Cas Adler MD   100 mg at 09/27/23 0805    Magnesium Standard Dose Replacement - Follow Nurse / BPA Driven Protocol   Does not apply PRN Urmila Vallejo APRN        naloxone (NARCAN) injection 0.4 mg  0.4 mg Intravenous Q5 Min PRN Cas Adler MD        nitroglycerin (NITROSTAT) SL tablet 0.4 mg  0.4 mg Sublingual Q5 Min PRN Urmila Vallejo APRN        ondansetron (ZOFRAN) tablet 4 mg  4 mg Oral Q6H PRN Cas Adler MD        Or    ondansetron (ZOFRAN) injection 4 mg  4 mg Intravenous Q6H PRN Cas Adler MD   4 mg at 09/19/23 1951    oxyCODONE (ROXICODONE) immediate release tablet 5 mg  5 mg Oral Q6H PRN Cas Adler MD   5 mg at 09/27/23 1547    pantoprazole (PROTONIX) injection 40 mg  40 mg Intravenous Q AM Urmila Vallejo APRN   40 mg at 09/27/23 1401    Phosphorus Replacement - Follow Nurse / BPA Driven Protocol   Does not apply PRN Urmila Vallejo, PEGGY        Potassium Replacement - Follow Nurse / BPA Driven Protocol   Does not apply PRN Urmila Vallejo APRN        rosuvastatin (CRESTOR) tablet 10 mg  10 mg Oral Nightly  Cas Adler MD   10 mg at 09/26/23 2058    sodium chloride 0.9 % flush 10 mL  10 mL Intravenous Q12H Cas Adler MD   10 mL at 09/27/23 0806    sodium chloride 0.9 % flush 10 mL  10 mL Intravenous PRN Cas Adler MD        sodium chloride 0.9 % infusion 40 mL  40 mL Intravenous PRN Cas Adler MD   200 mL at 09/19/23 2005       Objective     Physical Exam:   Temp:  [96.9 °F (36.1 °C)-98.4 °F (36.9 °C)] 98.4 °F (36.9 °C)  Heart Rate:  [71-91] 91  Resp:  [18] 18  BP: (116-154)/(56-76) 152/68    Physical Exam:  General Appearance:    Alert, cooperative, in no acute distress   Head:    Normocephalic, without obvious abnormality, atraumatic   Eyes:            Lids and lashes normal, conjunctivae and sclerae normal, no   icterus, no pallor, corneas clear, PERRLA   Ears:    Ears appear intact with no abnormalities noted   Throat:   No oral lesions, no thrush, oral mucosa moist   Neck:   No adenopathy, supple, trachea midline, no thyromegaly, no carotid bruit, no JVD   Back:     No kyphosis present, no scoliosis present, no skin lesions,    erythema or scars, no tenderness to percussion or                 palpation,   range of motion normal   Lungs:     Clear to auscultation,respirations regular, even and              unlabored    Heart:    Regular rhythm and normal rate, normal S1 and S2, no         murmur, no gallop, no rub, no click   Breast Exam:    Deferred   Abdomen:     Normal bowel sounds, no masses, no organomegaly, soft     nontender, nondistended, no guarding, no rebound                tenderness   Genitalia:    Deferred   Extremities:   Moves all extremities well, no edema, no cyanosis, no          redness   Pulses:   Pulses palpable and equal bilaterally   Skin:   No bleeding, bruising or rash   Lymph nodes:   No palpable adenopathy   Neurologic:   Cranial nerves II - XII grossly intact, sensation intact, DTR    present and equal bilaterally      Results Review:      Lab Results   Component Value Date    WBC 12.20 (H) 09/27/2023    WBC 10.76 09/26/2023    WBC 13.16 (H) 09/25/2023    HGB 6.9 (C) 09/27/2023    HGB 7.1 (L) 09/27/2023    HGB 7.5 (L) 09/26/2023    HCT 20.3 (C) 09/27/2023    HCT 21.1 (L) 09/27/2023    HCT 22.4 (L) 09/26/2023     09/27/2023     09/26/2023     09/25/2023     Results from last 7 days   Lab Units 09/23/23  1308   ALK PHOS U/L 65   ALT (SGPT) U/L <5   AST (SGOT) U/L 18     Results from last 7 days   Lab Units 09/23/23  1308   BILIRUBIN mg/dL 0.4   ALK PHOS U/L 65     No results found for: LIPASE  Lab Results   Component Value Date    INR 1.09 09/19/2023    INR 1.5 05/13/2014    INR 1.1 05/12/2014         Radiology Review:  Imaging Results (Last 72 Hours)       Procedure Component Value Units Date/Time    XR Chest 1 View [[id number]] Collected: 09/25/23 0902     Updated: 09/25/23 0919    Narrative:      INDICATION:  Fever.    FINDINGS:  No infiltrate, effusion or pneumothorax is seen.  Heart size and pulmonary  vascularity are normal.  The lungs are clear.  The mediastinum, cyndie and  visualized osseous structures are unremarkable.      Impression:      No significant abnormality of the chest.            I reviewed the patient's new clinical results.    I reviewed the patient's new imaging results and agree with the interpretation.     ASSESSMENT/PLAN:   ASSESSMENT: Patient with recent blood in the stool.  Patient has recently had a fractured hip for which she had surgery and a sushma placed.  Patient states is healing but had been constipated.  Patient had strained yesterday to have a bowel movement and then began passing blood.  Patient's hemoglobin has dropped down from 10.8 down to 6.9.  Patient is going to receive 1 unit of packed RBCs at this time.  Patient has last had a colonoscopy by Dr. Tejeda approximately 10 years ago.  Patient would like to follow-up with Dr. Tejeda while in the hospital.  JDBASICNOTETEMPLATE  PLAN: #1  continue to monitor patient's hemoglobin we will transfuse 2 units packed RBCs when hemoglobin is below 7.  #2 Dr. Tejeda will continue to follow with you to see if there is continued fluctuations in the hemoglobin and to see if patient needs endoscopic evaluation of the cause of this blood in the stool.  #3 keep anticoagulations at as minimum as possible for a safe recovery on this patient.  The risks, benefits, and alternatives of this procedure have been discussed with the JDBASICNOTETEMPLATEpatient or the responsible party. The patient understands and agrees to proceed.         Munir Rivers MD  09/27/23  17:14 CDT     This document has been electronically signed by Munir Rivers MD on September 27, 2023 17:14 CDT

## 2023-09-27 NOTE — SIGNIFICANT NOTE
Risks and benefits of blood product administration discussed with patient.  She is agreeable to proceed with transfusion.

## 2023-09-27 NOTE — PLAN OF CARE
Goal Outcome Evaluation:   New orders for nephro and gastro consult. New order for packed red blood cells. Awaiting type and screen. Pain controlled with prn meds.

## 2023-09-27 NOTE — THERAPY TREATMENT NOTE
Patient Name: Alexia Lopez  : 1935    MRN: [id number]                              Today's Date: 2023       Admit Date: 2023    Visit Dx:     ICD-10-CM ICD-9-CM   1. Traumatic closed displaced fracture of neck of right femur, initial encounter  S72.001A 820.8   2. Coronary arteriosclerosis in native artery  I25.10 414.01   3. S/P CABG (coronary artery bypass graft)  Z95.1 V45.81   4. Benign essential hypertension  I10 401.1   5. Controlled type 2 diabetes mellitus without complication, with long-term current use of insulin  E11.9 250.00    Z79.4 V58.67   6. Fall from slip, trip, or stumble, initial encounter  W01.0XXA E885.9   7. Closed fracture of right hip, initial encounter  S72.001A 820.8   8. Impaired functional mobility, balance, gait, and endurance [Z74.09 (ICD-10-CM)]  Z74.09 V49.89   9. Impaired mobility and ADLs [Z74.09, Z78.9 (ICD-10-CM)]  Z74.09 V49.89    Z78.9      Patient Active Problem List   Diagnosis    Coronary arteriosclerosis in native artery    S/P CABG (coronary artery bypass graft)    Benign essential hypertension    Hypercholesterolemia    Type 2 diabetes mellitus    Nuclear cataract    Controlled type 2 diabetes mellitus without complication    Pernicious anemia    Arthritis of knee    Chronic sinusitis    Encounter for screening mammogram for malignant neoplasm of breast    Generalized osteoarthritis    Iron deficiency anemia    Palpitations    Myocardial infarction    Hypertension    Hyperlipemia    History of echocardiogram    Diabetes mellitus    Coronary artery disease    Cataract    Chronic pain of both knees    Primary osteoarthritis of both knees    Claudication of lower extremity    Precordial pain    Fall from slip, trip, or stumble, initial encounter    Traumatic closed displaced fracture of neck of right femur, initial encounter    Hip fracture     Past Medical History:   Diagnosis Date    Cataract     Coronary artery disease     Diabetes mellitus      Type 2 diabetes mellitus - without retinopathy       History of echocardiogram 05/12/2014    Normal LV systolic function with EF of 55% with mild hypokinesis. Diastolic relaxation abnormality of left ventricle. Mild tricuspid regurg. Trace mitral regurg    Hyperlipemia     Hypertension     Myocardial infarction      Past Surgical History:   Procedure Laterality Date    BACK SURGERY      CARDIAC CATHETERIZATION  05/12/2014    Severe multivessel CAD with critical lesions noted in the LAD coronary artery, left circumflex coronary artery and RCA. Left ventriculogram no performed in view of elevated left ventricular end-diastolic pressure    CATARACT EXTRACTION W/ INTRAOCULAR LENS IMPLANT Left 2/2/2018    Procedure: CATARACT PHACO EXTRACTION WITH INTRAOCULAR LENS IMPLANT;  Surgeon: Gagan Lizarraga MD;  Location: [facility];  Service:     CATARACT EXTRACTION W/ INTRAOCULAR LENS IMPLANT Right 2/9/2018    Procedure: CATARACT PHACO EXTRACTION WITH INTRAOCULAR LENS IMPLANT;  Surgeon: Gagan Lizarraga MD;  Location: [facility];  Service:     CORONARY ARTERY BYPASS GRAFT      X 3 with LIMA to LAD, SVG to OMB and SVG to PDA.    DILATATION AND CURETTAGE      OTHER SURGICAL HISTORY  05/06/2014    INCISION OF EARDRUM GENERAL ANESTHETIC 52193 (1)    Bilateral myringotomy with tubes.     SINUS SURGERY      TONSILLECTOMY      TONSILLECTOMY AND ADENOIDECTOMY      TUBAL ABDOMINAL LIGATION  03/14/1978      General Information       Row Name 09/27/23 1048          OT Time and Intention    Document Type therapy note (daily note)  -KD     Mode of Treatment individual therapy;occupational therapy  -KD       Row Name 09/27/23 1048          General Information    Patient Profile Reviewed yes  -KD     Existing Precautions/Restrictions fall;hip, anterior  -KD       Row Name 09/27/23 1048          Cognition    Orientation Status (Cognition) oriented x 4  -KD       Row Name 09/27/23 1048          Safety Issues, Functional Mobility     Impairments Affecting Function (Mobility) strength;endurance/activity tolerance  -               User Key  (r) = Recorded By, (t) = Taken By, (c) = Cosigned By      Initials Name Provider Type    Barb Mims COTA Occupational Therapist Assistant                     Mobility/ADL's       Row Name 09/27/23 1048          Mobility    Extremity Weight-bearing Status right lower extremity  -KD     Right Lower Extremity (Weight-bearing Status) weight-bearing as tolerated (WBAT)  -               User Key  (r) = Recorded By, (t) = Taken By, (c) = Cosigned By      Initials Name Provider Type    Barb Mims COTA Occupational Therapist Assistant                   Obj/Interventions       Row Name 09/27/23 1048          Shoulder (Therapeutic Exercise)    Shoulder AROM (Therapeutic Exercise) bilateral;flexion;extension;aBduction;aDduction;external rotation;internal rotation  -       Row Name 09/27/23 1048          Elbow/Forearm (Therapeutic Exercise)    Elbow/Forearm (Therapeutic Exercise) AROM (active range of motion)  -     Elbow/Forearm AROM (Therapeutic Exercise) bilateral;flexion;extension;supination;pronation  -       Row Name 09/27/23 1048          Wrist (Therapeutic Exercise)    Wrist (Therapeutic Exercise) AROM (active range of motion)  -     Wrist AROM (Therapeutic Exercise) bilateral;extension;flexion  -       Row Name 09/27/23 1048          Hand (Therapeutic Exercise)    Hand (Therapeutic Exercise) AROM (active range of motion)  -     Hand AROM/AAROM (Therapeutic Exercise) bilateral;finger flexion;finger extension  -       Row Name 09/27/23 1048          Motor Skills    Therapeutic Exercise shoulder;elbow/forearm;wrist;hand  -               User Key  (r) = Recorded By, (t) = Taken By, (c) = Cosigned By      Initials Name Provider Type    Barb Mims COTA Occupational Therapist Assistant                   Goals/Plan       Row Name 09/27/23 1048          Transfer Goal 1 (OT)     Activity/Assistive Device (Transfer Goal 1, OT) toilet  -KD     [place]s Level/Cues Needed (Transfer Goal 1, OT) standby assist  -KD     Time Frame (Transfer Goal 1, OT) long term goal (LTG);by discharge  -KD     Progress/Outcome (Transfer Goal 1, OT) goal not met  -KD       Row Name 09/27/23 1048          Bathing Goal 1 (OT)    Activity/Device (Bathing Goal 1, OT) lower body bathing  -KD     [place]s Level/Cues Needed (Bathing Goal 1, OT) moderate assist (50-74% patient effort)  -KD     Time Frame (Bathing Goal 1, OT) long term goal (LTG);by discharge  -KD     Strategies/Barriers (Bathing Goal 1, OT) Spouse can assist as needed  -KD     Progress/Outcomes (Bathing Goal 1, OT) goal not met  -KD       Row Name 09/27/23 1048          Dressing Goal 1 (OT)    Activity/Device (Dressing Goal 1, OT) lower body dressing  -KD     [place]s/Cues Needed (Dressing Goal 1, OT) minimum assist (75% or more patient effort)  -KD     Time Frame (Dressing Goal 1, OT) long term goal (LTG);by discharge  -KD     Strategies/Barriers (Dressing Goal 1, OT) Spouse can assist as needed  -KD     Progress/Outcome (Dressing Goal 1, OT) goal not met  -KD       Row Name 09/27/23 1048          Strength Goal 1 (OT)    Strength Goal 1 (OT) Pt will be (I) with BUE strengthening HEP after demonstration for increased strength required for ADLs and mobility.  -KD     Time Frame (Strength Goal 1, OT) long term goal (LTG);by discharge  -KD     Progress/Outcome (Strength Goal 1, OT) goal not met  -KD               User Key  (r) = Recorded By, (t) = Taken By, (c) = Cosigned By      Initials Name Provider Type    KD Barb Oneil COTA Occupational Therapist Assistant                   Clinical Impression       Row Name 09/27/23 1048          Pain Assessment    Pretreatment Pain Rating 0/10 - no pain  -KD     Posttreatment Pain Rating 0/10 - no pain  -KD       Row Name 09/27/23 1048          Plan of Care Review    Plan of Care Reviewed With  patient  -KD     Progress improving  -KD     Outcome Evaluation Pt seated in recliner upon entry. Pt agreeable to ther ex to BUE's. Pt tolerated 2 sets x 20 reps ther ex to BUE's in all planes w/ 2lb HW and fair tolerance w/ RB's PRN. No goals met this date. Cont OT POC.  -KD       Row Name 09/27/23 1048          Therapy Assessment/Plan (OT)    Therapy Frequency (OT) daily  -KD       Row Name 09/27/23 1048          Therapy Plan Review/Discharge Plan (OT)    Anticipated Discharge Disposition (OT) home with 24/7 care;home with home health;other (see comments);inpatient rehabilitation facility  -KD       Row Name 09/27/23 1048          Vital Signs    Pre Systolic BP Rehab 116  -KD     Pre Treatment Diastolic BP 56  -KD     Pretreatment Heart Rate (beats/min) 71  -KD     Pre SpO2 (%) 97  -KD     O2 Delivery Pre Treatment room air  -KD     Pre Patient Position Sitting  -KD     Intra Patient Position Sitting  -KD     Post Patient Position Sitting  -KD       Row Name 09/27/23 1048          Positioning and Restraints    Pre-Treatment Position sitting in chair/recliner  -KD     Post Treatment Position chair  -KD     In Chair sitting;call light within reach;encouraged to call for assist;exit alarm on  -KD               User Key  (r) = Recorded By, (t) = Taken By, (c) = Cosigned By      Initials Name Provider Type    Barb Mims COTA Occupational Therapist Assistant                   Outcome Measures       Row Name 09/27/23 1048          How much help from another is currently needed...    Putting on and taking off regular lower body clothing? 3  -KD     Bathing (including washing, rinsing, and drying) 3  -KD     Toileting (which includes using toilet bed pan or urinal) 3  -KD     Putting on and taking off regular upper body clothing 4  -KD     Taking care of personal grooming (such as brushing teeth) 4  -KD     Eating meals 4  -KD     AM-PAC 6 Clicks Score (OT) 21  -KD       Row Name 09/27/23 0930 09/27/23 0878        How much help from another person do you currently need...    Turning from your back to your side while in flat bed without using bedrails? 3  -CA 3  -CS    Moving from lying on back to sitting on the side of a flat bed without bedrails? 3  -CA 3  -CS    Moving to and from a bed to a chair (including a wheelchair)? 3  -CA 3  -CS    Standing up from a chair using your arms (e.g., wheelchair, bedside chair)? 3  -CA 3  -CS    Climbing 3-5 steps with a railing? 3  -CA 3  -CS    To walk in hospital room? 3  -CA 3  -CS    AM-PAC 6 Clicks Score (PT) 18  -CA 18  -CS    Highest level of mobility 6 --> Walked 10 steps or more  -CA 6 --> Walked 10 steps or more  -CS      Row Name 09/27/23 0930          Functional Assessment    Outcome Measure Options AM-PAC 6 Clicks Basic Mobility (PT)  -CA               User Key  (r) = Recorded By, (t) = Taken By, (c) = Cosigned By      Initials Name Provider Type    CA Raymond Sullivan, PTA Physical Therapist Assistant    KD Barb Oneil COTA Occupational Therapist Assistant    Fatimah Blood, RN Registered Nurse                    Occupational Therapy Education       Title: PT OT SLP Therapies (In Progress)       Topic: Occupational Therapy (In Progress)       Point: ADL training (Done)       Description:   Instruct learner(s) on proper safety adaptation and remediation techniques during self care or transfers.   Instruct in proper use of assistive devices.                  Learning Progress Summary             Patient Acceptance, E,TB, VU by CM at 9/20/2023 1231    Comment: OT POC, Role of OT, d/c recommendations, AD/DME needs   Family Acceptance, E,TB, VU by CM at 9/20/2023 1231    Comment: OT POC, Role of OT, d/c recommendations, AD/DME needs                         Point: Home exercise program (Not Started)       Description:   Instruct learner(s) on appropriate technique for monitoring, assisting and/or progressing therapeutic exercises/activities.                  Learner  Progress:  Not documented in this visit.              Point: Precautions (Done)       Description:   Instruct learner(s) on prescribed precautions during self-care and functional transfers.                  Learning Progress Summary             Patient Acceptance, E,TB, VU by CM at 9/20/2023 1231    Comment: OT POC, Role of OT, d/c recommendations, AD/DME needs   Family Acceptance, E,TB, VU by CM at 9/20/2023 1231    Comment: OT POC, Role of OT, d/c recommendations, AD/DME needs                         Point: Body mechanics (Not Started)       Description:   Instruct learner(s) on proper positioning and spine alignment during self-care, functional mobility activities and/or exercises.                  Learner Progress:  Not documented in this visit.                              User Key       Initials Effective Dates Name Provider Type Discipline    HARRIET 11/18/22 -  Emily Lehman OT Occupational Therapist OT                  OT Recommendation and Plan  Therapy Frequency (OT): daily  Plan of Care Review  Plan of Care Reviewed With: patient  Progress: improving  Outcome Evaluation: Pt seated in recliner upon entry. Pt agreeable to ther ex to BUE's. Pt tolerated 2 sets x 20 reps ther ex to BUE's in all planes w/ 2lb HW and fair tolerance w/ RB's PRN. No goals met this date. Cont OT POC.     Time Calculation:         Time Calculation- OT       Row Name 09/27/23 1048             Time Calculation- OT    OT Start Time 1048  -KD      OT Stop Time 1111  -KD      OT Time Calculation (min) 23 min  -KD      Total Timed Code Minutes- OT 23 minute(s)  -KD      OT Received On 09/27/23  -KD         Timed Charges    16710 - OT Therapeutic Exercise Minutes 23  -KD         Total Minutes    Timed Charges Total Minutes 23  -KD       Total Minutes 23  -KD                User Key  (r) = Recorded By, (t) = Taken By, (c) = Cosigned By      Initials Name Provider Type    Barb Mims COTA Occupational Therapist Assistant                   Therapy Charges for Today       Code Description Service Date Service Provider Modifiers Qty    67558386248 HC OT SELF CARE/MGMT/TRAIN EA 15 MIN 9/26/2023 Barb Oneil COTA GO 3    54826205469 HC OT THER PROC EA 15 MIN 9/27/2023 Barb Oneil COTA GO 2                 CLEMENTE Villavicencio  9/27/2023

## 2023-09-27 NOTE — PLAN OF CARE
Goal Outcome Evaluation:  Plan of Care Reviewed With: patient        Progress: improving     Comments: This resident is on a regular texture, consistent carbohydrate, thin liquids diet. Her average meal intake has been 60-70% of meals the last seven days. She has increased needs for nutrients due to having had a hip fracture surgically repaired. She has had significant weight loss of greater than 7.5% in three months. She meets ASPEN criteria for moderate acute malnutrition. She has been encouraged to eat high calorie and high protein foods status post discharge for weight maintenance and for surgical wound healing. Will attach diet information to print with discharge papers.

## 2023-09-27 NOTE — PLAN OF CARE
Goal Outcome Evaluation:  Plan of Care Reviewed With: patient        Progress: improving  Outcome Evaluation: Pt. was sba/cga for transfers and was cga for gait this tx.  Pt. amb. 68' with RW and antalgic with decreased gait speed.  Pt. did have some c/o of increased pain in knees with gait.  Pt. returned to recliner post gait and performed some B LE therex.  No new goals met and pt. improving with mobility.

## 2023-09-28 LAB
ANION GAP SERPL CALCULATED.3IONS-SCNC: 12 MMOL/L (ref 5–15)
BUN SERPL-MCNC: 20 MG/DL (ref 8–23)
BUN/CREAT SERPL: 24.1 (ref 7–25)
CALCIUM SPEC-SCNC: 8.3 MG/DL (ref 8.6–10.5)
CHLORIDE SERPL-SCNC: 101 MMOL/L (ref 98–107)
CO2 SERPL-SCNC: 19 MMOL/L (ref 22–29)
CREAT SERPL-MCNC: 0.83 MG/DL (ref 0.57–1)
DEPRECATED RDW RBC AUTO: 46.2 FL (ref 37–54)
EGFRCR SERPLBLD CKD-EPI 2021: 68.8 ML/MIN/1.73
ERYTHROCYTE [DISTWIDTH] IN BLOOD BY AUTOMATED COUNT: 14 % (ref 12.3–15.4)
GLUCOSE BLDC GLUCOMTR-MCNC: 117 MG/DL (ref 70–130)
GLUCOSE BLDC GLUCOMTR-MCNC: 129 MG/DL (ref 70–130)
GLUCOSE SERPL-MCNC: 108 MG/DL (ref 65–99)
HCT VFR BLD AUTO: 21.3 % (ref 34–46.6)
HGB BLD-MCNC: 7.3 G/DL (ref 12–15.9)
MCH RBC QN AUTO: 30.5 PG (ref 26.6–33)
MCHC RBC AUTO-ENTMCNC: 34.3 G/DL (ref 31.5–35.7)
MCV RBC AUTO: 89.1 FL (ref 79–97)
PLATELET # BLD AUTO: 227 10*3/MM3 (ref 140–450)
PMV BLD AUTO: 10.7 FL (ref 6–12)
POTASSIUM SERPL-SCNC: 3.6 MMOL/L (ref 3.5–5.2)
POTASSIUM SERPL-SCNC: 4.4 MMOL/L (ref 3.5–5.2)
RBC # BLD AUTO: 2.39 10*6/MM3 (ref 3.77–5.28)
SODIUM SERPL-SCNC: 132 MMOL/L (ref 136–145)
URATE SERPL-MCNC: 6.1 MG/DL (ref 2.4–5.7)
WBC NRBC COR # BLD: 15.44 10*3/MM3 (ref 3.4–10.8)

## 2023-09-28 PROCEDURE — 82948 REAGENT STRIP/BLOOD GLUCOSE: CPT

## 2023-09-28 PROCEDURE — 84132 ASSAY OF SERUM POTASSIUM: CPT | Performed by: STUDENT IN AN ORGANIZED HEALTH CARE EDUCATION/TRAINING PROGRAM

## 2023-09-28 PROCEDURE — 97110 THERAPEUTIC EXERCISES: CPT

## 2023-09-28 PROCEDURE — 85027 COMPLETE CBC AUTOMATED: CPT | Performed by: NURSE PRACTITIONER

## 2023-09-28 PROCEDURE — 99024 POSTOP FOLLOW-UP VISIT: CPT | Performed by: ORTHOPAEDIC SURGERY

## 2023-09-28 PROCEDURE — 80048 BASIC METABOLIC PNL TOTAL CA: CPT | Performed by: NURSE PRACTITIONER

## 2023-09-28 PROCEDURE — 97530 THERAPEUTIC ACTIVITIES: CPT

## 2023-09-28 PROCEDURE — 25010000002 HEPARIN (PORCINE) PER 1000 UNITS: Performed by: ORTHOPAEDIC SURGERY

## 2023-09-28 PROCEDURE — 84550 ASSAY OF BLOOD/URIC ACID: CPT | Performed by: INTERNAL MEDICINE

## 2023-09-28 RX ORDER — HYDROCORTISONE 25 MG/G
CREAM TOPICAL 2 TIMES DAILY
Status: DISCONTINUED | OUTPATIENT
Start: 2023-09-28 | End: 2023-09-29 | Stop reason: HOSPADM

## 2023-09-28 RX ORDER — SODIUM CHLORIDE 1 G/1
2 TABLET ORAL 2 TIMES DAILY WITH MEALS
Status: DISCONTINUED | OUTPATIENT
Start: 2023-09-28 | End: 2023-09-29 | Stop reason: HOSPADM

## 2023-09-28 RX ORDER — POTASSIUM CHLORIDE 20 MEQ/1
40 TABLET, EXTENDED RELEASE ORAL EVERY 4 HOURS
Status: COMPLETED | OUTPATIENT
Start: 2023-09-28 | End: 2023-09-28

## 2023-09-28 RX ORDER — FUROSEMIDE 20 MG/1
20 TABLET ORAL DAILY
Status: DISCONTINUED | OUTPATIENT
Start: 2023-09-28 | End: 2023-09-29 | Stop reason: HOSPADM

## 2023-09-28 RX ADMIN — HEPARIN SODIUM 5000 UNITS: 5000 INJECTION INTRAVENOUS; SUBCUTANEOUS at 05:27

## 2023-09-28 RX ADMIN — HYDROCORTISONE: 25 CREAM TOPICAL at 21:20

## 2023-09-28 RX ADMIN — HEPARIN SODIUM 5000 UNITS: 5000 INJECTION INTRAVENOUS; SUBCUTANEOUS at 14:34

## 2023-09-28 RX ADMIN — ROSUVASTATIN CALCIUM 10 MG: 10 TABLET, FILM COATED ORAL at 21:20

## 2023-09-28 RX ADMIN — DOCUSATE SODIUM 50 MG AND SENNOSIDES 8.6 MG 2 TABLET: 8.6; 5 TABLET, FILM COATED ORAL at 09:06

## 2023-09-28 RX ADMIN — ACETAMINOPHEN 650 MG: 325 TABLET, FILM COATED ORAL at 03:30

## 2023-09-28 RX ADMIN — PANTOPRAZOLE SODIUM 40 MG: 40 INJECTION, POWDER, FOR SOLUTION INTRAVENOUS at 05:27

## 2023-09-28 RX ADMIN — POTASSIUM CHLORIDE 40 MEQ: 1500 TABLET, EXTENDED RELEASE ORAL at 10:42

## 2023-09-28 RX ADMIN — ACETAMINOPHEN 650 MG: 325 TABLET, FILM COATED ORAL at 16:16

## 2023-09-28 RX ADMIN — FUROSEMIDE 20 MG: 20 TABLET ORAL at 12:33

## 2023-09-28 RX ADMIN — OXYCODONE HYDROCHLORIDE 5 MG: 5 TABLET ORAL at 03:30

## 2023-09-28 RX ADMIN — AMLODIPINE BESYLATE 5 MG: 5 TABLET ORAL at 09:06

## 2023-09-28 RX ADMIN — ACETAMINOPHEN 650 MG: 325 TABLET, FILM COATED ORAL at 21:20

## 2023-09-28 RX ADMIN — Medication 2 G: at 12:34

## 2023-09-28 RX ADMIN — OXYCODONE HYDROCHLORIDE 5 MG: 5 TABLET ORAL at 14:33

## 2023-09-28 RX ADMIN — LABETALOL HYDROCHLORIDE 100 MG: 100 TABLET, FILM COATED ORAL at 09:06

## 2023-09-28 RX ADMIN — HEPARIN SODIUM 5000 UNITS: 5000 INJECTION INTRAVENOUS; SUBCUTANEOUS at 21:20

## 2023-09-28 RX ADMIN — Medication 10 ML: at 09:06

## 2023-09-28 RX ADMIN — ACETAMINOPHEN 650 MG: 325 TABLET, FILM COATED ORAL at 10:42

## 2023-09-28 RX ADMIN — Medication 10 ML: at 21:19

## 2023-09-28 RX ADMIN — POTASSIUM CHLORIDE 40 MEQ: 1500 TABLET, EXTENDED RELEASE ORAL at 14:34

## 2023-09-28 RX ADMIN — LABETALOL HYDROCHLORIDE 100 MG: 100 TABLET, FILM COATED ORAL at 21:20

## 2023-09-28 NOTE — CONSULTS
[facility]  [address] [place] [address]. [address]  T - [phone]  F - [phone]     Consultation         PATIENT  DEMOGRAPHICS   PATIENT NAME: Alexia Chenpkins                      PHYSICIAN: PEGGY Morgan  : 1935  MRN: [id number]    Subjective   SUBJECTIVE   Referring Provider: PEGGY Carrillo  Reason for Consultation: Hyponatremia  History of present illness: This is an 88-year-old female with a past medical history significant for hypertension, hyperlipidemia, DM 2, CAD who presented to the hospital on the  with complaints of right hip pain after a fall at home.  She was admitted for right femoral neck fracture requiring surgical repair.  Yesterday she was noted to have significant hyponatremia and nephrology was consulted to help manage.    Past Medical History:   Diagnosis Date    Cataract     Coronary artery disease     Diabetes mellitus     Type 2 diabetes mellitus - without retinopathy       History of echocardiogram 2014    Normal LV systolic function with EF of 55% with mild hypokinesis. Diastolic relaxation abnormality of left ventricle. Mild tricuspid regurg. Trace mitral regurg    Hyperlipemia     Hypertension     Myocardial infarction      Past Surgical History:   Procedure Laterality Date    BACK SURGERY      CARDIAC CATHETERIZATION  2014    Severe multivessel CAD with critical lesions noted in the LAD coronary artery, left circumflex coronary artery and RCA. Left ventriculogram no performed in view of elevated left ventricular end-diastolic pressure    CATARACT EXTRACTION W/ INTRAOCULAR LENS IMPLANT Left 2018    Procedure: CATARACT PHACO EXTRACTION WITH INTRAOCULAR LENS IMPLANT;  Surgeon: Gagan Lizarraga MD;  Location: [facility] OR;  Service:     CATARACT EXTRACTION W/ INTRAOCULAR LENS IMPLANT Right 2018    Procedure: CATARACT PHACO EXTRACTION WITH INTRAOCULAR LENS IMPLANT;  Surgeon: Gagan Lizarraga MD;  Location: [facility]  "OR;  Service:     CORONARY ARTERY BYPASS GRAFT      X 3 with LIMA to LAD, SVG to OMB and SVG to PDA.    DILATATION AND CURETTAGE      OTHER SURGICAL HISTORY  05/06/2014    INCISION OF EARDRUM GENERAL ANESTHETIC 92116 (1)    Bilateral myringotomy with tubes.     SINUS SURGERY      TONSILLECTOMY      TONSILLECTOMY AND ADENOIDECTOMY      TUBAL ABDOMINAL LIGATION  03/14/1978     Family History   Problem Relation Age of Onset    Hypertension Mother     Coronary artery disease Father     Diabetes Other         grandmother    Cancer Other      Social History     Tobacco Use    Smoking status: Never    Smokeless tobacco: Never   Vaping Use    Vaping Use: Never used   Substance Use Topics    Alcohol use: No    Drug use: No     Allergies:  Cherry, Peanut-containing drug products, Articaine, Lidocaine, Ciprofloxacin, and Losartan     REVIEW OF SYSTEMS    Review of Systems   All other systems reviewed and are negative.    Objective   OBJECTIVE   Vital Signs  Temp:  [96 °F (35.6 °C)-98.7 °F (37.1 °C)] 98.7 °F (37.1 °C)  Heart Rate:  [68-91] 72  Resp:  [18] 18  BP: (126-167)/(56-85) 147/65    Flowsheet Rows      Flowsheet Row First Filed Value   Admission Height 170.2 cm (67\") Documented at 09/19/2023 1134   Admission Weight 74.8 kg (165 lb) Documented at 09/19/2023 1134             I/O last 3 completed shifts:  In: 1303.8 [P.O.:780; Blood:523.8]  Out: 1300 [Urine:1300]    PHYSICAL EXAM    Physical Exam  Constitutional:       Appearance: She is well-developed.   HENT:      Head: Normocephalic and atraumatic.   Eyes:      Conjunctiva/sclera: Conjunctivae normal.      Pupils: Pupils are equal, round, and reactive to light.   Cardiovascular:      Rate and Rhythm: Normal rate and regular rhythm.   Pulmonary:      Effort: Pulmonary effort is normal.      Breath sounds: Normal breath sounds.   Abdominal:      Palpations: Abdomen is soft.   Musculoskeletal:      Cervical back: Neck supple.      Right lower leg: No edema.      Left lower " leg: No edema.   Skin:     General: Skin is warm and dry.   Neurological:      Mental Status: She is alert and oriented to person, place, and time.   Psychiatric:         Mood and Affect: Mood normal.         Behavior: Behavior normal.       RESULTS   Results Review:    Results from last 7 days   Lab Units 09/28/23  0505 09/27/23  0523 09/26/23  1550 09/26/23  0834 09/23/23  1644 09/23/23  1308   SODIUM mmol/L 132* 127* 128* 126*   < > 134*   POTASSIUM mmol/L 3.6 3.8  --  4.0   < > 4.7   CHLORIDE mmol/L 101 94*  --  93*   < > 100   CO2 mmol/L 19.0* 19.0*  --  19.0*   < > 24.0   BUN mg/dL 20 27*  --  30*   < > 20   CREATININE mg/dL 0.83 1.07*  --  1.30*   < > 0.98   CALCIUM mg/dL 8.3* 8.3*  --  8.7   < > 9.4   BILIRUBIN mg/dL  --   --   --   --   --  0.4   ALK PHOS U/L  --   --   --   --   --  65   ALT (SGPT) U/L  --   --   --   --   --  <5   AST (SGOT) U/L  --   --   --   --   --  18   GLUCOSE mg/dL 108* 100*  --  145*   < > 182*    < > = values in this interval not displayed.       Estimated Creatinine Clearance: 56.5 mL/min (by C-G formula based on SCr of 0.83 mg/dL).    Results from last 7 days   Lab Units 09/23/23  1308   MAGNESIUM mg/dL 2.0       Results from last 7 days   Lab Units 09/28/23  0505   URIC ACID mg/dL 6.1*       Results from last 7 days   Lab Units 09/28/23  0505 09/27/23  1600 09/27/23  0523 09/26/23  0834 09/25/23  0835 09/22/23  0540   WBC 10*3/mm3 15.44*  --  12.20* 10.76 13.16* 8.31   HEMOGLOBIN g/dL 7.3* 6.9* 7.1* 7.5* 7.9* 7.5*   PLATELETS 10*3/mm3 227  --  226 198 199 123*              MEDICATIONS    acetaminophen, 650 mg, Oral, Q6H  amLODIPine, 5 mg, Oral, Q24H  furosemide, 20 mg, Oral, Daily  heparin (porcine), 5,000 Units, Subcutaneous, Q8H  Hydrocortisone (Perianal), , Rectal, BID  Insulin Aspart, 0-7 Units, Subcutaneous, TID AC  labetalol, 100 mg, Oral, Q12H  pantoprazole, 40 mg, Intravenous, Q AM  potassium chloride ER, 40 mEq, Oral, Q4H  rosuvastatin, 10 mg, Oral,  Nightly  senna-docusate sodium, 2 tablet, Oral, BID  sodium chloride, 10 mL, Intravenous, Q12H  sodium chloride, 2 g, Oral, BID With Meals         Medications Prior to Admission   Medication Sig Dispense Refill Last Dose    aspirin 81 MG disintegrating tablet Take 81 mg by mouth Daily.   2023    docusate sodium (COLACE) 100 MG capsule Take 1 capsule by mouth Daily.   2023    hydroCHLOROthiazide (HYDRODIURIL) 12.5 MG tablet Take 1 tablet by mouth Daily.   2023    labetalol (NORMODYNE) 200 MG tablet Take 0.5 tablets by mouth 2 (Two) Times a Day. .5   2023    amLODIPine (NORVASC) 5 MG tablet TAKE 1 TABLET BY MOUTH DAILY IN THE EVENING 90 tablet 2 Unknown    [] cefuroxime (CEFTIN) 500 MG tablet Take 1 tablet by mouth.   Unknown    CO ENZYME Q-10 PO Take 100 mg by mouth Daily.   Unknown    fluticasone (FLONASE) 50 MCG/ACT nasal spray 2 sprays into the nostril(s) as directed by provider Daily. 16 g 11 Unknown    loratadine (CLARITIN) 10 MG tablet Take 1 tablet by mouth Daily.   Unknown    losartan (COZAAR) 100 MG tablet Take 1 tablet by mouth Daily.   Unknown    metFORMIN ER (GLUCOPHAGE-XR) 750 MG 24 hr tablet Take 2 tablets by mouth Every Night.   Unknown    ONE TOUCH ULTRA TEST test strip USE TO TEST BLOOD SUGAR EVERY DAY DX.E11.9  5 Unknown    Polysaccharide Iron Complex (FERREX 150 PO) Take 150 mg by mouth Daily.   Unknown    rosuvastatin (CRESTOR) 10 MG tablet Take 1 tablet by mouth Every Night.   Unknown    SALINE NASAL SPRAY NA into the nostril(s) as directed by provider As Needed.   Unknown    vitamin B-12 (CYANOCOBALAMIN) 1000 MCG tablet Take 1 tablet by mouth Daily.   Unknown     Assessment & Plan   ASSESSMENT / PLAN      Traumatic closed displaced fracture of neck of right femur, initial encounter    Coronary arteriosclerosis in native artery    S/P CABG (coronary artery bypass graft)    Benign essential hypertension    Controlled type 2 diabetes mellitus without complication    Fall  from slip, trip, or stumble, initial encounter    Hip fracture    Moderate malnutrition    1.  Hyponatremia- sodium was down to 126, now up to 132.  Urine studies consistent with SIADH.  Suspect SIADH secondary to pain from hip fracture.  We will add fluid restrictions as well as salt tablets and lasix.  Plan to taper salt tablets as able.    2.  Metabolic acidosis- mild, observe    3.  Anemia- status post PRBC transfusion    4.  GI bleed- has thrombosed external hemorrhoid, Dr. Tejeda following    5.  Right femoral neck fracture- status post ORIF on 919    6.  CAD    7.  Hypertension    8.  DM2      Thank you for the consult, we will continue to follow the patient.         I discussed the patients findings and my recommendations with patient      This document has been electronically signed by PEGGY Morgan on September 28, 2023 13:25 CDT      For this patient encounter, I have reviewed the Nurse Practitioner's documentation, medical decision making, and treatment plan and personally spent time with the patient.

## 2023-09-28 NOTE — PROGRESS NOTES
[facility] Medicine Services  INPATIENT PROGRESS NOTE    Length of Stay: 5  Date of Admission: 9/19/2023  Primary Care Physician: Munir Rodriguez MD    Subjective   Chief Complaint:  fatigue, blood in stool  HPI:  88 year old female with past medical history of CAD, DM, HTN, HLD who presented on 9/19/23 with complaints of right hip pain r/t fall at home.  She is currently admitted for issues including right femoral neck fracture that required surgical repair.  During today's visit, she reports being fatigued.  She also noted having a bowel movement with blood present this morning.     Daily Assessment   9/28/23  Pt denies GI bleeding issues overnight.  In pleasant spirits at time of interview.    Review of Systems   Constitutional:  Positive for appetite change and fatigue. Negative for chills and fever.   HENT:  Negative for congestion, rhinorrhea and sore throat.    Respiratory:  Negative for cough, chest tightness, shortness of breath and wheezing.    Cardiovascular:  Negative for chest pain, palpitations and leg swelling.   Gastrointestinal:  Positive for blood in stool. Negative for abdominal pain, constipation, diarrhea, nausea and vomiting.   Genitourinary:  Negative for difficulty urinating, flank pain and pelvic pain.   Musculoskeletal:  Negative for back pain and neck pain.        Right hip post op pain   Skin:  Negative for pallor.   Neurological:  Negative for dizziness, weakness and headaches.   Psychiatric/Behavioral:  Negative for confusion. The patient is not nervous/anxious.       All pertinent negatives and positives are as above. All other systems have been reviewed and are negative unless otherwise stated.     Objective    Temp:  [96 °F (35.6 °C)-98.7 °F (37.1 °C)] 98.7 °F (37.1 °C)  Heart Rate:  [68-91] 72  Resp:  [18] 18  BP: (126-167)/(56-85) 147/65  As of 9/27  Physical Exam  Vitals and nursing note reviewed.   Constitutional:       General: She is  not in acute distress.     Appearance: She is not ill-appearing.   HENT:      Head: Normocephalic and atraumatic.      Right Ear: External ear normal.      Left Ear: External ear normal.      Nose: Nose normal.      Mouth/Throat:      Mouth: Mucous membranes are moist.      Pharynx: Oropharynx is clear.   Eyes:      General: No scleral icterus.        Right eye: No discharge.         Left eye: No discharge.      Conjunctiva/sclera: Conjunctivae normal.   Cardiovascular:      Rate and Rhythm: Normal rate and regular rhythm.      Heart sounds: Normal heart sounds. No murmur heard.    No friction rub. No gallop.   Pulmonary:      Effort: Pulmonary effort is normal. No respiratory distress.      Breath sounds: Normal breath sounds. No stridor. No wheezing, rhonchi or rales.   Abdominal:      General: Bowel sounds are normal. There is no distension.      Palpations: Abdomen is soft.      Tenderness: There is no abdominal tenderness.   Musculoskeletal:         General: Normal range of motion.      Cervical back: Normal range of motion.      Right lower leg: Edema present.      Comments: RLE edema   Skin:     General: Skin is warm and dry.      Comments: Right hip incision intact.  No bleeding, swelling, drainage noted   Neurological:      General: No focal deficit present.      Mental Status: She is alert and oriented to person, place, and time.   Psychiatric:         Behavior: Behavior normal.           Results Review:  I have reviewed the labs, radiology results, and diagnostic studies.    Laboratory Data:   Results from last 7 days   Lab Units 09/28/23  0505 09/27/23  0523 09/26/23  1550 09/26/23  0834 09/23/23  1644 09/23/23  1308   SODIUM mmol/L 132* 127* 128* 126*   < > 134*   POTASSIUM mmol/L 3.6 3.8  --  4.0   < > 4.7   CHLORIDE mmol/L 101 94*  --  93*   < > 100   CO2 mmol/L 19.0* 19.0*  --  19.0*   < > 24.0   BUN mg/dL 20 27*  --  30*   < > 20   CREATININE mg/dL 0.83 1.07*  --  1.30*   < > 0.98   GLUCOSE mg/dL  108* 100*  --  145*   < > 182*   CALCIUM mg/dL 8.3* 8.3*  --  8.7   < > 9.4   BILIRUBIN mg/dL  --   --   --   --   --  0.4   ALK PHOS U/L  --   --   --   --   --  65   ALT (SGPT) U/L  --   --   --   --   --  <5   AST (SGOT) U/L  --   --   --   --   --  18   ANION GAP mmol/L 12.0 14.0  --  14.0   < > 10.0    < > = values in this interval not displayed.       Estimated Creatinine Clearance: 56.5 mL/min (by C-G formula based on SCr of 0.83 mg/dL).  Results from last 7 days   Lab Units 09/23/23  1308   MAGNESIUM mg/dL 2.0       Results from last 7 days   Lab Units 09/28/23  0505   URIC ACID mg/dL 6.1*     Results from last 7 days   Lab Units 09/28/23  0505 09/27/23  1600 09/27/23  0523 09/26/23  0834 09/25/23  0835 09/22/23  0540   WBC 10*3/mm3 15.44*  --  12.20* 10.76 13.16* 8.31   HEMOGLOBIN g/dL 7.3* 6.9* 7.1* 7.5* 7.9* 7.5*   HEMATOCRIT % 21.3* 20.3* 21.1* 22.4* 23.5* 22.0*   PLATELETS 10*3/mm3 227  --  226 198 199 123*               Culture Data:   Blood Culture   Date Value Ref Range Status   09/25/2023 No growth at 2 days  Preliminary   09/25/2023 No growth at 2 days  Preliminary     No results found for: URINECX  No results found for: RESPCX  No results found for: WOUNDCX  No results found for: STOOLCX  No components found for: BODYFLD    Radiology Data:   Imaging Results (Last 24 Hours)       ** No results found for the last 24 hours. **            I have reviewed the patient's current medications.     Assessment/Plan     Active Hospital Problems    Diagnosis    • **Traumatic closed displaced fracture of neck of right femur, initial encounter    • Moderate malnutrition    • Fall from slip, trip, or stumble, initial encounter    • Hip fracture    • Controlled type 2 diabetes mellitus without complication    • S/P CABG (coronary artery bypass graft)    • Benign essential hypertension    • Coronary arteriosclerosis in native artery        Plan:    Right femoral neck fracture:   - s/p ORIF on 9/19 with Dr. Adler.   Continue PT/OT, pain control.  Heparin for DVT prevention per ortho with plan to transition to Lovenox/Eliquis/warfarin at discharge (30 days of treatment)    CAD:   - Hold ASA due to reported blood in stool this morning.  Continue.  Labetalol 100 mg BID, Crestor 5 mg daily     HTN: continue Norvasc 5 mg daily, labetalol 100 mg BID.     Type 2 DM: FSBS AC and HS with SSI.     HLD: continue Crestor 5 mg nightly.     Palpitations: EKG reviewed.  Continue telemetry monitoring. Magnesium and potassium normal    Fever: CXR unremarkable, UA unremarkable. Incentive spirometer in use.  Blood culture negative at 24 hours.  No further fevers noted.     Hyponatremia: remains low despite small amount of IV fluids given overnight.  Check urine sodium, osmo, serum osmo, TSH, T4.  Will consult nephrology for assistance.     Blood in stool secondary to hemorrhoids:  - Small amount noted while using restroom this morning.  patient reports history of hemorrhoids and has had recent issues with constipation post op.  Will check hemoccult on next stool.  Hold ASA.  Start Protonix 40 mg daily.  May require GI consult if issue continues.  Will recheck Hgb this afternoon to assess for drop.  Currently 7.1.   -9/2 no current plans for EGD/colonoscopy per gastroenterology consultant.  Patient GI bleeding most likely secondary to hemorrhoids per gastroenterologist bedside examination.  We will continue to follow patient's H&H trends closely during hospitalization.      Medical Decision Making  Number and Complexity of problems: 9 moderate     Conditions and Status:        Condition is improving.     MDM Data  External documents reviewed: ED provider notes, ortho notes.   My EKG interpretation: NSR  My US interpretation: US renal - non obstructing left kidney stone, small right renal cysts.  No hydronephrosis.   Tests considered but not ordered: n/a     Decision rules/scores evaluated (example [id number], [place], etc): n/a      Discussed  with: patient      Care Planning  Code status and discussions: Full code status on file    Disposition  Social Determinants of Health that impact treatment or disposition: impaired mobility   I expect the patient to be discharged to [facility] once precertification process completed; hopefully tomorrow.    This document has been electronically signed by Suman Martinez MD on September 28, 2023 13:36 CDT

## 2023-09-28 NOTE — PLAN OF CARE
Goal Outcome Evaluation:  Plan of Care Reviewed With: (P) patient           Outcome Evaluation: (P) PT treatment completed. Patient sitting in chair upon arrival and agreeable to therapy. Patient reported R hip pain to be 2/10 when sitting pretreatment. Patient performed sit to stand transfer with CGA and a FWW. Patient ambulated 40'x2 with CGA and a FWW. Verbal cues provided to increase delvin and equalize step length. Patient ambulates with R knee valgus and ankle pronation. Patient reported pain increased to 5/10 in R hip with ambulation. Patient performed seated ther ex including Hip Flex, Hip Add, LAQ, glute holds, and ankle pumps. Patient unable to perform R hip flexion through full ROM d/t pain. Patient reported R hip pain to be 2/10 post-treatment. Continue skilled I/P PT.      Anticipated Discharge Disposition (PT): (P) home with assist, home with outpatient therapy services, inpatient rehabilitation facility, skilled nursing facility

## 2023-09-28 NOTE — THERAPY TREATMENT NOTE
Patient Name: Alexia Lopez  : 1935    MRN: [id number]                              Today's Date: 2023       Admit Date: 2023    Visit Dx:     ICD-10-CM ICD-9-CM   1. Traumatic closed displaced fracture of neck of right femur, initial encounter  S72.001A 820.8   2. Coronary arteriosclerosis in native artery  I25.10 414.01   3. S/P CABG (coronary artery bypass graft)  Z95.1 V45.81   4. Benign essential hypertension  I10 401.1   5. Controlled type 2 diabetes mellitus without complication, with long-term current use of insulin  E11.9 250.00    Z79.4 V58.67   6. Fall from slip, trip, or stumble, initial encounter  W01.0XXA E885.9   7. Closed fracture of right hip, initial encounter  S72.001A 820.8   8. Impaired functional mobility, balance, gait, and endurance [Z74.09 (ICD-10-CM)]  Z74.09 V49.89   9. Impaired mobility and ADLs [Z74.09, Z78.9 (ICD-10-CM)]  Z74.09 V49.89    Z78.9      Patient Active Problem List   Diagnosis    Coronary arteriosclerosis in native artery    S/P CABG (coronary artery bypass graft)    Benign essential hypertension    Hypercholesterolemia    Type 2 diabetes mellitus    Nuclear cataract    Controlled type 2 diabetes mellitus without complication    Pernicious anemia    Arthritis of knee    Chronic sinusitis    Encounter for screening mammogram for malignant neoplasm of breast    Generalized osteoarthritis    Iron deficiency anemia    Palpitations    Myocardial infarction    Hypertension    Hyperlipemia    History of echocardiogram    Diabetes mellitus    Coronary artery disease    Cataract    Chronic pain of both knees    Primary osteoarthritis of both knees    Claudication of lower extremity    Precordial pain    Fall from slip, trip, or stumble, initial encounter    Traumatic closed displaced fracture of neck of right femur, initial encounter    Hip fracture    Moderate malnutrition     Past Medical History:   Diagnosis Date    Cataract     Coronary artery disease      Diabetes mellitus     Type 2 diabetes mellitus - without retinopathy       History of echocardiogram 05/12/2014    Normal LV systolic function with EF of 55% with mild hypokinesis. Diastolic relaxation abnormality of left ventricle. Mild tricuspid regurg. Trace mitral regurg    Hyperlipemia     Hypertension     Myocardial infarction      Past Surgical History:   Procedure Laterality Date    BACK SURGERY      CARDIAC CATHETERIZATION  05/12/2014    Severe multivessel CAD with critical lesions noted in the LAD coronary artery, left circumflex coronary artery and RCA. Left ventriculogram no performed in view of elevated left ventricular end-diastolic pressure    CATARACT EXTRACTION W/ INTRAOCULAR LENS IMPLANT Left 2/2/2018    Procedure: CATARACT PHACO EXTRACTION WITH INTRAOCULAR LENS IMPLANT;  Surgeon: Gagan Lizarraga MD;  Location: [facility];  Service:     CATARACT EXTRACTION W/ INTRAOCULAR LENS IMPLANT Right 2/9/2018    Procedure: CATARACT PHACO EXTRACTION WITH INTRAOCULAR LENS IMPLANT;  Surgeon: Gagan Lizarraga MD;  Location: [facility];  Service:     CORONARY ARTERY BYPASS GRAFT      X 3 with LIMA to LAD, SVG to OMB and SVG to PDA.    DILATATION AND CURETTAGE      OTHER SURGICAL HISTORY  05/06/2014    INCISION OF EARDRUM GENERAL ANESTHETIC 52996 (1)    Bilateral myringotomy with tubes.     SINUS SURGERY      TONSILLECTOMY      TONSILLECTOMY AND ADENOIDECTOMY      TUBAL ABDOMINAL LIGATION  03/14/1978      General Information       Row Name 09/28/23 1303          OT Time and Intention    Document Type therapy note (daily note)  -KD     Mode of Treatment individual therapy;occupational therapy  -KD       Row Name 09/28/23 1303          General Information    Patient Profile Reviewed yes  -KD     Existing Precautions/Restrictions fall;hip, anterior  -KD       Row Name 09/28/23 1303          Cognition    Orientation Status (Cognition) oriented x 4  -KD       Row Name 09/28/23 1303          Safety Issues,  Functional Mobility    Impairments Affecting Function (Mobility) strength;endurance/activity tolerance;balance;pain  -               User Key  (r) = Recorded By, (t) = Taken By, (c) = Cosigned By      Initials Name Provider Type    Barb Mims COTA Occupational Therapist Assistant                     Mobility/ADL's       Row Name 09/28/23 1303          Mobility    Extremity Weight-bearing Status right lower extremity  -KD     Right Lower Extremity (Weight-bearing Status) weight-bearing as tolerated (WBAT)  -               User Key  (r) = Recorded By, (t) = Taken By, (c) = Cosigned By      Initials Name Provider Type    Barb Mims COTA Occupational Therapist Assistant                   Obj/Interventions       Row Name 09/28/23 1303          Shoulder (Therapeutic Exercise)    Shoulder AROM (Therapeutic Exercise) bilateral;flexion;extension;aBduction;aDduction;external rotation;internal rotation  -       Row Name 09/28/23 1303          Elbow/Forearm (Therapeutic Exercise)    Elbow/Forearm (Therapeutic Exercise) AROM (active range of motion)  -     Elbow/Forearm AROM (Therapeutic Exercise) bilateral;flexion;extension;supination;pronation  -       Row Name 09/28/23 1303          Wrist (Therapeutic Exercise)    Wrist (Therapeutic Exercise) AROM (active range of motion)  -     Wrist AROM (Therapeutic Exercise) bilateral;flexion;extension  -       Row Name 09/28/23 1303          Hand (Therapeutic Exercise)    Hand (Therapeutic Exercise) AROM (active range of motion)  -     Hand AROM/AAROM (Therapeutic Exercise) bilateral;finger flexion;finger extension  -       Row Name 09/28/23 1303          Motor Skills    Therapeutic Exercise shoulder;elbow/forearm;wrist;hand  -               User Key  (r) = Recorded By, (t) = Taken By, (c) = Cosigned By      Initials Name Provider Type    Barb Mims COTA Occupational Therapist Assistant                   Goals/Plan       Row Name 09/28/23  1303          Transfer Goal 1 (OT)    Activity/Assistive Device (Transfer Goal 1, OT) toilet  -KD     [place] Level/Cues Needed (Transfer Goal 1, OT) standby assist  -KD     Time Frame (Transfer Goal 1, OT) long term goal (LTG);by discharge  -KD     Progress/Outcome (Transfer Goal 1, OT) goal not met  -KD       Row Name 09/28/23 1303          Bathing Goal 1 (OT)    Activity/Device (Bathing Goal 1, OT) lower body bathing  -KD     [place] Level/Cues Needed (Bathing Goal 1, OT) moderate assist (50-74% patient effort)  -KD     Time Frame (Bathing Goal 1, OT) long term goal (LTG);by discharge  -KD     Strategies/Barriers (Bathing Goal 1, OT) Spouse can assist as needed  -KD     Progress/Outcomes (Bathing Goal 1, OT) goal not met  -KD       Row Name 09/28/23 1303          Dressing Goal 1 (OT)    Activity/Device (Dressing Goal 1, OT) lower body dressing  -KD     [place]/Cues Needed (Dressing Goal 1, OT) minimum assist (75% or more patient effort)  -KD     Time Frame (Dressing Goal 1, OT) long term goal (LTG);by discharge  -KD     Strategies/Barriers (Dressing Goal 1, OT) Spouse can assist as needed  -KD     Progress/Outcome (Dressing Goal 1, OT) goal not met  -KD       Row Name 09/28/23 1303          Strength Goal 1 (OT)    Strength Goal 1 (OT) Pt will be (I) with BUE strengthening HEP after demonstration for increased strength required for ADLs and mobility.  -KD     Time Frame (Strength Goal 1, OT) long term goal (LTG);by discharge  -KD     Progress/Outcome (Strength Goal 1, OT) goal not met  -KD               User Key  (r) = Recorded By, (t) = Taken By, (c) = Cosigned By      Initials Name Provider Type    KD Barb Oneil COTA Occupational Therapist Assistant                   Clinical Impression       Row Name 09/28/23 1303          Pain Assessment    Pretreatment Pain Rating 0/10 - no pain  -KD     Posttreatment Pain Rating 0/10 - no pain  -KD       Row Name 09/28/23 1303          Plan of Care  Review    Plan of Care Reviewed With patient  -KD     Progress improving  -KD     Outcome Evaluation Pt asleep upon arrival and easily awaken. Pt agreeable to bed ex @ this time. Pt tolerated BUE ther ex w/ 2lb HW and good tolerance w/ RB's PRN. No goals met this date. Cont OT POC.  -KD       Row Name 09/28/23 1303          Therapy Assessment/Plan (OT)    Therapy Frequency (OT) daily  -KD       Row Name 09/28/23 1303          Vital Signs    Pre Systolic BP Rehab 160  -KD     Pre Treatment Diastolic BP 68  -KD     Pretreatment Heart Rate (beats/min) 82  -KD     Intra SpO2 (%) 98  -KD     O2 Delivery Intra Treatment nasal cannula  -KD     Pre Patient Position Supine  -KD     Intra Patient Position Sitting  -KD     Post Patient Position Supine  -KD       Row Name 09/28/23 1303          Positioning and Restraints    Pre-Treatment Position in bed  -KD     Post Treatment Position bed  -KD     In Bed fowlers;call light within reach;encouraged to call for assist;exit alarm on  -KD               User Key  (r) = Recorded By, (t) = Taken By, (c) = Cosigned By      Initials Name Provider Type    KD Barb Oneil COTA Occupational Therapist Assistant                   Outcome Measures       Row Name 09/28/23 1303          How much help from another is currently needed...    Putting on and taking off regular lower body clothing? 3  -KD     Bathing (including washing, rinsing, and drying) 3  -KD     Toileting (which includes using toilet bed pan or urinal) 3  -KD     Putting on and taking off regular upper body clothing 4  -KD     Taking care of personal grooming (such as brushing teeth) 4  -KD     Eating meals 4  -KD     AM-PAC 6 Clicks Score (OT) 21  -KD       Row Name 09/28/23 0932 09/28/23 0858       How much help from another person do you currently need...    Turning from your back to your side while in flat bed without using bedrails? 3  -CZ (r) RE (t) CZ (c) 3  -MH    Moving from lying on back to sitting on the side of  a flat bed without bedrails? 3  -CZ (r) RE (t) CZ (c) 3  -MH    Moving to and from a bed to a chair (including a wheelchair)? 3  -CZ (r) RE (t) CZ (c) 3  -MH    Standing up from a chair using your arms (e.g., wheelchair, bedside chair)? 3  -CZ (r) RE (t) CZ (c) 3  -MH    Climbing 3-5 steps with a railing? 2  -CZ (r) RE (t) CZ (c) 3  -MH    To walk in hospital room? 3  -CZ (r) RE (t) CZ (c) 3  -MH    AM-PAC 6 Clicks Score (PT) 17  -CZ (r) RE (t) 18  -MH    Highest level of mobility 5 --> Static standing  -CZ (r) RE (t) 6 --> Walked 10 steps or more  -      Row Name 09/28/23 0932          Functional Assessment    Outcome Measure Options AM-PAC 6 Clicks Basic Mobility (PT)  -CZ (r) RE (t) CZ (c)               User Key  (r) = Recorded By, (t) = Taken By, (c) = Cosigned By      Initials Name Provider Type     Rosalie Velazco, RN Registered Nurse    Barb Mims COTA Occupational Therapist Assistant    CZ John Bazan, PT Physical Therapist    Eliceo Rivera, PT Student PT Student                    Occupational Therapy Education       Title: PT OT SLP Therapies (In Progress)       Topic: Occupational Therapy (In Progress)       Point: ADL training (Done)       Description:   Instruct learner(s) on proper safety adaptation and remediation techniques during self care or transfers.   Instruct in proper use of assistive devices.                  Learning Progress Summary             Patient Acceptance, E,TB, VU by CM at 9/20/2023 1231    Comment: OT POC, Role of OT, d/c recommendations, AD/DME needs   Family Acceptance, E,TB, VU by CM at 9/20/2023 1231    Comment: OT POC, Role of OT, d/c recommendations, AD/DME needs                         Point: Home exercise program (Not Started)       Description:   Instruct learner(s) on appropriate technique for monitoring, assisting and/or progressing therapeutic exercises/activities.                  Learner Progress:  Not documented in this visit.              Point:  Precautions (Done)       Description:   Instruct learner(s) on prescribed precautions during self-care and functional transfers.                  Learning Progress Summary             Patient Acceptance, E,TB, VU by CM at 9/20/2023 1231    Comment: OT POC, Role of OT, d/c recommendations, AD/DME needs   Family Acceptance, E,TB, VU by CM at 9/20/2023 1231    Comment: OT POC, Role of OT, d/c recommendations, AD/DME needs                         Point: Body mechanics (Not Started)       Description:   Instruct learner(s) on proper positioning and spine alignment during self-care, functional mobility activities and/or exercises.                  Learner Progress:  Not documented in this visit.                              User Key       Initials Effective Dates Name Provider Type Discipline     11/18/22 -  Emily Lehman OT Occupational Therapist OT                  OT Recommendation and Plan  Therapy Frequency (OT): daily  Plan of Care Review  Plan of Care Reviewed With: patient  Progress: improving  Outcome Evaluation: Pt asleep upon arrival and easily awaken. Pt agreeable to bed ex @ this time. Pt tolerated BUE ther ex w/ 2lb HW and good tolerance w/ RB's PRN. No goals met this date. Cont OT POC.     Time Calculation:         Time Calculation- OT       Row Name 09/28/23 1303             Time Calculation- OT    OT Start Time 1303  -KD      OT Stop Time 1327  -KD      OT Time Calculation (min) 24 min  -KD      Total Timed Code Minutes- OT 24 minute(s)  -KD      OT Received On 09/28/23  -KD         Timed Charges    78536 - OT Therapeutic Exercise Minutes 24  -KD         Total Minutes    Timed Charges Total Minutes 24  -KD       Total Minutes 24  -KD                User Key  (r) = Recorded By, (t) = Taken By, (c) = Cosigned By      Initials Name Provider Type     Barb Oneil COTA Occupational Therapist Assistant                  Therapy Charges for Today       Code Description Service Date Service Provider  Modifiers Qty    [id number]  OT THER PROC EA 15 MIN 9/27/2023 Barb Oneil COTA GO 2    [id number]  OT THER PROC EA 15 MIN 9/28/2023 Barb Oneil COTA GO 2                 CLEMENTE Villavicencio  9/28/2023

## 2023-09-28 NOTE — PLAN OF CARE
Goal Outcome Evaluation:  Plan of Care Reviewed With: patient        Progress: improving  Outcome Evaluation: Pt asleep upon arrival and easily awaken. Pt agreeable to bed ex @ this time. Pt tolerated BUE ther ex w/ 2lb HW and good tolerance w/ RB's PRN. No goals met this date. Cont OT POC.      Anticipated Discharge Disposition (OT): home with 24/7 care, home with home health, other (see comments), inpatient rehabilitation facility

## 2023-09-28 NOTE — PLAN OF CARE
Goal Outcome Evaluation:  Plan of Care Reviewed With: patient        Progress: no change  Outcome Evaluation: Pain controlled with interventions. BM today. VSS.

## 2023-09-28 NOTE — THERAPY TREATMENT NOTE
Patient Name: Alexia Lopez  : 1935    MRN: [id number]                              Today's Date: 2023       Admit Date: 2023    Visit Dx:     ICD-10-CM ICD-9-CM   1. Traumatic closed displaced fracture of neck of right femur, initial encounter  S72.001A 820.8   2. Coronary arteriosclerosis in native artery  I25.10 414.01   3. S/P CABG (coronary artery bypass graft)  Z95.1 V45.81   4. Benign essential hypertension  I10 401.1   5. Controlled type 2 diabetes mellitus without complication, with long-term current use of insulin  E11.9 250.00    Z79.4 V58.67   6. Fall from slip, trip, or stumble, initial encounter  W01.0XXA E885.9   7. Closed fracture of right hip, initial encounter  S72.001A 820.8   8. Impaired functional mobility, balance, gait, and endurance [Z74.09 (ICD-10-CM)]  Z74.09 V49.89   9. Impaired mobility and ADLs [Z74.09, Z78.9 (ICD-10-CM)]  Z74.09 V49.89    Z78.9      Patient Active Problem List   Diagnosis    Coronary arteriosclerosis in native artery    S/P CABG (coronary artery bypass graft)    Benign essential hypertension    Hypercholesterolemia    Type 2 diabetes mellitus    Nuclear cataract    Controlled type 2 diabetes mellitus without complication    Pernicious anemia    Arthritis of knee    Chronic sinusitis    Encounter for screening mammogram for malignant neoplasm of breast    Generalized osteoarthritis    Iron deficiency anemia    Palpitations    Myocardial infarction    Hypertension    Hyperlipemia    History of echocardiogram    Diabetes mellitus    Coronary artery disease    Cataract    Chronic pain of both knees    Primary osteoarthritis of both knees    Claudication of lower extremity    Precordial pain    Fall from slip, trip, or stumble, initial encounter    Traumatic closed displaced fracture of neck of right femur, initial encounter    Hip fracture    Moderate malnutrition     Past Medical History:   Diagnosis Date    Cataract     Coronary artery disease      Diabetes mellitus     Type 2 diabetes mellitus - without retinopathy       History of echocardiogram 05/12/2014    Normal LV systolic function with EF of 55% with mild hypokinesis. Diastolic relaxation abnormality of left ventricle. Mild tricuspid regurg. Trace mitral regurg    Hyperlipemia     Hypertension     Myocardial infarction      Past Surgical History:   Procedure Laterality Date    BACK SURGERY      CARDIAC CATHETERIZATION  05/12/2014    Severe multivessel CAD with critical lesions noted in the LAD coronary artery, left circumflex coronary artery and RCA. Left ventriculogram no performed in view of elevated left ventricular end-diastolic pressure    CATARACT EXTRACTION W/ INTRAOCULAR LENS IMPLANT Left 2/2/2018    Procedure: CATARACT PHACO EXTRACTION WITH INTRAOCULAR LENS IMPLANT;  Surgeon: Gagan Lizarraga MD;  Location: [facility];  Service:     CATARACT EXTRACTION W/ INTRAOCULAR LENS IMPLANT Right 2/9/2018    Procedure: CATARACT PHACO EXTRACTION WITH INTRAOCULAR LENS IMPLANT;  Surgeon: Gagan Lizarraga MD;  Location: [facility];  Service:     CORONARY ARTERY BYPASS GRAFT      X 3 with LIMA to LAD, SVG to OMB and SVG to PDA.    DILATATION AND CURETTAGE      OTHER SURGICAL HISTORY  05/06/2014    INCISION OF EARDRUM GENERAL ANESTHETIC 11912 (1)    Bilateral myringotomy with tubes.     SINUS SURGERY      TONSILLECTOMY      TONSILLECTOMY AND ADENOIDECTOMY      TUBAL ABDOMINAL LIGATION  03/14/1978      General Information       Row Name 09/28/23 0932          Physical Therapy Time and Intention    Document Type therapy note (daily note)  -CZ (r) RE (t) CZ (c)     Mode of Treatment physical therapy  -CZ       Row Name 09/28/23 0932          General Information    Patient Profile Reviewed yes  -CZ (r) RE (t) CZ (c)     Existing Precautions/Restrictions fall;hip, anterior  -CZ (r) RE (t) CZ (c)       Row Name 09/28/23 0932          Cognition    Orientation Status (Cognition) oriented x 4  -CZ (r) RE (t)  CZ (c)       Row Name 09/28/23 0932          Safety Issues, Functional Mobility    Impairments Affecting Function (Mobility) strength;endurance/activity tolerance;balance;pain  -CZ               User Key  (r) = Recorded By, (t) = Taken By, (c) = Cosigned By      Initials Name Provider Type    CZ John Bazan, PT Physical Therapist    Eliceo Rivera, PT Student PT Student                   Mobility       Row Name 09/28/23 0932          Sit-Stand Transfer    Sit-Stand [place] (Transfers) contact guard  -CZ (r) RE (t) CZ (c)     Assistive Device (Sit-Stand Transfers) walker, front-wheeled  -CZ (r) RE (t) CZ (c)       Row Name 09/28/23 0932          Gait/Stairs (Locomotion)    [place] Level (Gait) contact guard  -CZ (r) RE (t) CZ (c)     Assistive Device (Gait) walker, front-wheeled  -CZ (r) RE (t) CZ (c)     Distance in Feet (Gait) 40'x2  -CZ (r) RE (t) CZ (c)     Comment, (Gait/Stairs) Patient ambulates with decreased delvin and step length. Patient increased delvin with verbal cueing. Patient ambulates with R knee valgus and decreased stance time on R LE d/t pain.  -CZ (r) RE (t) CZ (c)       Row Name 09/28/23 0932          Mobility    Extremity Weight-bearing Status right lower extremity  -CZ (r) RE (t) CZ (c)     Right Lower Extremity (Weight-bearing Status) weight-bearing as tolerated (WBAT)  -CZ (r) RE (t) CZ (c)               User Key  (r) = Recorded By, (t) = Taken By, (c) = Cosigned By      Initials Name Provider Type    John De Santiago, PT Physical Therapist    Eliceo Rivera, PT Student PT Student                   Obj/Interventions       Row Name 09/28/23 0932          Hip (Therapeutic Exercise)    Hip (Therapeutic Exercise) AROM (active range of motion);strengthening exercise  -CZ (r) RE (t) CZ (c)     Hip Isometrics (Therapeutic Exercise) gluteal sets;10 repetitions;2 sets;5 second hold  -CZ (r) RE (t) CZ (c)     Hip Strengthening (Therapeutic Exercise)  bilateral;flexion;aDduction;sitting;10 repetitions;2 sets;3 second hold  Patient unable to move R hip into full range of flexion d/t pain.  -CZ (r) RE (t) CZ (c)       Row Name 09/28/23 0932          Knee (Therapeutic Exercise)    Knee (Therapeutic Exercise) AROM (active range of motion);strengthening exercise  -CZ (r) RE (t) CZ (c)     Knee AROM (Therapeutic Exercise) bilateral;LAQ (long arc quad);10 repetitions;3 second hold;2 sets;sitting  -CZ (r) RE (t) CZ (c)       Row Name 09/28/23 0932          Ankle (Therapeutic Exercise)    Ankle (Therapeutic Exercise) AROM (active range of motion)  -CZ (r) RE (t) CZ (c)     Ankle AROM (Therapeutic Exercise) bilateral;dorsiflexion;plantarflexion  -CZ (r) RE (t) CZ (c)               User Key  (r) = Recorded By, (t) = Taken By, (c) = Cosigned By      Initials Name Provider Type    CZ John Bazan, PT Physical Therapist    Eliceo Rivera, PT Student PT Student                   Goals/Plan       Row Name 09/28/23 0932          Bed Mobility Goal 1 (PT)    Activity/Assistive Device (Bed Mobility Goal 1, PT) sit to supine/supine to sit  -CZ (r) RE (t) CZ (c)     [place] Level/Cues Needed (Bed Mobility Goal 1, PT) independent  -CZ (r) RE (t) CZ (c)     Time Frame (Bed Mobility Goal 1, PT) by discharge  -CZ (r) RE (t) CZ (c)     Strategies/Barriers (Bed Mobility Goal 1, PT) HOB flat, no bed rails.  -CZ (r) RE (t) CZ (c)     Progress/Outcomes (Bed Mobility Goal 1, PT) goal not met  -CZ (r) RE (t) CZ (c)       Row Name 09/28/23 0932          Transfer Goal 1 (PT)    Activity/Assistive Device (Transfer Goal 1, PT) sit-to-stand/stand-to-sit;bed-to-chair/chair-to-bed;walker, rolling  -CZ (r) RE (t) CZ (c)     [place] Level/Cues Needed (Transfer Goal 1, PT) modified independence  -CZ (r) RE (t) CZ (c)     Time Frame (Transfer Goal 1, PT) by discharge  -CZ (r) RE (t) CZ (c)     Strategies/Barriers (Transfers Goal 1, PT) R hip fx, SHEELA, anterior, WBAT.  -CZ (r) RE (t) CZ (c)      Progress/Outcome (Transfer Goal 1, PT) goal not met  -CZ (r) RE (t) CZ (c)       Row Name 09/28/23 0932          Gait Training Goal 1 (PT)    Activity/Assistive Device (Gait Training Goal 1, PT) walker, rolling  -CZ (r) RE (t) CZ (c)     [place] Level (Gait Training Goal 1, PT) contact guard required  -CZ (r) RE (t) CZ (c)     Distance (Gait Training Goal 1, PT) 75'x2  -CZ (r) RE (t) CZ (c)     Time Frame (Gait Training Goal 1, PT) by discharge  -CZ (r) RE (t) CZ (c)     Strategies/Barriers (Gait Training Goal 1, PT) R hip fx, SHEELA, anterior, WBAT.  -CZ (r) RE (t) CZ (c)     Progress/Outcome (Gait Training Goal 1, PT) goal revised this date;goal not met  -CZ (r) RE (t) CZ (c)       Row Name 09/28/23 0932          Stairs Goal 1 (PT)    Activity/Assistive Device (Stairs Goal 1, PT) using handrail, right;using handrail, left  -CZ (r) RE (t) CZ (c)     [place] Level/Cues Needed (Stairs Goal 1, PT) contact guard required  -CZ (r) RE (t) CZ (c)     Number of Stairs (Stairs Goal 1, PT) 2 steps.  -CZ (r) RE (t) CZ (c)     Time Frame (Stairs Goal 1, PT) by discharge  -CZ (r) RE (t) CZ (c)     Strategies/Barriers (Stairs Goal 1, PT) R hip fx, SHEELA, anterior, WBAT.  -CZ (r) RE (t) CZ (c)     Progress/Outcome (Stairs Goal 1, PT) goal not met  -CZ (r) RE (t) CZ (c)               User Key  (r) = Recorded By, (t) = Taken By, (c) = Cosigned By      Initials Name Provider Type    CZ John Bazan, PT Physical Therapist    Eliceo Rivera PT Student PT Student                   Clinical Impression       Row Name 09/28/23 0932          Pain    Pretreatment Pain Rating 2/10  -CZ (r) RE (t) CZ (c)     Posttreatment Pain Rating 2/10  -CZ (r) RE (t) CZ (c)     Pain Location - Side/Orientation Right  -CZ (r) RE (t) CZ (c)     Pain Location - hip  -CZ (r) RE (t) CZ (c)     Pre/Posttreatment Pain Comment Pain increased to 5/10 with ambulation.  -CZ (r) RE (t) CZ (c)     Pain Intervention(s) Ambulation/increased  activity;Repositioned;Distraction  -CZ (r) RE (t) CZ (c)       Row Name 09/28/23 0932          Plan of Care Review    Plan of Care Reviewed With patient  -CZ (r) RE (t) CZ (c)     Outcome Evaluation PT treatment completed. Patient sitting in chair upon arrival and agreeable to therapy. Patient reported R hip pain to be 2/10 when sitting pretreatment. Patient performed sit to stand transfer with CGA and a FWW. Patient ambulated 40'x2 with CGA and a FWW. Verbal cues provided to increase delvin and equalize step length. Patient ambulates with R knee valgus and ankle pronation. Patient reported pain increased to 5/10 in R hip with ambulation. Patient performed seated ther ex including Hip Flex, Hip Add, LAQ, glute holds, and ankle pumps. Patient unable to perform R hip flexion through full ROM d/t pain. Patient reported R hip pain to be 2/10 post-treatment. Continue skilled I/P PT.  -CZ (r) RE (t) CZ (c)       Row Name 09/28/23 0932          Therapy Assessment/Plan (PT)    Rehab Potential (PT) good, to achieve stated therapy goals  -CZ (r) RE (t) CZ (c)     Criteria for Skilled Interventions Met (PT) yes;skilled treatment is necessary  -CZ (r) RE (t) CZ (c)     Therapy Frequency (PT) daily  -CZ (r) RE (t) CZ (c)       Row Name 09/28/23 0932          Vital Signs    Pre Systolic BP Rehab 144  -CZ (r) RE (t) CZ (c)     Pre Treatment Diastolic BP 91  -CZ (r) RE (t) CZ (c)     Post Systolic BP Rehab 148  -CZ (r) RE (t) CZ (c)     Post Treatment Diastolic BP 65  -CZ (r) RE (t) CZ (c)     Pretreatment Heart Rate (beats/min) 78  -CZ (r) RE (t) CZ (c)     Posttreatment Heart Rate (beats/min) 75  -CZ (r) RE (t) CZ (c)     Pre SpO2 (%) 98  -CZ (r) RE (t) CZ (c)     O2 Delivery Pre Treatment room air  -CZ (r) RE (t) CZ (c)     Post SpO2 (%) 99  -CZ (r) RE (t) CZ (c)     O2 Delivery Post Treatment room air  -CZ (r) RE (t) CZ (c)     Pre Patient Position Sitting  -CZ (r) RE (t) CZ (c)     Post Patient Position Sitting  -CZ (r) RE  (t) CZ (c)       Row Name 09/28/23 0932          Positioning and Restraints    Pre-Treatment Position sitting in chair/recliner  -CZ (r) RE (t) CZ (c)     Post Treatment Position chair  -CZ (r) RE (t) CZ (c)     In Chair exit alarm on;call light within reach;encouraged to call for assist;reclined;sitting  -CZ (r) RE (t) CZ (c)               User Key  (r) = Recorded By, (t) = Taken By, (c) = Cosigned By      Initials Name Provider Type    John De Santiago, PT Physical Therapist    Eliceo Rivera, PT Student PT Student                   Outcome Measures       Row Name 09/28/23 0932 09/28/23 0858       How much help from another person do you currently need...    Turning from your back to your side while in flat bed without using bedrails? 3  -CZ (r) RE (t) CZ (c) 3  -MH    Moving from lying on back to sitting on the side of a flat bed without bedrails? 3  -CZ (r) RE (t) CZ (c) 3  -MH    Moving to and from a bed to a chair (including a wheelchair)? 3  -CZ (r) RE (t) CZ (c) 3  -MH    Standing up from a chair using your arms (e.g., wheelchair, bedside chair)? 3  -CZ (r) RE (t) CZ (c) 3  -MH    Climbing 3-5 steps with a railing? 2  -CZ (r) RE (t) CZ (c) 3  -MH    To walk in hospital room? 3  -CZ (r) RE (t) CZ (c) 3  -MH    AM-PAC 6 Clicks Score (PT) 17  -CZ (r) RE (t) 18  -MH    Highest level of mobility 5 --> Static standing  -CZ (r) RE (t) 6 --> Walked 10 steps or more  -      Row Name 09/28/23 0932          Functional Assessment    Outcome Measure Options AM-PAC 6 Clicks Basic Mobility (PT)  -CZ (r) RE (t) CZ (c)               User Key  (r) = Recorded By, (t) = Taken By, (c) = Cosigned By      Initials Name Provider Type    Rosalie Funes, RN Registered Nurse    John De Santiago, PT Physical Therapist    Eliceo Rivera PT Student PT Student                                 Physical Therapy Education       Title: PT OT SLP Therapies (In Progress)       Topic: Physical Therapy (In Progress)       Point:  Mobility training (In Progress)       Learning Progress Summary             Patient Acceptance, E, NR by RE at 9/28/2023 1028    Comment: Transfer training, equal step lengths, increasing delvin.    Acceptance, E, NR by NIDA at 9/24/2023 1259    Acceptance, E, NR by CZ at 9/20/2023 0933    Comment: PT POC, rehab process, hand placement with transfers, use of FWW, proper gait mechanics.                         Point: Home exercise program (Not Started)       Learner Progress:  Not documented in this visit.              Point: Body mechanics (Not Started)       Learner Progress:  Not documented in this visit.              Point: Precautions (Not Started)       Learner Progress:  Not documented in this visit.                              User Key       Initials Effective Dates Name Provider Type Discipline    NIDA 06/16/21 -  Eugenio Chavez PTA Physical Therapist Assistant PT    JIE 07/11/23 -  John Bazan, PT Physical Therapist PT    RE 08/16/23 -  Eliceo Russell PT Student PT Student PT                  PT Recommendation and Plan    Attestation signed by John Bazan, PT at 09/28/23 1304     I agree with this student's documentation.               Goal Outcome Evaluation:  Plan of Care Reviewed With: (P) patient  Outcome Evaluation: (P) PT treatment completed. Patient sitting in chair upon arrival and agreeable to therapy. Patient reported R hip pain to be 2/10 when sitting pretreatment. Patient performed sit to stand transfer with CGA and a FWW. Patient ambulated 40'x2 with CGA and a FWW. Verbal cues provided to increase delvin and equalize step length. Patient ambulates with R knee valgus and ankle pronation. Patient reported pain increased to 5/10 in R hip with ambulation. Patient performed seated ther ex including Hip Flex, Hip Add, LAQ, glute holds, and ankle pumps. Patient unable to perform R hip flexion through full ROM d/t pain. Patient reported R hip pain to be 2/10 post-treatment. Continue skilled  I/P PT.        Anticipated Discharge Disposition (PT): (P) home with assist, home with outpatient therapy services, inpatient rehabilitation facility, skilled nursing facility         Time Calculation:   PT Evaluation Complexity  History, PT Evaluation Complexity: 1-2 personal factors and/or comorbidities  Examination of Body Systems (PT Eval Complexity): total of 3 or more elements  Clinical Presentation (PT Evaluation Complexity): evolving  Clinical Decision Making (PT Evaluation Complexity): moderate complexity  Overall Complexity (PT Evaluation Complexity): moderate complexity     PT Charges       Row Name 09/28/23 1304             Time Calculation    Start Time 0932  -CZ      Stop Time 1011  -CZ      Time Calculation (min) 39 min  -CZ         Time Calculation- PT    Total Timed Code Minutes- PT 39 minute(s)  -CZ         Timed Charges    90113 - PT Therapeutic Activity Minutes 39  -CZ         Total Minutes    Timed Charges Total Minutes 39  -CZ       Total Minutes 39  -CZ                User Key  (r) = Recorded By, (t) = Taken By, (c) = Cosigned By      Initials Name Provider Type    CZ John Bazan, PT Physical Therapist                  Therapy Charges for Today       Code Description Service Date Service Provider Modifiers Qty    [id number] HC PT THERAPEUTIC ACT EA 15 MIN 9/28/2023 John Bazan, PT GP 3            PT G-Codes  Outcome Measure Options: AM-PAC 6 Clicks Basic Mobility (PT)  AM-PAC 6 Clicks Score (PT): 17  AM-PAC 6 Clicks Score (OT): 21  PT Discharge Summary  Anticipated Discharge Disposition (PT): home with assist, home with outpatient therapy services, inpatient rehabilitation facility, skilled nursing facility    John Bazan PT  9/28/2023

## 2023-09-28 NOTE — PLAN OF CARE
Goal Outcome Evaluation:           Progress: improving  Outcome Evaluation: VSS. Pain controlled with PRN pain meds. IV fluids infusing per order. Resting well between care. No new complaints.

## 2023-09-28 NOTE — PROGRESS NOTES
Cristian Tejeda DO,[facility]  Gastroenterology  Hepatology  Endoscopy  Board Certified in Internal Medicine and gastroenterology  [address], [address]  [place]. [address]  - [phone]   F [phone]     GASTROENTEROLOGY PROGRESS NOTE   CRISTIAN TEJEDA DO.         SUBJECTIVE:   9/28/2023  Chief Complaint:     Subjective: Rectal bleeding    Patient is 88 y.o. female, personal history of diverticulosis with the last colonoscopy being done in 2016, fracture of involving the right hip.  The patient has been having problems with chronic anemia that has required the patient to be on iron twice daily.  This has been monitored by Dr. Rodriguez.  Patient had GI bleeding.  This was described as being bright red and lots of blood.  Patient had a decline in the hemoglobin with this.  The patient said that she had not had a bowel movement for 6 days.  However, she says afterwards she has had pain in her anus with this.  She denies any fevers or chills.  There has been no evidence of any abdominal pain.  Since the rectal bleeding, there has been no further episodes of any GI bleeding that has occurred.  Dr. Rivers asked us to see the patient in consultation.       CURRENT MEDICATIONS/OBJECTIVE/VS/PE:     Current Medications:     Current Facility-Administered Medications   Medication Dose Route Frequency Provider Last Rate Last Admin    acetaminophen (TYLENOL) tablet 650 mg  650 mg Oral Q6H Cas Adler MD   650 mg at 09/28/23 1042    amLODIPine (NORVASC) tablet 5 mg  5 mg Oral Q24H Norris Brown MD   5 mg at 09/28/23 0906    sennosides-docusate (PERICOLACE) 8.6-50 MG per tablet 2 tablet  2 tablet Oral BID Cas Adler MD   2 tablet at 09/28/23 0906    And    polyethylene glycol (MIRALAX) packet 17 g  17 g Oral Daily PRN Cas Adler MD        And    bisacodyl (DULCOLAX) EC tablet 5 mg  5 mg Oral Daily PRN Cas Adler MD        And    bisacodyl (DULCOLAX)  suppository 10 mg  10 mg Rectal Daily PRN Cas Adler MD        dextrose (D50W) (25 g/50 mL) IV injection 25 g  25 g Intravenous Q15 Min PRN Dinah Perry MD        dextrose (GLUTOSE) oral gel 15 g  15 g Oral Q15 Min PRN Dinah Perry MD        furosemide (LASIX) tablet 20 mg  20 mg Oral Daily Grace Del Rio MD   20 mg at 09/28/23 1233    glucagon HCl (Diagnostic) injection 1 mg  1 mg Intramuscular Q15 Min PRN Dinah Perry MD        heparin (porcine) 5000 UNIT/ML injection 5,000 Units  5,000 Units Subcutaneous Q8H Cas Adler MD   5,000 Units at 09/28/23 0527    hydrALAZINE (APRESOLINE) injection 10 mg  10 mg Intravenous Q6H PRN Urmila Vallejo APRN   10 mg at 09/24/23 1933    Insulin Aspart (novoLOG) injection 0-7 Units  0-7 Units Subcutaneous TID Dinah Bernard MD   2 Units at 09/25/23 1749    labetalol (NORMODYNE) tablet 100 mg  100 mg Oral Q12H Cas Adler MD   100 mg at 09/28/23 0906    Magnesium Standard Dose Replacement - Follow Nurse / BPA Driven Protocol   Does not apply PRN Urmila Vallejo S APRALINE        naloxone (NARCAN) injection 0.4 mg  0.4 mg Intravenous Q5 Min PRN Cas Adler MD        nitroglycerin (NITROSTAT) SL tablet 0.4 mg  0.4 mg Sublingual Q5 Min PRN Urmila Vallejo S, APRN        ondansetron (ZOFRAN) tablet 4 mg  4 mg Oral Q6H PRN Cas Adler MD        Or    ondansetron (ZOFRAN) injection 4 mg  4 mg Intravenous Q6H PRN Cas Adler MD   4 mg at 09/19/23 1951    oxyCODONE (ROXICODONE) immediate release tablet 5 mg  5 mg Oral Q6H PRN Cas Adler MD   5 mg at 09/28/23 0330    pantoprazole (PROTONIX) injection 40 mg  40 mg Intravenous Q AM Urmila Vallejo APRN   40 mg at 09/28/23 0527    Phosphorus Replacement - Follow Nurse / BPA Driven Protocol   Does not apply PRN Urmila Vallejo APRN        potassium chloride (K-DUR,KLOR-CON) CR tablet 40 mEq  40 mEq Oral Q4H Vicky,  Dinah ANDERSON MD   40 mEq at 09/28/23 1042    Potassium Replacement - Follow Nurse / BPA Driven Protocol   Does not apply PRN Urmila Vallejo APRN        rosuvastatin (CRESTOR) tablet 10 mg  10 mg Oral Nightly Cas Adler MD   10 mg at 09/27/23 2111    sodium chloride 0.9 % flush 10 mL  10 mL Intravenous Q12H Cas Adler MD   10 mL at 09/28/23 0906    sodium chloride 0.9 % flush 10 mL  10 mL Intravenous PRN Cas Adler MD        sodium chloride 0.9 % infusion 40 mL  40 mL Intravenous PRN Cas Adler MD   200 mL at 09/19/23 2005    sodium chloride tablet 2 g  2 g Oral BID With Meals Grace Del Rio MD   2 g at 09/28/23 1234       Objective     Physical Exam:   Temp:  [96 °F (35.6 °C)-98.7 °F (37.1 °C)] 98.7 °F (37.1 °C)  Heart Rate:  [68-91] 72  Resp:  [18] 18  BP: (126-167)/(56-85) 147/65     Physical Exam:  General Appearance:    Alert, cooperative, in no acute distress   Head:    Normocephalic, without obvious abnormality, atraumatic   Eyes:            Lids and lashes normal, conjunctivae and sclerae normal, no icterus, no pallor, corneas clear, PERRLA   Ears:    Ears appear intact with no abnormalities noted   Throat:   No oral lesions, no thrush, oral mucosa moist   Neck:   No adenopathy, supple, trachea midline, no thyromegaly, no  carotid bruit, no JVD   Back:     No kyphosis present, no scoliosis present, no skin lesions,   erythema or scars, no tenderness to percussion or  palpation,   range of motion normal   Lungs:     Clear to auscultation,respirations regular, even and              unlabored    Heart:    Regular rhythm and normal rate, normal S1 and S2, no         murmur, no gallop, no rub, no click   Breast Exam:    Deferred   Abdomen:     Normal bowel sounds, no masses, no organomegaly, soft  nontender, nondistended, no guarding, no rebound                 tenderness   Genitalia:  Thrombosed external hemorrhoid.   Extremities:   Moves all extremities  well, no edema, no cyanosis, no redness   Pulses:   Pulses palpable and equal bilaterally   Skin:   No bleeding, bruising or rash   Lymph nodes:   No palpable adenopathy   Neurologic:   Cranial nerves 2 - 12 grossly intact, sensation intact, DTR    present and equal bilaterally      Results Review:     Lab Results (last 24 hours)       Procedure Component Value Units Date/Time    Uric Acid [[id number]]  (Abnormal) Collected: 09/28/23 0505    Specimen: Blood Updated: 09/28/23 1204     Uric Acid 6.1 mg/dL     POC Glucose Once [[id number]]  (Normal) Collected: 09/28/23 1053    Specimen: Blood Updated: 09/28/23 1111     Glucose 129 mg/dL      Comment: RN NotifiedOperator: [id number] GAYATHRI Newport HospitalSHANNANEEMeter ID: [id number]       Basic Metabolic Panel [[id number]]  (Abnormal) Collected: 09/28/23 0505    Specimen: Blood Updated: 09/28/23 0534     Glucose 108 mg/dL      BUN 20 mg/dL      Creatinine 0.83 mg/dL      Sodium 132 mmol/L      Potassium 3.6 mmol/L      Chloride 101 mmol/L      CO2 19.0 mmol/L      Calcium 8.3 mg/dL      BUN/Creatinine Ratio 24.1     Anion Gap 12.0 mmol/L      eGFR 68.8 mL/min/1.73     Narrative:      GFR Normal >60  Chronic Kidney Disease <60  Kidney Failure <15    The GFR formula is only valid for adults with stable renal function between ages 18 and 70.    CBC (No Diff) [[id number]]  (Abnormal) Collected: 09/28/23 0505    Specimen: Blood Updated: 09/28/23 0518     WBC 15.44 10*3/mm3      RBC 2.39 10*6/mm3      Hemoglobin 7.3 g/dL      Hematocrit 21.3 %      MCV 89.1 fL      MCH 30.5 pg      MCHC 34.3 g/dL      RDW 14.0 %      RDW-SD 46.2 fl      MPV 10.7 fL      Platelets 227 10*3/mm3     POC Glucose Once [[id number]]  (Abnormal) Collected: 09/27/23 1955    Specimen: Blood Updated: 09/27/23 2019     Glucose 145 mg/dL      Comment: RN NotifiedOperator: [id number] SAMSONME MIKAMeter ID: [id number]       Occult Blood X 1, Stool - Stool, Per Rectum [[id number]]  (Abnormal) Collected: 09/27/23 1941    Specimen:  Stool from Per Rectum Updated: 09/27/23 2017     Fecal Occult Blood Positive    POC Glucose Once [[id number]]  (Normal) Collected: 09/27/23 1627    Specimen: Blood Updated: 09/27/23 1647     Glucose 116 mg/dL      Comment: RN NotifiedOperator: [id number] TAMIKO Arguello ID: [id number]       Hemoglobin & Hematocrit, Blood [[id number]]  (Abnormal) Collected: 09/27/23 1600    Specimen: Blood Updated: 09/27/23 1628     Hemoglobin 6.9 g/dL      Hematocrit 20.3 %     Blood Culture - Blood, Arm, Right [[id number]]  (Normal) Collected: 09/25/23 1506    Specimen: Blood from Arm, Right Updated: 09/27/23 1545     Blood Culture No growth at 2 days    Blood Culture - Blood, Arm, Right [[id number]]  (Normal) Collected: 09/25/23 1506    Specimen: Blood from Arm, Right Updated: 09/27/23 1545     Blood Culture No growth at 2 days    Osmolality, Serum [[id number]]  (Abnormal) Collected: 09/27/23 1350    Specimen: Blood Updated: 09/27/23 1432     Osmolality 272 mOsm/kg     TSH [[id number]]  (Normal) Collected: 09/27/23 0523    Specimen: Blood Updated: 09/27/23 1322     TSH 1.800 uIU/mL     T4, Free [[id number]]  (Normal) Collected: 09/27/23 0523    Specimen: Blood Updated: 09/27/23 1322     Free T4 1.27 ng/dL     Narrative:      Results may be falsely increased if patient taking Biotin.               I reviewed the patient's new clinical results.  I reviewed the patient's new imaging results and agree with the interpretation.     ASSESSMENT/PLAN:   ASSESSMENT:  1.  Chronic anemia  2.  Gastrointestinal bleeding, secondary to thrombosed external hemorrhoid    PLAN:  1.  Normally, I would recommend a colonoscopy.  Patient is very weak and hardly able to get up to moved even to the toilet.  I asked her about doing a colonoscopy and she is very reluctant to have that done.  I understand that.  2.  Anucort HC ointment 4 times daily  3.  Consider colonoscopy as an outpatient.     Campos Tejeda DO  09/28/23  12:47 CDT

## 2023-09-28 NOTE — PROGRESS NOTES
ORTHOPEDIC PROGRESS NOTE:    Name:  Alexia Lopez  Date:    2023  Date of admission:  2023    Post op day:  9 Days Post-Op  Procedure:    Procedure(s) (LRB):  RIGHT HIP BIPOLAR ANTERIOR APPROACH (Right)    Subjective:  No new complaints.  Events noted.    Vitals:     Vitals:    23 0440   BP: 132/56   Pulse: 70   Resp: 18   Temp: 97.8 °F (36.6 °C)   SpO2: 95%      Temp (24hrs), Av.6 °F (36.4 °C), Min:96 °F (35.6 °C), Max:98.7 °F (37.1 °C)      Exam:  Resting this am.  Good distal pulses and sensation  Calves soft    Results from last 7 days   Lab Units 23  0505 23  1600 23  0523 23  0834   WBC 10*3/mm3 15.44*  --  12.20* 10.76   HEMOGLOBIN g/dL 7.3* 6.9* 7.1* 7.5*   HEMATOCRIT % 21.3* 20.3* 21.1* 22.4*   PLATELETS 10*3/mm3 227  --  226 198     Results from last 7 days   Lab Units 23  0505 23  1644 23  1308   SODIUM mmol/L 132*   < > 134*   POTASSIUM mmol/L 3.6   < > 4.7   CHLORIDE mmol/L 101   < > 100   CO2 mmol/L 19.0*   < > 24.0   BUN mg/dL 20   < > 20   CREATININE mg/dL 0.83   < > 0.98   CALCIUM mg/dL 8.3*   < > 9.4   BILIRUBIN mg/dL  --   --  0.4   ALK PHOS U/L  --   --  65   ALT (SGPT) U/L  --   --  <5   AST (SGOT) U/L  --   --  18   GLUCOSE mg/dL 108*   < > 182*    < > = values in this interval not displayed.         ASSESSMENT:  Active Hospital Problems    Diagnosis  POA    **Traumatic closed displaced fracture of neck of right femur, initial encounter [S72.001A]  Yes    Moderate malnutrition [E44.0]  Yes    Fall from slip, trip, or stumble, initial encounter [W01.0XXA]  Yes    Hip fracture [S72.009A]  Yes    Controlled type 2 diabetes mellitus without complication [E11.9]  Yes    S/P CABG (coronary artery bypass graft) [Z95.1]  Not Applicable    Benign essential hypertension [I10]  Yes    Coronary arteriosclerosis in native artery [I25.10]  Yes         PLAN:    S/p bipolar endoprosthesis right hip.  Mobilizing slowly.  BRBPR - responded to  one unit PRBCs  Would nomally prophylax for DVT for 30 days postop - higher risk with rectal bleeding.  Continue to monitor.  Progress as tolerated.    09/28/23 at 07:37 CDT by Cas Adler MD

## 2023-09-29 VITALS
HEART RATE: 66 BPM | SYSTOLIC BLOOD PRESSURE: 117 MMHG | BODY MASS INDEX: 25.74 KG/M2 | OXYGEN SATURATION: 99 % | HEIGHT: 67 IN | TEMPERATURE: 97.7 F | WEIGHT: 164 LBS | RESPIRATION RATE: 18 BRPM | DIASTOLIC BLOOD PRESSURE: 56 MMHG

## 2023-09-29 LAB
ANION GAP SERPL CALCULATED.3IONS-SCNC: 10 MMOL/L (ref 5–15)
BH BB BLOOD EXPIRATION DATE: NORMAL
BH BB BLOOD TYPE BARCODE: 5100
BH BB DISPENSE STATUS: NORMAL
BH BB PRODUCT CODE: NORMAL
BH BB UNIT NUMBER: NORMAL
BUN SERPL-MCNC: 19 MG/DL (ref 8–23)
BUN/CREAT SERPL: 20.7 (ref 7–25)
CALCIUM SPEC-SCNC: 8.2 MG/DL (ref 8.6–10.5)
CHLORIDE SERPL-SCNC: 99 MMOL/L (ref 98–107)
CO2 SERPL-SCNC: 19 MMOL/L (ref 22–29)
CREAT SERPL-MCNC: 0.92 MG/DL (ref 0.57–1)
CROSSMATCH INTERPRETATION: NORMAL
DEPRECATED RDW RBC AUTO: 49 FL (ref 37–54)
EGFRCR SERPLBLD CKD-EPI 2021: 60.8 ML/MIN/1.73
ERYTHROCYTE [DISTWIDTH] IN BLOOD BY AUTOMATED COUNT: 14.6 % (ref 12.3–15.4)
GLUCOSE BLDC GLUCOMTR-MCNC: 136 MG/DL (ref 70–130)
GLUCOSE SERPL-MCNC: 98 MG/DL (ref 65–99)
HCT VFR BLD AUTO: 22.9 % (ref 34–46.6)
HGB BLD-MCNC: 7.7 G/DL (ref 12–15.9)
MAGNESIUM SERPL-MCNC: 1.8 MG/DL (ref 1.6–2.4)
MCH RBC QN AUTO: 30.8 PG (ref 26.6–33)
MCHC RBC AUTO-ENTMCNC: 33.6 G/DL (ref 31.5–35.7)
MCV RBC AUTO: 91.6 FL (ref 79–97)
PLATELET # BLD AUTO: 255 10*3/MM3 (ref 140–450)
PMV BLD AUTO: 10.8 FL (ref 6–12)
POTASSIUM SERPL-SCNC: 4.4 MMOL/L (ref 3.5–5.2)
RBC # BLD AUTO: 2.5 10*6/MM3 (ref 3.77–5.28)
SARS-COV-2 RNA RESP QL NAA+PROBE: NOT DETECTED
SODIUM SERPL-SCNC: 128 MMOL/L (ref 136–145)
UNIT  ABO: NORMAL
UNIT  RH: NORMAL
WBC NRBC COR # BLD: 21.61 10*3/MM3 (ref 3.4–10.8)

## 2023-09-29 PROCEDURE — 85027 COMPLETE CBC AUTOMATED: CPT | Performed by: NURSE PRACTITIONER

## 2023-09-29 PROCEDURE — 97530 THERAPEUTIC ACTIVITIES: CPT

## 2023-09-29 PROCEDURE — 97535 SELF CARE MNGMENT TRAINING: CPT

## 2023-09-29 PROCEDURE — 99024 POSTOP FOLLOW-UP VISIT: CPT | Performed by: ORTHOPAEDIC SURGERY

## 2023-09-29 PROCEDURE — 97110 THERAPEUTIC EXERCISES: CPT

## 2023-09-29 PROCEDURE — 83735 ASSAY OF MAGNESIUM: CPT | Performed by: INTERNAL MEDICINE

## 2023-09-29 PROCEDURE — 87635 SARS-COV-2 COVID-19 AMP PRB: CPT | Performed by: STUDENT IN AN ORGANIZED HEALTH CARE EDUCATION/TRAINING PROGRAM

## 2023-09-29 PROCEDURE — 80048 BASIC METABOLIC PNL TOTAL CA: CPT | Performed by: NURSE PRACTITIONER

## 2023-09-29 PROCEDURE — 82948 REAGENT STRIP/BLOOD GLUCOSE: CPT

## 2023-09-29 PROCEDURE — 25010000002 HEPARIN (PORCINE) PER 1000 UNITS: Performed by: ORTHOPAEDIC SURGERY

## 2023-09-29 RX ORDER — OXYCODONE HYDROCHLORIDE 5 MG/1
5 TABLET ORAL EVERY 6 HOURS PRN
Qty: 12 TABLET | Refills: 0 | Status: SHIPPED | OUTPATIENT
Start: 2023-09-29 | End: 2023-09-29 | Stop reason: SDUPTHER

## 2023-09-29 RX ORDER — HYDROCORTISONE 25 MG/G
CREAM TOPICAL 2 TIMES DAILY
Qty: 30 G | Refills: 0 | Status: SHIPPED | OUTPATIENT
Start: 2023-09-29 | End: 2023-10-08

## 2023-09-29 RX ORDER — OXYCODONE HYDROCHLORIDE 5 MG/1
5 TABLET ORAL EVERY 6 HOURS PRN
Qty: 12 TABLET | Refills: 0 | Status: SHIPPED | OUTPATIENT
Start: 2023-09-29 | End: 2023-10-02

## 2023-09-29 RX ORDER — NALOXONE HYDROCHLORIDE 4 MG/.1ML
SPRAY NASAL
Qty: 2 EACH | Refills: 0 | Status: SHIPPED | OUTPATIENT
Start: 2023-09-29

## 2023-09-29 RX ORDER — HYDROCORTISONE 25 MG/G
CREAM TOPICAL 2 TIMES DAILY
Qty: 30 G | Refills: 0 | Status: SHIPPED | OUTPATIENT
Start: 2023-09-29 | End: 2023-09-29 | Stop reason: SDUPTHER

## 2023-09-29 RX ADMIN — HYDROCORTISONE: 25 CREAM TOPICAL at 09:52

## 2023-09-29 RX ADMIN — ACETAMINOPHEN 650 MG: 325 TABLET, FILM COATED ORAL at 06:21

## 2023-09-29 RX ADMIN — Medication 10 ML: at 09:56

## 2023-09-29 RX ADMIN — AMLODIPINE BESYLATE 5 MG: 5 TABLET ORAL at 09:52

## 2023-09-29 RX ADMIN — FUROSEMIDE 20 MG: 20 TABLET ORAL at 09:51

## 2023-09-29 RX ADMIN — OXYCODONE HYDROCHLORIDE 5 MG: 5 TABLET ORAL at 01:09

## 2023-09-29 RX ADMIN — ACETAMINOPHEN 650 MG: 325 TABLET, FILM COATED ORAL at 09:52

## 2023-09-29 RX ADMIN — HEPARIN SODIUM 5000 UNITS: 5000 INJECTION INTRAVENOUS; SUBCUTANEOUS at 06:21

## 2023-09-29 RX ADMIN — Medication 2 G: at 09:51

## 2023-09-29 RX ADMIN — PANTOPRAZOLE SODIUM 40 MG: 40 INJECTION, POWDER, FOR SOLUTION INTRAVENOUS at 06:21

## 2023-09-29 NOTE — PLAN OF CARE
Goal Outcome Evaluation:           Progress: no change  Outcome Evaluation: VSS. Pain controlled with PRN meds. Patient resting well between care.

## 2023-09-29 NOTE — PLAN OF CARE
Goal Outcome Evaluation:  Plan of Care Reviewed With: patient        Progress: improving  Outcome Evaluation: Pt sitting on toilet upon arrival. Pericare-Min A. Sit>stand-CGA. Fxl mobility ~15' CGA of 1 w/ RW. UB bathing-SBA. LB bathing-Min A. UB dressing-Set up. LB dressing-Min A. Grooming tasks (brush teeth/ comb hair)-set up. Pt seated in recliner upon exit w/ all needs in reach. Cont OT POC.      Anticipated Discharge Disposition (OT): home with 24/7 care, home with home health, other (see comments), inpatient rehabilitation facility

## 2023-09-29 NOTE — DISCHARGE SUMMARY
"    [facility] Medicine Services  DISCHARGE SUMMARY       Date of Admission: 9/19/2023  Date of Discharge:  9/29/2023  Primary Care Physician: Munir Rodriguez MD    Presenting Problem/History of Present Illness:  Benign essential hypertension [I10]  Hip fracture [S72.009A]  S/P CABG (coronary artery bypass graft) [Z95.1]  Coronary arteriosclerosis in native artery [I25.10]  Fall from slip, trip, or stumble, initial encounter [W01.0XXA]  Closed fracture of right hip, initial encounter [S72.001A]  Traumatic closed displaced fracture of neck of right femur, initial encounter [S72.001A]  Controlled type 2 diabetes mellitus without complication, with long-term current use of insulin [E11.9, Z79.4]   Patient is an 88 year old female (PMHx CAD, DM, HLD, HTN) who presented to [facility] ED from home after a fall. Patient was going down a couple steps this morning when her knee gave out, causing her to fall to the concrete. She reports that she did hit her head, but denies any loss of consciousness, headache, confusion, or vision change. She is not having any nausea or vomiting. She complains of right hip pain from the fall. She reports history of arthritis in several joints, but that her knees are particularly bad, \"bone on bone\", but she has opted not to undergo surgical fixation. Patient does not take any blood thinners. Recently she has been experiencing some back discomfort and last week, was found to have a 1cm stone within the left kidney. She is awaiting scheduling for surgery with Dr Mandujano.        Final Discharge Diagnoses:  Active Hospital Problems    Diagnosis     **Traumatic closed displaced fracture of neck of right femur, initial encounter     Moderate malnutrition     Fall from slip, trip, or stumble, initial encounter     Hip fracture     Controlled type 2 diabetes mellitus without complication     S/P CABG (coronary artery bypass graft)     Benign essential " hypertension     Coronary arteriosclerosis in native artery        Consults:   Consults       Date and Time Order Name Status Description    9/27/2023  4:29 PM Inpatient Gastroenterology Consult Completed     9/27/2023  3:32 PM Inpatient Nephrology Consult Completed     9/20/2023  2:14 PM Inpatient Urology Consult      9/19/2023 12:37 PM Inpatient Orthopedic Surgery Consult              Procedures Performed: Procedure(s):  RIGHT HIP BIPOLAR ANTERIOR APPROACH                Pertinent Test Results:   Lab Results (most recent)       Procedure Component Value Units Date/Time    Magnesium [[id number]]  (Normal) Collected: 09/29/23 0531    Specimen: Blood Updated: 09/29/23 0640     Magnesium 1.8 mg/dL     Basic Metabolic Panel [[id number]]  (Abnormal) Collected: 09/29/23 0531    Specimen: Blood Updated: 09/29/23 0640     Glucose 98 mg/dL      BUN 19 mg/dL      Creatinine 0.92 mg/dL      Sodium 128 mmol/L      Potassium 4.4 mmol/L      Chloride 99 mmol/L      CO2 19.0 mmol/L      Calcium 8.2 mg/dL      BUN/Creatinine Ratio 20.7     Anion Gap 10.0 mmol/L      eGFR 60.8 mL/min/1.73     Narrative:      GFR Normal >60  Chronic Kidney Disease <60  Kidney Failure <15    The GFR formula is only valid for adults with stable renal function between ages 18 and 70.    CBC (No Diff) [[id number]]  (Abnormal) Collected: 09/29/23 0531    Specimen: Blood Updated: 09/29/23 0620     WBC 21.61 10*3/mm3      RBC 2.50 10*6/mm3      Hemoglobin 7.7 g/dL      Hematocrit 22.9 %      MCV 91.6 fL      MCH 30.8 pg      MCHC 33.6 g/dL      RDW 14.6 %      RDW-SD 49.0 fl      MPV 10.8 fL      Platelets 255 10*3/mm3     POC Glucose Once [[id number]]  (Abnormal) Collected: 09/28/23 1949    Specimen: Blood Updated: 09/29/23 0558     Glucose 136 mg/dL      Comment: RN NotifiedOperator: [id number] GABRIEL OPALMeter ID: [id number]       Potassium [[id number]]  (Normal) Collected: 09/28/23 1713    Specimen: Blood Updated: 09/28/23 1800     Potassium 4.4 mmol/L      POC Glucose Once [[id number]]  (Normal) Collected: 09/28/23 1629    Specimen: Blood Updated: 09/28/23 1701     Glucose 117 mg/dL      Comment: RN NotifiedOperator: [id number] PEGGY Forrest ID: [id number]       Blood Culture - Blood, Arm, Right [[id number]]  (Normal) Collected: 09/25/23 1506    Specimen: Blood from Arm, Right Updated: 09/28/23 1545     Blood Culture No growth at 3 days    Blood Culture - Blood, Arm, Right [[id number]]  (Normal) Collected: 09/25/23 1506    Specimen: Blood from Arm, Right Updated: 09/28/23 1545     Blood Culture No growth at 3 days    Uric Acid [[id number]]  (Abnormal) Collected: 09/28/23 0505    Specimen: Blood Updated: 09/28/23 1204     Uric Acid 6.1 mg/dL     Basic Metabolic Panel [[id number]]  (Abnormal) Collected: 09/28/23 0505    Specimen: Blood Updated: 09/28/23 0534     Glucose 108 mg/dL      BUN 20 mg/dL      Creatinine 0.83 mg/dL      Sodium 132 mmol/L      Potassium 3.6 mmol/L      Chloride 101 mmol/L      CO2 19.0 mmol/L      Calcium 8.3 mg/dL      BUN/Creatinine Ratio 24.1     Anion Gap 12.0 mmol/L      eGFR 68.8 mL/min/1.73     Narrative:      GFR Normal >60  Chronic Kidney Disease <60  Kidney Failure <15    The GFR formula is only valid for adults with stable renal function between ages 18 and 70.    CBC (No Diff) [[id number]]  (Abnormal) Collected: 09/28/23 0505    Specimen: Blood Updated: 09/28/23 0518     WBC 15.44 10*3/mm3      RBC 2.39 10*6/mm3      Hemoglobin 7.3 g/dL      Hematocrit 21.3 %      MCV 89.1 fL      MCH 30.5 pg      MCHC 34.3 g/dL      RDW 14.0 %      RDW-SD 46.2 fl      MPV 10.7 fL      Platelets 227 10*3/mm3     Occult Blood X 1, Stool - Stool, Per Rectum [[id number]]  (Abnormal) Collected: 09/27/23 1941    Specimen: Stool from Per Rectum Updated: 09/27/23 2017     Fecal Occult Blood Positive    Hemoglobin & Hematocrit, Blood [[id number]]  (Abnormal) Collected: 09/27/23 1600    Specimen: Blood Updated: 09/27/23 1628     Hemoglobin 6.9 g/dL       Hematocrit 20.3 %     Osmolality, Serum [[id number]]  (Abnormal) Collected: 09/27/23 1350    Specimen: Blood Updated: 09/27/23 1432     Osmolality 272 mOsm/kg     TSH [[id number]]  (Normal) Collected: 09/27/23 0523    Specimen: Blood Updated: 09/27/23 1322     TSH 1.800 uIU/mL     T4, Free [[id number]]  (Normal) Collected: 09/27/23 0523    Specimen: Blood Updated: 09/27/23 1322     Free T4 1.27 ng/dL     Narrative:      Results may be falsely increased if patient taking Biotin.      Osmolality, Urine - Urine, Clean Catch [[id number]]  (Normal) Collected: 09/26/23 1819    Specimen: Urine, Clean Catch Updated: 09/26/23 1906     Osmolality, Urine 326 mOsm/kg     Sodium [[id number]]  (Abnormal) Collected: 09/26/23 1550    Specimen: Blood Updated: 09/26/23 1641     Sodium 128 mmol/L     Sodium, Urine, Random - Urine, Clean Catch [[id number]] Collected: 09/25/23 1046    Specimen: Urine, Clean Catch Updated: 09/26/23 1112     Sodium, Urine 67 mmol/L     Narrative:      Reference intervals for random urine have not been established.  Clinical usage is dependent upon physician's interpretation in combination with other laboratory tests.       Respiratory Panel PCR w/COVID-19(SARS-CoV-2) ZHANG/MIGUEL/LANEY/PAD/COR/MAD/[facility] In-House, NP Swab in [facility], 3-4 HR TAT - Swab, Nasopharynx [[id number]]  (Normal) Collected: 09/25/23 1632    Specimen: Swab from Nasopharynx Updated: 09/25/23 1733     ADENOVIRUS, PCR Not Detected     Coronavirus 229E Not Detected     Coronavirus HKU1 Not Detected     Coronavirus NL63 Not Detected     Coronavirus OC43 Not Detected     COVID19 Not Detected     Human Metapneumovirus Not Detected     Human Rhinovirus/Enterovirus Not Detected     Influenza A PCR Not Detected     Influenza B PCR Not Detected     Parainfluenza Virus 1 Not Detected     Parainfluenza Virus 2 Not Detected     Parainfluenza Virus 3 Not Detected     Parainfluenza Virus 4 Not Detected     RSV, PCR Not Detected     Bordetella pertussis pcr Not  Detected     Bordetella parapertussis PCR Not Detected     Chlamydophila pneumoniae PCR Not Detected     Mycoplasma pneumo by PCR Not Detected    Narrative:      In the setting of a positive respiratory panel with a viral infection PLUS a negative procalcitonin without other underlying concern for bacterial infection, consider observing off antibiotics or discontinuation of antibiotics and continue supportive care. If the respiratory panel is positive for atypical bacterial infection (Bordetella pertussis, Chlamydophila pneumoniae, or Mycoplasma pneumoniae), consider antibiotic de-escalation to target atypical bacterial infection.    Urinalysis, Microscopic Only - Urine, Clean Catch [[id number]]  (Abnormal) Collected: 09/25/23 1046    Specimen: Urine, Clean Catch Updated: 09/25/23 1128     RBC, UA 3-5 /HPF      WBC, UA 3-5 /HPF      Comment: Urine culture not indicated.        Bacteria, UA None Seen /HPF      Squamous Epithelial Cells, UA 6-12 /HPF      Hyaline Casts, UA 3-6 /LPF      Granular Casts, UA 0-2 /LPF      Methodology Manual Light Microscopy    Urinalysis With Culture If Indicated - Urine, Clean Catch [[id number]]  (Abnormal) Collected: 09/25/23 1046    Specimen: Urine, Clean Catch Updated: 09/25/23 1059     Color, UA Dark Yellow     Appearance, UA Cloudy     pH, UA <=5.0     Specific Gravity, UA 1.020     Glucose, UA Negative     Ketones, UA Trace     Bilirubin, UA Negative     Blood, UA Negative     Protein,  mg/dL (2+)     Leuk Esterase, UA Trace     Nitrite, UA Negative     Urobilinogen, UA 0.2 E.U./dL    Narrative:      In absence of clinical symptoms, the presence of pyuria, bacteria, and/or nitrites on the urinalysis result does not correlate with infection.    Magnesium [[id number]]  (Normal) Collected: 09/23/23 1308    Specimen: Blood Updated: 09/23/23 1706     Magnesium 2.0 mg/dL     Comprehensive Metabolic Panel [[id number]]  (Abnormal) Collected: 09/23/23 1308    Specimen: Blood Updated:  23 1706     Glucose 182 mg/dL      BUN 20 mg/dL      Creatinine 0.98 mg/dL      Sodium 134 mmol/L      Potassium 4.7 mmol/L      Chloride 100 mmol/L      CO2 24.0 mmol/L      Calcium 9.4 mg/dL      Total Protein 5.9 g/dL      Albumin 3.0 g/dL      ALT (SGPT) <5 U/L      AST (SGOT) 18 U/L      Alkaline Phosphatase 65 U/L      Total Bilirubin 0.4 mg/dL      Globulin 2.9 gm/dL      A/G Ratio 1.0 g/dL      BUN/Creatinine Ratio 20.4     Anion Gap 10.0 mmol/L      eGFR 56.3 mL/min/1.73     Narrative:      GFR Normal >60  Chronic Kidney Disease <60  Kidney Failure <15    The GFR formula is only valid for adults with stable renal function between ages 18 and 70.    TISSUE EXAM, [facility] ([facility]) [[id number]] Collected: 23    Specimen: Bone from Hip, Right Updated: 23 1301     Reference Lab Report --     [facility]  [address] [place] [address]  Phone: [phone] or [phone]  Fax: [phone]  Abhilash Wharton M.D., Medical Director    PATIENT NAME                           LABORATORY NO.  SANCHO PINEDO.                  [id number]  [id number]                         AGE              SEX  SSN           CLIENT REF #  [facility]           88      1935  F    [id number]   [id number]    [place]                       REQUESTING M.D.     ATTENDING M.D.     COPY TO[address]                 MATILDA BABB [place],  [place] [address]                                                    CORINE ALBERTO ELIZABETH  DATE COLLECTED      DATE RECEIVED      DATE REPORTED  2023    DIAGNOSIS:  FEMORAL HEAD, RIGHT:  Fracture site changes  Osteoarthritis  Negative for significant inflammation or atypia    RLL    CLINICAL HISTORY:  Traumatic closed displaced fracture of neck of right femur, initial encounter,  coronary arteriosclerosis in native  "artery, status post CABG (coronary artery  bypass graft), benign essential hypertension, controlled type 2 diabetes mellitus  without complication, with long-term current use of insulin, fall from slip, trip,  or stumble, initial encounter    SPECIMENS RECEIVED:  FEMORAL HEAD, RIGHT    MICROSCOPIC DESCRIPTION:  Tissue blocks are prepared and slides are examined microscopically on all  specimens. See diagnosis for details.    Professional interpretation rendered by Abhilash Wharton M.D., CELESTECRadhaARadhaP. at  [facility], [facility], [address] [place] [address].    GROSS DESCRIPTION:  Received in formalin labeled \"femoral head\" is a portion of femoral head  measuring 4.6 x 4.2 x 4.0 cm and a separate aggregate of bone and soft tissue  measuring 5.5 x 5.1 x 2.5 cm.  The articular surface is tan-brown and roughened.  The cut surface is red-brown and jagged with a surgical defect measuring 0.7 x  0.7 cm.  The specimen is sectioned to reveal a slightly hemorrhagic cut surface.  The uninvolved bone is tan-yellow and gritty.  Representative sections of the  hemorrhagic areas are submitted in A1-A2, following decalcification.  [place]    REVIEWED, DIAGNOSED AND ELECTRONICALLY  SIGNED BY:    Abhilash Wharton M.D., F.C.A.P.  CPT CODES:  54214, 88311x2      CBC & Differential [[id number]]  (Abnormal) Collected: 09/22/23 0540    Specimen: Blood Updated: 09/22/23 0749    Narrative:      The following orders were created for panel order CBC & Differential.  Procedure                               Abnormality         Status                     ---------                               -----------         ------                     CBC Auto Differential[[id number]]        Abnormal            Final result               Scan Slide[[id number]]                                       Final result                 Please view results for these tests on the individual orders.    Scan Slide [[id number]] Collected: 09/22/23 0540    Specimen: Blood Updated: " 09/22/23 0749     Anisocytosis Slight/1+     Hypochromia Slight/1+     Microcytes Slight/1+     WBC Morphology Normal     Platelet Estimate Decreased    CBC Auto Differential [[id number]]  (Abnormal) Collected: 09/22/23 0540    Specimen: Blood Updated: 09/22/23 0646     WBC 8.31 10*3/mm3      RBC 2.44 10*6/mm3      Hemoglobin 7.5 g/dL      Hematocrit 22.0 %      MCV 90.2 fL      MCH 30.7 pg      MCHC 34.1 g/dL      RDW 13.2 %      RDW-SD 43.9 fl      MPV 11.9 fL      Platelets 123 10*3/mm3      Neutrophil % 78.8 %      Lymphocyte % 9.0 %      Monocyte % 9.0 %      Eosinophil % 2.2 %      Basophil % 0.4 %      Immature Grans % 0.6 %      Neutrophils, Absolute 6.55 10*3/mm3      Lymphocytes, Absolute 0.75 10*3/mm3      Monocytes, Absolute 0.75 10*3/mm3      Eosinophils, Absolute 0.18 10*3/mm3      Basophils, Absolute 0.03 10*3/mm3      Immature Grans, Absolute 0.05 10*3/mm3      nRBC 0.0 /100 WBC     CBC & Differential [[id number]]  (Abnormal) Collected: 09/21/23 0524    Specimen: Blood Updated: 09/21/23 0649    Narrative:      The following orders were created for panel order CBC & Differential.  Procedure                               Abnormality         Status                     ---------                               -----------         ------                     CBC Auto Differential[[id number]]        Abnormal            Final result                 Please view results for these tests on the individual orders.    CBC Auto Differential [[id number]]  (Abnormal) Collected: 09/21/23 0524    Specimen: Blood Updated: 09/21/23 0649     WBC 9.44 10*3/mm3      RBC 2.76 10*6/mm3      Hemoglobin 8.5 g/dL      Hematocrit 24.9 %      MCV 90.2 fL      MCH 30.8 pg      MCHC 34.1 g/dL      RDW 13.2 %      RDW-SD 43.2 fl      MPV 12.1 fL      Platelets 133 10*3/mm3      Neutrophil % 83.6 %      Lymphocyte % 7.7 %      Monocyte % 7.7 %      Eosinophil % 0.3 %      Basophil % 0.2 %      Immature Grans % 0.5 %      Neutrophils,  Absolute 7.88 10*3/mm3      Lymphocytes, Absolute 0.73 10*3/mm3      Monocytes, Absolute 0.73 10*3/mm3      Eosinophils, Absolute 0.03 10*3/mm3      Basophils, Absolute 0.02 10*3/mm3      Immature Grans, Absolute 0.05 10*3/mm3      nRBC 0.0 /100 WBC     Hemoglobin & Hematocrit, Blood [[id number]]  (Abnormal) Collected: 09/19/23 2043    Specimen: Blood Updated: 09/19/23 2048     Hemoglobin 10.8 g/dL      Hematocrit 32.0 %     Comprehensive Metabolic Panel [[id number]]  (Abnormal) Collected: 09/19/23 1235    Specimen: Blood Updated: 09/19/23 1303     Glucose 138 mg/dL      BUN 17 mg/dL      Creatinine 0.83 mg/dL      Sodium 133 mmol/L      Potassium 4.5 mmol/L      Chloride 98 mmol/L      CO2 25.0 mmol/L      Calcium 9.8 mg/dL      Total Protein 6.4 g/dL      Albumin 3.9 g/dL      ALT (SGPT) 9 U/L      AST (SGOT) 20 U/L      Alkaline Phosphatase 69 U/L      Total Bilirubin 0.3 mg/dL      Globulin 2.5 gm/dL      A/G Ratio 1.6 g/dL      BUN/Creatinine Ratio 20.5     Anion Gap 10.0 mmol/L      eGFR 68.8 mL/min/1.73     Narrative:      GFR Normal >60  Chronic Kidney Disease <60  Kidney Failure <15    The GFR formula is only valid for adults with stable renal function between ages 18 and 70.    aPTT [[id number]]  (Normal) Collected: 09/19/23 1235    Specimen: Blood Updated: 09/19/23 1254     PTT 28.0 seconds     Narrative:      The recommended Heparin therapeutic range is 68-97 seconds.    Protime-INR [[id number]]  (Normal) Collected: 09/19/23 1235    Specimen: Blood Updated: 09/19/23 1254     Protime 14.1 Seconds      INR 1.09    Narrative:      Therapeutic range for most indications is 2.0-3.0 INR,  or 2.5-3.5 for mechanical heart valves.          Imaging Results (Most Recent)       Procedure Component Value Units Date/Time    XR Chest 1 View [[id number]] Collected: 09/25/23 0902     Updated: 09/25/23 0919    Narrative:      INDICATION:  Fever.    FINDINGS:  No infiltrate, effusion or pneumothorax is seen.  Heart size and  pulmonary  vascularity are normal.  The lungs are clear.  The mediastinum, cyndie and  visualized osseous structures are unremarkable.      Impression:      No significant abnormality of the chest.    US Renal Bilateral [[id number]] Collected: 09/21/23 0839     Updated: 09/21/23 1210    Narrative:      PROCEDURE:  Complete retroperitoneal sonogram    COMPARISON:  No comparison    HISTORY:  Follow-up renal ultrasound.  No other history was given.    FINDINGS:  Realtime sonographic images are obtained of the kidneys and bladder.  The  kidneys are normal in size and echogenicity.  A 14 mm echogenic structure lower  left kidney is suspicious of a nonobstructing stone.  No hydronephrosis. The  right kidney measures 10.8 cm in length, the left kidney 10.2 cm in length.  Small right renal cyst is present.    The bladder is grossly unremarkable.      Impression:      1.  Kidneys are normal in size and demonstrate normal renal cortical  echogenicity.  No hydronephrosis.    2.  A 14 mm echogenic structure lower left kidney likely representing a  nonobstructing stone.    3.  Small right renal cyst.    FL C Arm During Surgery [[id number]] Resulted: 09/20/23 0821     Updated: 09/20/23 0821    XR Hip With or Without Pelvis 2 - 3 View Right [[id number]] Collected: 09/19/23 1213     Updated: 09/19/23 1217    Narrative:      HISTORY: trauma    COMPARISON: None    FINDINGS:  AP and lateral views were obtained.    Acute fracture of the right femoral neck with proximal migration of the distal  fragment and varus angulation. No dislocation. No pelvic fracture.              Chief Complaint on Day of Discharge: none    Hospital Course:  The patient is a 88 y.o. female who presented to [facility] with mechanical fall and subsequently underwent open reduction internal fixation by Dr. Adler during hospitalization 9/19/2023.  Patient suffered from hematochezia during hospitalization, and received 1 unit packed RBCs.  Patient  "evaluated by gastroenterology during hospitalization, with anoscopy occurring at bedside.  Patient found to have hemorrhoids causing hematochezia.  Patient ultimately advised to follow-up with gastroenterology for continued hemorrhoid management as outpatient.  Patient started on Anusol cream during hospitalization..      Condition on Discharge: Fair    Physical Exam on Discharge:  /56 (BP Location: Left arm, Patient Position: Lying)   Pulse 66   Temp 97.7 °F (36.5 °C) (Temporal)   Resp 18   Ht 170.2 cm (67\")   Wt 74.4 kg (164 lb)   LMP  (LMP Unknown)   SpO2 99%   BMI 25.69 kg/m²   Physical Exam  Physical Exam  Vitals and nursing note reviewed.   Constitutional:       Appearance: Normal appearance.   HENT:      Head: Normocephalic and atraumatic.      Right Ear: External ear normal.      Left Ear: External ear normal.      Nose: Nose normal. No congestion or rhinorrhea.      Mouth/Throat:      Mouth: Mucous membranes are moist.      Pharynx: Oropharynx is clear.   Eyes:      General: No scleral icterus.     Extraocular Movements: Extraocular movements intact.      Conjunctiva/sclera: Conjunctivae normal.      Pupils: Pupils are equal, round, and reactive to light.   Neck:      Vascular: No carotid bruit.   Cardiovascular:      Rate and Rhythm: Normal rate and regular rhythm.      Pulses: Normal pulses.      Heart sounds: Normal heart sounds. No murmur heard.  Pulmonary:      Effort: Pulmonary effort is normal.      Breath sounds: Normal breath sounds. No wheezing, rhonchi or rales.   Abdominal:      General: Abdomen is flat. Bowel sounds are normal.      Palpations: Abdomen is soft.      Tenderness: There is no abdominal tenderness. There is no guarding.   Musculoskeletal:         General: No swelling or deformity.      Cervical back: Normal range of motion and neck supple. No tenderness.      Right hip: Tenderness and bony tenderness present. Decreased range of motion.      Right lower leg: No edema. "      Left lower leg: No edema.   Skin:     General: Skin is warm and dry.      Capillary Refill: Capillary refill takes less than 2 seconds.      Findings: No rash.   Neurological:      General: No focal deficit present.      Mental Status: She is alert and oriented to person, place, and time.      GCS: GCS eye subscore is 4. GCS verbal subscore is 5. GCS motor subscore is 6.      Motor: No weakness.   Psychiatric:         Mood and Affect: Mood normal.         Behavior: Behavior normal.         Thought Content: Thought content normal.         Judgment: Judgment normal.           Discharge Disposition:  Skilled Nursing Facility (DC - External)    Discharge Medications:     Discharge Medications        New Medications        Instructions Start Date   Hydrocortisone (Perianal) 2.5 % rectal cream  Commonly known as: ANUSOL-HC   Rectal, 2 Times Daily      oxyCODONE 5 MG immediate release tablet  Commonly known as: ROXICODONE   5 mg, Oral, Every 6 Hours PRN             Continue These Medications        Instructions Start Date   amLODIPine 5 MG tablet  Commonly known as: NORVASC   TAKE 1 TABLET BY MOUTH DAILY IN THE EVENING      aspirin 81 MG disintegrating tablet   81 mg, Oral, Daily      CO ENZYME Q-10 PO   100 mg, Oral, Daily      docusate sodium 100 MG capsule  Commonly known as: COLACE   100 mg, Oral, Daily      FERREX 150 PO   150 mg, Oral, Daily      fluticasone 50 MCG/ACT nasal spray  Commonly known as: FLONASE   2 sprays, Nasal, Daily      hydroCHLOROthiazide 12.5 MG tablet  Commonly known as: HYDRODIURIL   12.5 mg, Oral, Daily      labetalol 200 MG tablet  Commonly known as: NORMODYNE   100 mg, Oral, 2 Times Daily, .5      loratadine 10 MG tablet  Commonly known as: CLARITIN   10 mg, Oral, Daily      losartan 100 MG tablet  Commonly known as: COZAAR   100 mg, Oral, Daily      metFORMIN  MG 24 hr tablet  Commonly known as: GLUCOPHAGE-XR   1,500 mg, Oral, Nightly      ONE TOUCH ULTRA TEST test strip  Generic  drug: glucose blood   USE TO TEST BLOOD SUGAR EVERY DAY DX.E11.9      rosuvastatin 10 MG tablet  Commonly known as: CRESTOR   10 mg, Oral, Nightly      SALINE NASAL SPRAY NA   Nasal, As Needed      vitamin B-12 1000 MCG tablet  Commonly known as: CYANOCOBALAMIN   1,000 mcg, Oral, Daily             Stop These Medications      cefuroxime 500 MG tablet  Commonly known as: CEFTIN              Discharge Diet: Diabetic appropriate diet    Activity at Discharge: Slowly regain normal activity level    Discharge Care Plan/Instructions: Please follow-up with primary care physician, gastroenterology, and orthopedic surgery on outpatient basis    Follow-up Appointments:   Future Appointments   Date Time Provider Department Center   11/20/2023 10:30 AM Ember Forrest MD [facility] None       Test Results Pending at Discharge:   Pending Labs       Order Current Status    Blood Culture - Blood, Arm, Right Preliminary result    Blood Culture - Blood, Arm, Right Preliminary result                        Time: 848

## 2023-09-29 NOTE — PROGRESS NOTES
Adult Nutrition  Assessment/PES    Patient Name:  Alexai Lopez  YOB: 1935  MRN: [id number]  Admit Date:  9/19/2023    Assessment Date:  9/29/2023    Comments: This patient is on a controlled carbohydrate, regular texture, thin liquids diet. He has been placed on a 1500 ml fluid restriction due to sodium level of 128 on 9/29/30. Her appetite has been fair, averaging around 50-60% of meals. Ht: 67 inches wt: 164, decrease of 5.75%. She has moderate malnutrition. I provided her with nutrition education on a 1500 ml fluid restriction. I encouraged her to continue to try and eat more.              Estimated/Assessed Needs - Anthropometrics       Row Name 09/29/23 0400          Anthropometrics    Weight 74.4 kg (164 lb)                           Problem/Interventions:   Problem 1       Row Name 09/29/23 1022          Nutrition Diagnoses Problem 1    Problem 1 Malnutrition     Etiology (related to) Factors Affecting Nutrition     Appetite Fair     Signs/Symptoms (evidenced by) PO Intake;Unintended Weight Change  various fat loss and muscle wasting     Percent (%) intake recorded 55 %     Over number of meals 21     Unintended Weight Change Loss     Number of Pounds Lost 9.82 pounds     Weight loss time period Over a 3 month period                    Problem 2       Row Name 09/29/23 1033          Nutrition Diagnoses Problem 2    Problem 2 Increased Nutrient Needs     Macronutrient Protein;Kcal     Etiology (related to) Medical Diagnosis     Ortho Fracture     Skin Surgical wound     Signs/Symptoms (evidenced by) Unintended Weight Change     Unintended Weight Change Loss     Number of Pounds Lost 10     Weight loss time period 3 months                        Intervention Goal       Row Name 09/29/23 1034          Intervention Goal    General Maintain nutrition     PO Increase intake     Weight Maintain weight                    Nutrition Intervention       Row Name 09/29/23 1035          Nutrition  Intervention    RD/Tech Action Follow Tx progress;Encourage intake;Care plan reviewd                    Nutrition Prescription       Row Name 09/29/23 1035          Nutrition Prescription PO    PO Prescription Other (comment)     Fluid Consistency Thin     Common Modifiers Fluid Restriction;Consistent Carbohydrate     Fluid Restriction mL per Day 1500 mL                    Education/Evaluation       Row Name 09/29/23 1037          Education    Education Provided education regarding     Education Topics Fluid     Fluid (mL/day) 1500 mL/day        Monitor/Evaluation    Monitor PO intake;Pertinent labs;Skin status;Weight     Education Follow-up Reinforce PRN                     Electronically signed by:  Jere Magana RD  09/29/23 11:30 CDT

## 2023-09-29 NOTE — THERAPY TREATMENT NOTE
Patient Name: Alexia Lopez  : 1935    MRN: [id number]                              Today's Date: 2023       Physical Therapy Treatment Note    Admit Date: 2023    Visit Dx:     ICD-10-CM ICD-9-CM   1. Traumatic closed displaced fracture of neck of right femur, initial encounter  S72.001A 820.8   2. Coronary arteriosclerosis in native artery  I25.10 414.01   3. S/P CABG (coronary artery bypass graft)  Z95.1 V45.81   4. Benign essential hypertension  I10 401.1   5. Controlled type 2 diabetes mellitus without complication, with long-term current use of insulin  E11.9 250.00    Z79.4 V58.67   6. Fall from slip, trip, or stumble, initial encounter  W01.0XXA E885.9   7. Closed fracture of right hip, initial encounter  S72.001A 820.8   8. Impaired functional mobility, balance, gait, and endurance [Z74.09 (ICD-10-CM)]  Z74.09 V49.89   9. Impaired mobility and ADLs [Z74.09, Z78.9 (ICD-10-CM)]  Z74.09 V49.89    Z78.9      Patient Active Problem List   Diagnosis    Coronary arteriosclerosis in native artery    S/P CABG (coronary artery bypass graft)    Benign essential hypertension    Hypercholesterolemia    Type 2 diabetes mellitus    Nuclear cataract    Controlled type 2 diabetes mellitus without complication    Pernicious anemia    Arthritis of knee    Chronic sinusitis    Encounter for screening mammogram for malignant neoplasm of breast    Generalized osteoarthritis    Iron deficiency anemia    Palpitations    Myocardial infarction    Hypertension    Hyperlipemia    History of echocardiogram    Diabetes mellitus    Coronary artery disease    Cataract    Chronic pain of both knees    Primary osteoarthritis of both knees    Claudication of lower extremity    Precordial pain    Fall from slip, trip, or stumble, initial encounter    Traumatic closed displaced fracture of neck of right femur, initial encounter    Hip fracture    Moderate malnutrition     Past Medical History:   Diagnosis Date     Cataract     Coronary artery disease     Diabetes mellitus     Type 2 diabetes mellitus - without retinopathy       History of echocardiogram 05/12/2014    Normal LV systolic function with EF of 55% with mild hypokinesis. Diastolic relaxation abnormality of left ventricle. Mild tricuspid regurg. Trace mitral regurg    Hyperlipemia     Hypertension     Myocardial infarction      Past Surgical History:   Procedure Laterality Date    BACK SURGERY      CARDIAC CATHETERIZATION  05/12/2014    Severe multivessel CAD with critical lesions noted in the LAD coronary artery, left circumflex coronary artery and RCA. Left ventriculogram no performed in view of elevated left ventricular end-diastolic pressure    CATARACT EXTRACTION W/ INTRAOCULAR LENS IMPLANT Left 2/2/2018    Procedure: CATARACT PHACO EXTRACTION WITH INTRAOCULAR LENS IMPLANT;  Surgeon: Gagan Lizarraga MD;  Location: [facility];  Service:     CATARACT EXTRACTION W/ INTRAOCULAR LENS IMPLANT Right 2/9/2018    Procedure: CATARACT PHACO EXTRACTION WITH INTRAOCULAR LENS IMPLANT;  Surgeon: Gagan Lizarraga MD;  Location: [facility];  Service:     CORONARY ARTERY BYPASS GRAFT      X 3 with LIMA to LAD, SVG to OMB and SVG to PDA.    DILATATION AND CURETTAGE      OTHER SURGICAL HISTORY  05/06/2014    INCISION OF EARDRUM GENERAL ANESTHETIC 23750 (1)    Bilateral myringotomy with tubes.     SINUS SURGERY      TONSILLECTOMY      TONSILLECTOMY AND ADENOIDECTOMY      TUBAL ABDOMINAL LIGATION  03/14/1978      General Information       Row Name 09/29/23 1041          Physical Therapy Time and Intention    Document Type therapy note (daily note)  -     Mode of Treatment individual therapy;physical therapy  -       Row Name 09/29/23 1041          General Information    Patient Profile Reviewed yes  -     Existing Precautions/Restrictions fall;hip, anterior  -       Row Name 09/29/23 1041          Cognition    Orientation Status (Cognition) oriented x 4  -        Row Name 09/29/23 1041          Safety Issues, Functional Mobility    Impairments Affecting Function (Mobility) strength;endurance/activity tolerance;balance;pain  -               User Key  (r) = Recorded By, (t) = Taken By, (c) = Cosigned By      Initials Name Provider Type     Brianda Young PTA Physical Therapist Assistant                   Mobility       Row Name 09/29/23 1105          Bed Mobility    Bed Mobility bed mobility (all) activities  -     All Activities, Cat[place] (Bed Mobility) unable to assess;not tested  -     Comment, (Bed Mobility) pt sitting up in chair on entry, post treatment she is left sitting on toilet with nsg notified  -       Row Name 09/29/23 1105          Bed-Chair Transfer    Bed-Chair [place] (Transfers) supervision  -     Assistive Device (Bed-Chair Transfers) walker, front-wheeled  -       Row Name 09/29/23 1105          Sit-Stand Transfer    Sit-Stand Cat[place] (Transfers) supervision  -     Assistive Device (Sit-Stand Transfers) walker, front-wheeled  -[facility] Name 09/29/23 1105          Gait/Stairs (Locomotion)    Catron Level (Gait) supervision  -     Assistive Device (Gait) walker, front-wheeled  -     Distance in Feet (Gait) 90 feet + 90 feet + 14 feet  -     Deviations/Abnormal Patterns (Gait) antalgic;gait speed decreased  -       Row Name 09/29/23 1105          Mobility    Extremity Weight-bearing Status right lower extremity  -     Right Lower Extremity (Weight-bearing Status) weight-bearing as tolerated (WBAT)  -               User Key  (r) = Recorded By, (t) = Taken By, (c) = Cosigned By      Initials Name Provider Type     Brianda Young PTA Physical Therapist Assistant                   Obj/Interventions       Row Name 09/29/23 1105          Motor Skills    Therapeutic Exercise hip;knee;ankle  -       Row Name 09/29/23 1105          Hip (Therapeutic Exercise)    Hip (Therapeutic Exercise) strengthening exercise   -     Hip Isometrics (Therapeutic Exercise) bilateral;aDduction;gluteal sets;10 repetitions;2 sets;sitting;5 second hold  -     Hip Strengthening (Therapeutic Exercise) bilateral;marching while seated;20 repititions;sitting;5 second hold  -       Row Name 09/29/23 1105          Knee (Therapeutic Exercise)    Knee AROM (Therapeutic Exercise) bilateral;heel slides;sitting;20 repititions  -     Knee Strengthening (Therapeutic Exercise) bilateral;sitting;LAQ (long arc quad);20 repititions;5 second hold  -       Row Name 09/29/23 1105          Ankle (Therapeutic Exercise)    Ankle Strengthening (Therapeutic Exercise) bilateral;dorsiflexion;plantarflexion;20 repititions;5 second hold;sitting  -               User Key  (r) = Recorded By, (t) = Taken By, (c) = Cosigned By      Initials Name Provider Type     Brianda Young PTA Physical Therapist Assistant                   Goals/Plan       Row Name 09/29/23 1141          Bed Mobility Goal 1 (PT)    Activity/Assistive Device (Bed Mobility Goal 1, PT) sit to supine/supine to sit  -     Harvey Level/Cues Needed (Bed Mobility Goal 1, PT) independent  -     Time Frame (Bed Mobility Goal 1, PT) by discharge  -     Strategies/Barriers (Bed Mobility Goal 1, PT) HOB flat, no bed rails.  -     Progress/Outcomes (Bed Mobility Goal 1, PT) goal not met  -       Row Name 09/29/23 1141          Transfer Goal 1 (PT)    Activity/Assistive Device (Transfer Goal 1, PT) sit-to-stand/stand-to-sit;bed-to-chair/chair-to-bed;walker, rolling  -     Harvey Level/Cues Needed (Transfer Goal 1, PT) modified independence  -     Time Frame (Transfer Goal 1, PT) by discharge  -     Strategies/Barriers (Transfers Goal 1, PT) R hip fx, SHEELA, anterior, WBAT.  -     Progress/Outcome (Transfer Goal 1, PT) goal not met  -       Row Name 09/29/23 1141          Gait Training Goal 1 (PT)    Activity/Assistive Device (Gait Training Goal 1, PT) walker, rolling  -      Davis Level (Gait Training Goal 1, PT) contact guard required  -     Distance (Gait Training Goal 1, PT) 75'x2  -MH     Time Frame (Gait Training Goal 1, PT) by discharge  -     Strategies/Barriers (Gait Training Goal 1, PT) R hip fx, SHEELA, anterior, WBAT.  -MH     Progress/Outcome (Gait Training Goal 1, PT) goal met   -       Row Name 09/29/23 1141          Stairs Goal 1 (PT)    Activity/Assistive Device (Stairs Goal 1, PT) using handrail, right;using handrail, left  -     Davis Level/Cues Needed (Stairs Goal 1, PT) contact guard required  -     Number of Stairs (Stairs Goal 1, PT) 2 steps.  -     Time Frame (Stairs Goal 1, PT) by discharge  -     Strategies/Barriers (Stairs Goal 1, PT) R hip fx, SHEELA, anterior, WBAT.  -     Progress/Outcome (Stairs Goal 1, PT) goal not met  -               User Key  (r) = Recorded By, (t) = Taken By, (c) = Cosigned By      Initials Name Provider Type     Brianda Young PTA Physical Therapist Assistant                   Clinical Impression       Row Name 09/29/23 1041          Pain    Pretreatment Pain Rating 0/10 - no pain  -     Posttreatment Pain Rating 0/10 - no pain  -       Row Name 09/29/23 1041          Plan of Care Review    Plan of Care Reviewed With patient  -     Progress improving  -     Outcome Evaluation patient is sitting up in chair on entry. agreeable to treatment. she is Supervision with fww for sit to stand today with increased time. she is able to transfer bed to chair/toilet with supervision and FWW and increased time. she ambulates increased gait distance today with fww and supervision with cues for stride length today. completes sitting therex in bedside chair today with no difficulty, with written copies of HEP issued to patient today with patient verbalizing understanding. unable to complete bed mobility because food tray is present post treamtent and patient is left in restroom with call light in reach and nsg  notified post treatment. all needs met  -       Row Name 09/29/23 1041          Therapy Assessment/Plan (PT)    Rehab Potential (PT) good, to achieve stated therapy goals  -     Criteria for Skilled Interventions Met (PT) yes;skilled treatment is necessary  -     Therapy Frequency (PT) daily  -       Row Name 09/29/23 1041          Vital Signs    Pre Systolic BP Rehab 133  -MH     Pre Treatment Diastolic BP 63  -MH     Pretreatment Heart Rate (beats/min) 71  -MH     Pre SpO2 (%) 94  -MH     O2 Delivery Pre Treatment room air  -     Pre Patient Position Sitting  -     Post Patient Position Sitting  -       Row Name 09/29/23 1041          Positioning and Restraints    Pre-Treatment Position sitting in chair/recliner  -     Post Treatment Position bathroom  -     Bathroom notified nsg;sitting;call light within reach;encouraged to call for assist  -               User Key  (r) = Recorded By, (t) = Taken By, (c) = Cosigned By      Initials Name Provider Type    Brianda Rodríguez PTA Physical Therapist Assistant                   Outcome Measures       Row Name 09/29/23 1141 09/29/23 0800       How much help from another person do you currently need...    Turning from your back to your side while in flat bed without using bedrails? 3  -MH 3  -LW    Moving from lying on back to sitting on the side of a flat bed without bedrails? 3  -MH 3  -LW    Moving to and from a bed to a chair (including a wheelchair)? 4  -MH 3  -LW    Standing up from a chair using your arms (e.g., wheelchair, bedside chair)? 4  - 3  -LW    Climbing 3-5 steps with a railing? 2  -MH 2  -LW    To walk in hospital room? 4  -MH 3  -LW    AM-PAC 6 Clicks Score (PT) 20  - 17  -LW    Highest level of mobility 6 --> Walked 10 steps or more  - 5 --> Static standing  -LW              User Key  (r) = Recorded By, (t) = Taken By, (c) = Cosigned By      Initials Name Provider Type    Brianda Rodríguez PTA Physical Therapist Assistant     Jessica Enriquez, RN Registered Nurse                                 Physical Therapy Education       Title: PT OT SLP Therapies (In Progress)       Topic: Physical Therapy (In Progress)       Point: Mobility training (In Progress)       Learning Progress Summary             Patient Acceptance, E, NR by RE at 9/28/2023 1028    Comment: Transfer training, equal step lengths, increasing delvin.    Acceptance, E, NR by NIDA at 9/24/2023 1259    Acceptance, E, NR by CZ at 9/20/2023 0933    Comment: PT POC, rehab process, hand placement with transfers, use of FWW, proper gait mechanics.                         Point: Home exercise program (Not Started)       Learner Progress:  Not documented in this visit.              Point: Body mechanics (Not Started)       Learner Progress:  Not documented in this visit.              Point: Precautions (Not Started)       Learner Progress:  Not documented in this visit.                              User Key       Initials Effective Dates Name Provider Type Discipline    NIDA 06/16/21 -  Eugenio Chavez, PTA Physical Therapist Assistant PT    CZ 07/11/23 -  John Bazan, PT Physical Therapist PT    RE 08/16/23 -  Eliceo Russell PT Student PT Student PT                  PT Recommendation and Plan     Plan of Care Reviewed With: patient  Progress: improving  Outcome Evaluation: patient is sitting up in chair on entry. agreeable to treatment. she is Supervision with fww for sit to stand today with increased time. she is able to transfer bed to chair/toilet with supervision and FWW and increased time. she ambulates increased gait distance today with fww and supervision with cues for stride length today. completes sitting therex in bedside chair today with no difficulty, with written copies of HEP issued to patient today with patient verbalizing understanding. unable to complete bed mobility because food tray is present post treamtent and patient is left in restroom with call light in  reach and nsg notified post treatment. all needs met     Time Calculation:         PT Charges       Row Name 09/29/23 1045             Time Calculation    Start Time 1038  -MH      Stop Time 1131  -MH      Time Calculation (min) 53 min  -MH      PT Received On 09/29/23  -MH         Time Calculation- PT    Total Timed Code Minutes- PT 53 minute(s)  -MH         Timed Charges    44851 - PT Therapeutic Exercise Minutes 25  -MH      82344 - PT Therapeutic Activity Minutes 28  -MH         Total Minutes    Timed Charges Total Minutes 53  -MH       Total Minutes 53  -MH                User Key  (r) = Recorded By, (t) = Taken By, (c) = Cosigned By      Initials Name Provider Type     Brianda Yuong PTA Physical Therapist Assistant                  Therapy Charges for Today       Code Description Service Date Service Provider Modifiers Qty    01894279062 HC PT THER PROC EA 15 MIN 9/29/2023 Brianda Young PTA GP 2    17614990320 HC PT THERAPEUTIC ACT EA 15 MIN 9/29/2023 Brianda Young PTA GP 2            PT G-Codes  Outcome Measure Options: AM-PAC 6 Clicks Basic Mobility (PT)  AM-PAC 6 Clicks Score (PT): 20  AM-PAC 6 Clicks Score (OT): 21  PT Discharge Summary  Anticipated Discharge Disposition (PT): home with assist, home with outpatient therapy services, inpatient rehabilitation facility, skilled nursing facility    Brianda Young PTA  9/29/2023

## 2023-09-29 NOTE — THERAPY TREATMENT NOTE
Patient Name: Alexia Lopez  : 1935    MRN: [id number]                              Today's Date: 2023       Admit Date: 2023    Visit Dx:     ICD-10-CM ICD-9-CM   1. Traumatic closed displaced fracture of neck of right femur, initial encounter  S72.001A 820.8   2. Coronary arteriosclerosis in native artery  I25.10 414.01   3. S/P CABG (coronary artery bypass graft)  Z95.1 V45.81   4. Benign essential hypertension  I10 401.1   5. Controlled type 2 diabetes mellitus without complication, with long-term current use of insulin  E11.9 250.00    Z79.4 V58.67   6. Fall from slip, trip, or stumble, initial encounter  W01.0XXA E885.9   7. Closed fracture of right hip, initial encounter  S72.001A 820.8   8. Impaired functional mobility, balance, gait, and endurance [Z74.09 (ICD-10-CM)]  Z74.09 V49.89   9. Impaired mobility and ADLs [Z74.09, Z78.9 (ICD-10-CM)]  Z74.09 V49.89    Z78.9      Patient Active Problem List   Diagnosis    Coronary arteriosclerosis in native artery    S/P CABG (coronary artery bypass graft)    Benign essential hypertension    Hypercholesterolemia    Type 2 diabetes mellitus    Nuclear cataract    Controlled type 2 diabetes mellitus without complication    Pernicious anemia    Arthritis of knee    Chronic sinusitis    Encounter for screening mammogram for malignant neoplasm of breast    Generalized osteoarthritis    Iron deficiency anemia    Palpitations    Myocardial infarction    Hypertension    Hyperlipemia    History of echocardiogram    Diabetes mellitus    Coronary artery disease    Cataract    Chronic pain of both knees    Primary osteoarthritis of both knees    Claudication of lower extremity    Precordial pain    Fall from slip, trip, or stumble, initial encounter    Traumatic closed displaced fracture of neck of right femur, initial encounter    Hip fracture    Moderate malnutrition     Past Medical History:   Diagnosis Date    Cataract     Coronary artery disease      Diabetes mellitus     Type 2 diabetes mellitus - without retinopathy       History of echocardiogram 05/12/2014    Normal LV systolic function with EF of 55% with mild hypokinesis. Diastolic relaxation abnormality of left ventricle. Mild tricuspid regurg. Trace mitral regurg    Hyperlipemia     Hypertension     Myocardial infarction      Past Surgical History:   Procedure Laterality Date    BACK SURGERY      CARDIAC CATHETERIZATION  05/12/2014    Severe multivessel CAD with critical lesions noted in the LAD coronary artery, left circumflex coronary artery and RCA. Left ventriculogram no performed in view of elevated left ventricular end-diastolic pressure    CATARACT EXTRACTION W/ INTRAOCULAR LENS IMPLANT Left 2/2/2018    Procedure: CATARACT PHACO EXTRACTION WITH INTRAOCULAR LENS IMPLANT;  Surgeon: Gagan Lizarraga MD;  Location: [facility];  Service:     CATARACT EXTRACTION W/ INTRAOCULAR LENS IMPLANT Right 2/9/2018    Procedure: CATARACT PHACO EXTRACTION WITH INTRAOCULAR LENS IMPLANT;  Surgeon: Gagan Lizarraga MD;  Location: [facility];  Service:     CORONARY ARTERY BYPASS GRAFT      X 3 with LIMA to LAD, SVG to OMB and SVG to PDA.    DILATATION AND CURETTAGE      OTHER SURGICAL HISTORY  05/06/2014    INCISION OF EARDRUM GENERAL ANESTHETIC 17748 (1)    Bilateral myringotomy with tubes.     SINUS SURGERY      TONSILLECTOMY      TONSILLECTOMY AND ADENOIDECTOMY      TUBAL ABDOMINAL LIGATION  03/14/1978      General Information       Row Name 09/29/23 0850          OT Time and Intention    Document Type therapy note (daily note)  -KD     Mode of Treatment individual therapy;occupational therapy  -KD       Row Name 09/29/23 0850          General Information    Patient Profile Reviewed yes  -KD     Existing Precautions/Restrictions fall;hip, anterior  -KD       Row Name 09/29/23 0850          Cognition    Orientation Status (Cognition) oriented x 4  -KD       Row Name 09/29/23 0850          Safety Issues,  Functional Mobility    Impairments Affecting Function (Mobility) strength;endurance/activity tolerance;balance;pain  -               User Key  (r) = Recorded By, (t) = Taken By, (c) = Cosigned By      Initials Name Provider Type     Barb Oneil COTA Occupational Therapist Assistant                     Mobility/ADL's       Row Name 09/29/23 0850          Bed Mobility    Comment, (Bed Mobility) Pt sitting on toilet upon entry.  -       Row Name 09/29/23 0850          Sit-Stand Transfer    Sit-Stand [place] (Transfers) contact guard  -KD     Assistive Device (Sit-Stand Transfers) walker, front-wheeled  -KD       Row Name 09/29/23 0850          Stand-Sit Transfer    Stand-Sit [place] (Transfers) contact guard  -KD     Assistive Device (Stand-Sit Transfers) walker, front-wheeled  -KD       Row Name 09/29/23 0850          Toilet Transfer    [place] Level (Toilet Transfer) contact guard  -KD     Assistive Device (Toilet Transfer) commode  -       Row Name 09/29/23 0850          Functional Mobility    Functional Mobility- Ind. Level contact guard assist  -     Functional Mobility- Device walker, front-wheeled  -     Functional Mobility-Distance (Feet) 15  -KD       Row Name 09/29/23 0850          Activities of Daily Living    BADL Assessment/Intervention bathing;upper body dressing;lower body dressing;grooming;toileting  -       Row Name 09/29/23 0850          Mobility    Extremity Weight-bearing Status right lower extremity  -     Right Lower Extremity (Weight-bearing Status) weight-bearing as tolerated (WBAT)  -       Row Name 09/29/23 0850          Lower Body Dressing Assessment/Training    [place] Level (Lower Body Dressing) lower body dressing skills;doff;don;minimum assist (75% patient effort)  -     Position (Lower Body Dressing) edge of bed sitting  -       Row Name 09/29/23 0850          Grooming Assessment/Training    [place] Level (Grooming) grooming skills;hair  care, combing/brushing;oral care regimen;set up  -KD     Position (Grooming) supported sitting  -KD       Row Name 09/29/23 0850          Bathing Assessment/Intervention    [place] Level (Bathing) bathing skills;lower body;upper body;standby assist;moderate assist (50% patient effort)  -KD     Assistive Devices (Bathing) bath mitt;long-handled sponge  -KD     Position (Bathing) supported standing;unsupported sitting  -KD       Row Name 09/29/23 0850          Upper Body Dressing Assessment/Training    [place] Level (Upper Body Dressing) upper body dressing skills;doff;don;pull-over garment;set up  -KD     Position (Upper Body Dressing) supported sitting  -KD       Row Name 09/29/23 0850          Toileting Assessment/Training    [place] Level (Toileting) toileting skills;adjust/manage clothing;perform perineal hygiene;contact guard assist  -KD     Assistive Devices (Toileting) commode  -KD     Position (Toileting) supported standing  -KD               User Key  (r) = Recorded By, (t) = Taken By, (c) = Cosigned By      Initials Name Provider Type    KD Barb Oneil COTA Occupational Therapist Assistant                   Obj/Interventions    No documentation.                  Goals/Plan       Row Name 09/29/23 0850          Transfer Goal 1 (OT)    Activity/Assistive Device (Transfer Goal 1, OT) toilet  -KD     [place] Level/Cues Needed (Transfer Goal 1, OT) standby assist  -KD     Time Frame (Transfer Goal 1, OT) long term goal (LTG);by discharge  -KD     Progress/Outcome (Transfer Goal 1, OT) goal not met  -KD       Row Name 09/29/23 0850          Bathing Goal 1 (OT)    Activity/Device (Bathing Goal 1, OT) lower body bathing  -KD     [place] Level/Cues Needed (Bathing Goal 1, OT) moderate assist (50-74% patient effort)  -KD     Time Frame (Bathing Goal 1, OT) long term goal (LTG);by discharge  -KD     Strategies/Barriers (Bathing Goal 1, OT) Spouse can assist as needed  -KD      Progress/Outcomes (Bathing Goal 1, OT) goal met  -KD       Row Name 09/29/23 0850          Dressing Goal 1 (OT)    Activity/Device (Dressing Goal 1, OT) lower body dressing  -KD     [place]/Cues Needed (Dressing Goal 1, OT) minimum assist (75% or more patient effort)  -KD     Time Frame (Dressing Goal 1, OT) long term goal (LTG);by discharge  -KD     Strategies/Barriers (Dressing Goal 1, OT) Spouse can assist as needed  -KD     Progress/Outcome (Dressing Goal 1, OT) goal not met  -KD       Row Name 09/29/23 0850          Strength Goal 1 (OT)    Strength Goal 1 (OT) Pt will be (I) with BUE strengthening HEP after demonstration for increased strength required for ADLs and mobility.  -KD     Time Frame (Strength Goal 1, OT) long term goal (LTG);by discharge  -KD     Progress/Outcome (Strength Goal 1, OT) goal not met  -KD               User Key  (r) = Recorded By, (t) = Taken By, (c) = Cosigned By      Initials Name Provider Type    KD Barb Oneil COTA Occupational Therapist Assistant                   Clinical Impression       Row Name 09/29/23 0850          Pain Assessment    Pretreatment Pain Rating 0/10 - no pain  -KD     Posttreatment Pain Rating 0/10 - no pain  -KD       Row Name 09/29/23 0850          Plan of Care Review    Plan of Care Reviewed With patient  -KD     Progress improving  -KD     Outcome Evaluation Pt sitting on toilet upon arrival. Pericare-Min A. Sit>stand-CGA. Fxl mobility ~15' CGA of 1 w/ RW. UB bathing-SBA. LB bathing-Min A. UB dressing-Set up. LB dressing-Min A. Grooming tasks (brush teeth/ comb hair)-set up. Pt seated in recliner upon exit w/ all needs in reach. Cont OT POC.  -KD       Row Name 09/29/23 0850          Therapy Assessment/Plan (OT)    Therapy Frequency (OT) daily  -KD       Row Name 09/29/23 0850          Therapy Plan Review/Discharge Plan (OT)    Anticipated Discharge Disposition (OT) home with 24/7 care;home with home health;other (see comments);inpatient  rehabilitation facility  -KD       Row Name 09/29/23 0850          Vital Signs    Pre Systolic BP Rehab 132  -KD     Pre Treatment Diastolic BP 63  -KD     Pretreatment Heart Rate (beats/min) 66  -KD     Intra SpO2 (%) 98  -KD     Pre Patient Position Sitting  -KD     Intra Patient Position Standing  -KD     Post Patient Position Sitting  -KD       Row Name 09/29/23 0850          Positioning and Restraints    Pre-Treatment Position bathroom  -KD     Post Treatment Position chair  -KD     In Chair sitting;call light within reach;encouraged to call for assist;exit alarm on  -KD               User Key  (r) = Recorded By, (t) = Taken By, (c) = Cosigned By      Initials Name Provider Type    Barb Mims COTA Occupational Therapist Assistant                   Outcome Measures       Row Name 09/29/23 0850          How much help from another is currently needed...    Putting on and taking off regular lower body clothing? 3  -KD     Bathing (including washing, rinsing, and drying) 3  -KD     Toileting (which includes using toilet bed pan or urinal) 3  -KD     Putting on and taking off regular upper body clothing 4  -KD     Taking care of personal grooming (such as brushing teeth) 4  -KD     Eating meals 4  -KD     AM-PAC 6 Clicks Score (OT) 21  -KD               User Key  (r) = Recorded By, (t) = Taken By, (c) = Cosigned By      Initials Name Provider Type    Barb Mims COTA Occupational Therapist Assistant                    Occupational Therapy Education       Title: PT OT SLP Therapies (In Progress)       Topic: Occupational Therapy (In Progress)       Point: ADL training (Done)       Description:   Instruct learner(s) on proper safety adaptation and remediation techniques during self care or transfers.   Instruct in proper use of assistive devices.                  Learning Progress Summary             Patient Acceptance, E,TB, VU by CM at 9/20/2023 1231    Comment: OT POC, Role of OT, d/c  recommendations, AD/DME needs   Family Acceptance, E,TB, VU by CM at 9/20/2023 1231    Comment: OT POC, Role of OT, d/c recommendations, AD/DME needs                         Point: Home exercise program (Not Started)       Description:   Instruct learner(s) on appropriate technique for monitoring, assisting and/or progressing therapeutic exercises/activities.                  Learner Progress:  Not documented in this visit.              Point: Precautions (Done)       Description:   Instruct learner(s) on prescribed precautions during self-care and functional transfers.                  Learning Progress Summary             Patient Acceptance, E,TB, VU by CM at 9/20/2023 1231    Comment: OT POC, Role of OT, d/c recommendations, AD/DME needs   Family Acceptance, E,TB, VU by CM at 9/20/2023 1231    Comment: OT POC, Role of OT, d/c recommendations, AD/DME needs                         Point: Body mechanics (Not Started)       Description:   Instruct learner(s) on proper positioning and spine alignment during self-care, functional mobility activities and/or exercises.                  Learner Progress:  Not documented in this visit.                              User Key       Initials Effective Dates Name Provider Type Discipline     11/18/22 -  Emily Lehman OT Occupational Therapist OT                  OT Recommendation and Plan  Therapy Frequency (OT): daily  Plan of Care Review  Plan of Care Reviewed With: patient  Progress: improving  Outcome Evaluation: Pt sitting on toilet upon arrival. Pericare-Min A. Sit>stand-CGA. Fxl mobility ~15' CGA of 1 w/ RW. UB bathing-SBA. LB bathing-Min A. UB dressing-Set up. LB dressing-Min A. Grooming tasks (brush teeth/ comb hair)-set up. Pt seated in recliner upon exit w/ all needs in reach. Cont OT POC.     Time Calculation:         Time Calculation- OT       Row Name 09/29/23 0850             Time Calculation- OT    OT Start Time 0850  -KD      OT Stop Time 0928  -KD      OT  Time Calculation (min) 38 min  -KD      Total Timed Code Minutes- OT 38 minute(s)  -KD      OT Received On 09/29/23  -KD         Timed Charges    78720 - OT Self Care/Mgmt Minutes 38  -KD         Total Minutes    Timed Charges Total Minutes 38  -KD       Total Minutes 38  -KD                User Key  (r) = Recorded By, (t) = Taken By, (c) = Cosigned By      Initials Name Provider Type    Barb Mims COTA Occupational Therapist Assistant                  Therapy Charges for Today       Code Description Service Date Service Provider Modifiers Qty    87580843669 HC OT THER PROC EA 15 MIN 9/28/2023 Barb Oneil COTA GO 2    27928627408 HC OT SELF CARE/MGMT/TRAIN EA 15 MIN 9/29/2023 Barb Oneil COTA GO 3                 CLEMENTE Villavicencio  9/29/2023

## 2023-09-29 NOTE — PLAN OF CARE
Goal Outcome Evaluation:  Plan of Care Reviewed With: patient        Progress: improving  Outcome Evaluation: patient is sitting up in chair on entry. agreeable to treatment. she is Supervision with fww for sit to stand today with increased time. she is able to transfer bed to chair/toilet with supervision and FWW and increased time. she ambulates increased gait distance today with fww and supervision with cues for stride length today. completes sitting therex in bedside chair today with no difficulty, with written copies of HEP issued to patient today with patient verbalizing understanding. unable to complete bed mobility because food tray is present post treamtent and patient is left in restroom with call light in reach and nsg notified post treatment. all needs met      Anticipated Discharge Disposition (PT): home with assist, home with outpatient therapy services, inpatient rehabilitation facility, skilled nursing facility

## 2023-09-29 NOTE — PLAN OF CARE
Goal Outcome Evaluation:   Patient siting in chair at this time. Denies pain. Schedule tylenol giver per order. VSS. Call light and phone in reach.

## 2023-09-29 NOTE — PROGRESS NOTES
"  [facility]  [address] [place] [address]. [address]  T - [phone]  F - [phone]     Progress Note          PATIENT  DEMOGRAPHICS   PATIENT NAME: Alexia Lopez                      PHYSICIAN: Grace Del Rio MD  : 1935  MRN: [id number]   LOS: 6 days    Patient Care Team:  Munir Rodriguez MD as PCP - General  Praveen Rivas APRN as Nurse Practitioner (Orthopedic Surgery)  Subjective   SUBJECTIVE   Feels well no soa         Objective   OBJECTIVE   Vital Signs  Temp:  [97.7 °F (36.5 °C)-98.7 °F (37.1 °C)] 97.7 °F (36.5 °C)  Heart Rate:  [66-90] 66  Resp:  [18] 18  BP: (117-162)/(56-70) 117/56    Flowsheet Rows      Flowsheet Row First Filed Value   Admission Height 170.2 cm (67\") Documented at 2023 1134   Admission Weight 74.8 kg (165 lb) Documented at 2023 1134             I/O last 3 completed shifts:  In: 1123.8 [P.O.:600; Blood:523.8]  Out: 1800 [Urine:1800]    PHYSICAL EXAM    Physical Exam  Constitutional:       Appearance: She is well-developed.   HENT:      Head: Normocephalic.   Eyes:      Pupils: Pupils are equal, round, and reactive to light.   Cardiovascular:      Rate and Rhythm: Normal rate and regular rhythm.      Heart sounds: Normal heart sounds.   Pulmonary:      Effort: Pulmonary effort is normal.      Breath sounds: Normal breath sounds.   Abdominal:      General: Bowel sounds are normal.      Palpations: Abdomen is soft.   Musculoskeletal:         General: No swelling.   Skin:     Coloration: Skin is not jaundiced.   Neurological:      General: No focal deficit present.      Mental Status: She is alert and oriented to person, place, and time.       RESULTS   Results Review:    Results from last 7 days   Lab Units 23  0531 23  1713 23  0505 23  0523 23  1644 23  1308   SODIUM mmol/L 128*  --  132* 127*   < > 134*   POTASSIUM mmol/L 4.4 4.4 3.6 3.8   < > 4.7   CHLORIDE mmol/L 99  --  101 94*   < > 100   CO2 " mmol/L 19.0*  --  19.0* 19.0*   < > 24.0   BUN mg/dL 19  --  20 27*   < > 20   CREATININE mg/dL 0.92  --  0.83 1.07*   < > 0.98   CALCIUM mg/dL 8.2*  --  8.3* 8.3*   < > 9.4   BILIRUBIN mg/dL  --   --   --   --   --  0.4   ALK PHOS U/L  --   --   --   --   --  65   ALT (SGPT) U/L  --   --   --   --   --  <5   AST (SGOT) U/L  --   --   --   --   --  18   GLUCOSE mg/dL 98  --  108* 100*   < > 182*    < > = values in this interval not displayed.       Estimated Creatinine Clearance: 46.2 mL/min (by C-G formula based on SCr of 0.92 mg/dL).    Results from last 7 days   Lab Units 09/29/23  0531 09/23/23  1308   MAGNESIUM mg/dL 1.8 2.0       Results from last 7 days   Lab Units 09/28/23  0505   URIC ACID mg/dL 6.1*       Results from last 7 days   Lab Units 09/29/23  0531 09/28/23  0505 09/27/23  1600 09/27/23  0523 09/26/23  0834 09/25/23  0835   WBC 10*3/mm3 21.61* 15.44*  --  12.20* 10.76 13.16*   HEMOGLOBIN g/dL 7.7* 7.3* 6.9* 7.1* 7.5* 7.9*   PLATELETS 10*3/mm3 255 227  --  226 198 199               Imaging Results (Last 24 Hours)       ** No results found for the last 24 hours. **             MEDICATIONS    acetaminophen, 650 mg, Oral, Q6H  amLODIPine, 5 mg, Oral, Q24H  furosemide, 20 mg, Oral, Daily  heparin (porcine), 5,000 Units, Subcutaneous, Q8H  Hydrocortisone (Perianal), , Rectal, BID  Insulin Aspart, 0-7 Units, Subcutaneous, TID AC  labetalol, 100 mg, Oral, Q12H  pantoprazole, 40 mg, Intravenous, Q AM  rosuvastatin, 10 mg, Oral, Nightly  senna-docusate sodium, 2 tablet, Oral, BID  sodium chloride, 10 mL, Intravenous, Q12H  sodium chloride, 2 g, Oral, BID With Meals           Assessment & Plan   ASSESSMENT / PLAN      Traumatic closed displaced fracture of neck of right femur, initial encounter    Coronary arteriosclerosis in native artery    S/P CABG (coronary artery bypass graft)    Benign essential hypertension    Controlled type 2 diabetes mellitus without complication    Fall from slip, trip, or stumble,  initial encounter    Hip fracture    Moderate malnutrition      1.  Hyponatremia- sodium was down to 126, now was up to 132.  na dropped some.     Keep salt tab and lasix     Urine studies consistent with SIADH.  Suspect SIADH secondary to pain from hip fracture.  keep fluid restrictions     2.  Metabolic acidosis- mild, observe     3.  Anemia- status post PRBC transfusion     4.  GI bleed- has thrombosed external hemorrhoid, Dr. Tejeda following     5.  Right femoral neck fracture- status post ORIF on 919     6.  CAD     7.  Hypertension     8.  DM2                   This document has been electronically signed by Grace Del Rio MD on September 29, 2023 13:27 CDT

## 2023-09-29 NOTE — PROGRESS NOTES
ORTHOPEDIC PROGRESS NOTE:    Name:  Alexia Lopez  Date:    2023  Date of admission:  2023    Post op day:  10 Days Post-Op  Procedure:    Procedure(s) (LRB):  RIGHT HIP BIPOLAR ANTERIOR APPROACH (Right)    Subjective:    No new complaints.  Pain improving  No fever or chills.    Vitals:     Vitals:    23 0400   BP: 132/63   Pulse: 70   Resp: 18   Temp: 97.8 °F (36.6 °C)   SpO2: 98%      Temp (24hrs), Av.4 °F (36.9 °C), Min:97.8 °F (36.6 °C), Max:98.7 °F (37.1 °C)      Exam:  Awake and alert  Toes up and down.  Incision/mesh dressing clean and dry  No erythema  Calves soft and nontender.  Good distal pulses and sensation    Results from last 7 days   Lab Units 23  0531 23  0505 23  1600 23  0523   WBC 10*3/mm3 21.61* 15.44*  --  12.20*   HEMOGLOBIN g/dL 7.7* 7.3* 6.9* 7.1*   HEMATOCRIT % 22.9* 21.3* 20.3* 21.1*   PLATELETS 10*3/mm3 255 227  --  226         ASSESSMENT:  Active Hospital Problems    Diagnosis  POA    **Traumatic closed displaced fracture of neck of right femur, initial encounter [S72.001A]  Yes    Moderate malnutrition [E44.0]  Yes    Fall from slip, trip, or stumble, initial encounter [W01.0XXA]  Yes    Hip fracture [S72.009A]  Yes    Controlled type 2 diabetes mellitus without complication [E11.9]  Yes    S/P CABG (coronary artery bypass graft) [Z95.1]  Not Applicable    Benign essential hypertension [I10]  Yes    Coronary arteriosclerosis in native artery [I25.10]  Yes         PLAN:    S/p bipolar endoprosthesis right hip.  Mobilizing slowly.  BRBPR - responded to one unit PRBCs   Outpatient colonoscopy per Dr Tejeda  Would nomally prophylax for DVT for 30 days postop - higher risk with rectal bleeding. - defer to primary team  Persistent leukocytosis - CXR/UA have been unremarkable.     Blood culture - no growth at 3 days  Continue to monitor.  Progress as tolerated.       23 at 06:30 CDT by Cas Adler MD

## 2023-09-30 LAB
BACTERIA SPEC AEROBE CULT: NORMAL
BACTERIA SPEC AEROBE CULT: NORMAL
GLUCOSE BLDC GLUCOMTR-MCNC: 131 MG/DL (ref 70–130)

## 2024-01-14 NOTE — PROGRESS NOTES
Alexia Lopez is a 86 y.o. female   Primary provider:  Munir Rodriguez MD       Chief Complaint   Patient presents with   • Right Knee - Pain   • Left Knee - Pain       HISTORY OF PRESENT ILLNESS:    Patient is an 86-year-old female who presents today with complaints of bilateral knee pain that has been present for the last several years.  Pain is a grinding and aching pain that is associated with intermittent swelling.  Patient reports she cannot take NSAIDs, but she has tried Tylenol and rice therapy without significant relief of symptoms.  She reports viscosupplementation in the past which helped her.  She denies injury/trauma.  She denies fever, nausea, vomiting.  On intake form patient checked numbness and tingling, she reports this is in her left hand.  She reports she is currently being seen by her PCP for this issue.      Pain  This is a chronic problem. The current episode started more than 1 year ago. The problem occurs constantly. The problem has been unchanged. Associated symptoms include numbness. Associated symptoms comments: GRINDING, SWELLING. . The symptoms are aggravated by standing and walking. She has tried ice and heat (VISCO INJECTIONS BY AMANDO CLARK. ) for the symptoms.        CONCURRENT MEDICAL HISTORY:    Past Medical History:   Diagnosis Date   • Cataract    • Coronary artery disease    • Diabetes mellitus (HCC)     Type 2 diabetes mellitus - without retinopathy      • History of echocardiogram 05/12/2014    Normal LV systolic function with EF of 55% with mild hypokinesis. Diastolic relaxation abnormality of left ventricle. Mild tricuspid regurg. Trace mitral regurg   • Hyperlipemia    • Hypertension    • Myocardial infarction (HCC)        Allergies   Allergen Reactions   • Cherry Anaphylaxis   • Peanut-Containing Drug Products Anaphylaxis   • Articaine Other (See Comments)     Had at dentist office causes shaking   • Lidocaine Other (See Comments)     Lidocaine injection at dentist  office caused shaking         Current Outpatient Medications:   •  amLODIPine (NORVASC) 5 MG tablet, TAKE 1 TABLET BY MOUTH DAILY IN THE EVENING, Disp: 90 tablet, Rfl: 2  •  aspirin 81 MG disintegrating tablet, Take 81 mg by mouth Daily., Disp: , Rfl:   •  CO ENZYME Q-10 PO, Take 100 mg by mouth Daily., Disp: , Rfl:   •  docusate sodium (COLACE) 100 MG capsule, Take 100 mg by mouth Daily., Disp: , Rfl:   •  fluticasone (FLONASE) 50 MCG/ACT nasal spray, 2 sprays into the nostril(s) as directed by provider Daily., Disp: 16 g, Rfl: 11  •  labetalol (NORMODYNE) 200 MG tablet, Take 100 mg by mouth 2 (Two) Times a Day. .5, Disp: , Rfl:   •  loratadine (CLARITIN) 10 MG tablet, Take 10 mg by mouth Daily., Disp: , Rfl:   •  losartan (COZAAR) 100 MG tablet, Take 100 mg by mouth Daily., Disp: , Rfl:   •  metFORMIN ER (GLUCOPHAGE-XR) 750 MG 24 hr tablet, Take 1,500 mg by mouth Every Night., Disp: , Rfl:   •  ONE TOUCH ULTRA TEST test strip, USE TO TEST BLOOD SUGAR EVERY DAY DX.E11.9, Disp: , Rfl: 5  •  Polysaccharide Iron Complex (FERREX 150 PO), Take 150 mg by mouth Daily., Disp: , Rfl:   •  rosuvastatin (CRESTOR) 10 MG tablet, Take 10 mg by mouth Every Night., Disp: , Rfl:   •  SALINE NASAL SPRAY NA, into the nostril(s) as directed by provider As Needed., Disp: , Rfl:   •  spironolactone (ALDACTONE) 25 MG tablet, Take 1 tablet by mouth Daily. (Patient taking differently: Take 25 mg by mouth Daily. .), Disp: 30 tablet, Rfl: 3  •  vitamin B-12 (CYANOCOBALAMIN) 1000 MCG tablet, Take 1,000 mcg by mouth Daily., Disp: , Rfl:     Past Surgical History:   Procedure Laterality Date   • BACK SURGERY     • CARDIAC CATHETERIZATION  05/12/2014    Severe multivessel CAD with critical lesions noted in the LAD coronary artery, left circumflex coronary artery and RCA. Left ventriculogram no performed in view of elevated left ventricular end-diastolic pressure   • CATARACT EXTRACTION W/ INTRAOCULAR LENS IMPLANT Left 2/2/2018    Procedure:  "CATARACT PHACO EXTRACTION WITH INTRAOCULAR LENS IMPLANT;  Surgeon: Gagan Lizarraga MD;  Location: [facility];  Service:    • CATARACT EXTRACTION W/ INTRAOCULAR LENS IMPLANT Right 2/9/2018    Procedure: CATARACT PHACO EXTRACTION WITH INTRAOCULAR LENS IMPLANT;  Surgeon: Gagan Lizarraga MD;  Location: [facility];  Service:    • CORONARY ARTERY BYPASS GRAFT      X 3 with LIMA to LAD, SVG to OMB and SVG to PDA.   • DILATATION AND CURETTAGE     • OTHER SURGICAL HISTORY  05/06/2014    INCISION OF EARDRUM GENERAL ANESTHETIC 06242 (1)    Bilateral myringotomy with tubes.    • SINUS SURGERY     • TONSILLECTOMY     • TONSILLECTOMY AND ADENOIDECTOMY     • TUBAL ABDOMINAL LIGATION  03/14/1978       Family History   Problem Relation Age of Onset   • Hypertension Mother    • Coronary artery disease Father    • Diabetes Other         grandmother   • Cancer Other        Social History     Socioeconomic History   • Marital status:    Tobacco Use   • Smoking status: Never Smoker   • Smokeless tobacco: Never Used   Substance and Sexual Activity   • Alcohol use: No   • Drug use: No   • Sexual activity: Defer        Review of Systems   Musculoskeletal:        Bilateral knee pain.   Neurological: Positive for dizziness and numbness.   All other systems reviewed and are negative.      PHYSICAL EXAMINATION:       Ht 170.2 cm (67\")   Wt 78.9 kg (174 lb)   LMP  (LMP Unknown)   BMI 27.25 kg/m²     Physical Exam  Vitals and nursing note reviewed.   Constitutional:       General: She is not in acute distress.     Appearance: She is well-developed. She is not toxic-appearing.   HENT:      Head: Normocephalic.   Pulmonary:      Effort: Pulmonary effort is normal. No respiratory distress.   Musculoskeletal:      Right knee: No effusion.      Left knee: No effusion.   Skin:     General: Skin is warm and dry.   Neurological:      Mental Status: She is alert and oriented to person, place, and time.   Psychiatric:         " Behavior: Behavior normal.         Thought Content: Thought content normal.         Judgment: Judgment normal.         GAIT:     []  Normal  []  Antalgic    Assistive device: [x]  None  []  Walker     []  Crutches  []  Cane     []  Wheelchair  []  Stretcher    Right Knee Exam     Tenderness   Right knee tenderness location: diffuse.    Range of Motion   Extension: -5   Flexion: 130     Other   Erythema: absent  Sensation: normal  Pulse: present  Swelling: mild  Effusion: no effusion present    Comments:  Crepitus with arc of motion.  Mild pain and limitations with arc of motion.  No evidence of infection      Left Knee Exam     Tenderness   Left knee tenderness location: diffuse.    Range of Motion   Extension: -5   Flexion: 130     Other   Erythema: absent  Sensation: normal  Pulse: present  Swelling: mild  Effusion: no effusion present    Comments:  Mild pain and limitations with arc of motion.  No evidence of infection                  No results found.        ASSESSMENT:    Diagnoses and all orders for this visit:    Chronic pain of both knees    Primary osteoarthritis of both knees          PLAN    X-rays reviewed, patient has advanced arthritis in both knees.  Patient cannot take NSAIDs due to decreased GFR, age, cardiac history.  Tylenol has been ineffective.  After discussing available conservative options including steroid injections, viscosupplementation, activity modification, bracing etc.  Patient opts to proceed with viscosupplementation as this is helped her in the past, viscosupplementation ordered.  We did also discuss surgical interventions, patient is not interested in this currently.      Recommend the following:    -Rest and activity modification as tolerated and based on pain.  -Modified weightbearing of the affected extremity with use of a cane or walker as needed.  -Gradual progression of weightbearing and activity as pain and swelling allow.  -Conditioning and strengthening exercises of the  bilateral knees/legs.  -Elevation and ice therapy to the affected knee to minimize pain/swelling/inflammation.   -Tylenol as needed for pain/discomfort.          Return for Visco.    EMR Dragon/Transciption Disclaimer: Some of this note may be an electronic transcription/translation of spoken language to printed text.  The electronic translation of spoken language may permit erroneous, or at times, nonsensical words or phrases to be inadvertently transcribed. Although I have reviewed the note for such errors, some may still exist.       This document has been electronically signed by PEGGY Andino on April 26, 2022 09:58 CDT           76

## (undated) DEVICE — THE MONARCH® "D" CARTRIDGE IS A SINGLE-USE POLYPROPYLENE CARTRIDGE FOR POSTERIOR CHAMBER IOL DELIVERY: Brand: MONARCH® III

## (undated) DEVICE — OPHTHALMIC KNIFE 15°: Brand: ALCON

## (undated) DEVICE — CLEARCUT® SLIT KNIFE INTREPID MICRO-COAXIAL SYSTEM 2.4 SB: Brand: CLEARCUT®; INTREPID

## (undated) DEVICE — ALCON INTREPID CURVED 0.3MM POLYMER I/A TIP: Brand: ALCON, INTREPID

## (undated) DEVICE — GLV SURG SENSICARE GREEN W/ALOE PF LF 6.5 STRL

## (undated) DEVICE — GLV SURG SENSICARE GREEN W/ALOE PF LF 6 STRL

## (undated) DEVICE — Device

## (undated) DEVICE — GLV SURG TRIUMPH LT PF LTX 7.5 STRL

## (undated) DEVICE — 30° KELMAN®, 0.9 MM TURBOSONICS® ABS® MINI TIP: Brand: ALCON, KELMAN, TURBOSONICS, ABS

## (undated) DEVICE — MICROSURGICAL INSTRUMENT PROVISC CANNULA 27GA THREADED HUB: Brand: ALCON

## (undated) DEVICE — SYR LL 3CC

## (undated) DEVICE — SOL IRR H2O BTL 1000ML STRL

## (undated) DEVICE — GOWN,AURORA,NOREINF,RAGLAN,XL,STERILE: Brand: MEDLINE

## (undated) DEVICE — GLV SURG TRIUMPH LT PF LTX 8 STRL